# Patient Record
Sex: MALE | Race: BLACK OR AFRICAN AMERICAN | NOT HISPANIC OR LATINO | Employment: OTHER | URBAN - METROPOLITAN AREA
[De-identification: names, ages, dates, MRNs, and addresses within clinical notes are randomized per-mention and may not be internally consistent; named-entity substitution may affect disease eponyms.]

---

## 2017-01-04 ENCOUNTER — DOCUMENTATION ONLY (OUTPATIENT)
Dept: TRANSPLANT | Facility: CLINIC | Age: 67
End: 2017-01-04

## 2017-01-04 ENCOUNTER — TELEPHONE (OUTPATIENT)
Dept: TRANSPLANT | Facility: CLINIC | Age: 67
End: 2017-01-04

## 2017-01-04 NOTE — NURSING
Patient no showed for clinic follow-up with labs and ct (c/a/p).  Message sent to Rubi Mackey, , to contact patient to rescheduled ASAP.

## 2017-01-05 RX ORDER — METOPROLOL TARTRATE 25 MG/1
TABLET, FILM COATED ORAL
Qty: 90 TABLET | Refills: 0 | Status: SHIPPED | OUTPATIENT
Start: 2017-01-05 | End: 2017-04-07 | Stop reason: SDUPTHER

## 2017-01-10 ENCOUNTER — HOSPITAL ENCOUNTER (OUTPATIENT)
Dept: RADIOLOGY | Facility: HOSPITAL | Age: 67
Discharge: HOME | End: 2017-01-10
Attending: INTERNAL MEDICINE
Payer: MEDICARE

## 2017-01-10 DIAGNOSIS — C22.0 HCC (HEPATOCELLULAR CARCINOMA): Chronic | ICD-10-CM

## 2017-01-10 PROCEDURE — 71260 CT THORAX DX C+: CPT | Mod: 26,,, | Performed by: RADIOLOGY

## 2017-01-10 PROCEDURE — 74177 CT ABD & PELVIS W/CONTRAST: CPT | Mod: TC

## 2017-01-10 PROCEDURE — 25500020 PHARM REV CODE 255: Performed by: INTERNAL MEDICINE

## 2017-01-10 PROCEDURE — 71260 CT THORAX DX C+: CPT | Mod: TC

## 2017-01-10 PROCEDURE — 74177 CT ABD & PELVIS W/CONTRAST: CPT | Mod: 26,,, | Performed by: RADIOLOGY

## 2017-01-10 RX ADMIN — IOHEXOL 15 ML: 350 INJECTION, SOLUTION INTRAVENOUS at 04:01

## 2017-01-10 RX ADMIN — IOHEXOL 100 ML: 350 INJECTION, SOLUTION INTRAVENOUS at 07:01

## 2017-01-10 RX ADMIN — IOHEXOL 15 ML: 350 INJECTION, SOLUTION INTRAVENOUS at 03:01

## 2017-01-13 ENCOUNTER — IMMUNIZATION (OUTPATIENT)
Dept: INTERNAL MEDICINE | Facility: CLINIC | Age: 67
End: 2017-01-13
Payer: MEDICARE

## 2017-01-13 ENCOUNTER — OFFICE VISIT (OUTPATIENT)
Dept: INTERNAL MEDICINE | Facility: CLINIC | Age: 67
End: 2017-01-13
Payer: MEDICARE

## 2017-01-13 VITALS
DIASTOLIC BLOOD PRESSURE: 66 MMHG | HEART RATE: 58 BPM | HEIGHT: 73 IN | OXYGEN SATURATION: 99 % | WEIGHT: 212.31 LBS | TEMPERATURE: 97 F | BODY MASS INDEX: 28.14 KG/M2 | SYSTOLIC BLOOD PRESSURE: 119 MMHG

## 2017-01-13 DIAGNOSIS — Z85.038 HISTORY OF COLON CANCER: ICD-10-CM

## 2017-01-13 DIAGNOSIS — K21.00 GASTROESOPHAGEAL REFLUX DISEASE WITH ESOPHAGITIS: Chronic | ICD-10-CM

## 2017-01-13 DIAGNOSIS — Z76.82 LIVER TRANSPLANT CANDIDATE: ICD-10-CM

## 2017-01-13 DIAGNOSIS — J44.9 CHRONIC OBSTRUCTIVE PULMONARY DISEASE, UNSPECIFIED COPD TYPE: Chronic | ICD-10-CM

## 2017-01-13 DIAGNOSIS — Z93.0 TRACHEOSTOMY IN PLACE: Chronic | ICD-10-CM

## 2017-01-13 DIAGNOSIS — E03.8 SUBCLINICAL HYPOTHYROIDISM: Primary | ICD-10-CM

## 2017-01-13 DIAGNOSIS — Z00.00 ENCOUNTER FOR HEALTH MAINTENANCE EXAMINATION: ICD-10-CM

## 2017-01-13 DIAGNOSIS — R73.02 IMPAIRED GLUCOSE TOLERANCE: ICD-10-CM

## 2017-01-13 DIAGNOSIS — C22.0 HCC (HEPATOCELLULAR CARCINOMA): Chronic | ICD-10-CM

## 2017-01-13 DIAGNOSIS — Z85.46 HISTORY OF PROSTATE CANCER: Chronic | ICD-10-CM

## 2017-01-13 PROCEDURE — G0008 ADMIN INFLUENZA VIRUS VAC: HCPCS | Mod: S$GLB,,, | Performed by: INTERNAL MEDICINE

## 2017-01-13 PROCEDURE — 99214 OFFICE O/P EST MOD 30 MIN: CPT | Mod: 25,S$GLB,, | Performed by: INTERNAL MEDICINE

## 2017-01-13 PROCEDURE — 1157F ADVNC CARE PLAN IN RCRD: CPT | Mod: S$GLB,,, | Performed by: INTERNAL MEDICINE

## 2017-01-13 PROCEDURE — 3074F SYST BP LT 130 MM HG: CPT | Mod: S$GLB,,, | Performed by: INTERNAL MEDICINE

## 2017-01-13 PROCEDURE — 1159F MED LIST DOCD IN RCRD: CPT | Mod: S$GLB,,, | Performed by: INTERNAL MEDICINE

## 2017-01-13 PROCEDURE — 99999 PR PBB SHADOW E&M-EST. PATIENT-LVL III: CPT | Mod: PBBFAC,,, | Performed by: INTERNAL MEDICINE

## 2017-01-13 PROCEDURE — 1126F AMNT PAIN NOTED NONE PRSNT: CPT | Mod: S$GLB,,, | Performed by: INTERNAL MEDICINE

## 2017-01-13 PROCEDURE — 90670 PCV13 VACCINE IM: CPT | Mod: S$GLB,,, | Performed by: INTERNAL MEDICINE

## 2017-01-13 PROCEDURE — G0009 ADMIN PNEUMOCOCCAL VACCINE: HCPCS | Mod: S$GLB,,, | Performed by: INTERNAL MEDICINE

## 2017-01-13 PROCEDURE — 90662 IIV NO PRSV INCREASED AG IM: CPT | Mod: S$GLB,,, | Performed by: INTERNAL MEDICINE

## 2017-01-13 PROCEDURE — 3078F DIAST BP <80 MM HG: CPT | Mod: S$GLB,,, | Performed by: INTERNAL MEDICINE

## 2017-01-13 PROCEDURE — 1160F RVW MEDS BY RX/DR IN RCRD: CPT | Mod: S$GLB,,, | Performed by: INTERNAL MEDICINE

## 2017-01-13 RX ORDER — NAPROXEN SODIUM 220 MG/1
81 TABLET, FILM COATED ORAL EVERY MORNING
Status: ON HOLD | COMMUNITY
End: 2018-10-24

## 2017-01-13 NOTE — PROGRESS NOTES
Subjective:       Patient ID: Robin Hawkins is a 66 y.o. male.    Chief Complaint: Follow-up    HPI  65 y/o man with h/o HTN, HLD, GERD, h/o colon cancer, h/o prostate cancer, Hep C with HCC, glaucoma here for follow-up visit. Here with his sister Darby.    Hepatitis C, cirrhosis, liver lesion c/w HCC - Completed Harvoni tx on 1/2016 but relapsed. CT surveillance showed enlarging liver mass consistent with HCC, +elevated AFP. Underwent TACE on 1/29/16 -- follow-up CT scans on 3/4/16 and 6/21/16 showed well-treated lesion per Hepatology notes; CT 10/18/16 showed stable lesion.  Following closely with hepatology/transplant team.   Completed PT for deconditioning, overall doing better. No longer using walker. Patient says he can walk 6 blocks; sister thinks maybe 2-3 before getting short of breath. Feels that his legs are stronger.     Has been having dental problems, teeth falling out. Not following with a dentist but looking into this to get tooth extractions / dental work done before potentially getting transplant.     HCC, h/o colon cancer, h/o prostate cancer - following with Dr Holder with Oncology, see those notes for full history. Had LN biopsy (abdominal wall) which was negative); has an enlarged L lower pelvic LN noted 6/2016 that is being followed.     H/o tracheostomy - following with mahsa Howell decannulated 9/28/16. Feels he is doing well, was recommended to f/u in 4 weeks with ENT, hasn't yet made appointment. Hole closing up, still has some mucus discharge but no bleeding or pain at side.     GERD, f/u of intestinal metaplasia of stomach - Had EGD and cscope done 5/6/16. Polyp found in gastric body (focal mild gastritis on path, benign), another polyp found in duodenum (adenomatous polyp). No H.pylori, other biopsies showed gastritis but no other abnormalities. Recommended to repeat EGD in 3 years and c-scope in 5 years.  Has been having more heartburn recently, especially in the mornings. Takes  tums with relief. Still taking prilosec every day.     COPD - saw Dr Goyal 5/5/16, recommended to continue duoneb, pulmicort BID. Has both these inhalers at home.     Saw Dr Page 4/2016 for osteopenia, borderline DM, subclinical hypothyroidism.  Osteopenia - no recent falls/fractures. Does take a PPI. DXA done 3/2016 showing osteopenia of hip and femoral neck. Recommended RDA of calcium and vitamin D, would consider 1 dose IV reclast prior to transplant if deemed transplant candidate.   Borderline DM - working on getting more active. Not following specific diabetic diet. Last A1c 6.4 in 5/2016.  Elevated TSH - being monitored, recommended to start levothyroxine if TSH >10. Last TSH 6.354, normal free T4 on labs 5/2016.    Aortic atherosclerosis, CAD noted on imaging - stress echo overall normal 3/2016. On statin. Takes ASA 81.    Review of Systems   Constitutional: Negative for fatigue and fever.   HENT: Positive for dental problem. Negative for congestion and sore throat.    Eyes: Negative for visual disturbance.   Respiratory: Positive for shortness of breath (on exertion, improving). Negative for cough.    Cardiovascular: Negative for chest pain, palpitations and leg swelling.   Gastrointestinal: Positive for abdominal pain (heartburn). Negative for blood in stool, constipation, diarrhea and nausea.   Endocrine: Negative for cold intolerance and heat intolerance.   Genitourinary: Negative.    Musculoskeletal: Negative for arthralgias and gait problem.   Skin: Negative for rash.   Neurological: Positive for weakness (generalized; improving). Negative for numbness.   Psychiatric/Behavioral: Negative for dysphoric mood. The patient is not nervous/anxious.          Past medical history, surgical history, and family medical history reviewed and updated as appropriate.    Medications and allergies reviewed.     Objective:          Vitals:    01/13/17 0808   BP: 119/66   BP Location: Right arm   Patient Position:  "Sitting   Pulse: (!) 58   Temp: 97.4 °F (36.3 °C)   TempSrc: Oral   SpO2: 99%   Weight: 96.3 kg (212 lb 4.9 oz)   Height: 6' 1" (1.854 m)     Body mass index is 28.01 kg/(m^2).  Physical Exam   Constitutional: He is oriented to person, place, and time. He appears well-developed and well-nourished. No distress.   HENT:   Head: Normocephalic and atraumatic.   Nose: Nose normal.   Mouth/Throat: Oropharynx is clear and moist. Abnormal dentition.   Dressing in place over tracheostomy site, no discharge or drainage, nontender   Eyes: Conjunctivae and EOM are normal. Pupils are equal, round, and reactive to light. No scleral icterus.   Neck: Normal range of motion. Neck supple.   Cardiovascular: Normal rate, regular rhythm and normal heart sounds.    No murmur heard.  Pulmonary/Chest: Effort normal and breath sounds normal. No respiratory distress.   Abdominal: Soft. Bowel sounds are normal. He exhibits no distension. There is no tenderness.   Musculoskeletal: He exhibits no edema or tenderness.   Lymphadenopathy:     He has no cervical adenopathy.   Neurological: He is alert and oriented to person, place, and time. He has normal strength. No cranial nerve deficit or sensory deficit. Gait normal.   Skin: Skin is warm and dry.   Psychiatric: He has a normal mood and affect.   Vitals reviewed.      Lab Results   Component Value Date    WBC 8.58 01/10/2017    HGB 13.1 (L) 01/10/2017    HCT 38.2 (L) 01/10/2017     01/10/2017    CHOL 104 (L) 09/07/2015    TRIG 132 09/07/2015    HDL 23 (L) 09/07/2015    ALT 29 01/10/2017    AST 38 01/10/2017     (L) 01/10/2017    K 4.2 01/10/2017     01/10/2017    CREATININE 0.9 01/10/2017    BUN 14 01/10/2017    CO2 22 (L) 01/10/2017    TSH 6.354 (H) 05/10/2016    PSA 0.01 12/30/2015    INR 1.0 01/10/2017    HGBA1C 6.4 (H) 05/10/2016       Assessment:       1. Subclinical hypothyroidism    2. Impaired glucose tolerance     3. HCC (hepatocellular carcinoma)    4. Liver " transplant candidate    5. History of prostate cancer    6. History of colon cancer    7. Gastroesophageal reflux disease with esophagitis    8. Chronic obstructive pulmonary disease, unspecified COPD type    9. Tracheostomy in place    10. Encounter for health maintenance examination        Plan:   Robin was seen today for follow-up.    Diagnoses and all orders for this visit:    Subclinical hypothyroidism - due for recheck of TFTs with next labs. Clinically euthyroid today, monitoring.  -     TSH; Future  -     T4, free; Future    Impaired glucose tolerance - last A1c 6.4, due for recheck with next labs  -     Hemoglobin A1c; Future    HCC (hepatocellular carcinoma)  Liver transplant candidate - continue to follow with hepatology/transplant team, continue treatment/meds per their recommendations. Reviewed chart and recent notes with patient    History of prostate cancer  History of colon cancer - continue to follow with Oncology as recommended    Gastroesophageal reflux disease with esophagitis - continue PPI and tums    Chronic obstructive pulmonary disease, unspecified COPD type - continue current inhalers. Symptoms stable. Plan for pulm follow up if symptoms worse, otherwise monitoring via primary care    Tracheostomy in place - healing well, continue to f/u with ENT (will help make next appt with Dr Holcomb today)    Encounter for health maintenance examination  -     Pneumococcal Conjugate Vaccine (13 Valent) (IM)    Health maintenance reviewed with patient. Flu and prevnar vaccines today    Return in about 6 months (around 7/13/2017) for coordination of care.    Venancio Mcneil MD  Internal Medicine  [facility]  1/29/2017

## 2017-01-13 NOTE — PATIENT INSTRUCTIONS
Tips to Control Acid Reflux  To control acid reflux, youll need to make some basic diet and lifestyle changes. The simple steps outlined below may be all youll need to ease discomfort.  Watch what you eat    · Avoid fatty foods and spicy foods.  · Eat fewer acidic foods, such as citrus and tomato-based foods. These can increase symptoms.  · Limit drinking alcohol, caffeine, and fizzy beverages. All increase acid reflux.  · Try limiting chocolate, peppermint, and spearmint. These can worsen acid reflux in some people.  Watch when you eat  · Avoid lying down for 3 hours after eating.  · Do not snack before going to bed.  Raise your head    Raising your head and upper body by 4 to 6 inches helps limit reflux when youre lying down. Put blocks under the head of your bed frame to raise it.  Other changes  · Lose weight, if you need to  · Dont exercise near bedtime  · Avoid tight-fitting clothes  · Limit aspirin and ibuprofen  · Stop smoking   © 4643-3848 [employer]. [address] [place] [address], [place] [address]. All rights reserved. This information is not intended as a substitute for professional medical care. Always follow your healthcare professional's instructions.

## 2017-01-13 NOTE — MR AVS SNAPSHOT
[facility] - Internal Medicine  [address]  [facility] [address]  Phone: [phone]  Fax: [phone]                  Robin Hawkins   2017 8:00 AM   Office Visit    Description:  Male : 1950   Provider:  Venancio Mcneil MD   Department:  [facility] - Internal Medicine           Reason for Visit     Follow-up           Diagnoses this Visit        Comments    Borderline diabetes mellitus    -  Primary     Subclinical hypothyroidism         Encounter for health maintenance examination         Impaired glucose tolerance         HCC (hepatocellular carcinoma)         Liver transplant candidate         History of prostate cancer         History of colon cancer         Gastroesophageal reflux disease with esophagitis         Chronic obstructive pulmonary disease, unspecified COPD type         Tracheostomy in place                To Do List           Future Appointments        Provider Department Dept Phone    2017 10:00 AM LAB, [place] [facility] [phone]    2017 9:00 AM Octavia Scott MD [facility] Hepatology [phone]    2017 9:20 AM Lencho Holcomb MD [facility] [phone]    2017 10:00 AM LAB, [place] [facility] [phone]    3/16/2017 10:00 AM [facility], [place] [facility] [phone]      Goals (5 Years of Data)     None      Follow-Up and Disposition     Return in about 6 months (around 2017) for coordination of care.      [facility]     [facility] -  Assistance  Registered nurses in the [facility] provide clinical advisement, health education, appointment booking, and other advisory services.  Call for this free service at [phone].             Medications           Message regarding Medications     Verify the changes and/or additions to your medication regime listed below are the same as discussed with your clinician  "today.  If any of these changes or additions are incorrect, please notify your healthcare provider.             Verify that the below list of medications is an accurate representation of the medications you are currently taking.  If none reported, the list may be blank. If incorrect, please contact your healthcare provider. Carry this list with you in case of emergency.           Current Medications     albuterol-ipratropium 2.5mg-0.5mg/3mL (DUO-NEB) 0.5 mg-3 mg(2.5 mg base)/3 mL nebulizer solution USE 1 VIAL IN NEBULIZER EVERY 6 HOURS AS NEEDED FOR WHEEZING OR SHORTNESS OF BREATH    aspirin 81 MG Chew Take 81 mg by mouth once daily.    atorvastatin (LIPITOR) 80 MG tablet Take 1 tablet (80 mg total) by mouth once daily.    budesonide 1 mg/2 mL NbSp USE CONTENT OF 1 VIAL IN NEBULIZER TWICE DAILY    dorzolamide-timolol 2-0.5% (COSOPT) 22.3-6.8 mg/mL ophthalmic solution Place 1 drop into both eyes 2 (two) times daily.    hydrochlorothiazide (HYDRODIURIL) 25 MG tablet TAKE 1 TABLET BY MOUTH EVERY DAY    latanoprost 0.005 % ophthalmic solution INSTILL 1 DROP IN BOTH EYES EVERY EVENING    lisinopril (PRINIVIL,ZESTRIL) 20 MG tablet TAKE 1 TABLET BY MOUTH EVERY DAY    metoprolol tartrate (LOPRESSOR) 25 MG tablet TAKE ONE-HALF TABLET BY MOUTH TWICE DAILY    mirabegron (MYRBETRIQ) 50 mg Tb24 Take 1 tablet (50 mg total) by mouth once daily.    multivitamin capsule Take 1 capsule by mouth once daily.    omeprazole (PRILOSEC) 20 MG capsule TAKE 2 CAPSULES BY MOUTH EVERY MORNING    oxybutynin (DITROPAN-XL) 10 MG 24 hr tablet Take 10 mg by mouth once daily.            Clinical Reference Information           Vital Signs - Last Recorded  Most recent update: 1/13/2017  8:11 AM by Angelica Huynh MA    BP Pulse Temp Ht Wt SpO2    119/66 (BP Location: Right arm, Patient Position: Sitting) (!) 58 97.4 °F (36.3 °C) (Oral) 6' 1" (1.854 m) 96.3 kg (212 lb 4.9 oz) 99%    BMI                28.01 kg/m2          Blood Pressure          " Most Recent Value    BP  119/66      Allergies as of 1/13/2017     No Known Allergies      Immunizations Administered on Date of Encounter - 1/13/2017     Name Date Dose VIS Date Route    Pneumococcal Conjugate - 13 Valent  Incomplete 0.5 mL 11/5/2015 Intramuscular      Orders Placed During Today's Visit      Normal Orders This Visit    Pneumococcal Conjugate Vaccine (13 Valent) (IM)     Future Labs/Procedures Expected by Expires    Hemoglobin A1c  1/13/2017 4/13/2017    T4, free  1/13/2017 4/13/2017    TSH  1/13/2017 (Approximate) 1/13/2018      Maintenance Dialysis History     Patient has no recorded history of maintenance dialysis.      Transplant Information        Txp Date Organ Coordinator Care Team     Liver Katelyn Smith Referring Physician:  Francesco Hoffman MD   Corresponding Physician:  Francesco Hoffman MD   Current Hepatologist:  Octavia Scott MD         Instructions      Tips to Control Acid Reflux  To control acid reflux, youll need to make some basic diet and lifestyle changes. The simple steps outlined below may be all youll need to ease discomfort.  Watch what you eat    · Avoid fatty foods and spicy foods.  · Eat fewer acidic foods, such as citrus and tomato-based foods. These can increase symptoms.  · Limit drinking alcohol, caffeine, and fizzy beverages. All increase acid reflux.  · Try limiting chocolate, peppermint, and spearmint. These can worsen acid reflux in some people.  Watch when you eat  · Avoid lying down for 3 hours after eating.  · Do not snack before going to bed.  Raise your head    Raising your head and upper body by 4 to 6 inches helps limit reflux when youre lying down. Put blocks under the head of your bed frame to raise it.  Other changes  · Lose weight, if you need to  · Dont exercise near bedtime  · Avoid tight-fitting clothes  · Limit aspirin and ibuprofen  · Stop smoking   © 3834-3029 [employer]. [address] [place] [address], [place] [address]. All  rights reserved. This information is not intended as a substitute for professional medical care. Always follow your healthcare professional's instructions.

## 2017-01-13 NOTE — MEDICAL/APP STUDENT
Subjective:       Patient ID: Robin Hawkins is a 66 y.o. male.    Chief Complaint: Follow-up    HPI Comments: Liver - states transplant and liver team are watching the progression of the HCC and have prepared him for transplantation if needed. At the moment pt has no complaints and has been taking medications as directed.    Pt also reports having increasing problems with his dentition. He states that he cannot eat solid food anymore and that his teeth have become loose and falling out. Discussed this with liver team and agreed on getting dental work before any procedure.     Tracheostomy - Pt states that he has been doing well since the removal of his trach, doesn't complain of any pain, swelling or increasing secretions. Feels as though the stoma site is closing. Has not followed up with Dr. Holcomb since removal.    Deconditioning  - pt feels stronger and able to ambulate longer distances.     GERD - patient complains of increasing heart burn in the morning for which he takes multiples Toms and also takes protonix. He has not had any recent diet changes or changes in his weight.     Dyspnea - Pt still has dyspnea on exertion which has been stable since last visit.     Review of Systems   Constitutional: Negative for chills, fatigue and fever.   HENT: Positive for dental problem.    Eyes: Negative for photophobia and visual disturbance.   Respiratory: Positive for shortness of breath. Negative for cough, chest tightness and wheezing.    Cardiovascular: Negative for chest pain and leg swelling.   Gastrointestinal: Negative for abdominal pain, constipation, diarrhea and nausea.   Endocrine: Negative for cold intolerance and heat intolerance.   Genitourinary: Negative for dysuria, frequency and hematuria.   Neurological: Positive for weakness. Negative for syncope, light-headedness and headaches.       Objective:      Physical Exam   Constitutional: He appears well-developed and well-nourished.   HENT:   Head:  Normocephalic and atraumatic.   Mouth/Throat: No oral lesions. Abnormal dentition. No lacerations.   Eyes: EOM are normal. Pupils are equal, round, and reactive to light.   Cardiovascular: Normal rate and regular rhythm.    Pulmonary/Chest: Effort normal and breath sounds normal.   Abdominal: Soft. Bowel sounds are normal. He exhibits no distension and no ascites.       Assessment:       1. Borderline diabetes mellitus    2. Subclinical hypothyroidism    3. Encounter for health maintenance examination    4. Impaired glucose tolerance     5. HCC (hepatocellular carcinoma)    6. Liver transplant candidate    7. History of prostate cancer    8. History of colon cancer    9. Gastroesophageal reflux disease with esophagitis    10. Chronic obstructive pulmonary disease, unspecified COPD type    11. Tracheostomy in place        Plan:       1. HCC (hepatocellular carcinoma) - pt followed by transplant team, was told that HCC has increased and may need transplantation after all. Has not shown stigmata of liver failure.   - f/u with transplant and liver team  - labs on 1/16    2. Gastroesophageal reflux disease with esophagitis - continues to have symptoms and goes through several Toms tablets a day in addition to protonix.  - instructed to try OTC ranititine   - follow up with GI for a possible EGD    3. Borderline diabetes mellitus   - Continue to monitor blood sugar levels  - Hemoglobin A1c; Future    4. Chronic obstructive pulmonary disease, unspecified COPD type - continues to have dyspnea but able to mobilize more with increased strength  - continue     5. Tracheostomy in place - no significant secretions, no pain or erythema at site.  - continue to monitor  - f/u with ENT    6. Subclinical hypothyroidism - pt remains asymptomatic   - TSH  - T4, free    7. Encounter for health maintenance examination  - Pneumococcal Conjugate Vaccine (13 Valent) (IM)    Ifeoma Winter MS4

## 2017-01-16 ENCOUNTER — CONFERENCE (OUTPATIENT)
Dept: TRANSPLANT | Facility: CLINIC | Age: 67
End: 2017-01-16
Payer: MEDICARE

## 2017-01-16 ENCOUNTER — LAB VISIT (OUTPATIENT)
Dept: LAB | Facility: HOSPITAL | Age: 67
End: 2017-01-16
Attending: INTERNAL MEDICINE
Payer: MEDICARE

## 2017-01-16 DIAGNOSIS — C22.0 HCC (HEPATOCELLULAR CARCINOMA): ICD-10-CM

## 2017-01-16 DIAGNOSIS — C22.0 HCC (HEPATOCELLULAR CARCINOMA): Chronic | ICD-10-CM

## 2017-01-16 DIAGNOSIS — Z76.82 AWAITING ORGAN TRANSPLANT: Primary | ICD-10-CM

## 2017-01-16 LAB — AFP SERPL-MCNC: 313 NG/ML

## 2017-01-16 PROCEDURE — 82105 ALPHA-FETOPROTEIN SERUM: CPT

## 2017-01-16 PROCEDURE — 36415 COLL VENOUS BLD VENIPUNCTURE: CPT | Mod: PO

## 2017-01-16 NOTE — TELEPHONE ENCOUNTER
LIVER CONFERENCE 01/17/2017    Radiology review by Dr. Jacky Alarcon    . Robin Hawkins [id number] ([id number]) LTX--in work-up  re:  surveillance CT (rising AFP)  65 y/o with a diagnosis of HCV. Completed Harvoni tx on 1/2016.   Diagnosed with a 2.5 cm hcc 12/2015.  Treated with TACE 1/29/16.  CT Scan from 1/10/17 shows  that  there is persistent hypodense nonenhancing lesions in right hepatic lobe segment VI, measuring 2.3 cm on today's examination (1.8 cm previously).  Additionally, there has been interval development of 2.7 cm lesion in right hepatic lobe segment VI demonstrating subtle enhancement and some washout on the venous and delayed phases.     AFP was 109 on 1/25/16; 13 on 6/21/16; currently 272     Recommend Right lobe Y90    Patient results reviewed with the patient in clinic by MD according to clinic note.

## 2017-01-17 ENCOUNTER — LAB VISIT (OUTPATIENT)
Dept: LAB | Facility: HOSPITAL | Age: 67
End: 2017-01-17
Payer: MEDICARE

## 2017-01-17 ENCOUNTER — OFFICE VISIT (OUTPATIENT)
Dept: HEPATOLOGY | Facility: CLINIC | Age: 67
End: 2017-01-17
Payer: MEDICARE

## 2017-01-17 ENCOUNTER — TELEPHONE (OUTPATIENT)
Dept: HEPATOLOGY | Facility: CLINIC | Age: 67
End: 2017-01-17

## 2017-01-17 VITALS
DIASTOLIC BLOOD PRESSURE: 65 MMHG | WEIGHT: 212 LBS | SYSTOLIC BLOOD PRESSURE: 105 MMHG | HEIGHT: 73 IN | BODY MASS INDEX: 28.1 KG/M2 | OXYGEN SATURATION: 99 % | TEMPERATURE: 96 F | HEART RATE: 70 BPM | RESPIRATION RATE: 16 BRPM

## 2017-01-17 DIAGNOSIS — Z85.46 HISTORY OF PROSTATE CANCER: Chronic | ICD-10-CM

## 2017-01-17 DIAGNOSIS — B19.20 HEPATITIS C VIRUS INFECTION, UNSPECIFIED CHRONICITY: Primary | Chronic | ICD-10-CM

## 2017-01-17 DIAGNOSIS — R53.81 PHYSICAL DECONDITIONING: ICD-10-CM

## 2017-01-17 DIAGNOSIS — C22.0 HCC (HEPATOCELLULAR CARCINOMA): ICD-10-CM

## 2017-01-17 DIAGNOSIS — K74.60 CIRRHOSIS OF LIVER WITHOUT ASCITES, UNSPECIFIED HEPATIC CIRRHOSIS TYPE: Chronic | ICD-10-CM

## 2017-01-17 DIAGNOSIS — Z85.038 HISTORY OF COLON CANCER: ICD-10-CM

## 2017-01-17 DIAGNOSIS — Z76.82 LIVER TRANSPLANT CANDIDATE: ICD-10-CM

## 2017-01-17 DIAGNOSIS — C22.0 HCC (HEPATOCELLULAR CARCINOMA): Primary | Chronic | ICD-10-CM

## 2017-01-17 DIAGNOSIS — Z76.82 AWAITING ORGAN TRANSPLANT: ICD-10-CM

## 2017-01-17 PROCEDURE — 3078F DIAST BP <80 MM HG: CPT | Mod: S$GLB,,, | Performed by: INTERNAL MEDICINE

## 2017-01-17 PROCEDURE — 99999 PR PBB SHADOW E&M-EST. PATIENT-LVL IV: CPT | Mod: PBBFAC,,, | Performed by: INTERNAL MEDICINE

## 2017-01-17 PROCEDURE — 3074F SYST BP LT 130 MM HG: CPT | Mod: S$GLB,,, | Performed by: INTERNAL MEDICINE

## 2017-01-17 PROCEDURE — 1160F RVW MEDS BY RX/DR IN RCRD: CPT | Mod: S$GLB,,, | Performed by: INTERNAL MEDICINE

## 2017-01-17 PROCEDURE — 1157F ADVNC CARE PLAN IN RCRD: CPT | Mod: S$GLB,,, | Performed by: INTERNAL MEDICINE

## 2017-01-17 PROCEDURE — 1159F MED LIST DOCD IN RCRD: CPT | Mod: S$GLB,,, | Performed by: INTERNAL MEDICINE

## 2017-01-17 PROCEDURE — 99215 OFFICE O/P EST HI 40 MIN: CPT | Mod: S$GLB,,, | Performed by: INTERNAL MEDICINE

## 2017-01-17 NOTE — PATIENT INSTRUCTIONS
1. Rising AFP and enlarging original lesion with new lesion in the liver. Also 2 new spots in the lung- too small to know if they are cancer. Recommedn repeat TACE and ct chest in 3 months. Weekly labs after tace  2. Would also recommend seeing oncology for nexavar.

## 2017-01-17 NOTE — MR AVS SNAPSHOT
[facility]  [address]  [place] [address]  Phone: [phone]  Fax: [phone]                  Robin Hawkins   2017 9:00 AM   Office Visit    Description:  Male : 1950   Provider:  Octavia Scott MD   Department:  [facility] - Hepatology           Reason for Visit     Hepatocellular Carcinoma           Diagnoses this Visit        Comments    Awaiting organ transplant         HCC (hepatocellular carcinoma)                To Do List           Future Appointments        Provider Department Dept Phone    2017 9:20 AM Lencho Holcomb MD [facility] [phone]    2017 10:00 AM LAB, [place] [facility] [phone]    3/16/2017 10:00 AM [facility], [place] [facility] [phone]    2017 10:00 AM [facility], [place] [facility] [phone]      Goals (5 Years of Data)     None      [facility]     [facility] On [facility] Nurse Care Line -  Assistance  Registered nurses in the [facility] Center provide clinical advisement, health education, appointment booking, and other advisory services.  Call for this free service at [phone].             Medications           Message regarding Medications     Verify the changes and/or additions to your medication regime listed below are the same as discussed with your clinician today.  If any of these changes or additions are incorrect, please notify your healthcare provider.             Verify that the below list of medications is an accurate representation of the medications you are currently taking.  If none reported, the list may be blank. If incorrect, please contact your healthcare provider. Carry this list with you in case of emergency.           Current Medications     albuterol-ipratropium 2.5mg-0.5mg/3mL (DUO-NEB) 0.5 mg-3 mg(2.5 mg base)/3 mL nebulizer solution USE 1 VIAL IN NEBULIZER EVERY 6 HOURS AS NEEDED FOR WHEEZING OR SHORTNESS  "OF BREATH    atorvastatin (LIPITOR) 80 MG tablet Take 1 tablet (80 mg total) by mouth once daily.    budesonide 1 mg/2 mL NbS USE CONTENT OF 1 VIAL IN NEBULIZER TWICE DAILY    dorzolamide-timolol 2-0.5% (COSOPT) 22.3-6.8 mg/mL ophthalmic solution Place 1 drop into both eyes 2 (two) times daily.    hydrochlorothiazide (HYDRODIURIL) 25 MG tablet TAKE 1 TABLET BY MOUTH EVERY DAY    latanoprost 0.005 % ophthalmic solution INSTILL 1 DROP IN BOTH EYES EVERY EVENING    lisinopril (PRINIVIL,ZESTRIL) 20 MG tablet TAKE 1 TABLET BY MOUTH EVERY DAY    metoprolol tartrate (LOPRESSOR) 25 MG tablet TAKE ONE-HALF TABLET BY MOUTH TWICE DAILY    mirabegron (MYRBETRIQ) 50 mg Tb24 Take 1 tablet (50 mg total) by mouth once daily.    multivitamin capsule Take 1 capsule by mouth once daily.    omeprazole (PRILOSEC) 20 MG capsule TAKE 2 CAPSULES BY MOUTH EVERY MORNING    oxybutynin (DITROPAN-XL) 10 MG 24 hr tablet Take 10 mg by mouth once daily.     aspirin 81 MG Chew Take 81 mg by mouth once daily.           Clinical Reference Information           Vital Signs - Last Recorded  Most recent update: 1/17/2017  9:21 AM by Marie Peace MA    BP Pulse Temp Resp Ht Wt    105/65 (BP Location: Right arm, Patient Position: Sitting, BP Method: Automatic) 70 96.3 °F (35.7 °C) (Oral) 16 6' 1" (1.854 m) 96.2 kg (212 lb)    SpO2 BMI             99% 27.97 kg/m2         Blood Pressure          Most Recent Value    BP  105/65      Allergies as of 1/17/2017     No Known Allergies      Immunizations Administered on Date of Encounter - 1/17/2017     None      Orders Placed During Today's Visit      Normal Orders This Visit    Ambulatory consult to Oncology     Toxicology screen, urine     Future Labs/Procedures Expected by Expires    CT Chest Without Contrast  4/17/2017 1/17/2018    Recurring Lab Work Interval Expires    CBC auto differential  weekly until 1/17/2026 1/17/2026    Comprehensive metabolic panel  weekly until 1/17/2026 1/17/2026    " Protime-INR  weekly until 1/17/2026 1/17/2026      Maintenance Dialysis History     Patient has no recorded history of maintenance dialysis.      Transplant Information        Txp Date Organ Coordinator Care Team     Liver [facility] Referring Physician:  Francesco Hoffman MD   Corresponding Physician:  Francesco Hoffman MD   Current Hepatologist:  Octavia Scott MD         Instructions    1. Rising AFP and enlarging original lesion with new lesion in the liver. Also 2 new spots in the lung- too small to know if they are cancer. Recommedn repeat TACE and ct chest in 3 months. Weekly labs after tace  2. Would also recommend seeing oncology for nexavar.
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
## 2017-01-18 ENCOUNTER — TELEPHONE (OUTPATIENT)
Dept: HEMATOLOGY/ONCOLOGY | Facility: CLINIC | Age: 67
End: 2017-01-18

## 2017-01-20 LAB — PHOSPHATIDYLETHANOL (PETH): 68 NG/ML

## 2017-01-23 DIAGNOSIS — N39.41 URGE INCONTINENCE: ICD-10-CM

## 2017-01-24 ENCOUNTER — TELEPHONE (OUTPATIENT)
Dept: TRANSPLANT | Facility: HOSPITAL | Age: 67
End: 2017-01-24

## 2017-01-24 RX ORDER — OXYBUTYNIN CHLORIDE 10 MG/1
TABLET, EXTENDED RELEASE ORAL
Qty: 30 TABLET | Refills: 5 | Status: SHIPPED | OUTPATIENT
Start: 2017-01-24 | End: 2017-04-24 | Stop reason: SDUPTHER

## 2017-01-24 NOTE — TELEPHONE ENCOUNTER
"ROBIN attempted to contact pt at [phone]. Pt was unavailable at time of call. SW spoke with pt's sister, Darby.     Per note from office visit on 17, Dr. Scott noted that pt had returned to drinking and smoking cigarettes. PETH test was positive. Levels for PETH test indicated that pt had been engaging in moderate to heavy drinking in the past 3 weeks since lab collection.    SW addressed this with pt's sister, who confirmed that pt had been drinking over the holidays to cope with depression. Pt's sister reported that pt and the rest of the family get depressed over the holidays due to reminders of pt's  parents and other family members that  during past holidays. Pt's sister reported that she works during the day and other family members have been moving away. Pt's sister expressed her opinion that pt is lonely because of family not being around which facilitates his depression.     Pt's sister reported that she witnessed pt drinking one can of beer per day during his relapse. She stated "But I am gone all day and do not know if he was drinking while I was gone." Pt's sister reported that pt resumed drinking around [place] and continued to drink until "about a week before his appointment with the hepatologist." SW estimated stop date to be around 17. Pt's sister confirmed that this seemed accurate.     Pt's sister reported that she confronted pt about his drinking/smoking and discussed consequences if he continued to drink alcohol and use tobacco. Pt's sister reported that if he drinks one more time she will go home to [place] and cease all care that she is providing for pt. Pt's sister reported that he stopped drinking and smoking immediately after this interaction. Pt's sister is under the impression that pt will not return to use.     SW expressed concern that pt does not have enough support or skills to remain sober for a lifetime. Pt does not have a hx of attending formal addiction " "treatment. SW explained that this would benefit him and would be required for pt to be completed prior to transplant in light of his recent relapse. SW recommended that pt be evaluated for Addiction IOP. Pt's sister reported that this would not be possible as she works. Pt's sister reported she is willing to take time off work to care for pt if he receives a transplant, but is not able to transport pt to addiction treatment. Pt's sister stated "I have a life and this is the best that I can do." SW advised pt's sister that if pt does not complete IOP, it could create a significant barrier to pt receiving transplant. Pt's sister stated "I know he will not go back to drinking. By me threatening to leave if he returns to drinking, I know he won't  another drink." SW explained that rehab prior to transplant would still be recommended and required. Pt's sister expressed understanding and stated she is still unable to get him to treatment. Pt's sister reported she is the only one in pt's life that would be able to provide transport.     SW inquired about caregiver plan in the event pt receives transplant. Pt's sister reported that she would be providing care she is the only reliable person in pt's life at this time. Pt's sister denied having any backup caregivers at this time due to the previously committed caregivers being "unreliable." SW expressed the importance of a backup care plan. Pt's sister stated "getting a reliable back up is just not possible right now."    SW provided opportunity for pt to voice questions/concerns. Pt denied any needs at this time. SW remains available for psychosocial, resource, and educational needs as appropriate.     Psychosocial Suitability:    At this time pt presents as an unacceptable candidate for liver transplant. Pt is high risk due to recent relapse, no formal addiction treatment, inability to attend addiction treatment prior to transplant, and no back up caregiver plan. " Pt's sister also expressed her intention to leave as pt's caregiver if pt drinks again. If pt returns to use post transplant, pt will not have a caregiver. Strengths include a hx of 2 years sobriety from self-will, motivated to live per pt's sister, and support from sister.

## 2017-01-27 ENCOUNTER — OFFICE VISIT (OUTPATIENT)
Dept: OTOLARYNGOLOGY | Facility: CLINIC | Age: 67
End: 2017-01-27
Payer: MEDICARE

## 2017-01-27 VITALS
BODY MASS INDEX: 27.72 KG/M2 | WEIGHT: 210.13 LBS | TEMPERATURE: 95 F | HEART RATE: 58 BPM | SYSTOLIC BLOOD PRESSURE: 111 MMHG | DIASTOLIC BLOOD PRESSURE: 78 MMHG

## 2017-01-27 DIAGNOSIS — J95.04 TRACHEOCUTANEOUS FISTULA FOLLOWING TRACHEOSTOMY: Primary | ICD-10-CM

## 2017-01-27 DIAGNOSIS — J38.6 LARYNGEAL STENOSIS: Chronic | ICD-10-CM

## 2017-01-27 PROCEDURE — 1159F MED LIST DOCD IN RCRD: CPT | Mod: S$GLB,,, | Performed by: OTOLARYNGOLOGY

## 2017-01-27 PROCEDURE — 1157F ADVNC CARE PLAN IN RCRD: CPT | Mod: S$GLB,,, | Performed by: OTOLARYNGOLOGY

## 2017-01-27 PROCEDURE — 1160F RVW MEDS BY RX/DR IN RCRD: CPT | Mod: S$GLB,,, | Performed by: OTOLARYNGOLOGY

## 2017-01-27 PROCEDURE — 99999 PR PBB SHADOW E&M-EST. PATIENT-LVL IV: CPT | Mod: PBBFAC,,, | Performed by: OTOLARYNGOLOGY

## 2017-01-27 PROCEDURE — 3074F SYST BP LT 130 MM HG: CPT | Mod: S$GLB,,, | Performed by: OTOLARYNGOLOGY

## 2017-01-27 PROCEDURE — 31615 TRCHEOBRNCHSC EST TRACHS INC: CPT | Mod: S$GLB,,, | Performed by: OTOLARYNGOLOGY

## 2017-01-27 PROCEDURE — 3078F DIAST BP <80 MM HG: CPT | Mod: S$GLB,,, | Performed by: OTOLARYNGOLOGY

## 2017-01-27 PROCEDURE — 99214 OFFICE O/P EST MOD 30 MIN: CPT | Mod: 25,S$GLB,, | Performed by: OTOLARYNGOLOGY

## 2017-01-27 PROCEDURE — 1126F AMNT PAIN NOTED NONE PRSNT: CPT | Mod: S$GLB,,, | Performed by: OTOLARYNGOLOGY

## 2017-01-27 RX ORDER — LIDOCAINE HYDROCHLORIDE 10 MG/ML
1 INJECTION, SOLUTION EPIDURAL; INFILTRATION; INTRACAUDAL; PERINEURAL ONCE
Status: CANCELLED | OUTPATIENT
Start: 2017-01-27 | End: 2017-01-27

## 2017-01-27 NOTE — MR AVS SNAPSHOT
[facility]  [address] [facility] 2[address]  [facility] [address]  Phone: [phone]  Fax: [phone]                  Robin Hawkins   2017 9:20 AM   Office Visit    Description:  Male : 1950   Provider:  Lencho Holcomb MD   Department:  [facility]           Reason for Visit     Follow-up           Diagnoses this Visit        Comments    Tracheocutaneous fistula following tracheostomy    -  Primary            To Do List           Future Appointments        Provider Department Dept Phone    2017 8:00 AM LAB, HEMONC CANCER [facility] [phone]    2017 9:00 AM Ortiz Carrillo DO, [facility] - Hematology Oncology [phone]    2017 10:00 AM LAB, [place] [facility] [phone]    3/16/2017 10:00 AM LAB, [place] [facility] [phone]    2017 10:00 AM LAB, [place] [facility] [phone]      Goals (5 Years of Data)     None      Follow-Up and Disposition     Return in about 6 days (around 2017) for surgery.      [facility]     [facility] Nurse Care Line - 24 Assistance  Registered nurses in the [facility] provide clinical advisement, health education, appointment booking, and other advisory services.  Call for this free service at [phone].             Medications           Message regarding Medications     Verify the changes and/or additions to your medication regime listed below are the same as discussed with your clinician today.  If any of these changes or additions are incorrect, please notify your healthcare provider.             Verify that the below list of medications is an accurate representation of the medications you are currently taking.  If none reported, the list may be blank. If incorrect, please contact your healthcare provider. Carry this list with you in case of emergency.           Current  Medications     albuterol-ipratropium 2.5mg-0.5mg/3mL (DUO-NEB) 0.5 mg-3 mg(2.5 mg base)/3 mL nebulizer solution USE 1 VIAL IN NEBULIZER EVERY 6 HOURS AS NEEDED FOR WHEEZING OR SHORTNESS OF BREATH    aspirin 81 MG Chew Take 81 mg by mouth once daily.    atorvastatin (LIPITOR) 80 MG tablet Take 1 tablet (80 mg total) by mouth once daily.    budesonide 1 mg/2 mL NbSp USE CONTENT OF 1 VIAL IN NEBULIZER TWICE DAILY    dorzolamide-timolol 2-0.5% (COSOPT) 22.3-6.8 mg/mL ophthalmic solution Place 1 drop into both eyes 2 (two) times daily.    hydrochlorothiazide (HYDRODIURIL) 25 MG tablet TAKE 1 TABLET BY MOUTH EVERY DAY    latanoprost 0.005 % ophthalmic solution INSTILL 1 DROP IN BOTH EYES EVERY EVENING    lisinopril (PRINIVIL,ZESTRIL) 20 MG tablet TAKE 1 TABLET BY MOUTH EVERY DAY    metoprolol tartrate (LOPRESSOR) 25 MG tablet TAKE ONE-HALF TABLET BY MOUTH TWICE DAILY    mirabegron (MYRBETRIQ) 50 mg Tb24 Take 1 tablet (50 mg total) by mouth once daily.    multivitamin capsule Take 1 capsule by mouth once daily.    omeprazole (PRILOSEC) 20 MG capsule TAKE 2 CAPSULES BY MOUTH EVERY MORNING    oxybutynin (DITROPAN-XL) 10 MG 24 hr tablet TAKE 1 TABLET(10 MG) BY MOUTH EVERY DAY           Clinical Reference Information           Vital Signs - Last Recorded  Most recent update: 1/27/2017  9:09 AM by Giulia Torres MA    BP Pulse Temp Wt BMI    111/78 (!) 58 (!) 94.5 °F (34.7 °C) 95.3 kg (210 lb 1.6 oz) 27.72 kg/m2      Blood Pressure          Most Recent Value    BP  111/78      Allergies as of 1/27/2017     No Known Allergies      Immunizations Administered on Date of Encounter - 1/27/2017     None      Maintenance Dialysis History     Patient has no recorded history of maintenance dialysis.      Transplant Information        Txp Date Organ Coordinator Care Team     Liver [facility] Referring Physician:  Francesco Hoffman MD   Corresponding Physician:  Francesco Hoffman MD   Current Hepatologist:  Octavia Scott MD          Instructions    Plan for surgery on 2/2/2016

## 2017-01-27 NOTE — PROGRESS NOTES
[facility]  Department of Otorhinolaryngology and Communication Sciences    Subjective:      Robin Hawkins is a 66 y.o. male who presents for follow-up. He has laryngotracheal stenosis following a prolonged intubation. Treatment history is outlined below. He was decannulated in September. He denies dysphagia. His voice is serviceable. He denies dyspnea at rest or with exertion. He denies any nocturnal dyspnea. He reports that his trach stoma has not healed. He occasionally expectorates greenish mucus from the stoma. This is a nuisance which is negatively impacting his quality of life.    TREATMENT HISTORY:  1. 3/29/2016 - SML/bronch with left sided posterior cordotomy/medial arytenoidectomy, intralesional steroid, laser excision of SGS/tracheal stenosis.  2. 9/28/2016 - decannulation    The patient's medications, allergies, past medical, surgical, social and family histories were reviewed and updated as appropriate.    A detailed review of systems was obtained with pertinent positives as per the above HPI, and otherwise negative.      Objective:     Visit Vitals    /78    Pulse (!) 58    Temp (!) 94.5 °F (34.7 °C)    Wt 95.3 kg (210 lb 1.6 oz)    BMI 27.72 kg/m2        Constitutional: comfortable, well dressed  Psychiatric: appropriate affect  Respiratory: comfortably breathing, symmetric chest rise, no stridor  Voice: breathy  Head: normocephalic  Eyes: conjunctivae and sclerae clear  Neck: Persistent tracheocutaneous fistula, clean edges, tapers to ~ 6 mm    Procedure    Tracheobronchoscopy through Established Tracheal Stoma (56280): Tracheobronchoscopy is indicated for comprehensive airway evaluation. After verbal consent was obtained, the patient was positioned and the laryngotracheal complex was topically anesthetized with 2 mL of 4% lidocaine administered via the tracheocutaneous fistula. After an adequate degree of anesthesia was obtained, the endoscope was passed through the stoma and  into the trachea to achieve a magnified view of the trachea and proximal mainstem bronchi. The scope was retroflexed for a magnified view of the proximal trachea, subglottis, and infraglottic vocal folds. Structures were evaluated for the following features: the presence of laryngeal, tracheal, or bronchial masses; vocal fold range and symmetry of motion; mucosal edema, erythema, or inflammation; mucosal hydration; gross laryngeal sensation; laryngeal stenosis; tracheal stenosis; and mucopurulence, blood, or thickened secretions. The equipment was removed. The patient tolerated the procedure well without complication. All findings were normal except:  - durable relief of glottic-level stenosis, with stable left sided posterior glottic defect; no granulation  - durable relief of subglottic and tracheal stenosis  - distal trachea and proximal mainstem bronchi normal        Assessment:     Robin Hawkins is a 66 y.o. male with laryngotracheal stenosis following a prolonged intubation. He is doing well after endoscopic airway intervention followed by decannulation in 9/2016. He persists with a tracheocutaneous fistula.     Plan:     Options offered to the patient included no treatment or surgical intervention. The patient desires surgical intervention. I would recommend an open revision/repair of his tracheocutaneous fistula under local/MAC in the operating room. The risks and benefits were discussed with the patient and his wife. Risks included, but were not limited to, pain, bleeding, infection, scar, recurrence, need for additional/revision procedures, damage to the surrounding structures, pneumomediastinum, pneumothorax, mediastinitis, reaction to medication, and risks of anesthesia. I gave the patient the opportunity to ask questions and I answered all of them. He wishes to proceed. We will arrange this in the coming weeks. He will have preop testing triage by the anesthesiology team. We will plan for overnight  observation.    All questions were answered, and the patient is in agreement with the plan.    This visit was 25 minutes in duration, with over 50% of the time spent in direct face-to-face counseling and coordination of care regarding the issues outlined above.    Lencho Holcomb M.D.  [facility]  Department of Otorhinolaryngology and Communication Sciences

## 2017-01-29 PROBLEM — E03.8 SUBCLINICAL HYPOTHYROIDISM: Status: ACTIVE | Noted: 2017-01-29

## 2017-01-29 PROBLEM — R73.02 IMPAIRED GLUCOSE TOLERANCE: Status: ACTIVE | Noted: 2017-01-29

## 2017-02-01 ENCOUNTER — OFFICE VISIT (OUTPATIENT)
Dept: HEMATOLOGY/ONCOLOGY | Facility: CLINIC | Age: 67
End: 2017-02-01
Payer: MEDICARE

## 2017-02-01 ENCOUNTER — ANESTHESIA EVENT (OUTPATIENT)
Dept: SURGERY | Facility: HOSPITAL | Age: 67
End: 2017-02-01
Payer: MEDICAID

## 2017-02-01 ENCOUNTER — LAB VISIT (OUTPATIENT)
Dept: LAB | Facility: HOSPITAL | Age: 67
End: 2017-02-01
Attending: INTERNAL MEDICINE
Payer: MEDICARE

## 2017-02-01 ENCOUNTER — COMMITTEE REVIEW (OUTPATIENT)
Dept: TRANSPLANT | Facility: CLINIC | Age: 67
End: 2017-02-01

## 2017-02-01 VITALS
BODY MASS INDEX: 27.96 KG/M2 | TEMPERATURE: 96 F | DIASTOLIC BLOOD PRESSURE: 67 MMHG | HEIGHT: 73 IN | WEIGHT: 211 LBS | HEART RATE: 99 BPM | SYSTOLIC BLOOD PRESSURE: 139 MMHG

## 2017-02-01 DIAGNOSIS — Z85.038 HISTORY OF COLON CANCER: ICD-10-CM

## 2017-02-01 DIAGNOSIS — R91.1 LUNG NODULE: ICD-10-CM

## 2017-02-01 DIAGNOSIS — C22.0 HCC (HEPATOCELLULAR CARCINOMA): ICD-10-CM

## 2017-02-01 DIAGNOSIS — Z85.46 HISTORY OF PROSTATE CANCER: Chronic | ICD-10-CM

## 2017-02-01 DIAGNOSIS — C22.0 HCC (HEPATOCELLULAR CARCINOMA): Primary | Chronic | ICD-10-CM

## 2017-02-01 DIAGNOSIS — C61 PROSTATE CA: ICD-10-CM

## 2017-02-01 LAB
AFP SERPL-MCNC: 382 NG/ML
COMPLEXED PSA SERPL-MCNC: 0.05 NG/ML
ERYTHROCYTE [DISTWIDTH] IN BLOOD BY AUTOMATED COUNT: 14.4 %
HCT VFR BLD AUTO: 39 %
HGB BLD-MCNC: 12.8 G/DL
MCH RBC QN AUTO: 30.8 PG
MCHC RBC AUTO-ENTMCNC: 32.8 %
MCV RBC AUTO: 94 FL
NEUTROPHILS # BLD AUTO: 5.3 K/UL
PLATELET # BLD AUTO: 216 K/UL
PMV BLD AUTO: 10.4 FL
RBC # BLD AUTO: 4.15 M/UL
WBC # BLD AUTO: 8.35 K/UL

## 2017-02-01 PROCEDURE — 1160F RVW MEDS BY RX/DR IN RCRD: CPT | Mod: S$GLB,,, | Performed by: INTERNAL MEDICINE

## 2017-02-01 PROCEDURE — 3078F DIAST BP <80 MM HG: CPT | Mod: S$GLB,,, | Performed by: INTERNAL MEDICINE

## 2017-02-01 PROCEDURE — 85027 COMPLETE CBC AUTOMATED: CPT

## 2017-02-01 PROCEDURE — 1157F ADVNC CARE PLAN IN RCRD: CPT | Mod: S$GLB,,, | Performed by: INTERNAL MEDICINE

## 2017-02-01 PROCEDURE — 82105 ALPHA-FETOPROTEIN SERUM: CPT

## 2017-02-01 PROCEDURE — 1126F AMNT PAIN NOTED NONE PRSNT: CPT | Mod: S$GLB,,, | Performed by: INTERNAL MEDICINE

## 2017-02-01 PROCEDURE — 3075F SYST BP GE 130 - 139MM HG: CPT | Mod: S$GLB,,, | Performed by: INTERNAL MEDICINE

## 2017-02-01 PROCEDURE — 84153 ASSAY OF PSA TOTAL: CPT

## 2017-02-01 PROCEDURE — 99999 PR PBB SHADOW E&M-EST. PATIENT-LVL III: CPT | Mod: PBBFAC,,, | Performed by: INTERNAL MEDICINE

## 2017-02-01 PROCEDURE — 1159F MED LIST DOCD IN RCRD: CPT | Mod: S$GLB,,, | Performed by: INTERNAL MEDICINE

## 2017-02-01 PROCEDURE — 99499 UNLISTED E&M SERVICE: CPT | Mod: S$GLB,,, | Performed by: INTERNAL MEDICINE

## 2017-02-01 PROCEDURE — 99215 OFFICE O/P EST HI 40 MIN: CPT | Mod: S$GLB,,, | Performed by: INTERNAL MEDICINE

## 2017-02-01 PROCEDURE — 36415 COLL VENOUS BLD VENIPUNCTURE: CPT

## 2017-02-01 RX ORDER — ATORVASTATIN CALCIUM 40 MG/1
TABLET, FILM COATED ORAL
Qty: 90 TABLET | Refills: 0 | Status: SHIPPED | OUTPATIENT
Start: 2017-02-01 | End: 2017-05-01 | Stop reason: SDUPTHER

## 2017-02-01 NOTE — PRE-PROCEDURE INSTRUCTIONS
Spoke with Patient's Sister - Darby.  NPO, medication, and pre-op instructions reviewed.  Denies previous problems with Anesthesia.  Sister verbalized understanding of instructions.

## 2017-02-01 NOTE — COMMITTEE REVIEW
Robin Hawkins's case presented to selection committee.  Patient has been declined for liver transplant due to current alcohol abuse.   I was present and agree with the committee's conclusions.

## 2017-02-01 NOTE — LETTER
February 1, 2017      Octavia Scott MD  [address]  [place] [address]           [facility]  [address]  [place] [address]  Phone: [phone]          Patient: Robin Hawkins   MR Number: [id number]   YOB: 1950   Date of Visit: 2/1/2017       Dear Dr. Octavia Scott:    Thank you for referring Robin Hawkins to me for evaluation. Attached you will find relevant portions of my assessment and plan of care.    If you have questions, please do not hesitate to call me. I look forward to following Robin Hawkins along with you.    Sincerely,    Ortiz Carrillo, , FACP    Enclosure  CC:  No Recipients    If you would like to receive this communication electronically, please contact [email or web address] or [phone] to request more information on [employer] [employer] access.    For providers and/or their staff who would like to refer a patient to [facility], please contact us through our one-stop-shop provider referral line, Micah Potter, at [phone].    If you feel you have received this communication in error or would no longer like to receive these types of communications, please e-mail [email or web address]
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
## 2017-02-02 ENCOUNTER — ANESTHESIA (OUTPATIENT)
Dept: SURGERY | Facility: HOSPITAL | Age: 67
End: 2017-02-02
Payer: MEDICAID

## 2017-02-02 ENCOUNTER — HOSPITAL ENCOUNTER (OUTPATIENT)
Facility: HOSPITAL | Age: 67
Discharge: HOME | End: 2017-02-03
Attending: OTOLARYNGOLOGY | Admitting: OTOLARYNGOLOGY
Payer: MEDICARE

## 2017-02-02 ENCOUNTER — SURGERY (OUTPATIENT)
Age: 67
End: 2017-02-02

## 2017-02-02 DIAGNOSIS — J95.04 TRACHEOCUTANEOUS FISTULA FOLLOWING TRACHEOSTOMY: Primary | ICD-10-CM

## 2017-02-02 PROCEDURE — 36000706: Performed by: OTOLARYNGOLOGY

## 2017-02-02 PROCEDURE — 25000003 PHARM REV CODE 250

## 2017-02-02 PROCEDURE — 25000003 PHARM REV CODE 250: Performed by: OTOLARYNGOLOGY

## 2017-02-02 PROCEDURE — D9220A PRA ANESTHESIA: Mod: CRNA,,, | Performed by: NURSE ANESTHETIST, CERTIFIED REGISTERED

## 2017-02-02 PROCEDURE — 25000003 PHARM REV CODE 250: Performed by: NURSE ANESTHETIST, CERTIFIED REGISTERED

## 2017-02-02 PROCEDURE — 88304 TISSUE EXAM BY PATHOLOGIST: CPT | Mod: 26,,, | Performed by: PATHOLOGY

## 2017-02-02 PROCEDURE — 88304 TISSUE EXAM BY PATHOLOGIST: CPT | Performed by: PATHOLOGY

## 2017-02-02 PROCEDURE — 71000015 HC POSTOP RECOV 1ST HR: Performed by: OTOLARYNGOLOGY

## 2017-02-02 PROCEDURE — 31820 CLOSURE OF WINDPIPE LESION: CPT | Mod: ,,, | Performed by: OTOLARYNGOLOGY

## 2017-02-02 PROCEDURE — 63600175 PHARM REV CODE 636 W HCPCS: Performed by: NURSE ANESTHETIST, CERTIFIED REGISTERED

## 2017-02-02 PROCEDURE — 37000008 HC ANESTHESIA 1ST 15 MINUTES: Performed by: OTOLARYNGOLOGY

## 2017-02-02 PROCEDURE — 37000009 HC ANESTHESIA EA ADD 15 MINS: Performed by: OTOLARYNGOLOGY

## 2017-02-02 PROCEDURE — D9220A PRA ANESTHESIA: Mod: ANES,,, | Performed by: ANESTHESIOLOGY

## 2017-02-02 PROCEDURE — 36000707: Performed by: OTOLARYNGOLOGY

## 2017-02-02 RX ORDER — LIDOCAINE HYDROCHLORIDE 10 MG/ML
1 INJECTION, SOLUTION EPIDURAL; INFILTRATION; INTRACAUDAL; PERINEURAL ONCE
Status: COMPLETED | OUTPATIENT
Start: 2017-02-02 | End: 2017-02-02

## 2017-02-02 RX ORDER — SODIUM CHLORIDE 9 MG/ML
INJECTION, SOLUTION INTRAVENOUS CONTINUOUS
Status: DISCONTINUED | OUTPATIENT
Start: 2017-02-02 | End: 2017-02-02

## 2017-02-02 RX ORDER — OXYBUTYNIN CHLORIDE 10 MG/1
10 TABLET, EXTENDED RELEASE ORAL DAILY
Status: DISCONTINUED | OUTPATIENT
Start: 2017-02-02 | End: 2017-02-03 | Stop reason: HOSPADM

## 2017-02-02 RX ORDER — IPRATROPIUM BROMIDE AND ALBUTEROL SULFATE 2.5; .5 MG/3ML; MG/3ML
3 SOLUTION RESPIRATORY (INHALATION) EVERY 6 HOURS PRN
Status: DISCONTINUED | OUTPATIENT
Start: 2017-02-02 | End: 2017-02-03 | Stop reason: HOSPADM

## 2017-02-02 RX ORDER — SODIUM CHLORIDE 0.9 % (FLUSH) 0.9 %
3 SYRINGE (ML) INJECTION EVERY 8 HOURS
Status: DISCONTINUED | OUTPATIENT
Start: 2017-02-02 | End: 2017-02-03 | Stop reason: HOSPADM

## 2017-02-02 RX ORDER — LATANOPROST 50 UG/ML
1 SOLUTION/ DROPS OPHTHALMIC NIGHTLY
Status: DISCONTINUED | OUTPATIENT
Start: 2017-02-02 | End: 2017-02-03 | Stop reason: HOSPADM

## 2017-02-02 RX ORDER — ATORVASTATIN CALCIUM 20 MG/1
40 TABLET, FILM COATED ORAL DAILY
Status: DISCONTINUED | OUTPATIENT
Start: 2017-02-02 | End: 2017-02-03 | Stop reason: HOSPADM

## 2017-02-02 RX ORDER — PROPOFOL 10 MG/ML
VIAL (ML) INTRAVENOUS
Status: DISCONTINUED | OUTPATIENT
Start: 2017-02-02 | End: 2017-02-02

## 2017-02-02 RX ORDER — LISINOPRIL 20 MG/1
20 TABLET ORAL DAILY
Status: DISCONTINUED | OUTPATIENT
Start: 2017-02-02 | End: 2017-02-03 | Stop reason: HOSPADM

## 2017-02-02 RX ORDER — NAPROXEN SODIUM 220 MG/1
81 TABLET, FILM COATED ORAL EVERY MORNING
Status: DISCONTINUED | OUTPATIENT
Start: 2017-02-02 | End: 2017-02-03 | Stop reason: HOSPADM

## 2017-02-02 RX ORDER — FENTANYL CITRATE 50 UG/ML
INJECTION, SOLUTION INTRAMUSCULAR; INTRAVENOUS
Status: DISCONTINUED | OUTPATIENT
Start: 2017-02-02 | End: 2017-02-02

## 2017-02-02 RX ORDER — HYDROCHLOROTHIAZIDE 25 MG/1
25 TABLET ORAL DAILY
Status: DISCONTINUED | OUTPATIENT
Start: 2017-02-02 | End: 2017-02-03 | Stop reason: HOSPADM

## 2017-02-02 RX ORDER — HYDROCODONE BITARTRATE AND ACETAMINOPHEN 5; 325 MG/1; MG/1
1 TABLET ORAL EVERY 4 HOURS PRN
Status: DISCONTINUED | OUTPATIENT
Start: 2017-02-02 | End: 2017-02-03 | Stop reason: HOSPADM

## 2017-02-02 RX ORDER — MORPHINE SULFATE 2 MG/ML
4 INJECTION, SOLUTION INTRAMUSCULAR; INTRAVENOUS EVERY 4 HOURS PRN
Status: DISCONTINUED | OUTPATIENT
Start: 2017-02-02 | End: 2017-02-03 | Stop reason: HOSPADM

## 2017-02-02 RX ORDER — RAMELTEON 8 MG/1
8 TABLET ORAL NIGHTLY PRN
Status: DISCONTINUED | OUTPATIENT
Start: 2017-02-02 | End: 2017-02-03 | Stop reason: HOSPADM

## 2017-02-02 RX ORDER — ONDANSETRON 8 MG/1
8 TABLET, ORALLY DISINTEGRATING ORAL EVERY 8 HOURS PRN
Status: DISCONTINUED | OUTPATIENT
Start: 2017-02-02 | End: 2017-02-03 | Stop reason: HOSPADM

## 2017-02-02 RX ORDER — DIPHENHYDRAMINE HYDROCHLORIDE 50 MG/ML
25 INJECTION INTRAMUSCULAR; INTRAVENOUS EVERY 4 HOURS PRN
Status: DISCONTINUED | OUTPATIENT
Start: 2017-02-02 | End: 2017-02-03 | Stop reason: HOSPADM

## 2017-02-02 RX ORDER — LIDOCAINE HCL/PF 100 MG/5ML
SYRINGE (ML) INTRAVENOUS
Status: DISCONTINUED | OUTPATIENT
Start: 2017-02-02 | End: 2017-02-02

## 2017-02-02 RX ORDER — METOPROLOL TARTRATE 25 MG/1
12.5 TABLET ORAL 2 TIMES DAILY
Status: DISCONTINUED | OUTPATIENT
Start: 2017-02-02 | End: 2017-02-03 | Stop reason: HOSPADM

## 2017-02-02 RX ORDER — PANTOPRAZOLE SODIUM 40 MG/1
40 TABLET, DELAYED RELEASE ORAL DAILY
Status: DISCONTINUED | OUTPATIENT
Start: 2017-02-02 | End: 2017-02-03 | Stop reason: HOSPADM

## 2017-02-02 RX ORDER — MIDAZOLAM HYDROCHLORIDE 1 MG/ML
INJECTION, SOLUTION INTRAMUSCULAR; INTRAVENOUS
Status: DISCONTINUED | OUTPATIENT
Start: 2017-02-02 | End: 2017-02-02

## 2017-02-02 RX ORDER — LIDOCAINE HYDROCHLORIDE AND EPINEPHRINE 10; 10 MG/ML; UG/ML
INJECTION, SOLUTION INFILTRATION; PERINEURAL
Status: DISCONTINUED | OUTPATIENT
Start: 2017-02-02 | End: 2017-02-02 | Stop reason: HOSPADM

## 2017-02-02 RX ADMIN — MIDAZOLAM HYDROCHLORIDE 1 MG: 1 INJECTION, SOLUTION INTRAMUSCULAR; INTRAVENOUS at 07:02

## 2017-02-02 RX ADMIN — HYDROCODONE BITARTRATE AND ACETAMINOPHEN 1 TABLET: 5; 325 TABLET ORAL at 06:02

## 2017-02-02 RX ADMIN — Medication 12.5 MG: at 10:02

## 2017-02-02 RX ADMIN — LIDOCAINE HYDROCHLORIDE AND EPINEPHRINE 7 ML: 10; 10 INJECTION, SOLUTION INFILTRATION; PERINEURAL at 07:02

## 2017-02-02 RX ADMIN — HYDROCODONE BITARTRATE AND ACETAMINOPHEN 1 TABLET: 5; 325 TABLET ORAL at 10:02

## 2017-02-02 RX ADMIN — HYDROCODONE BITARTRATE AND ACETAMINOPHEN 1 TABLET: 5; 325 TABLET ORAL at 02:02

## 2017-02-02 RX ADMIN — PROPOFOL 20 MG: 10 INJECTION, EMULSION INTRAVENOUS at 07:02

## 2017-02-02 RX ADMIN — LIDOCAINE HYDROCHLORIDE 0.2 MG: 10 INJECTION, SOLUTION EPIDURAL; INFILTRATION; INTRACAUDAL; PERINEURAL at 05:02

## 2017-02-02 RX ADMIN — FENTANYL CITRATE 25 MCG: 50 INJECTION, SOLUTION INTRAMUSCULAR; INTRAVENOUS at 07:02

## 2017-02-02 RX ADMIN — Medication 2 G: at 07:02

## 2017-02-02 RX ADMIN — SODIUM CHLORIDE: 0.9 INJECTION, SOLUTION INTRAVENOUS at 05:02

## 2017-02-02 RX ADMIN — LATANOPROST 1 DROP: 50 SOLUTION OPHTHALMIC at 10:02

## 2017-02-02 RX ADMIN — Medication 3 ML: at 10:02

## 2017-02-02 RX ADMIN — LIDOCAINE HYDROCHLORIDE 50 MG: 20 INJECTION, SOLUTION INTRAVENOUS at 07:02

## 2017-02-02 RX ADMIN — PROPOFOL 30 MG: 10 INJECTION, EMULSION INTRAVENOUS at 07:02

## 2017-02-02 NOTE — PLAN OF CARE
Problem: Patient Care Overview  Goal: Plan of Care Review  Outcome: Ongoing (interventions implemented as appropriate)  Pt resting in bed. Tolerating PO intake. O2 stats % on RA. Dressing intact. Voiding without difficulty. Patient states adequate pain control with current pain med reg. States understanding of plan of care. Denies needs at present. Safety and fall precautions maintained. Will monitor.

## 2017-02-02 NOTE — NURSING TRANSFER
Nursing Transfer Note      2/2/2017     Transfer from [id number] to [id number]  Transfer via stretcher    Transfer with , tele    Transported by [facility]    Medicines sent:no    Chart send with patient: YES    Notified: Evie LINDA

## 2017-02-02 NOTE — ANESTHESIA RELEASE NOTE
Anesthesia Release from PACU Note    Patient name: Robin Hawkins    Procedure(s): Procedure(s) (LRB):  CLOSURE-FISTULA-TRACHEAL (N/A)    Anesthesia type: general    Post pain: adequate analgesia    Post assessment: no apparent complications    Last vitals:   Vitals:    02/02/17 0742   BP: 124/64   Pulse: (!) 52   Resp: 16   Temp: 36.5 °C (97.7 °F)       Post vital signs: stable    Level of consciousness: alert     Nausea/Vomiting: no nausea/no vomiting    Complications: none    Airway Patency:  patent    Respiratory: unassisted    Cardiovascular: stable and blood pressure at baseline    Hydration: euvolemic

## 2017-02-02 NOTE — ANESTHESIA POSTPROCEDURE EVALUATION
"Anesthesia Post Evaluation    Patient: Robin Hawkins    Procedure(s) Performed: Procedure(s) (LRB):  CLOSURE-FISTULA-TRACHEAL (N/A)    Final Anesthesia Type: general  Patient location during evaluation: PACU  Patient participation: Yes- Able to Participate  Level of consciousness: awake  Post-procedure vital signs: reviewed and stable  Pain management: adequate  Airway patency: patent  PONV status at discharge: No PONV  Anesthetic complications: no      Cardiovascular status: stable  Respiratory status: unassisted  Hydration status: euvolemic  Follow-up not needed.        Visit Vitals    /64 (BP Location: Left arm, Patient Position: Lying, BP Method: Automatic)    Pulse (!) 52    Temp 36.5 °C (97.7 °F) (Temporal)    Resp 16    Ht 6' 1" (1.854 m)    Wt 95.7 kg (211 lb)    SpO2 100%    BMI 27.84 kg/m2       Pain/Patrick Score: Pain Assessment Performed: Yes (2/2/2017  5:43 AM)  Presence of Pain: denies (2/2/2017  5:43 AM)      "

## 2017-02-02 NOTE — OP NOTE
DATE OF PROCEDURE:  02/02/2017.    PREOPERATIVE DIAGNOSIS:  Tracheocutaneous fistula following tracheostomy.    POSTOPERATIVE DIAGNOSIS:  Tracheocutaneous fistula following tracheostomy.    PROCEDURE:  Excision of tracheocutaneous fistula for closure by secondary   intention.    SURGEON:  Lencho Holcomb M.D.    ASSISTANT:  Rai Hernandez M.D. (RES).    ANESTHESIA:  Local with MAC.    BLOOD LOSS:  Minimal.    COMPLICATIONS:  None.    SPECIMENS:  Tracheocutaneous fistula.    FINDINGS:  The patient's tracheocutaneous fistula was comprised of a soft tissue   component measuring 4 mm in diameter and a cartilaginous defect component   measuring 6 mm in diameter.  The stomal tract was dissected down to the   anterior tracheal wall where it was excised.    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  He underwent endoscopic airway   surgery in March 2016 and was thereafter decannulated in September.  He has been   doing well from a respiratory and digestive standpoint and has achieved   serviceable voicing; however, he does persist with a tracheocutaneous fistula,   which is negatively impacting his quality of life.  Complete airway endoscopy   was performed in the office and his airway has not shown any signs of   re-stenosis.  The patient was offered surgical intervention in order to help   achieve closure of the tracheocutaneous fistula.  Prior to the procedure, the   risks, benefits, and options were discussed at length with the patient.  Risks   included, but were not limited to, pain, bleeding, infection, scar   recurrence/persistence, need for additional or revision procedures, damage to   surrounding structures, subcutaneous emphysema, pneumomediastinum,   mediastinitis, pneumothorax, worsening respiratory status requiring replacement   of the trach, as well  as risks of general anesthesia.  He understands that excision of  the tract followed by healing by secondary intention will minimize the risk of major   complications. In spite of the risks of surgery, the patient agreed to move forward   with the procedure.  Informed consent was obtained.    PROCEDURE IN DETAIL:  The patient was brought to the Operating Room and was   placed in the flat supine position.  Monitored anesthesia care was administered   with IV agents.  The tracheocutaneous fistula was infiltrated with local   anesthesia in the form of 1% lidocaine with epinephrine at a concentration of   1:100,000.  The neck was prepped and draped in the usual sterile fashion.  A   final timeout was performed for verification purposes.  An elliptical skin  incision was made in the skin at the periphery of the stoma using a 15-blade.    Thereafter, curved iris scissors were used to perform careful dissection along   the periphery of the fistula down to the anterior tracheal wall.  The fistula   was grasped externally with Allis clamps for additional assistance with traction   and dissection until the tract had been dissected down directly to the anterior   tracheal wall.  It was left completely intact. At this point, the fistula was truncated   at its communication with the trachea.  It was passed off the field for permanent   pathologic analysis.  The wound was inspected for hemostasis, which was achieved   with bipolar electrocautery.  A dry sterile dressing was applied.  With this, the   procedure was brought to completion.  He was turned back over to the Anesthesiology   team for full emergence from anesthesia.  He was escorted to the Recovery Room in good   condition.  The patient tolerated the procedure well.  There were no   complications.  All needle, sponge, and instrument counts were correct at the   end of the case.    ATTESTATION:  I, as the attending of record, was present and participated in all   portions  of this procedure.      TONY  dd: 02/02/2017 12:18:29 (CST)  td: 02/02/2017 14:33:44 (CST)  Doc ID   #[id number]  Job ID #[id number]    CC:

## 2017-02-02 NOTE — ANESTHESIA PREPROCEDURE EVALUATION
02/02/2017  Robin Hawkins is a 66 y.o., male.    [facility] Anesthesia Evaluation    I have reviewed the Patient Summary Reports.    I have reviewed the Nursing Notes.   I have reviewed the Medications.     Review of Systems  Anesthesia Hx:  No problems with previous Anesthesia    Cardiovascular:   Hypertension CAD      Pulmonary:   COPD Shortness of breath    Hepatic/GI:   Hiatal Hernia, GERD Liver Disease, Hepatitis, C    Neurological:   Neuromuscular Disease,    Endocrine:   Hypothyroidism    Psych:   Psychiatric History          Physical Exam  General:  Obesity, Well nourished    Airway/Jaw/Neck:  Airway Findings: Mouth Opening: Normal Tongue: Normal  General Airway Assessment: Adult  Mallampati: III  Improves to II with phonation.  TM Distance: Normal, at least 6 cm      Dental:  Dental Findings:   Chest/Lungs:  Chest/Lungs Findings: Clear to auscultation     Heart/Vascular:  Heart Findings: Rate: Normal  Rhythm: Regular Rhythm  Sounds: Normal        Mental Status:  Mental Status Findings:  Cooperative, Alert and Oriented         Anesthesia Plan  Type of Anesthesia, risks & benefits discussed:  Anesthesia Type:  general, MAC  Patient's Preference:   Intra-op Monitoring Plan:   Intra-op Monitoring Plan Comments:   Post Op Pain Control Plan:   Post Op Pain Control Plan Comments:   Induction:   IV  Beta Blocker:  Patient is on a Beta-Blocker and has received one dose within the past 24 hours (No further documentation required).       Informed Consent: Patient understands risks and agrees with Anesthesia plan.  Questions answered. Anesthesia consent signed with patient.  ASA Score: 3     Day of Surgery Review of History & Physical:    H&P update referred to the surgeon.         Ready For Surgery From Anesthesia Perspective.

## 2017-02-02 NOTE — PROGRESS NOTES
Pt admitted to floor, stable, VSS, no complaints at this time noted or stated, I.S at bedside, SCDs intact, will hand over to nurse. BITE pain menu, TV guide, pain control pamphlet, given, explained, and offered to patient

## 2017-02-02 NOTE — IP AVS SNAPSHOT
[facility]  [address]  [facility] [address]  Phone: [phone]           Patient Discharge Instructions     Our goal is to set you up for success. This packet includes information on your condition, medications, and your home care. It will help you to care for yourself so you don't get sicker and need to go back to the hospital.     Please ask your nurse if you have any questions.        There are many details to remember when preparing to leave the hospital. Here is what you will need to do:    1. Take your medicine. If you are prescribed medications, review your Medication List in the following pages. You may have new medications to  at the pharmacy and others that you'll need to stop taking. Review the instructions for how and when to take your medications. Talk with your doctor or nurses if you are unsure of what to do.     2. Go to your follow-up appointments. Specific follow-up information is listed in the following pages. Your may be contacted by a transition nurse or clinical provider about future appointments. Be sure we have all of the phone numbers to reach you, if needed. Please contact your provider's office if you are unable to make an appointment.     3. Watch for warning signs. Your doctor or nurse will give you detailed warning signs to watch for and when to call for assistance. These instructions may also include educational information about your condition. If you experience any of warning signs to your health, call your doctor.               [facility]  Unless otherwise directed by your provider, please contact [facility], our nurse care line that is available for 24/7 assistance.     [phone] (toll-free)    Registered nurses in the [facility] provide clinical advisement, health education, appointment booking, and other advisory services.                    ** Verify the list of medication(s) below is accurate and up  to date. Carry this with you in case of emergency. If your medications have changed, please notify your healthcare provider.             Medication List      START taking these medications        Additional Info                      cephALEXin 500 MG capsule   Commonly known as:  KEFLEX   Quantity:  56 capsule   Refills:  0   Dose:  500 mg    Instructions:  Take 1 capsule (500 mg total) by mouth 4 (four) times daily.     Begin Date    AM    Noon    PM    Bedtime       hydrocodone-acetaminophen 5-325mg 5-325 mg per tablet   Commonly known as:  NORCO   Quantity:  30 tablet   Refills:  0   Dose:  1 tablet    Last time this was given:  1 tablet on 2/3/2017  6:52 AM   Instructions:  Take 1 tablet by mouth every 6 (six) hours as needed for Pain.     Begin Date    AM    Noon    PM    Bedtime         CHANGE how you take these medications        Additional Info                      hydrochlorothiazide 25 MG tablet   Commonly known as:  HYDRODIURIL   Quantity:  90 tablet   Refills:  1   What changed:  See the new instructions.    Last time this was given:  25 mg on 2/3/2017  8:31 AM   Instructions:  TAKE 1 TABLET BY MOUTH EVERY DAY     Begin Date    AM    Noon    PM    Bedtime       lisinopril 20 MG tablet   Commonly known as:  PRINIVIL,ZESTRIL   Quantity:  90 tablet   Refills:  0   What changed:  See the new instructions.    Last time this was given:  20 mg on 2/3/2017  8:31 AM   Instructions:  TAKE 1 TABLET BY MOUTH EVERY DAY     Begin Date    AM    Noon    PM    Bedtime       mirabegron 50 mg Tb24   Commonly known as:  MYRBETRIQ   Quantity:  30 tablet   Refills:  11   Dose:  1 tablet   What changed:  when to take this    Last time this was given:  50 mg on 2/3/2017  8:34 AM   Instructions:  Take 1 tablet (50 mg total) by mouth once daily.     Begin Date    AM    Noon    PM    Bedtime       oxybutynin 10 MG 24 hr tablet   Commonly known as:  DITROPAN-XL   Quantity:  30 tablet   Refills:  5   What changed:  See the new  instructions.    Last time this was given:  10 mg on 2/3/2017  8:31 AM   Instructions:  TAKE 1 TABLET(10 MG) BY MOUTH EVERY DAY     Begin Date    AM    Noon    PM    Bedtime         CONTINUE taking these medications        Additional Info                      albuterol-ipratropium 2.5mg-0.5mg/3mL 0.5 mg-3 mg(2.5 mg base)/3 mL nebulizer solution   Commonly known as:  DUO-NEB   Quantity:  360 mL   Refills:  6    Instructions:  USE 1 VIAL IN NEBULIZER EVERY 6 HOURS AS NEEDED FOR WHEEZING OR SHORTNESS OF BREATH     Begin Date    AM    Noon    PM    Bedtime       aspirin 81 MG Chew   Refills:  0   Dose:  81 mg    Last time this was given:  81 mg on 2/3/2017  6:52 AM   Instructions:  Take 81 mg by mouth every morning.     Begin Date    AM    Noon    PM    Bedtime       atorvastatin 40 MG tablet   Commonly known as:  LIPITOR   Quantity:  90 tablet   Refills:  0    Last time this was given:  40 mg on 2/3/2017  8:30 AM   Instructions:  TAKE 1 TABLET BY MOUTH EVERY DAY     Begin Date    AM    Noon    PM    Bedtime       budesonide 1 mg/2 mL Nbsp   Quantity:  120 mL   Refills:  6    Instructions:  USE CONTENT OF 1 VIAL IN NEBULIZER TWICE DAILY     Begin Date    AM    Noon    PM    Bedtime       dorzolamide-timolol 2-0.5% 22.3-6.8 mg/mL ophthalmic solution   Commonly known as:  COSOPT   Quantity:  10 mL   Refills:  12   Dose:  1 drop    Instructions:  Place 1 drop into both eyes 2 (two) times daily.     Begin Date    AM    Noon    PM    Bedtime       latanoprost 0.005 % ophthalmic solution   Quantity:  7.5 mL   Refills:  12   Comments:  **Patient requests 90 days supply**    Last time this was given:  1 drop on 2/2/2017 10:00 PM   Instructions:  INSTILL 1 DROP IN BOTH EYES EVERY EVENING     Begin Date    AM    Noon    PM    Bedtime       metoprolol tartrate 25 MG tablet   Commonly known as:  LOPRESSOR   Quantity:  90 tablet   Refills:  0    Last time this was given:  12.5 mg on 2/3/2017  8:31 AM   Instructions:  TAKE ONE-HALF  TABLET BY MOUTH TWICE DAILY     Begin Date    AM    Noon    PM    Bedtime       multivitamin capsule   Refills:  0   Dose:  1 capsule    Instructions:  Take 1 capsule by mouth every morning.     Begin Date    AM    Noon    PM    Bedtime       omeprazole 20 MG capsule   Commonly known as:  PRILOSEC   Quantity:  180 capsule   Refills:  1    Instructions:  TAKE 2 CAPSULES BY MOUTH EVERY MORNING     Begin Date    AM    Noon    PM    Bedtime            Where to Get Your Medications      You can get these medications from any pharmacy     Bring a paper prescription for each of these medications     cephALEXin 500 MG capsule    hydrocodone-acetaminophen 5-325mg 5-325 mg per tablet                  Please bring to all follow up appointments:    1. A copy of your discharge instructions.  2. All medicines you are currently taking in their original bottles.  3. Identification and insurance card.    Please arrive 15 minutes ahead of scheduled appointment time.    Please call 24 hours in advance if you must reschedule your appointment and/or time.        Your Scheduled Appointments     Feb 08, 2017 10:00 AM CST   Post OP with Lencho Holcomb MD   [facility] ([facility] )    [address] [address] [phone]            Feb 16, 2017 10:00 AM CST   Non-Fasting Lab with LAB, [facility]    [address] [place] [address] [phone]            Mar 07, 2017 11:20 AM CST   Post OP with Lencho Holcomb MD   [facility] ([facility] )    [address] [id number] [phone]            Mar 15, 2017 10:30 AM CDT   Established Patient Visit with Ortiz Carrillo DO, FACP   [facility] ([facility])    [address]  [place] [address]   [phone]            Mar 16, 2017 10:00 AM CDT   [facility]  [facility]    [address]  [place] [address] [phone]              Follow-up Information     Follow up with Lencho Holcomb MD In 1 month.    Specialty:  Otolaryngology    Contact information:    [address]  [facility] [address] [phone]          Discharge Instructions     Future Orders    Activity as tolerated     Call MD for:  persistent nausea and vomiting or diarrhea     Call MD for:  redness, tenderness, or signs of infection (pain, swelling, redness, odor or green/yellow discharge around incision site)     Call MD for:  severe uncontrolled pain     Call MD for:  temperature >100.4     Diet general     Questions:    Total calories:      Fat restriction, if any:      Protein restriction, if any:      Na restriction, if any:      Fluid restriction:      Additional restrictions:      No dressing needed         Primary Diagnosis     Your primary diagnosis was:  Abnormal Connection Occurring Between Windpipe And Skin      Admission Information     Date & Time Provider Department CSN    2/2/2017  4:38 AM Lencho Holcomb MD [facility] [id number]      Care Providers     Provider Role Specialty Primary office phone    Lencho Holcomb MD Attending Provider Otolaryngology [phone]    Lencho Holcomb MD Surgeon  Otolaryngology [phone]      Your Vitals Were     BP Pulse Temp Resp Height Weight    120/61 55 97.2 °F (36.2 °C) (Oral) 14 6' 1" (1.854 m) 95.7 kg (211 lb)    SpO2 BMI             98% 27.84 kg/m2         Recent Lab Values        12/23/2014 12/30/2015 5/10/2016                    10:32 AM  4:34 PM  7:07 AM         A1C 6.0 6.1 6.4 (H)                   Pending Labs     Order Current Status    Specimen to Pathology - Surgery In process      Allergies as of 2/3/2017     No Known Allergies      Advance Directives     An advance directive is a document which, in the event you are no longer able to make decisions for " yourself, tells your healthcare team what kind of treatment you do or do not want to receive, or who you would like to make those decisions for you.  If you do not currently have an advance directive, [facility] encourages you to create one.  For more information call:  [phone]), [phone] ([phone]),  or log on to [email or web address].        Smoking Cessation     If you would like to quit smoking:   You may be eligible for free services if you are a [place] resident and started smoking cigarettes before September 1, 1988.  Call the Smoking Cessation [employer] ([facility]) toll free at (647) [phone] or [phone].   Call 4-926-QUIT-NOW if you do not meet the above criteria.            Language Assistance Services     ATTENTION: Language assistance services are available, free of charge. Please call [phone].      ATENCIÓN: Si habla español, tiene a gonzalez disposición servicios gratuitos de asistencia lingüística. Llame al [phone].     [facility] Ý: N?u b?n nói Ti?ng Vi?t, có các d?ch v? h? tr? ngôn ng? mi?n phí dành cho b?n. G?i s? [phone].         [facility] complies with applicable Federal civil rights laws and does not discriminate on the basis of race, color, national origin, age, disability, or sex.

## 2017-02-02 NOTE — H&P (VIEW-ONLY)
[facility]  Department of Otorhinolaryngology and Communication Sciences    Subjective:      Robin Hawkins is a 66 y.o. male who presents for follow-up. He has laryngotracheal stenosis following a prolonged intubation. Treatment history is outlined below. He was decannulated in September. He denies dysphagia. His voice is serviceable. He denies dyspnea at rest or with exertion. He denies any nocturnal dyspnea. He reports that his trach stoma has not healed. He occasionally expectorates greenish mucus from the stoma. This is a nuisance which is negatively impacting his quality of life.    TREATMENT HISTORY:  1. 3/29/2016 - SML/bronch with left sided posterior cordotomy/medial arytenoidectomy, intralesional steroid, laser excision of SGS/tracheal stenosis.  2. 9/28/2016 - decannulation    The patient's medications, allergies, past medical, surgical, social and family histories were reviewed and updated as appropriate.    A detailed review of systems was obtained with pertinent positives as per the above HPI, and otherwise negative.      Objective:     Visit Vitals    /78    Pulse (!) 58    Temp (!) 94.5 °F (34.7 °C)    Wt 95.3 kg (210 lb 1.6 oz)    BMI 27.72 kg/m2        Constitutional: comfortable, well dressed  Psychiatric: appropriate affect  Respiratory: comfortably breathing, symmetric chest rise, no stridor  Voice: breathy  Head: normocephalic  Eyes: conjunctivae and sclerae clear  Neck: Persistent tracheocutaneous fistula, clean edges, tapers to ~ 6 mm    Procedure    Tracheobronchoscopy through Established Tracheal Stoma (41471): Tracheobronchoscopy is indicated for comprehensive airway evaluation. After verbal consent was obtained, the patient was positioned and the laryngotracheal complex was topically anesthetized with 2 mL of 4% lidocaine administered via the tracheocutaneous fistula. After an adequate degree of anesthesia was obtained, the endoscope was passed through the stoma and  into the trachea to achieve a magnified view of the trachea and proximal mainstem bronchi. The scope was retroflexed for a magnified view of the proximal trachea, subglottis, and infraglottic vocal folds. Structures were evaluated for the following features: the presence of laryngeal, tracheal, or bronchial masses; vocal fold range and symmetry of motion; mucosal edema, erythema, or inflammation; mucosal hydration; gross laryngeal sensation; laryngeal stenosis; tracheal stenosis; and mucopurulence, blood, or thickened secretions. The equipment was removed. The patient tolerated the procedure well without complication. All findings were normal except:  - durable relief of glottic-level stenosis, with stable left sided posterior glottic defect; no granulation  - durable relief of subglottic and tracheal stenosis  - distal trachea and proximal mainstem bronchi normal        Assessment:     Robin Hawkins is a 66 y.o. male with laryngotracheal stenosis following a prolonged intubation. He is doing well after endoscopic airway intervention followed by decannulation in 9/2016. He persists with a tracheocutaneous fistula.     Plan:     Options offered to the patient included no treatment or surgical intervention. The patient desires surgical intervention. I would recommend an open revision/repair of his tracheocutaneous fistula under local/MAC in the operating room. The risks and benefits were discussed with the patient and his wife. Risks included, but were not limited to, pain, bleeding, infection, scar, recurrence, need for additional/revision procedures, damage to the surrounding structures, pneumomediastinum, pneumothorax, mediastinitis, reaction to medication, and risks of anesthesia. I gave the patient the opportunity to ask questions and I answered all of them. He wishes to proceed. We will arrange this in the coming weeks. He will have preop testing triage by the anesthesiology team. We will plan for overnight  observation.    All questions were answered, and the patient is in agreement with the plan.    This visit was 25 minutes in duration, with over 50% of the time spent in direct face-to-face counseling and coordination of care regarding the issues outlined above.    Lencho Holcomb M.D.  [facility]  Department of Otorhinolaryngology and Communication Sciences

## 2017-02-02 NOTE — INTERVAL H&P NOTE
The patient has been examined and the H&P has been reviewed:    I concur with the findings and no changes have occurred since H&P was written.    Anesthesia/Surgery risks, benefits and alternative options discussed and understood by patient/family.          Active Hospital Problems    Diagnosis  POA    Tracheocutaneous fistula following tracheostomy [J95.04]  Yes      Resolved Hospital Problems    Diagnosis Date Resolved POA   No resolved problems to display.

## 2017-02-02 NOTE — TRANSFER OF CARE
"Anesthesia Transfer of Care Note    Patient: Robin Hawkins    Procedure(s) Performed: Procedure(s) (LRB):  CLOSURE-FISTULA-TRACHEAL (N/A)    Patient location: [facility]    Anesthesia Type: MAC    Transport from OR: Transported from OR on 2-3 L/min O2 by NC with adequate spontaneous ventilation    Post pain: adequate analgesia    Post assessment: no apparent anesthetic complications    Post vital signs: stable    Level of consciousness: awake    Nausea/Vomiting: no nausea/vomiting    Complications: none    Comments: 124/99 RR16 99% T97.5 58      Last vitals:   Visit Vitals    /70 (BP Location: Right arm, Patient Position: Lying, BP Method: Automatic)    Pulse (!) 54    Temp 36.6 °C (97.9 °F) (Oral)    Resp 20    Ht 6' 1" (1.854 m)    Wt 95.7 kg (211 lb)    SpO2 96%    BMI 27.84 kg/m2     "

## 2017-02-03 ENCOUNTER — TELEPHONE (OUTPATIENT)
Dept: TRANSPLANT | Facility: CLINIC | Age: 67
End: 2017-02-03

## 2017-02-03 VITALS
SYSTOLIC BLOOD PRESSURE: 120 MMHG | BODY MASS INDEX: 27.96 KG/M2 | OXYGEN SATURATION: 98 % | RESPIRATION RATE: 14 BRPM | WEIGHT: 211 LBS | DIASTOLIC BLOOD PRESSURE: 61 MMHG | HEIGHT: 73 IN | HEART RATE: 70 BPM | TEMPERATURE: 97 F

## 2017-02-03 DIAGNOSIS — I70.0 AORTIC ATHEROSCLEROSIS: Chronic | ICD-10-CM

## 2017-02-03 DIAGNOSIS — E78.5 HYPERLIPIDEMIA: Chronic | ICD-10-CM

## 2017-02-03 PROCEDURE — 25000003 PHARM REV CODE 250

## 2017-02-03 RX ORDER — HYDROCODONE BITARTRATE AND ACETAMINOPHEN 5; 325 MG/1; MG/1
1 TABLET ORAL EVERY 6 HOURS PRN
Qty: 30 TABLET | Refills: 0 | Status: ON HOLD | OUTPATIENT
Start: 2017-02-03 | End: 2017-07-07 | Stop reason: HOSPADM

## 2017-02-03 RX ORDER — CEPHALEXIN 500 MG/1
500 CAPSULE ORAL 4 TIMES DAILY
Qty: 56 CAPSULE | Refills: 0 | Status: SHIPPED | OUTPATIENT
Start: 2017-02-03 | End: 2017-02-03

## 2017-02-03 RX ORDER — CEPHALEXIN 500 MG/1
500 CAPSULE ORAL 4 TIMES DAILY
Qty: 56 CAPSULE | Refills: 0 | Status: SHIPPED | OUTPATIENT
Start: 2017-02-03 | End: 2017-02-17

## 2017-02-03 RX ORDER — HYDROCODONE BITARTRATE AND ACETAMINOPHEN 5; 325 MG/1; MG/1
1 TABLET ORAL EVERY 6 HOURS PRN
Qty: 30 TABLET | Refills: 0 | Status: SHIPPED | OUTPATIENT
Start: 2017-02-03 | End: 2017-02-03

## 2017-02-03 RX ORDER — ATORVASTATIN CALCIUM 80 MG/1
TABLET, FILM COATED ORAL
Qty: 90 TABLET | Refills: 0 | OUTPATIENT
Start: 2017-02-03

## 2017-02-03 RX ADMIN — LISINOPRIL 20 MG: 20 TABLET ORAL at 08:02

## 2017-02-03 RX ADMIN — HYDROCODONE BITARTRATE AND ACETAMINOPHEN 1 TABLET: 5; 325 TABLET ORAL at 06:02

## 2017-02-03 RX ADMIN — PANTOPRAZOLE SODIUM 40 MG: 40 TABLET, DELAYED RELEASE ORAL at 08:02

## 2017-02-03 RX ADMIN — ATORVASTATIN CALCIUM 40 MG: 20 TABLET, FILM COATED ORAL at 08:02

## 2017-02-03 RX ADMIN — HYDROCODONE BITARTRATE AND ACETAMINOPHEN 1 TABLET: 5; 325 TABLET ORAL at 01:02

## 2017-02-03 RX ADMIN — Medication 12.5 MG: at 08:02

## 2017-02-03 RX ADMIN — OXYBUTYNIN CHLORIDE 10 MG: 10 TABLET, FILM COATED, EXTENDED RELEASE ORAL at 08:02

## 2017-02-03 RX ADMIN — HYDROCHLOROTHIAZIDE 25 MG: 25 TABLET ORAL at 08:02

## 2017-02-03 RX ADMIN — THERA TABS 1 TABLET: TAB at 08:02

## 2017-02-03 RX ADMIN — ASPIRIN 81 MG CHEWABLE TABLET 81 MG: 81 TABLET CHEWABLE at 06:02

## 2017-02-03 NOTE — TELEPHONE ENCOUNTER
Yes, yttrium was recommended at the last IR conference and he's in agreement with having the procedure done       ----- Message from Octavia Scott MD sent at 2/1/2017  2:40 PM CST -----  i think he initially refused a repeat tace, but now will do. Katelyn can you confirm?   ----- Message -----     From: Ortiz Carrillo DO, FACP     Sent: 2/1/2017   2:33 PM       To: Octavia Scott MD, #    DrMarques Scott,  I just saw Mr. Hawkins and noted he was taken off transplant list.  Are you setting him up for additional TACE?  I think we can watch the things in the lung for now.  Also, if you are setting him up for TACE can you see if they can also do a core needle biopsy prior to this so we can have tissue?  He may qualify for our immuno versus nexavar trial.  Thanks!  Nahun Carrillo

## 2017-02-03 NOTE — NURSING
Pt discharged in stable condition, discharge instructions and prescriptions given, pt verbalized understanding. Surgical site intact.pt waiting on transportation. Kusum Stinson RN

## 2017-02-03 NOTE — PROGRESS NOTES
Otolaryngology - Head and Neck Surgery  Progress Note    Subjective: NAEON. Pain well controlled. Tolerating regular diet.     Objective:  Temp:  [95.7 °F (35.4 °C)-97.7 °F (36.5 °C)]   Pulse:  [48-61]   Resp:  [16-20]   BP: (108-154)/(59-80)   SpO2:  [96 %-100 %]     PE:  AAOx3, NAD  Neck non tender, no crepitus  TC fistula with post surgical changes. No bleeding    CBC  No results for input(s): WBC, HGB, HCT, MCV, PLT in the last 24 hours.  BMP  No results for input(s): GLU, NA, K, CL, CO2, BUN, CREATININE, CALCIUM, MG in the last 24 hours.    Assessment: Mr Hawkins is a 67 yo M POD 1 from TC closure. Recovering well    Plan:   - Plan to d/c home today  - PO pain meds  - Discussed with Dr Holcomb

## 2017-02-03 NOTE — DISCHARGE SUMMARY
[facility]  Discharge Note  Short Stay    Admit Date: 2/2/2017    Discharge Date and Time: No discharge date for patient encounter.     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       Discharged Condition: good    Hospital Course: Following completion of an electively scheduled procedure, he was transferred to the floor for postoperative monitoring. his hospital course was uneventful and noted for adequate pain control and PO intake following surgery. he is discharged home in good condition and will follow-up with Dr. Holcomb in 1 month.      Final Diagnoses: Same as principal problem.    Disposition: Home or Self Care    Follow up/Patient Instructions:    Medications:  Reconciled Home Medications:   Current Discharge Medication List      START taking these medications    Details   cephALEXin (KEFLEX) 500 MG capsule Take 1 capsule (500 mg total) by mouth 4 (four) times daily.  Qty: 56 capsule, Refills: 0      hydrocodone-acetaminophen 5-325mg (NORCO) 5-325 mg per tablet Take 1 tablet by mouth every 6 (six) hours as needed for Pain.  Qty: 30 tablet, Refills: 0         CONTINUE these medications which have NOT CHANGED    Details   albuterol-ipratropium 2.5mg-0.5mg/3mL (DUO-NEB) 0.5 mg-3 mg(2.5 mg base)/3 mL nebulizer solution USE 1 VIAL IN NEBULIZER EVERY 6 HOURS AS NEEDED FOR WHEEZING OR SHORTNESS OF BREATH  Qty: 360 mL, Refills: 6      aspirin 81 MG Chew Take 81 mg by mouth every morning.       atorvastatin (LIPITOR) 40 MG tablet TAKE 1 TABLET BY MOUTH EVERY DAY  Qty: 90 tablet, Refills: 0      budesonide 1 mg/2 mL NbSp USE CONTENT OF 1 VIAL IN NEBULIZER TWICE DAILY  Qty: 120 mL, Refills: 6    Associated Diagnoses: Dyspnea, unspecified dyspnea       dorzolamide-timolol 2-0.5% (COSOPT) 22.3-6.8 mg/mL ophthalmic solution Place 1 drop into both eyes 2 (two) times daily.  Qty: 10 mL, Refills: 12    Associated Diagnoses: Open angle primary glaucoma, moderate stage      hydrochlorothiazide (HYDRODIURIL) 25 MG tablet TAKE 1 TABLET BY MOUTH EVERY DAY  Qty: 90 tablet, Refills: 1      latanoprost 0.005 % ophthalmic solution INSTILL 1 DROP IN BOTH EYES EVERY EVENING  Qty: 7.5 mL, Refills: 12    Comments: **Patient requests 90 days supply**      lisinopril (PRINIVIL,ZESTRIL) 20 MG tablet TAKE 1 TABLET BY MOUTH EVERY DAY  Qty: 90 tablet, Refills: 0    Associated Diagnoses: Essential hypertension      metoprolol tartrate (LOPRESSOR) 25 MG tablet TAKE ONE-HALF TABLET BY MOUTH TWICE DAILY  Qty: 90 tablet, Refills: 0      mirabegron (MYRBETRIQ) 50 mg Tb24 Take 1 tablet (50 mg total) by mouth once daily.  Qty: 30 tablet, Refills: 11    Associated Diagnoses: Urge incontinence      multivitamin capsule Take 1 capsule by mouth every morning.       omeprazole (PRILOSEC) 20 MG capsule TAKE 2 CAPSULES BY MOUTH EVERY MORNING  Qty: 180 capsule, Refills: 1      oxybutynin (DITROPAN-XL) 10 MG 24 hr tablet TAKE 1 TABLET(10 MG) BY MOUTH EVERY DAY  Qty: 30 tablet, Refills: 5    Associated Diagnoses: Urge incontinence             Discharge Procedure Orders  Diet general     Activity as tolerated     Call MD for:  temperature >100.4     Call MD for:  persistent nausea and vomiting or diarrhea     Call MD for:  severe uncontrolled pain     Call MD for:  redness, tenderness, or signs of infection (pain, swelling, redness, odor or green/yellow discharge around incision site)     No dressing needed       Follow-up Information     Follow up with Lencho Holcomb MD In 1 month.    Specialty:  Otolaryngology    Contact information:    [address]  [facility] [address] [phone]            Discharge Procedure Orders (must include Diet, Follow-up, Activity):    Discharge Procedure Orders  (must include Diet, Follow-up, Activity)  Diet general     Activity as tolerated     Call MD for:  temperature >100.4     Call MD for:  persistent nausea and vomiting or diarrhea     Call MD for:  severe uncontrolled pain     Call MD for:  redness, tenderness, or signs of infection (pain, swelling, redness, odor or green/yellow discharge around incision site)     No dressing needed

## 2017-02-14 ENCOUNTER — LAB VISIT (OUTPATIENT)
Dept: LAB | Facility: HOSPITAL | Age: 67
End: 2017-02-14
Attending: INTERNAL MEDICINE
Payer: MEDICARE

## 2017-02-14 DIAGNOSIS — I10 ESSENTIAL HYPERTENSION: ICD-10-CM

## 2017-02-14 DIAGNOSIS — E03.8 SUBCLINICAL HYPOTHYROIDISM: ICD-10-CM

## 2017-02-14 DIAGNOSIS — C22.0 HCC (HEPATOCELLULAR CARCINOMA): Chronic | ICD-10-CM

## 2017-02-14 DIAGNOSIS — R73.02 IMPAIRED GLUCOSE TOLERANCE: ICD-10-CM

## 2017-02-14 LAB
AFP SERPL-MCNC: 554 NG/ML
T4 FREE SERPL-MCNC: 0.93 NG/DL
TSH SERPL DL<=0.005 MIU/L-ACNC: 4.71 UIU/ML

## 2017-02-14 PROCEDURE — 83036 HEMOGLOBIN GLYCOSYLATED A1C: CPT

## 2017-02-14 PROCEDURE — 82105 ALPHA-FETOPROTEIN SERUM: CPT

## 2017-02-14 PROCEDURE — 84443 ASSAY THYROID STIM HORMONE: CPT

## 2017-02-14 PROCEDURE — 84439 ASSAY OF FREE THYROXINE: CPT

## 2017-02-14 PROCEDURE — 36415 COLL VENOUS BLD VENIPUNCTURE: CPT | Mod: PO

## 2017-02-14 RX ORDER — LISINOPRIL 20 MG/1
TABLET ORAL
Qty: 90 TABLET | Refills: 1 | Status: SHIPPED | OUTPATIENT
Start: 2017-02-14 | End: 2017-08-10 | Stop reason: SDUPTHER

## 2017-02-15 LAB
ESTIMATED AVG GLUCOSE: 126 MG/DL
HBA1C MFR BLD HPLC: 6 %

## 2017-02-16 DIAGNOSIS — H40.1193 PRIMARY OPEN-ANGLE GLAUCOMA, SEVERE STAGE: ICD-10-CM

## 2017-02-16 RX ORDER — LATANOPROST 50 UG/ML
SOLUTION/ DROPS OPHTHALMIC
Qty: 2.5 ML | Refills: 12 | Status: SHIPPED | OUTPATIENT
Start: 2017-02-16 | End: 2017-09-12

## 2017-02-16 NOTE — TELEPHONE ENCOUNTER
----- Message from Kusum Muhammad OD sent at 2/16/2017 10:24 AM CST -----  Regarding: call pt to schedule  Hello,    I received a refill request from this patient for Latanoprost, which I approved. However, please call him to remind him that he is overdue his 6-month follow-up (for HVF 24-2ss and RNFL OCT followed by IOP check with Dr. Muhammad). Please offer to schedule these appointments.    Thank you,  Kusum Muhammad OD

## 2017-02-17 ENCOUNTER — TELEPHONE (OUTPATIENT)
Dept: HEPATOLOGY | Facility: CLINIC | Age: 67
End: 2017-02-17

## 2017-02-17 NOTE — TELEPHONE ENCOUNTER
I spoke with patient's sister Darby to inform her of elevated lab and procedure that was ordered by Provider. Also that I will call and schedule procedure. Patient's sister Darby verbalizes understanding.  I spoke with Mag in IR to schedule patient' procedure. Patient procedure Pre Mapping Yttrium scheduled on 3/8/17 @7:30 am. Also informed nurse if patient's sister would like to have a consultation to explain procedure we could schedule an appointment for her.  I called and spoke with Darby, patient's sister. Informed her of scheduled date and time of procedure. Also, asked if she wanted to schedule an appointment to speak with someone to explain the procedure and to answer her questions if she has any. Patient's sister declined. Instructed her that patient should not eat or drink anything after midnight, night before procedure. And that patient should stop taking his ASA 7 days prior to procedure, to stop taking ASA on 3/1/17. Darby verbalizes understanding. Asked if she had any question,patient's sister says no. Informed her I will mail out appointment letter and instructions.

## 2017-03-01 ENCOUNTER — TELEPHONE (OUTPATIENT)
Dept: HEPATOLOGY | Facility: CLINIC | Age: 67
End: 2017-03-01

## 2017-03-01 DIAGNOSIS — C22.0 HCC (HEPATOCELLULAR CARCINOMA): Primary | ICD-10-CM

## 2017-03-01 DIAGNOSIS — C22.0 HEPATOCELLULAR CARCINOMA: Primary | ICD-10-CM

## 2017-03-01 NOTE — TELEPHONE ENCOUNTER
----- Message from Katelyn Smith sent at 2/28/2017  9:40 PM CST -----  Mr. Hawkins has been declined for transplant due to alcohol relapse.  He was last seen by Dr. Scott in Jan and needs a 2 month follow-up with labs.      Please refer the patient's chart for additional information.    Please contact me with any additional questions.    Thanks  Katelyn  ----- Message -----     From: Octavia Scott MD     Sent: 1/18/2017   4:05 PM       To: [facility] Pre-Liver Transplant Clinical

## 2017-03-01 NOTE — TELEPHONE ENCOUNTER
I spoke with patient's sister Darby. All labs, imaging and follow up appointment scheduled as ordered. Appointment letters mailed out to patient.

## 2017-03-03 ENCOUNTER — INITIAL CONSULT (OUTPATIENT)
Dept: INTERVENTIONAL RADIOLOGY/VASCULAR | Facility: CLINIC | Age: 67
End: 2017-03-03
Payer: MEDICARE

## 2017-03-03 VITALS
BODY MASS INDEX: 28.49 KG/M2 | SYSTOLIC BLOOD PRESSURE: 122 MMHG | WEIGHT: 215 LBS | HEIGHT: 73 IN | HEART RATE: 73 BPM | DIASTOLIC BLOOD PRESSURE: 70 MMHG

## 2017-03-03 DIAGNOSIS — C22.0 HCC (HEPATOCELLULAR CARCINOMA): Primary | ICD-10-CM

## 2017-03-03 PROCEDURE — 3074F SYST BP LT 130 MM HG: CPT | Mod: S$GLB,,, | Performed by: NURSE PRACTITIONER

## 2017-03-03 PROCEDURE — 3078F DIAST BP <80 MM HG: CPT | Mod: S$GLB,,, | Performed by: NURSE PRACTITIONER

## 2017-03-03 PROCEDURE — 1159F MED LIST DOCD IN RCRD: CPT | Mod: S$GLB,,, | Performed by: NURSE PRACTITIONER

## 2017-03-03 PROCEDURE — 99999 PR PBB SHADOW E&M-EST. PATIENT-LVL II: CPT | Mod: PBBFAC,,,

## 2017-03-03 PROCEDURE — 1157F ADVNC CARE PLAN IN RCRD: CPT | Mod: S$GLB,,, | Performed by: NURSE PRACTITIONER

## 2017-03-03 PROCEDURE — 1160F RVW MEDS BY RX/DR IN RCRD: CPT | Mod: S$GLB,,, | Performed by: NURSE PRACTITIONER

## 2017-03-03 PROCEDURE — 99499 UNLISTED E&M SERVICE: CPT | Mod: S$GLB,,, | Performed by: NURSE PRACTITIONER

## 2017-03-03 PROCEDURE — 99214 OFFICE O/P EST MOD 30 MIN: CPT | Mod: S$GLB,,, | Performed by: NURSE PRACTITIONER

## 2017-03-03 NOTE — PROGRESS NOTES
Subjective:       Patient ID: Robin Hawkins is a 66 y.o. male.    Chief Complaint: Hepatocellular Carcinoma    HPI Comments: Patient in today for consult for a radioembolization for recurrent HCC. Patient is scheduled for a Pre-Y90 (mapping) on 03/08/17. Patient has no complaints today. Here is accompanied by his sister.     Review of Systems   Constitutional: Negative for activity change, appetite change, chills, fatigue, fever and unexpected weight change.   HENT: Positive for hearing loss (diminished hearing left ear ).    Respiratory: Positive for shortness of breath. Negative for apnea, cough and chest tightness.         Tracheostomy reversed/closed 3 weeks ago   Cardiovascular: Negative for chest pain, palpitations and leg swelling.   Gastrointestinal: Positive for abdominal pain (occasional epigastric discomfort relieved by Tums  ). Negative for abdominal distention, blood in stool, constipation, diarrhea, nausea and vomiting.   Genitourinary: Negative for difficulty urinating, dysuria, frequency and urgency.   Musculoskeletal: Negative.    Neurological: Negative for dizziness, seizures, syncope, weakness, light-headedness, numbness and headaches.   Hematological: Does not bruise/bleed easily.       Objective:      Physical Exam   Constitutional: He is oriented to person, place, and time. He appears well-developed and well-nourished.   Eyes: EOM are normal. Pupils are equal, round, and reactive to light. No scleral icterus.   Neck: Normal range of motion. Neck supple.   Healing tracheostomy site    Cardiovascular: Normal rate, regular rhythm, normal heart sounds and intact distal pulses.    Pulmonary/Chest: Effort normal and breath sounds normal. He has no wheezes. He has no rales.   Abdominal: Soft. Bowel sounds are normal. He exhibits no distension and no mass. There is no hepatosplenomegaly. There is no tenderness. There is no rebound and no guarding.   Lymphadenopathy:     He has no cervical adenopathy.    Neurological: He is alert and oriented to person, place, and time.   Skin: Skin is warm and dry. No cyanosis.   Psychiatric: He has a normal mood and affect. His behavior is normal. Judgment and thought content normal.   Vitals reviewed.      Assessment:       1. HCC (hepatocellular carcinoma)        Plan:     Radioembolization and mapping procedures discussed in detail with the patient including risks, benefits, potential complications, usual pre and post procedure course, as well as the possible need for overnight hospital stay. Informed consents signed. Verbal and written pre procedure instructions provided. Mapping scheduled on 03/08/17 at 7:30AM. Patient and his sister verbalize understanding. Contact information verified in [facility]. Clinic contact information provided.

## 2017-03-07 ENCOUNTER — OFFICE VISIT (OUTPATIENT)
Dept: OTOLARYNGOLOGY | Facility: CLINIC | Age: 67
End: 2017-03-07
Payer: MEDICARE

## 2017-03-07 VITALS
SYSTOLIC BLOOD PRESSURE: 112 MMHG | BODY MASS INDEX: 28.24 KG/M2 | HEART RATE: 70 BPM | TEMPERATURE: 97 F | WEIGHT: 214.06 LBS | DIASTOLIC BLOOD PRESSURE: 78 MMHG

## 2017-03-07 DIAGNOSIS — J38.6 LARYNGEAL STENOSIS: Primary | Chronic | ICD-10-CM

## 2017-03-07 DIAGNOSIS — C22.0 HCC (HEPATOCELLULAR CARCINOMA): Primary | ICD-10-CM

## 2017-03-07 DIAGNOSIS — J38.02 BILATERAL COMPLETE VOCAL FOLD PARALYSIS: ICD-10-CM

## 2017-03-07 PROBLEM — J95.04 TRACHEOCUTANEOUS FISTULA FOLLOWING TRACHEOSTOMY: Status: RESOLVED | Noted: 2017-01-27 | Resolved: 2017-03-07

## 2017-03-07 PROCEDURE — 99024 POSTOP FOLLOW-UP VISIT: CPT | Mod: S$GLB,,, | Performed by: OTOLARYNGOLOGY

## 2017-03-07 PROCEDURE — 99999 PR PBB SHADOW E&M-EST. PATIENT-LVL III: CPT | Mod: PBBFAC,,, | Performed by: OTOLARYNGOLOGY

## 2017-03-07 NOTE — PATIENT INSTRUCTIONS
Follow up with pulmonology    If you ever need general anesthesia, I would recommend an LMA or no larger than a size 7 breathing tube    Call with questions

## 2017-03-07 NOTE — MR AVS SNAPSHOT
[facility]  [address]  [facility] [address]  Phone: [phone]  Fax: [phone]                  Robin Hawkins   3/7/2017 11:20 AM   Office Visit    Description:  Male : 1950   Provider:  Lencho Holcomb MD   Department:  [facility]           Reason for Visit     Follow-up           Diagnoses this Visit        Comments    Laryngeal stenosis    -  Primary     Bilateral complete vocal fold paralysis                To Do List           Future Appointments        Provider Department Dept Phone    3/8/2017 7:30 AM [facility] [id number] [facility] [phone]    3/8/2017 9:30 AM NOM NM5 PREP ROOM [facility] [phone]    3/8/2017 1:00 PM [facility] NM2 INFINIA 400LB LIMIT [facility] [phone]    3/15/2017 10:30 AM Ortiz Carrillo DO, [facility] - Hematology Oncology [phone]    3/16/2017 10:00 AM LAB, [place] [phone]      Your Future Surgeries/Procedures     Mar 08, 2017   Surgery with [facility] Diagnostic Provider   [facility] ([facility])    [address] [address] [phone]              Goals (5 Years of Data)     None      Follow-Up and Disposition     Return if symptoms worsen or fail to improve.      [facility]     [facility] Care [facility] -  Assistance  Registered nurses in the [facility] provide clinical advisement, health education, appointment booking, and other advisory services.  Call for this free service at [phone].             Medications           Message regarding Medications     Verify the changes and/or additions to your medication regime listed below are the same as discussed with your clinician today.  If any of these changes or additions are incorrect, please notify your healthcare provider.             Verify that the below list of  medications is an accurate representation of the medications you are currently taking.  If none reported, the list may be blank. If incorrect, please contact your healthcare provider. Carry this list with you in case of emergency.           Current Medications     albuterol-ipratropium 2.5mg-0.5mg/3mL (DUO-NEB) 0.5 mg-3 mg(2.5 mg base)/3 mL nebulizer solution USE 1 VIAL IN NEBULIZER EVERY 6 HOURS AS NEEDED FOR WHEEZING OR SHORTNESS OF BREATH    aspirin 81 MG Chew Take 81 mg by mouth every morning.     atorvastatin (LIPITOR) 40 MG tablet TAKE 1 TABLET BY MOUTH EVERY DAY    budesonide 1 mg/2 mL NbSp USE CONTENT OF 1 VIAL IN NEBULIZER TWICE DAILY    dorzolamide-timolol 2-0.5% (COSOPT) 22.3-6.8 mg/mL ophthalmic solution Place 1 drop into both eyes 2 (two) times daily.    hydrochlorothiazide (HYDRODIURIL) 25 MG tablet TAKE 1 TABLET BY MOUTH EVERY DAY    hydrocodone-acetaminophen 5-325mg (NORCO) 5-325 mg per tablet Take 1 tablet by mouth every 6 (six) hours as needed for Pain.    latanoprost 0.005 % ophthalmic solution INSTILL 1 DROP IN BOTH EYES EVERY EVENING    latanoprost 0.005 % ophthalmic solution INSTILL 1 DROP IN BOTH EYES EVERY EVENING    lisinopril (PRINIVIL,ZESTRIL) 20 MG tablet TAKE 1 TABLET BY MOUTH EVERY DAY    metoprolol tartrate (LOPRESSOR) 25 MG tablet TAKE ONE-HALF TABLET BY MOUTH TWICE DAILY    mirabegron (MYRBETRIQ) 50 mg Tb24 Take 1 tablet (50 mg total) by mouth once daily.    multivitamin capsule Take 1 capsule by mouth every morning.     omeprazole (PRILOSEC) 20 MG capsule TAKE 2 CAPSULES BY MOUTH EVERY MORNING    oxybutynin (DITROPAN-XL) 10 MG 24 hr tablet TAKE 1 TABLET(10 MG) BY MOUTH EVERY DAY           Clinical Reference Information           Your Vitals Were     BP Pulse Temp Weight BMI    112/78 70 97.2 °F (36.2 °C) (Tympanic) 97.1 kg (214 lb 1.1 oz) 28.24 kg/m2      Blood Pressure          Most Recent Value    BP  112/78      Allergies as of 3/7/2017     No Known Allergies      Immunizations  Administered on Date of Encounter - 3/7/2017     None      Maintenance Dialysis History     Patient has no recorded history of maintenance dialysis.      Instructions    Follow up with pulmonology    If you ever need general anesthesia, I would recommend an LMA or no larger than a size 7 breathing tube    Call with questions       Language Assistance Services     ATTENTION: Language assistance services are available, free of charge. Please call [phone].      ATENCIÓN: Si habla español, tiene a gonzalez disposición servicios gratuitos de asistencia lingüística. Llame al [phone].     CHÚ Ý: N?u b?n nói Ti?ng Vi?t, có các d?ch v? h? tr? ngôn ng? mi?n phí dành cho b?n. G?i s? [phone].         [facility] complies with applicable Federal civil rights laws and does not discriminate on the basis of race, color, national origin, age, disability, or sex.

## 2017-03-08 ENCOUNTER — HOSPITAL ENCOUNTER (OUTPATIENT)
Dept: RADIOLOGY | Facility: HOSPITAL | Age: 67
Discharge: HOME | End: 2017-03-08
Attending: INTERNAL MEDICINE | Admitting: INTERNAL MEDICINE
Payer: MEDICARE

## 2017-03-08 ENCOUNTER — HOSPITAL ENCOUNTER (OUTPATIENT)
Facility: HOSPITAL | Age: 67
Discharge: HOME | End: 2017-03-08
Attending: INTERNAL MEDICINE | Admitting: INTERNAL MEDICINE
Payer: MEDICARE

## 2017-03-08 VITALS
HEART RATE: 50 BPM | WEIGHT: 215 LBS | TEMPERATURE: 98 F | DIASTOLIC BLOOD PRESSURE: 45 MMHG | SYSTOLIC BLOOD PRESSURE: 110 MMHG | HEIGHT: 73 IN | BODY MASS INDEX: 28.49 KG/M2 | RESPIRATION RATE: 18 BRPM | OXYGEN SATURATION: 99 %

## 2017-03-08 DIAGNOSIS — Z76.82 LIVER TRANSPLANT CANDIDATE: ICD-10-CM

## 2017-03-08 DIAGNOSIS — C22.0 HCC (HEPATOCELLULAR CARCINOMA): Chronic | ICD-10-CM

## 2017-03-08 DIAGNOSIS — K74.60 CIRRHOSIS OF LIVER WITHOUT ASCITES, UNSPECIFIED HEPATIC CIRRHOSIS TYPE: Chronic | ICD-10-CM

## 2017-03-08 DIAGNOSIS — C22.0 HCC (HEPATOCELLULAR CARCINOMA): ICD-10-CM

## 2017-03-08 PROCEDURE — 25000003 PHARM REV CODE 250: Performed by: FAMILY MEDICINE

## 2017-03-08 PROCEDURE — 25000003 PHARM REV CODE 250: Performed by: RADIOLOGY

## 2017-03-08 PROCEDURE — 78201 LIVER IMAGING STATIC ONLY: CPT | Mod: 26,,, | Performed by: NUCLEAR MEDICINE

## 2017-03-08 PROCEDURE — 25500020 PHARM REV CODE 255: Performed by: INTERNAL MEDICINE

## 2017-03-08 PROCEDURE — 63600175 PHARM REV CODE 636 W HCPCS: Performed by: FAMILY MEDICINE

## 2017-03-08 RX ORDER — HEPARIN SODIUM 1000 [USP'U]/ML
INJECTION, SOLUTION INTRAVENOUS; SUBCUTANEOUS CODE/TRAUMA/SEDATION MEDICATION
Status: COMPLETED | OUTPATIENT
Start: 2017-03-08 | End: 2017-03-08

## 2017-03-08 RX ORDER — SODIUM CHLORIDE 9 MG/ML
INJECTION, SOLUTION INTRAVENOUS CONTINUOUS
Status: DISCONTINUED | OUTPATIENT
Start: 2017-03-08 | End: 2017-03-08 | Stop reason: HOSPADM

## 2017-03-08 RX ORDER — NITROGLYCERIN 5 MG/ML
INJECTION, SOLUTION INTRAVENOUS CODE/TRAUMA/SEDATION MEDICATION
Status: COMPLETED | OUTPATIENT
Start: 2017-03-08 | End: 2017-03-08

## 2017-03-08 RX ORDER — MIDAZOLAM HYDROCHLORIDE 1 MG/ML
2 INJECTION INTRAMUSCULAR; INTRAVENOUS
Status: DISCONTINUED | OUTPATIENT
Start: 2017-03-08 | End: 2017-03-08 | Stop reason: HOSPADM

## 2017-03-08 RX ORDER — FENTANYL CITRATE 50 UG/ML
50 INJECTION, SOLUTION INTRAMUSCULAR; INTRAVENOUS
Status: DISCONTINUED | OUTPATIENT
Start: 2017-03-08 | End: 2017-03-08 | Stop reason: HOSPADM

## 2017-03-08 RX ORDER — LIDOCAINE HYDROCHLORIDE 10 MG/ML
1 INJECTION, SOLUTION EPIDURAL; INFILTRATION; INTRACAUDAL; PERINEURAL ONCE AS NEEDED
Status: DISCONTINUED | OUTPATIENT
Start: 2017-03-08 | End: 2017-03-08 | Stop reason: HOSPADM

## 2017-03-08 RX ADMIN — HEPARIN SODIUM 3000 UNITS: 1000 INJECTION, SOLUTION INTRAVENOUS; SUBCUTANEOUS at 09:03

## 2017-03-08 RX ADMIN — MIDAZOLAM 1 MG: 1 INJECTION INTRAMUSCULAR; INTRAVENOUS at 09:03

## 2017-03-08 RX ADMIN — SODIUM CHLORIDE: 0.9 INJECTION, SOLUTION INTRAVENOUS at 09:03

## 2017-03-08 RX ADMIN — NITROGLYCERIN 200 MCG: 5 INJECTION, SOLUTION INTRAVENOUS at 10:03

## 2017-03-08 RX ADMIN — FENTANYL CITRATE 25 MCG: 50 INJECTION, SOLUTION INTRAMUSCULAR; INTRAVENOUS at 09:03

## 2017-03-08 RX ADMIN — NITROGLYCERIN 200 MCG: 5 INJECTION, SOLUTION INTRAVENOUS at 09:03

## 2017-03-08 RX ADMIN — IOHEXOL 40 ML: 300 INJECTION, SOLUTION INTRAVENOUS at 10:03

## 2017-03-08 RX ADMIN — AMPICILLIN SODIUM AND SULBACTAM SODIUM 3 G: 2; 1 INJECTION, POWDER, FOR SOLUTION INTRAMUSCULAR; INTRAVENOUS at 09:03

## 2017-03-08 NOTE — PROCEDURES
Radiology Post-Procedure Note    Pre Op Diagnosis: Hepatocellular carcinoma    Post Op Diagnosis: Same    Procedure: Yttrium planning    Procedure performed by: Dr Jacky Alarcon    Written Informed Consent Obtained: Yes    Specimen Removed: No    Estimated Blood Loss: Minimal    Findings: No complications  Patient tolerated procedure well.    Jacky Alarcon MD  Staff Radiologist  Department of Radiology  Pager: [phone]

## 2017-03-08 NOTE — PROGRESS NOTES
Pt arrived to [facility]. Report received from MADDI Streeter. Dressing to wrist dry and intact.

## 2017-03-08 NOTE — PROGRESS NOTES
[facility]  Department of Otorhinolaryngology and Communication Sciences    Subjective:      Robin Hawkins is a 66 y.o. male who presents for follow-up. He has laryngotracheal stenosis following a prolonged intubation. Treatment history is outlined below. He was decannulated in September. He underwent excision of his TC fistula about 1 month ago. It completely closed over within about 1 week's time.     He denies dyspnea at rest or with exertion. He denies any nocturnal dyspnea. He denies dysphagia. His voice is weak but serviceable.    TREATMENT HISTORY:  1. 3/29/2016 - SML/bronch with left sided posterior cordotomy/medial arytenoidectomy, intralesional steroid, laser excision of SGS/tracheal stenosis.  2. 9/28/2016 - decannulation  3. 2/02/2017 - excision of TC fistula / secondary intention closure    The patient's medications, allergies, past medical, surgical, social and family histories were reviewed and updated as appropriate.    A detailed review of systems was obtained with pertinent positives as per the above HPI, and otherwise negative.      Objective:     /78  Pulse 70  Temp 97.2 °F (36.2 °C) (Tympanic)   Wt 97.1 kg (214 lb 1.1 oz)  BMI 28.24 kg/m2     Constitutional: comfortable, well dressed  Psychiatric: appropriate affect  Respiratory: pursed lip exhalation, symmetric chest rise, no stridor  Voice: breathy  Head: normocephalic  Eyes: conjunctivae and sclerae clear  Neck: complete closure of TC fistula        Assessment:     Robin Hawkins is a 66 y.o. male with laryngotracheal stenosis following a prolonged intubation. He is doing well after endoscopic airway intervention followed by decannulation in 9/2016 and closure of his tracheocutaneous fistula in 2/2017.     Plan:     Reassurance was provided. I recommended continued follow up with his other specialists. Should he ever require general anesthetic, I would recommend use of an LMA or no larger than a size 7.0 ETT.     He will  follow up with me in the future on an as-needed basis.    Lencho Holcomb M.D.  [facility]  Department of Otorhinolaryngology and Communication Sciences
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
## 2017-03-08 NOTE — DISCHARGE INSTRUCTIONS
The following are instructions your doctor would like you to follow regarding your activity, diet, and follow up care.    Your procedure was done by making a small puncture in your wrist. You must observe that area for bleeding and swelling once you are discharged from the hospital. Be careful not to over-stimulate the affected wrist for 24 hours.    1. Do not strenuously flex the wrist for 24 hours.  2. Occlusive bandage (Tegaderm) may be removed after 24 hours.  3. At 24 hours after your procedure, remove the Tagaderm bandage and leave puncture site open to air. If minor oozing, apply adhesive bandage (Band-Aid) and remove after 12 hours.  4. No driving for 24 hours.  5. No soaking wrist for 2 days. Gently wash site with soap and water. Dry well. Do not apply powders, lotions or ointments. No tub baths, whirlpool or swimming for 48 hours.  6. No lifting more than 3-5 pounds with affected wrist for 7 days.  7. Restart your usual diet and medications with appropriate changes as dictated by your doctor.  8. For any bleeding, hold pressure with thumb against puncture site and finger against back of wrist.  9. If you see fresh bleeding, bright red or a lump that is getting bigger at the puncture site, apply pressure to the area as instructed above and call [phone] between the hours of 8:00am-5:00pm or [phone] after hours and ask to speak to the Interventional Radiology Resident on-call. If you are unable to control the bleeding, call [phone].   10. Call the Interventional Radiology Resident on-call at [phone] with any concerns or any of the following:  - Swelling, redness, discharge for the site, large bruising or increasing pain, numbness or tingling at the site  - Temperature of 101 F or higher  - Shortness of breath          For scheduling: Call Mag at [phone]    For questions or concerns call: TORREY MON-FRI 8 AM- 5PM [phone]. Radiology resident on call [phone].    For immediate concerns  that are not emergent, you may call our radiology clinic at: [phone]

## 2017-03-08 NOTE — IP AVS SNAPSHOT
[facility]  [address]  [facility] [address]  Phone: [phone]           Patient Discharge Instructions     Our goal is to set you up for success. This packet includes information on your condition, medications, and your home care. It will help you to care for yourself so you don't get sicker and need to go back to the hospital.     Please ask your nurse if you have any questions.        There are many details to remember when preparing to leave the hospital. Here is what you will need to do:    1. Take your medicine. If you are prescribed medications, review your Medication List in the following pages. You may have new medications to  at the pharmacy and others that you'll need to stop taking. Review the instructions for how and when to take your medications. Talk with your doctor or nurses if you are unsure of what to do.     2. Go to your follow-up appointments. Specific follow-up information is listed in the following pages. Your may be contacted by a transition nurse or clinical provider about future appointments. Be sure we have all of the phone numbers to reach you, if needed. Please contact your provider's office if you are unable to make an appointment.     3. Watch for warning signs. Your doctor or nurse will give you detailed warning signs to watch for and when to call for assistance. These instructions may also include educational information about your condition. If you experience any of warning signs to your health, call your doctor.               [facility]  Unless otherwise directed by your provider, please contact [facility], our nurse care line that is available for 24/7 assistance.     [phone] (toll-free)    Registered nurses in the [facility] provide clinical advisement, health education, appointment booking, and other advisory services.                    ** Verify the list of medication(s) below is accurate and up  to date. Carry this with you in case of emergency. If your medications have changed, please notify your healthcare provider.             Medication List      ASK your doctor about these medications        Additional Info                      albuterol-ipratropium 2.5mg-0.5mg/3mL 0.5 mg-3 mg(2.5 mg base)/3 mL nebulizer solution   Commonly known as:  DUO-NEB   Quantity:  360 mL   Refills:  6    Instructions:  USE 1 VIAL IN NEBULIZER EVERY 6 HOURS AS NEEDED FOR WHEEZING OR SHORTNESS OF BREATH     Begin Date    AM    Noon    PM    Bedtime       aspirin 81 MG Chew   Refills:  0   Dose:  81 mg    Instructions:  Take 81 mg by mouth every morning.     Begin Date    AM    Noon    PM    Bedtime       atorvastatin 40 MG tablet   Commonly known as:  LIPITOR   Quantity:  90 tablet   Refills:  0    Instructions:  TAKE 1 TABLET BY MOUTH EVERY DAY     Begin Date    AM    Noon    PM    Bedtime       budesonide 1 mg/2 mL Nbsp   Quantity:  120 mL   Refills:  6    Instructions:  USE CONTENT OF 1 VIAL IN NEBULIZER TWICE DAILY     Begin Date    AM    Noon    PM    Bedtime       dorzolamide-timolol 2-0.5% 22.3-6.8 mg/mL ophthalmic solution   Commonly known as:  COSOPT   Quantity:  10 mL   Refills:  12   Dose:  1 drop    Instructions:  Place 1 drop into both eyes 2 (two) times daily.     Begin Date    AM    Noon    PM    Bedtime       hydrochlorothiazide 25 MG tablet   Commonly known as:  HYDRODIURIL   Quantity:  90 tablet   Refills:  1    Instructions:  TAKE 1 TABLET BY MOUTH EVERY DAY     Begin Date    AM    Noon    PM    Bedtime       hydrocodone-acetaminophen 5-325mg 5-325 mg per tablet   Commonly known as:  NORCO   Quantity:  30 tablet   Refills:  0   Dose:  1 tablet    Instructions:  Take 1 tablet by mouth every 6 (six) hours as needed for Pain.     Begin Date    AM    Noon    PM    Bedtime       * latanoprost 0.005 % ophthalmic solution   Quantity:  7.5 mL   Refills:  12   Comments:  **Patient requests 90 days supply**     Instructions:  INSTILL 1 DROP IN BOTH EYES EVERY EVENING     Begin Date    AM    Noon    PM    Bedtime       * latanoprost 0.005 % ophthalmic solution   Quantity:  2.5 mL   Refills:  12    Instructions:  INSTILL 1 DROP IN BOTH EYES EVERY EVENING     Begin Date    AM    Noon    PM    Bedtime       lisinopril 20 MG tablet   Commonly known as:  PRINIVIL,ZESTRIL   Quantity:  90 tablet   Refills:  1    Instructions:  TAKE 1 TABLET BY MOUTH EVERY DAY     Begin Date    AM    Noon    PM    Bedtime       metoprolol tartrate 25 MG tablet   Commonly known as:  LOPRESSOR   Quantity:  90 tablet   Refills:  0    Instructions:  TAKE ONE-HALF TABLET BY MOUTH TWICE DAILY     Begin Date    AM    Noon    PM    Bedtime       mirabegron 50 mg Tb24   Commonly known as:  MYRBETRIQ   Quantity:  30 tablet   Refills:  11   Dose:  1 tablet    Instructions:  Take 1 tablet (50 mg total) by mouth once daily.     Begin Date    AM    Noon    PM    Bedtime       multivitamin capsule   Refills:  0   Dose:  1 capsule    Instructions:  Take 1 capsule by mouth every morning.     Begin Date    AM    Noon    PM    Bedtime       omeprazole 20 MG capsule   Commonly known as:  PRILOSEC   Quantity:  180 capsule   Refills:  1    Instructions:  TAKE 2 CAPSULES BY MOUTH EVERY MORNING     Begin Date    AM    Noon    PM    Bedtime       oxybutynin 10 MG 24 hr tablet   Commonly known as:  DITROPAN-XL   Quantity:  30 tablet   Refills:  5    Instructions:  TAKE 1 TABLET(10 MG) BY MOUTH EVERY DAY     Begin Date    AM    Noon    PM    Bedtime       * Notice:  This list has 2 medication(s) that are the same as other medications prescribed for you. Read the directions carefully, and ask your doctor or other care provider to review them with you.               Please bring to all follow up appointments:    1. A copy of your discharge instructions.  2. All medicines you are currently taking in their original bottles.  3. Identification and insurance card.    Please arrive  15 minutes ahead of scheduled appointment time.    Please call 24 hours in advance if you must reschedule your appointment and/or time.        Your Scheduled Appointments     Mar 08, 2017  1:00 PM CST   Nm Liver Pre Y90 with [facility] NM2 INFINIA 400LB LIMIT [facility] ([facility] )    [address] [address]   [phone]            Mar 15, 2017 10:30 AM CDT   Established Patient Visit with Ortiz Carrillo DO, FACP   [facility] ([facility])    [address]  [place] [address]   [phone]            Mar 16, 2017 10:00 AM CDT   Non-Fasting Lab with LAB, [place] [facility]    [address]  [place] [address]   [phone]            Mar 23, 2017 10:00 AM CDT   Non-Fasting Lab with LAB, [place] [facility]    [address]  [place] [address]   [phone]            Mar 28, 2017  9:20 AM CDT   Established Patient Visit with Octavia Scott MD   [facility] - Hepatology ([facility] )    [address]  [place] [address]   [phone]                  Discharge Instructions       The following are instructions your doctor would like you to follow regarding your activity, diet, and follow up care.    Your procedure was done by making a small puncture in your wrist. You must observe that area for bleeding and swelling once you are discharged from the hospital. Be careful not to over-stimulate the affected wrist for 24 hours.    1. Do not strenuously flex the wrist for 24 hours.  2. Occlusive bandage (Tegaderm) may be removed after 24 hours.  3. At 24 hours after your procedure, remove the Tagaderm bandage and leave puncture site open to air. If minor oozing, apply adhesive bandage (Band-Aid) and remove after 12 hours.  4. No driving for 24 hours.  5. No soaking wrist for 2 days. Gently wash site with soap and water. Dry well. Do not apply  "powders, lotions or ointments. No tub baths, whirlpool or swimming for 48 hours.  6. No lifting more than 3-5 pounds with affected wrist for 7 days.  7. Restart your usual diet and medications with appropriate changes as dictated by your doctor.  8. For any bleeding, hold pressure with thumb against puncture site and finger against back of wrist.  9. If you see fresh bleeding, bright red or a lump that is getting bigger at the puncture site, apply pressure to the area as instructed above and call [phone] between the hours of 8:00am-5:00pm or [phone] after hours and ask to speak to the Interventional Radiology Resident on-call. If you are unable to control the bleeding, call [phone].   10. Call the Interventional Radiology Resident on-call at [phone] with any concerns or any of the following:  - Swelling, redness, discharge for the site, large bruising or increasing pain, numbness or tingling at the site  - Temperature of 101 F or higher  - Shortness of breath          For scheduling: Call Mag at [phone]    For questions or concerns call: TORREY MON-FRI 8 AM- 5PM [phone]. Radiology resident on call [phone].    For immediate concerns that are not emergent, you may call our radiology clinic at: [phone]        Discharge References/Attachments     SEDATION, PROCEDURAL (ADULT) (ENGLISH)        Admission Information     Date & Time Provider Department CSN    3/8/2017  7:20 AM Octavia Scott MD [facility] [id number]      Care Providers     Provider Role Specialty Primary office phone    Octavia Scott MD Attending Provider Hepatology [phone]      Your Vitals Were     BP Pulse Temp Resp Height Weight    119/64 58 97.6 °F (36.4 °C) (Axillary) 18 6' 1" (1.854 m) 97.5 kg (215 lb)    SpO2 BMI             97% 28.37 kg/m2         Recent Lab Values        12/23/2014 12/30/2015 5/10/2016 2/14/2017                 10:32 AM  4:34 PM  7:07 AM  7:42 AM        A1C 6.0 6.1 6.4 " (H) 6.0        Comment for A1C at  7:42 AM on 2/14/2017:  According to ADA guidelines, hemoglobin A1C <7.0% represents  optimal control in non-pregnant diabetic patients.  Different  metrics may apply to specific populations.   Standards of Medical Care in Diabetes - 2016.  For the purpose of screening for the presence of diabetes:  <5.7%     Consistent with the absence of diabetes  5.7-6.4%  Consistent with increasing risk for diabetes   (prediabetes)  >or=6.5%  Consistent with diabetes  Currently no consensus exists for use of hemoglobin A1C  for diagnosis of diabetes for children.        Allergies as of 3/8/2017     No Known Allergies      Advance Directives     An advance directive is a document which, in the event you are no longer able to make decisions for yourself, tells your healthcare team what kind of treatment you do or do not want to receive, or who you would like to make those decisions for you.  If you do not currently have an advance directive, [facility] encourages you to create one.  For more information call:  [phone]), [phone] ([phone]),  or log on to [email or web address].        Language Assistance Services     ATTENTION: Language assistance services are available, free of charge. Please call [phone].      ATENCIÓN: Si habla español, tiene a gonzalez disposición servicios gratuitos de asistencia lingüística. Llame al [phone].     DEYSI Ý: N?u b?n nói Ti?ng Vi?t, có các d?ch v? h? tr? ngôn ng? mi?n phí dành cho b?n. G?i s? [phone].         [facility] complies with applicable Federal civil rights laws and does not discriminate on the basis of race, color, national origin, age, disability, or sex.

## 2017-03-08 NOTE — SEDATION DOCUMENTATION
Pre- Yttrium complete, TR band applied to left wrist. 13 Mls of air instilled int TR band at 1019 by MD Agnes. No acute events. Pt resting comfortably, denies pain/ discomfort. See flow sheets for additional monitoring.

## 2017-03-08 NOTE — PROGRESS NOTES
Pt discharged to home.  Discharge instructions given, pt and sister stated understanding.  Dressing to wrist dry and intact, IV removed.  Pt left via wheelchair with sister to home.

## 2017-03-08 NOTE — PROGRESS NOTES
3cc's air removed from TR band pressure closure device. No bleeding noted. Site CDI. Will continue to monitor.

## 2017-03-10 NOTE — DISCHARGE SUMMARY
Radiology Discharge Summary      Hospital Course: No complications    Admit Date: 3/8/2017  Discharge Date: 3/8/17    Instructions Given to Patient: Yes  Diet: regular diet and Resume prior diet  Activity: activity as tolerated    Description of Condition on Discharge: Stable  Vital Signs (Most Recent): Temp: 97.6 °F (36.4 °C) (03/08/17 1100)  Pulse: (!) 50 (03/08/17 1300)  Resp: 18 (03/08/17 1300)  BP: (!) 110/45 (03/08/17 1300)  SpO2: 99 % (03/08/17 1300)    Discharge Disposition: Home    Discharge Diagnosis: hcc    Jacky Alarcon MD  Staff Radiologist  Department of Radiology  Pager: [phone]

## 2017-03-13 DIAGNOSIS — I10 ESSENTIAL HYPERTENSION: Primary | ICD-10-CM

## 2017-03-13 RX ORDER — HYDROCHLOROTHIAZIDE 25 MG/1
25 TABLET ORAL DAILY
Qty: 90 TABLET | Refills: 1 | Status: SHIPPED | OUTPATIENT
Start: 2017-03-13 | End: 2017-09-05 | Stop reason: SDUPTHER

## 2017-03-15 ENCOUNTER — OFFICE VISIT (OUTPATIENT)
Dept: HEMATOLOGY/ONCOLOGY | Facility: CLINIC | Age: 67
End: 2017-03-15
Payer: MEDICARE

## 2017-03-15 VITALS
WEIGHT: 212.06 LBS | DIASTOLIC BLOOD PRESSURE: 73 MMHG | HEART RATE: 58 BPM | TEMPERATURE: 98 F | HEIGHT: 73 IN | SYSTOLIC BLOOD PRESSURE: 116 MMHG | BODY MASS INDEX: 28.11 KG/M2

## 2017-03-15 DIAGNOSIS — C22.0 HCC (HEPATOCELLULAR CARCINOMA): Primary | Chronic | ICD-10-CM

## 2017-03-15 PROCEDURE — 3078F DIAST BP <80 MM HG: CPT | Mod: S$GLB,,, | Performed by: INTERNAL MEDICINE

## 2017-03-15 PROCEDURE — 1157F ADVNC CARE PLAN IN RCRD: CPT | Mod: S$GLB,,, | Performed by: INTERNAL MEDICINE

## 2017-03-15 PROCEDURE — 1160F RVW MEDS BY RX/DR IN RCRD: CPT | Mod: S$GLB,,, | Performed by: INTERNAL MEDICINE

## 2017-03-15 PROCEDURE — 99999 PR PBB SHADOW E&M-EST. PATIENT-LVL III: CPT | Mod: PBBFAC,,, | Performed by: INTERNAL MEDICINE

## 2017-03-15 PROCEDURE — 1159F MED LIST DOCD IN RCRD: CPT | Mod: S$GLB,,, | Performed by: INTERNAL MEDICINE

## 2017-03-15 PROCEDURE — 1126F AMNT PAIN NOTED NONE PRSNT: CPT | Mod: S$GLB,,, | Performed by: INTERNAL MEDICINE

## 2017-03-15 PROCEDURE — 99214 OFFICE O/P EST MOD 30 MIN: CPT | Mod: S$GLB,,, | Performed by: INTERNAL MEDICINE

## 2017-03-15 PROCEDURE — 3074F SYST BP LT 130 MM HG: CPT | Mod: S$GLB,,, | Performed by: INTERNAL MEDICINE

## 2017-03-15 PROCEDURE — 99499 UNLISTED E&M SERVICE: CPT | Mod: S$GLB,,, | Performed by: INTERNAL MEDICINE
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
## 2017-03-16 ENCOUNTER — LAB VISIT (OUTPATIENT)
Dept: LAB | Facility: HOSPITAL | Age: 67
End: 2017-03-16
Attending: INTERNAL MEDICINE
Payer: MEDICARE

## 2017-03-16 DIAGNOSIS — C22.0 HCC (HEPATOCELLULAR CARCINOMA): Chronic | ICD-10-CM

## 2017-03-16 LAB — AFP SERPL-MCNC: 1373 NG/ML

## 2017-03-16 PROCEDURE — 36415 COLL VENOUS BLD VENIPUNCTURE: CPT | Mod: PO

## 2017-03-16 PROCEDURE — 82105 ALPHA-FETOPROTEIN SERUM: CPT

## 2017-03-23 ENCOUNTER — LAB VISIT (OUTPATIENT)
Dept: LAB | Facility: HOSPITAL | Age: 67
End: 2017-03-23
Attending: INTERNAL MEDICINE
Payer: MEDICARE

## 2017-03-23 DIAGNOSIS — C22.0 HCC (HEPATOCELLULAR CARCINOMA): Chronic | ICD-10-CM

## 2017-03-23 LAB — AFP SERPL-MCNC: 1919 NG/ML

## 2017-03-23 PROCEDURE — 36415 COLL VENOUS BLD VENIPUNCTURE: CPT | Mod: PO

## 2017-03-23 PROCEDURE — 82105 ALPHA-FETOPROTEIN SERUM: CPT

## 2017-03-24 ENCOUNTER — TELEPHONE (OUTPATIENT)
Dept: TRANSPLANT | Facility: CLINIC | Age: 67
End: 2017-03-24

## 2017-03-24 NOTE — TELEPHONE ENCOUNTER
Call returned.  Darby agreed to Yttrium procedure on 4/4/17 @ 1030.  Pre-procedure instructions mailed to patient.    ----- Message from Natalie Lincoln sent at 3/24/2017  2:59 PM CDT -----  Contact: patient    Patient calling to speak with Katelyn to let you know that April 4th is a good date for the procedure,. Please call

## 2017-03-27 ENCOUNTER — TELEPHONE (OUTPATIENT)
Dept: HEPATOLOGY | Facility: CLINIC | Age: 67
End: 2017-03-27

## 2017-03-27 DIAGNOSIS — C22.0 HCC (HEPATOCELLULAR CARCINOMA): Primary | ICD-10-CM

## 2017-03-27 NOTE — TELEPHONE ENCOUNTER
MA called pt no answer. Left message on Vm, next available not until May2017. Please give office a call back that he want to cx or keep appt.

## 2017-03-27 NOTE — TELEPHONE ENCOUNTER
----- Message from Jay Ellison sent at 3/27/2017 12:08 PM CDT -----  Contact: Pt   Pt would like a call back from staff    Pt is requesting to reschedule appt to Thursday at 9am    Can be reached at [phone] (leave message)

## 2017-03-28 ENCOUNTER — TELEPHONE (OUTPATIENT)
Dept: HEPATOLOGY | Facility: CLINIC | Age: 67
End: 2017-03-28

## 2017-03-28 NOTE — TELEPHONE ENCOUNTER
----- Message from Trudy Peralta sent at 3/28/2017  7:38 AM CDT -----  Contact: Sister   is calling regarding the pt's appt today.  Sister says she cannot bring him today, but she will bring him on Thursday morning at 9:00 am.    She can be reached at [phone].  She is at work, so please leave a message and contact number.    Thank you.

## 2017-03-30 ENCOUNTER — OFFICE VISIT (OUTPATIENT)
Dept: HEPATOLOGY | Facility: CLINIC | Age: 67
End: 2017-03-30
Payer: MEDICARE

## 2017-03-30 VITALS
BODY MASS INDEX: 27.91 KG/M2 | DIASTOLIC BLOOD PRESSURE: 74 MMHG | HEART RATE: 64 BPM | RESPIRATION RATE: 18 BRPM | OXYGEN SATURATION: 98 % | HEIGHT: 73 IN | SYSTOLIC BLOOD PRESSURE: 134 MMHG | WEIGHT: 210.56 LBS

## 2017-03-30 DIAGNOSIS — C22.0 HCC (HEPATOCELLULAR CARCINOMA): Chronic | ICD-10-CM

## 2017-03-30 DIAGNOSIS — Z85.46 HISTORY OF PROSTATE CANCER: Chronic | ICD-10-CM

## 2017-03-30 DIAGNOSIS — B19.20 HEPATITIS C VIRUS INFECTION, UNSPECIFIED CHRONICITY: Chronic | ICD-10-CM

## 2017-03-30 DIAGNOSIS — K74.60 CIRRHOSIS OF LIVER WITHOUT ASCITES, UNSPECIFIED HEPATIC CIRRHOSIS TYPE: Primary | Chronic | ICD-10-CM

## 2017-03-30 DIAGNOSIS — R91.1 LUNG NODULE: ICD-10-CM

## 2017-03-30 PROCEDURE — 1159F MED LIST DOCD IN RCRD: CPT | Mod: S$GLB,,, | Performed by: NURSE PRACTITIONER

## 2017-03-30 PROCEDURE — 1126F AMNT PAIN NOTED NONE PRSNT: CPT | Mod: S$GLB,,, | Performed by: NURSE PRACTITIONER

## 2017-03-30 PROCEDURE — 99999 PR PBB SHADOW E&M-EST. PATIENT-LVL IV: CPT | Mod: PBBFAC,,, | Performed by: NURSE PRACTITIONER

## 2017-03-30 PROCEDURE — 1157F ADVNC CARE PLAN IN RCRD: CPT | Mod: S$GLB,,, | Performed by: NURSE PRACTITIONER

## 2017-03-30 PROCEDURE — 3078F DIAST BP <80 MM HG: CPT | Mod: S$GLB,,, | Performed by: NURSE PRACTITIONER

## 2017-03-30 PROCEDURE — 1160F RVW MEDS BY RX/DR IN RCRD: CPT | Mod: S$GLB,,, | Performed by: NURSE PRACTITIONER

## 2017-03-30 PROCEDURE — 3075F SYST BP GE 130 - 139MM HG: CPT | Mod: S$GLB,,, | Performed by: NURSE PRACTITIONER

## 2017-03-30 PROCEDURE — 99214 OFFICE O/P EST MOD 30 MIN: CPT | Mod: S$GLB,,, | Performed by: NURSE PRACTITIONER
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
## 2017-03-31 ENCOUNTER — LAB VISIT (OUTPATIENT)
Dept: LAB | Facility: HOSPITAL | Age: 67
End: 2017-03-31
Attending: INTERNAL MEDICINE
Payer: MEDICARE

## 2017-03-31 DIAGNOSIS — C22.0 HEPATOCELLULAR CARCINOMA: ICD-10-CM

## 2017-03-31 LAB — AFP SERPL-MCNC: 3105 NG/ML

## 2017-03-31 PROCEDURE — 36415 COLL VENOUS BLD VENIPUNCTURE: CPT | Mod: PO

## 2017-03-31 PROCEDURE — 82105 ALPHA-FETOPROTEIN SERUM: CPT

## 2017-04-03 DIAGNOSIS — C22.0 HCC (HEPATOCELLULAR CARCINOMA): Primary | ICD-10-CM

## 2017-04-04 ENCOUNTER — HOSPITAL ENCOUNTER (OUTPATIENT)
Dept: RADIOLOGY | Facility: HOSPITAL | Age: 67
Discharge: HOME | End: 2017-04-04
Attending: INTERNAL MEDICINE | Admitting: INTERNAL MEDICINE
Payer: MEDICARE

## 2017-04-04 ENCOUNTER — HOSPITAL ENCOUNTER (OUTPATIENT)
Facility: HOSPITAL | Age: 67
Discharge: HOME | End: 2017-04-04
Attending: INTERNAL MEDICINE | Admitting: INTERNAL MEDICINE
Payer: MEDICARE

## 2017-04-04 VITALS
BODY MASS INDEX: 29.12 KG/M2 | HEIGHT: 72 IN | SYSTOLIC BLOOD PRESSURE: 138 MMHG | RESPIRATION RATE: 18 BRPM | OXYGEN SATURATION: 99 % | DIASTOLIC BLOOD PRESSURE: 78 MMHG | TEMPERATURE: 98 F | WEIGHT: 215 LBS | HEART RATE: 66 BPM

## 2017-04-04 DIAGNOSIS — K74.60 CIRRHOSIS OF LIVER WITHOUT ASCITES, UNSPECIFIED HEPATIC CIRRHOSIS TYPE: Chronic | ICD-10-CM

## 2017-04-04 DIAGNOSIS — Z76.82 LIVER TRANSPLANT CANDIDATE: ICD-10-CM

## 2017-04-04 DIAGNOSIS — C22.0 HCC (HEPATOCELLULAR CARCINOMA): Primary | ICD-10-CM

## 2017-04-04 DIAGNOSIS — C22.0 HCC (HEPATOCELLULAR CARCINOMA): ICD-10-CM

## 2017-04-04 DIAGNOSIS — C22.0 HCC (HEPATOCELLULAR CARCINOMA): Chronic | ICD-10-CM

## 2017-04-04 PROCEDURE — 63600175 PHARM REV CODE 636 W HCPCS: Performed by: RADIOLOGY

## 2017-04-04 PROCEDURE — 63600175 PHARM REV CODE 636 W HCPCS: Performed by: FAMILY MEDICINE

## 2017-04-04 PROCEDURE — 25000003 PHARM REV CODE 250: Performed by: FAMILY MEDICINE

## 2017-04-04 PROCEDURE — 25500020 PHARM REV CODE 255: Performed by: INTERNAL MEDICINE

## 2017-04-04 PROCEDURE — 78201 LIVER IMAGING STATIC ONLY: CPT | Mod: 26,,, | Performed by: NUCLEAR MEDICINE

## 2017-04-04 PROCEDURE — 25000003 PHARM REV CODE 250: Performed by: RADIOLOGY

## 2017-04-04 PROCEDURE — 78201 LIVER IMAGING STATIC ONLY: CPT | Mod: TC

## 2017-04-04 RX ORDER — SODIUM CHLORIDE 9 MG/ML
INJECTION, SOLUTION INTRAVENOUS CONTINUOUS
Status: DISCONTINUED | OUTPATIENT
Start: 2017-04-04 | End: 2017-04-04 | Stop reason: HOSPADM

## 2017-04-04 RX ORDER — MIDAZOLAM HYDROCHLORIDE 1 MG/ML
INJECTION, SOLUTION INTRAMUSCULAR; INTRAVENOUS CODE/TRAUMA/SEDATION MEDICATION
Status: COMPLETED | OUTPATIENT
Start: 2017-04-04 | End: 2017-04-04

## 2017-04-04 RX ORDER — FENTANYL CITRATE 50 UG/ML
50 INJECTION, SOLUTION INTRAMUSCULAR; INTRAVENOUS
Status: DISCONTINUED | OUTPATIENT
Start: 2017-04-04 | End: 2017-04-04 | Stop reason: HOSPADM

## 2017-04-04 RX ORDER — LIDOCAINE HYDROCHLORIDE 10 MG/ML
1 INJECTION, SOLUTION EPIDURAL; INFILTRATION; INTRACAUDAL; PERINEURAL ONCE AS NEEDED
Status: DISCONTINUED | OUTPATIENT
Start: 2017-04-04 | End: 2017-04-04 | Stop reason: HOSPADM

## 2017-04-04 RX ORDER — ONDANSETRON 2 MG/ML
INJECTION INTRAMUSCULAR; INTRAVENOUS CODE/TRAUMA/SEDATION MEDICATION
Status: COMPLETED | OUTPATIENT
Start: 2017-04-04 | End: 2017-04-04

## 2017-04-04 RX ORDER — FENTANYL CITRATE 50 UG/ML
INJECTION, SOLUTION INTRAMUSCULAR; INTRAVENOUS CODE/TRAUMA/SEDATION MEDICATION
Status: COMPLETED | OUTPATIENT
Start: 2017-04-04 | End: 2017-04-04

## 2017-04-04 RX ORDER — NITROGLYCERIN 5 MG/ML
INJECTION, SOLUTION INTRAVENOUS CODE/TRAUMA/SEDATION MEDICATION
Status: COMPLETED | OUTPATIENT
Start: 2017-04-04 | End: 2017-04-04

## 2017-04-04 RX ORDER — DEXAMETHASONE SODIUM PHOSPHATE 100 MG/10ML
INJECTION INTRAMUSCULAR; INTRAVENOUS CODE/TRAUMA/SEDATION MEDICATION
Status: COMPLETED | OUTPATIENT
Start: 2017-04-04 | End: 2017-04-04

## 2017-04-04 RX ORDER — HEPARIN SODIUM 1000 [USP'U]/ML
INJECTION, SOLUTION INTRAVENOUS; SUBCUTANEOUS CODE/TRAUMA/SEDATION MEDICATION
Status: COMPLETED | OUTPATIENT
Start: 2017-04-04 | End: 2017-04-04

## 2017-04-04 RX ORDER — MIDAZOLAM HYDROCHLORIDE 1 MG/ML
2 INJECTION INTRAMUSCULAR; INTRAVENOUS
Status: DISCONTINUED | OUTPATIENT
Start: 2017-04-04 | End: 2017-04-04 | Stop reason: HOSPADM

## 2017-04-04 RX ORDER — ESOMEPRAZOLE MAGNESIUM 40 MG/1
40 CAPSULE, DELAYED RELEASE ORAL DAILY
Qty: 30 CAPSULE | Refills: 0 | Status: SHIPPED | OUTPATIENT
Start: 2017-04-04 | End: 2017-10-30 | Stop reason: ALTCHOICE

## 2017-04-04 RX ORDER — METHYLPREDNISOLONE 4 MG/1
TABLET ORAL
Qty: 1 PACKAGE | Refills: 0 | Status: SHIPPED | OUTPATIENT
Start: 2017-04-04 | End: 2017-06-12 | Stop reason: ALTCHOICE

## 2017-04-04 RX ADMIN — SODIUM CHLORIDE: 0.9 INJECTION, SOLUTION INTRAVENOUS at 01:04

## 2017-04-04 RX ADMIN — IOHEXOL 60 ML: 300 INJECTION, SOLUTION INTRAVENOUS at 03:04

## 2017-04-04 RX ADMIN — MIDAZOLAM HYDROCHLORIDE 1 MG: 1 INJECTION, SOLUTION INTRAMUSCULAR; INTRAVENOUS at 03:04

## 2017-04-04 RX ADMIN — ONDANSETRON 4 MG: 2 INJECTION INTRAMUSCULAR; INTRAVENOUS at 02:04

## 2017-04-04 RX ADMIN — MIDAZOLAM 1 MG: 1 INJECTION INTRAMUSCULAR; INTRAVENOUS at 02:04

## 2017-04-04 RX ADMIN — FENTANYL CITRATE 50 MCG: 50 INJECTION, SOLUTION INTRAMUSCULAR; INTRAVENOUS at 03:04

## 2017-04-04 RX ADMIN — NITROGLYCERIN 200 MCG: 5 INJECTION, SOLUTION INTRAVENOUS at 02:04

## 2017-04-04 RX ADMIN — MIDAZOLAM HYDROCHLORIDE 0.5 MG: 1 INJECTION, SOLUTION INTRAMUSCULAR; INTRAVENOUS at 02:04

## 2017-04-04 RX ADMIN — FENTANYL CITRATE 50 MCG: 50 INJECTION, SOLUTION INTRAMUSCULAR; INTRAVENOUS at 02:04

## 2017-04-04 RX ADMIN — FENTANYL CITRATE 25 MCG: 50 INJECTION, SOLUTION INTRAMUSCULAR; INTRAVENOUS at 02:04

## 2017-04-04 RX ADMIN — FENTANYL CITRATE 25 MCG: 50 INJECTION, SOLUTION INTRAMUSCULAR; INTRAVENOUS at 03:04

## 2017-04-04 RX ADMIN — DEXAMETHASONE SODIUM PHOSPHATE 20 MG: 10 INJECTION INTRAMUSCULAR; INTRAVENOUS at 03:04

## 2017-04-04 RX ADMIN — HEPARIN SODIUM 3000 UNITS: 1000 INJECTION, SOLUTION INTRAVENOUS; SUBCUTANEOUS at 02:04

## 2017-04-04 RX ADMIN — AMPICILLIN SODIUM AND SULBACTAM SODIUM 3 G: 2; 1 INJECTION, POWDER, FOR SOLUTION INTRAMUSCULAR; INTRAVENOUS at 01:04

## 2017-04-04 RX ADMIN — MIDAZOLAM HYDROCHLORIDE 0.5 MG: 1 INJECTION, SOLUTION INTRAMUSCULAR; INTRAVENOUS at 03:04

## 2017-04-04 NOTE — IP AVS SNAPSHOT
[facility]  [address]  [facility] [address]  Phone: [phone]           Patient Discharge Instructions   Our goal is to set you up for success. This packet includes information on your condition, medications, and your home care.  It will help you care for yourself to prevent having to return to the hospital.     Please ask your nurse if you have any questions.      There are many details to remember when preparing to leave the hospital. Here is what you will need to do:    1. Take your medicine. If you are prescribed medications, review your Medication List on the following pages. You may have new medications to  at the pharmacy and others that you'll need to stop taking. Review the instructions for how and when to take your medications. Talk with your doctor or nurses if you are unsure of what to do.     2. Go to your follow-up appointments. Specific follow-up information is listed in the following pages. Your may be contacted by a nurse or clinical provider about future appointments. Be sure we have all of the phone numbers to reach you. Please contact your provider's office if you are unable to make an appointment.     3. Watch for warning signs. Your doctor or nurse will give you detailed warning signs to watch for and when to call for assistance. These instructions may also include educational information about your condition. If you experience any of warning signs to your health, call your doctor.           [facility]  Unless otherwise directed by your provider, please   contact [facility], our nurse care line   that is available for 24/7 assistance.     [phone] (toll-free)     Registered nurses in the [facility]   provide: appointment scheduling, clinical advisement, health education, and other advisory services.                  ** Verify the list of medication(s) below is accurate and up to date. Carry this with you in case of  emergency. If your medications have changed, please notify your healthcare provider.             Medication List      START taking these medications        Additional Info                      esomeprazole 40 MG capsule   Commonly known as:  NEXIUM   Quantity:  30 capsule   Refills:  0   Dose:  40 mg    Instructions:  Take 1 capsule (40 mg total) by mouth once daily.     Begin Date    AM    Noon    PM    Bedtime       methylPREDNISolone 4 mg tablet   Commonly known as:  MEDROL DOSEPACK   Quantity:  1 Package   Refills:  0    Instructions:  use as directed     Begin Date    AM    Noon    PM    Bedtime         CHANGE how you take these medications        Additional Info                      mirabegron 50 mg Tb24   Commonly known as:  MYRBETRIQ   Quantity:  30 tablet   Refills:  11   Dose:  1 tablet   What changed:  when to take this    Instructions:  Take 1 tablet (50 mg total) by mouth once daily.     Begin Date    AM    Noon    PM    Bedtime       oxybutynin 10 MG 24 hr tablet   Commonly known as:  DITROPAN-XL   Quantity:  30 tablet   Refills:  5   What changed:  See the new instructions.    Instructions:  TAKE 1 TABLET(10 MG) BY MOUTH EVERY DAY     Begin Date    AM    Noon    PM    Bedtime         CONTINUE taking these medications        Additional Info                      albuterol-ipratropium 2.5mg-0.5mg/3mL 0.5 mg-3 mg(2.5 mg base)/3 mL nebulizer solution   Commonly known as:  DUO-NEB   Quantity:  360 mL   Refills:  6    Instructions:  USE 1 VIAL IN NEBULIZER EVERY 6 HOURS AS NEEDED FOR WHEEZING OR SHORTNESS OF BREATH     Begin Date    AM    Noon    PM    Bedtime       aspirin 81 MG Chew   Refills:  0   Dose:  81 mg    Instructions:  Take 81 mg by mouth every morning.     Begin Date    AM    Noon    PM    Bedtime       atorvastatin 40 MG tablet   Commonly known as:  LIPITOR   Quantity:  90 tablet   Refills:  0    Instructions:  TAKE 1 TABLET BY MOUTH EVERY DAY     Begin Date    AM    Noon    PM    Bedtime        budesonide 1 mg/2 mL Nbsp   Quantity:  120 mL   Refills:  6    Instructions:  USE CONTENT OF 1 VIAL IN NEBULIZER TWICE DAILY     Begin Date    AM    Noon    PM    Bedtime       dorzolamide-timolol 2-0.5% 22.3-6.8 mg/mL ophthalmic solution   Commonly known as:  COSOPT   Quantity:  10 mL   Refills:  12   Dose:  1 drop    Instructions:  Place 1 drop into both eyes 2 (two) times daily.     Begin Date    AM    Noon    PM    Bedtime       hydrochlorothiazide 25 MG tablet   Commonly known as:  HYDRODIURIL   Quantity:  90 tablet   Refills:  1   Dose:  25 mg    Instructions:  Take 1 tablet (25 mg total) by mouth once daily.     Begin Date    AM    Noon    PM    Bedtime       hydrocodone-acetaminophen 5-325mg 5-325 mg per tablet   Commonly known as:  NORCO   Quantity:  30 tablet   Refills:  0   Dose:  1 tablet    Instructions:  Take 1 tablet by mouth every 6 (six) hours as needed for Pain.     Begin Date    AM    Noon    PM    Bedtime       * latanoprost 0.005 % ophthalmic solution   Quantity:  7.5 mL   Refills:  12   Comments:  **Patient requests 90 days supply**    Instructions:  INSTILL 1 DROP IN BOTH EYES EVERY EVENING     Begin Date    AM    Noon    PM    Bedtime       * latanoprost 0.005 % ophthalmic solution   Quantity:  2.5 mL   Refills:  12    Instructions:  INSTILL 1 DROP IN BOTH EYES EVERY EVENING     Begin Date    AM    Noon    PM    Bedtime       lisinopril 20 MG tablet   Commonly known as:  PRINIVIL,ZESTRIL   Quantity:  90 tablet   Refills:  1    Instructions:  TAKE 1 TABLET BY MOUTH EVERY DAY     Begin Date    AM    Noon    PM    Bedtime       metoprolol tartrate 25 MG tablet   Commonly known as:  LOPRESSOR   Quantity:  90 tablet   Refills:  0    Instructions:  TAKE ONE-HALF TABLET BY MOUTH TWICE DAILY     Begin Date    AM    Noon    PM    Bedtime       multivitamin capsule   Refills:  0   Dose:  1 capsule    Instructions:  Take 1 capsule by mouth every morning.     Begin Date    AM    Noon    PM    Bedtime        omeprazole 20 MG capsule   Commonly known as:  PRILOSEC   Quantity:  180 capsule   Refills:  1    Instructions:  TAKE 2 CAPSULES BY MOUTH EVERY MORNING     Begin Date    AM    Noon    PM    Bedtime       * Notice:  This list has 2 medication(s) that are the same as other medications prescribed for you. Read the directions carefully, and ask your doctor or other care provider to review them with you.         Where to Get Your Medications      You can get these medications from any pharmacy     Bring a paper prescription for each of these medications     esomeprazole 40 MG capsule    methylPREDNISolone 4 mg tablet                  Please bring to all follow up appointments:    1. A copy of your discharge instructions.  2. All medicines you are currently taking in their original bottles.  3. Identification and insurance card.    Please arrive 15 minutes ahead of scheduled appointment time.    Please call 24 hours in advance if you must reschedule your appointment and/or time.        Your Scheduled Appointments     Apr 06, 2017 10:00 AM CDT   Non-Fasting Lab with LAB, [place] [facility] ([facility])    [address] [facility] [address]   [phone]            Apr 17, 2017  9:00 AM CDT   CT CHEST W/OUT CONTRAST with [facility] CT1 64- LIMIT 450 LBS   [facility] ([facility] )    [address] [facility] [address]   [phone]            Apr 17, 2017  9:15 AM CDT   Ct Abd Pel W Contrast with [facility] CT1 64- LIMIT 450 LBS   [facility] ([facility] )    [address] [facility] [address]   [phone]            Apr 17, 2017 10:00 AM CDT   Non-Fasting Lab with LAB, [place] [facility] ([facility])    [address] [place] [address]   [phone]            Apr 26, 2017 11:00 AM CDT   Established Patient Visit with Ortiz MILLER  DO Gerardo, REMI   [facility] ([facility])    [address]  [facility] [address] [phone]                  Discharge Instructions       ROCU MON-FRI 8 AM- 5PM [phone]. Radiology resident on call [phone].    The following are instructions your doctor would like you to follow regarding your activity, diet, and follow up care.    Your procedure was done by making a small puncture in your wrist. You must observe that area for bleeding and swelling once you are discharged from the hospital. Be careful not to over-stimulate the affected wrist for 24 hours.    1. Do not strenuously flex the wrist for 24 hours.  2. Occlusive bandage (Tegaderm) may be removed after 24 hours.  3. At 24 hours after your procedure, remove the Tagaderm bandage and leave puncture site open to air. If minor oozing, apply adhesive bandage (Band-Aid) and remove after 12 hours.  4. No driving for 24 hours.  5. No soaking wrist for 2 days. Gently wash site with soap and water. Dry well. Do not apply powders, lotions or ointments. No tub baths, whirlpool or swimming for 48 hours.  6. No lifting more than 3-5 pounds with affected wrist for 7 days.  7. Restart your usual diet and medications with appropriate changes as dictated by your doctor.  8. For any bleeding, hold pressure with thumb against puncture site and finger against back of wrist.  9. If you see fresh bleeding, bright red or a lump that is getting bigger at the puncture site, apply pressure to the area as instructed above and call [phone] between the hours of 8:00am-5:00pm or [phone] after hours and ask to speak to the Interventional Radiology Resident on-call. If you are unable to control the bleeding, call [phone].   10. Call the Interventional Radiology Resident on-call at [phone] with any concerns or any of the following:  - Swelling, redness, discharge for the site, large bruising or increasing pain, numbness or  tingling at the site  - Temperature of 101 F or higher  - Shortness of breath      Discharge References/Attachments     HEPATIC ANGIOGRAPHY, DISCHARGE INSTRUCTIONS (ENGLISH)    SEDATION, PROCEDURAL (ADULT) (ENGLISH)        Admission Information     Date & Time Provider Department CSN    4/4/2017  9:55 AM Octavia Scott MD [facility] [id number]      Care Providers     Provider Role Specialty Primary office phone    Octavia Scott MD Attending Provider Hepatology [phone]    [facility] Diagnostic Provider Surgeon  -- Number not on file      Your Vitals Were     BP Pulse Temp Resp Height Weight    147/78 53 97.9 °F (36.6 °C) (Oral) 18 6' (1.829 m) 97.5 kg (215 lb)    SpO2 BMI             96% 29.16 kg/m2         Recent Lab Values        12/23/2014 12/30/2015 5/10/2016 2/14/2017                 10:32 AM  4:34 PM  7:07 AM  7:42 AM        A1C 6.0 6.1 6.4 (H) 6.0        Comment for A1C at  7:42 AM on 2/14/2017:  According to ADA guidelines, hemoglobin A1C <7.0% represents  optimal control in non-pregnant diabetic patients.  Different  metrics may apply to specific populations.   Standards of Medical Care in Diabetes - 2016.  For the purpose of screening for the presence of diabetes:  <5.7%     Consistent with the absence of diabetes  5.7-6.4%  Consistent with increasing risk for diabetes   (prediabetes)  >or=6.5%  Consistent with diabetes  Currently no consensus exists for use of hemoglobin A1C  for diagnosis of diabetes for children.        Allergies as of 4/4/2017     No Known Allergies      Advance Directives     An advance directive is a document which, in the event you are no longer able to make decisions for yourself, tells your healthcare team what kind of treatment you do or do not want to receive, or who you would like to make those decisions for you.  If you do not currently have an advance directive, [facility] encourages you to create one.  For more information call:  [phone]  [phone]), [phone] ([phone]),  or log on to [email or web address].        Smoking Cessation     If you would like to quit smoking:   You may be eligible for free services if you are a [place] resident and started smoking cigarettes before September 1, 1988.  Call the Smoking Cessation Trust (SCT) toll free at (246) 681-1392 or (090) 679-5998.   Call 1-800-QUIT-NOW if you do not meet the above criteria.   Contact us via email: [email or web address]   View our website for more information: [email or web address]        Language Assistance Services     ATTENTION: Language assistance services are available, free of charge. Please call [phone].      ATENCIÓN: Si izabelala josse, tiene a gonzalez disposición servicios gratuitos de asistencia lingüística. Llame al [phone].     CHÚ Ý: N?u b?n nói Ti?ng Vi?t, có các d?ch v? h? tr? ngôn ng? mi?n phí dành cho b?n. G?i s? [phone].         [facility] complies with applicable Federal civil rights laws and does not discriminate on the basis of race, color, national origin, age, disability, or sex.

## 2017-04-04 NOTE — PROGRESS NOTES
Y-90 complete. Pt tolerated well. Discharge instructions and handouts provided. Pt verbalized understanding. VSS. NAD. Pt escorted to  Lobby via wheelchair. Pt discharged.

## 2017-04-04 NOTE — PROGRESS NOTES
Pt RR noted to be increased, however sats 98% on RA and in NAD. Dr. Nur at bedside to assess pt. Ok to proceed.

## 2017-04-04 NOTE — PROGRESS NOTES
Pt arrived to ROCU bed 4/5 for post Yttrium recovery. Report received from CESAR Desir RN. Pt oriented to unit and staff.  Stretcher locked and placed in lowest position. Calming environment promoted. Comfort measures provided. Pt resting comfortably. Dressing CDI. VSS. No acute events.  See flow sheets for post procedure monitoring. Pt denies pain/discomfort.  Will continue to monitor.

## 2017-04-04 NOTE — PROCEDURES
Radiology Post-Procedure Note    Pre Op Diagnosis: Hepatocellular carcinoma    Post Op Diagnosis: Same    Procedure: Yttrium treatment    Procedure performed by: Agnes LONGORIA, Mauricio Nur    Written Informed Consent Obtained: Yes    Specimen Removed: No    Estimated Blood Loss: Minimal    Findings: Successful yttrium radioembolization of the right hepatic artery.    Patient tolerated procedure well.    Alan Nur MD  Resident  Department of Radiology  Pager: [phone]

## 2017-04-04 NOTE — DISCHARGE INSTRUCTIONS
[facility] MON-FRI 8 AM- 5PM [phone]. Radiology resident on call [phone].    The following are instructions your doctor would like you to follow regarding your activity, diet, and follow up care.    Your procedure was done by making a small puncture in your wrist. You must observe that area for bleeding and swelling once you are discharged from the hospital. Be careful not to over-stimulate the affected wrist for 24 hours.    1. Do not strenuously flex the wrist for 24 hours.  2. Occlusive bandage (Tegaderm) may be removed after 24 hours.  3. At 24 hours after your procedure, remove the Tagaderm bandage and leave puncture site open to air. If minor oozing, apply adhesive bandage (Band-Aid) and remove after 12 hours.  4. No driving for 24 hours.  5. No soaking wrist for 2 days. Gently wash site with soap and water. Dry well. Do not apply powders, lotions or ointments. No tub baths, whirlpool or swimming for 48 hours.  6. No lifting more than 3-5 pounds with affected wrist for 7 days.  7. Restart your usual diet and medications with appropriate changes as dictated by your doctor.  8. For any bleeding, hold pressure with thumb against puncture site and finger against back of wrist.  9. If you see fresh bleeding, bright red or a lump that is getting bigger at the puncture site, apply pressure to the area as instructed above and call [phone] between the hours of 8:00am-5:00pm or [phone] after hours and ask to speak to the Interventional Radiology Resident on-call. If you are unable to control the bleeding, call [phone].   10. Call the Interventional Radiology Resident on-call at [phone] with any concerns or any of the following:  - Swelling, redness, discharge for the site, large bruising or increasing pain, numbness or tingling at the site  - Temperature of 101 F or higher  - Shortness of breath

## 2017-04-04 NOTE — PROGRESS NOTES
Pt arrived to IR room 189 for right hepatic artery radioembolization, no acute distress noted. Orders, consent and labs reviewed on chart.
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
## 2017-04-04 NOTE — PROGRESS NOTES
Right hepatic artery radioembolization completed, pt tolerated well. No apparent distress noted. TR band applied with 15cc of air, CDI. Pt to be transferred to nuclear medicine and then ROCU, report to be given at bedside.

## 2017-04-06 NOTE — DISCHARGE SUMMARY
Radiology Discharge Summary      Hospital Course: No complications    Admit Date: 4/4/2017  Discharge Date: 4/4/2017    Instructions Given to Patient: Yes  Diet: Resume prior diet  Activity: activity as tolerated    Description of Condition on Discharge: Stable  Vital Signs (Most Recent): Temp: 97.9 °F (36.6 °C) (04/04/17 1600)  Pulse: 66 (04/04/17 1730)  Resp: 18 (04/04/17 1730)  BP: 138/78 (04/04/17 1730)  SpO2: 99 % (04/04/17 1730)    Discharge Disposition: Home    Discharge Diagnosis: HCC s/p Y90 radioembolization    Alan Nur MD  Resident  Department of Radiology  Pager: [phone]

## 2017-04-07 RX ORDER — METOPROLOL TARTRATE 25 MG/1
TABLET, FILM COATED ORAL
Qty: 90 TABLET | Refills: 0 | Status: SHIPPED | OUTPATIENT
Start: 2017-04-07 | End: 2017-07-06 | Stop reason: SDUPTHER

## 2017-04-10 DIAGNOSIS — C22.0 HCC (HEPATOCELLULAR CARCINOMA): Primary | ICD-10-CM

## 2017-04-17 ENCOUNTER — LAB VISIT (OUTPATIENT)
Dept: LAB | Facility: HOSPITAL | Age: 67
End: 2017-04-17
Attending: INTERNAL MEDICINE
Payer: MEDICARE

## 2017-04-17 DIAGNOSIS — C22.0 HCC (HEPATOCELLULAR CARCINOMA): Chronic | ICD-10-CM

## 2017-04-17 LAB — AFP SERPL-MCNC: 3662 NG/ML

## 2017-04-17 PROCEDURE — 82105 ALPHA-FETOPROTEIN SERUM: CPT

## 2017-04-17 PROCEDURE — 36415 COLL VENOUS BLD VENIPUNCTURE: CPT

## 2017-04-24 RX ORDER — OXYBUTYNIN CHLORIDE 10 MG/1
TABLET, EXTENDED RELEASE ORAL
Qty: 90 TABLET | Refills: 0 | Status: SHIPPED | OUTPATIENT
Start: 2017-04-24 | End: 2017-06-12 | Stop reason: SDUPTHER

## 2017-04-26 ENCOUNTER — OFFICE VISIT (OUTPATIENT)
Dept: HEMATOLOGY/ONCOLOGY | Facility: CLINIC | Age: 67
End: 2017-04-26
Payer: MEDICARE

## 2017-04-26 VITALS
HEIGHT: 73 IN | OXYGEN SATURATION: 100 % | BODY MASS INDEX: 27.96 KG/M2 | RESPIRATION RATE: 20 BRPM | TEMPERATURE: 98 F | DIASTOLIC BLOOD PRESSURE: 72 MMHG | WEIGHT: 211 LBS | SYSTOLIC BLOOD PRESSURE: 133 MMHG | HEART RATE: 67 BPM

## 2017-04-26 DIAGNOSIS — C22.0 HCC (HEPATOCELLULAR CARCINOMA): Primary | Chronic | ICD-10-CM

## 2017-04-26 PROCEDURE — 3075F SYST BP GE 130 - 139MM HG: CPT | Mod: S$GLB,,, | Performed by: INTERNAL MEDICINE

## 2017-04-26 PROCEDURE — 1126F AMNT PAIN NOTED NONE PRSNT: CPT | Mod: S$GLB,,, | Performed by: INTERNAL MEDICINE

## 2017-04-26 PROCEDURE — 1160F RVW MEDS BY RX/DR IN RCRD: CPT | Mod: S$GLB,,, | Performed by: INTERNAL MEDICINE

## 2017-04-26 PROCEDURE — 99999 PR PBB SHADOW E&M-EST. PATIENT-LVL IV: CPT | Mod: PBBFAC,,, | Performed by: INTERNAL MEDICINE

## 2017-04-26 PROCEDURE — 1159F MED LIST DOCD IN RCRD: CPT | Mod: S$GLB,,, | Performed by: INTERNAL MEDICINE

## 2017-04-26 PROCEDURE — 99214 OFFICE O/P EST MOD 30 MIN: CPT | Mod: S$GLB,,, | Performed by: INTERNAL MEDICINE

## 2017-04-26 PROCEDURE — 3078F DIAST BP <80 MM HG: CPT | Mod: S$GLB,,, | Performed by: INTERNAL MEDICINE

## 2017-04-26 PROCEDURE — 99499 UNLISTED E&M SERVICE: CPT | Mod: S$GLB,,, | Performed by: INTERNAL MEDICINE
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
## 2017-05-01 RX ORDER — ATORVASTATIN CALCIUM 40 MG/1
TABLET, FILM COATED ORAL
Qty: 90 TABLET | Refills: 3 | Status: SHIPPED | OUTPATIENT
Start: 2017-05-01 | End: 2018-04-27 | Stop reason: SDUPTHER

## 2017-05-04 RX ORDER — OMEPRAZOLE 20 MG/1
CAPSULE, DELAYED RELEASE ORAL
Qty: 180 CAPSULE | Refills: 1 | Status: SHIPPED | OUTPATIENT
Start: 2017-05-04 | End: 2017-06-05 | Stop reason: SDUPTHER

## 2017-05-10 ENCOUNTER — LAB VISIT (OUTPATIENT)
Dept: LAB | Facility: HOSPITAL | Age: 67
End: 2017-05-10
Attending: INTERNAL MEDICINE
Payer: MEDICARE

## 2017-05-10 DIAGNOSIS — C22.0 HEPATOCELLULAR CARCINOMA: ICD-10-CM

## 2017-05-10 LAB — AFP SERPL-MCNC: 5556 NG/ML

## 2017-05-10 PROCEDURE — 36415 COLL VENOUS BLD VENIPUNCTURE: CPT | Mod: PO

## 2017-05-10 PROCEDURE — 82105 ALPHA-FETOPROTEIN SERUM: CPT
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
## 2017-05-15 ENCOUNTER — TELEPHONE (OUTPATIENT)
Dept: HEPATOLOGY | Facility: CLINIC | Age: 67
End: 2017-05-15

## 2017-05-15 ENCOUNTER — TELEPHONE (OUTPATIENT)
Dept: ADMINISTRATIVE | Facility: OTHER | Age: 67
End: 2017-05-15

## 2017-05-15 NOTE — TELEPHONE ENCOUNTER
----- Message from Octavia Scott MD sent at 5/15/2017  9:08 AM CDT -----  Rising afp. Repeat imaging due in June. Please ensure pt has f/u with me and Dr tavares

## 2017-05-15 NOTE — TELEPHONE ENCOUNTER
MA called patient to schedule his follow up visit to see Dr. Scott. Patient sister accepted 6/5/17 mailed appt reminder to patient.BESSIE

## 2017-05-22 ENCOUNTER — OFFICE VISIT (OUTPATIENT)
Dept: HEMATOLOGY/ONCOLOGY | Facility: CLINIC | Age: 67
End: 2017-05-22
Payer: MEDICARE

## 2017-05-22 VITALS
BODY MASS INDEX: 28.02 KG/M2 | RESPIRATION RATE: 14 BRPM | TEMPERATURE: 99 F | HEART RATE: 67 BPM | SYSTOLIC BLOOD PRESSURE: 121 MMHG | HEIGHT: 73 IN | WEIGHT: 211.44 LBS | DIASTOLIC BLOOD PRESSURE: 59 MMHG | OXYGEN SATURATION: 100 %

## 2017-05-22 DIAGNOSIS — R77.2 ELEVATED AFP: ICD-10-CM

## 2017-05-22 DIAGNOSIS — C22.0 HCC (HEPATOCELLULAR CARCINOMA): Primary | Chronic | ICD-10-CM

## 2017-05-22 PROCEDURE — 1126F AMNT PAIN NOTED NONE PRSNT: CPT | Mod: S$GLB,,, | Performed by: INTERNAL MEDICINE

## 2017-05-22 PROCEDURE — 99214 OFFICE O/P EST MOD 30 MIN: CPT | Mod: S$GLB,,, | Performed by: INTERNAL MEDICINE

## 2017-05-22 PROCEDURE — 99999 PR PBB SHADOW E&M-EST. PATIENT-LVL IV: CPT | Mod: PBBFAC,,, | Performed by: INTERNAL MEDICINE

## 2017-05-22 PROCEDURE — 3074F SYST BP LT 130 MM HG: CPT | Mod: S$GLB,,, | Performed by: INTERNAL MEDICINE

## 2017-05-22 PROCEDURE — 99499 UNLISTED E&M SERVICE: CPT | Mod: S$GLB,,, | Performed by: INTERNAL MEDICINE

## 2017-05-22 PROCEDURE — 1159F MED LIST DOCD IN RCRD: CPT | Mod: S$GLB,,, | Performed by: INTERNAL MEDICINE

## 2017-05-22 PROCEDURE — 1160F RVW MEDS BY RX/DR IN RCRD: CPT | Mod: S$GLB,,, | Performed by: INTERNAL MEDICINE

## 2017-05-22 PROCEDURE — 3078F DIAST BP <80 MM HG: CPT | Mod: S$GLB,,, | Performed by: INTERNAL MEDICINE

## 2017-05-22 NOTE — Clinical Note
Follow-up with me on June 7 at 10:00 Add CT of the chest to the CT he already has scheduled on June 5
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
## 2017-05-25 DIAGNOSIS — N39.41 URGE INCONTINENCE: ICD-10-CM

## 2017-05-25 NOTE — TELEPHONE ENCOUNTER
Pt was last seen by dr valentine 5-27-16. Pt next visit with dr valentine  is scheduled for 6-12-17

## 2017-06-02 ENCOUNTER — TELEPHONE (OUTPATIENT)
Dept: RADIOLOGY | Facility: HOSPITAL | Age: 67
End: 2017-06-02

## 2017-06-05 ENCOUNTER — HOSPITAL ENCOUNTER (OUTPATIENT)
Dept: RADIOLOGY | Facility: HOSPITAL | Age: 67
Discharge: HOME | End: 2017-06-05
Attending: INTERNAL MEDICINE
Payer: MEDICARE

## 2017-06-05 ENCOUNTER — OFFICE VISIT (OUTPATIENT)
Dept: HEPATOLOGY | Facility: CLINIC | Age: 67
End: 2017-06-05
Payer: MEDICARE

## 2017-06-05 VITALS
DIASTOLIC BLOOD PRESSURE: 79 MMHG | TEMPERATURE: 97 F | SYSTOLIC BLOOD PRESSURE: 152 MMHG | HEIGHT: 73 IN | HEART RATE: 65 BPM | BODY MASS INDEX: 27.91 KG/M2 | WEIGHT: 210.56 LBS | OXYGEN SATURATION: 99 %

## 2017-06-05 DIAGNOSIS — C22.0 HCC (HEPATOCELLULAR CARCINOMA): ICD-10-CM

## 2017-06-05 DIAGNOSIS — K74.60 CIRRHOSIS OF LIVER WITHOUT ASCITES, UNSPECIFIED HEPATIC CIRRHOSIS TYPE: Chronic | ICD-10-CM

## 2017-06-05 DIAGNOSIS — R77.2 ELEVATED AFP: ICD-10-CM

## 2017-06-05 DIAGNOSIS — C22.0 HCC (HEPATOCELLULAR CARCINOMA): Primary | ICD-10-CM

## 2017-06-05 DIAGNOSIS — B19.20 HEPATITIS C VIRUS INFECTION, UNSPECIFIED CHRONICITY: Chronic | ICD-10-CM

## 2017-06-05 PROCEDURE — 99499 UNLISTED E&M SERVICE: CPT | Mod: S$GLB,,, | Performed by: INTERNAL MEDICINE

## 2017-06-05 PROCEDURE — 99215 OFFICE O/P EST HI 40 MIN: CPT | Mod: S$GLB,,, | Performed by: INTERNAL MEDICINE

## 2017-06-05 PROCEDURE — 1126F AMNT PAIN NOTED NONE PRSNT: CPT | Mod: S$GLB,,, | Performed by: INTERNAL MEDICINE

## 2017-06-05 PROCEDURE — 99999 PR PBB SHADOW E&M-EST. PATIENT-LVL V: CPT | Mod: PBBFAC,,, | Performed by: INTERNAL MEDICINE

## 2017-06-05 PROCEDURE — 1159F MED LIST DOCD IN RCRD: CPT | Mod: S$GLB,,, | Performed by: INTERNAL MEDICINE
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
## 2017-06-05 NOTE — PATIENT INSTRUCTIONS
1. Continue to do liver labs monthly  2. Winifred will reschedule ct scan with picc placement- after June 20th ([id number], winifred- his sister)  3. Bone scan to rule out mets to the bones  Return 3 months

## 2017-06-05 NOTE — Clinical Note
Winifred will reschedule ct scan with picc placement- after June 20th ([id number], winifred- his sister)

## 2017-06-06 ENCOUNTER — TELEPHONE (OUTPATIENT)
Dept: HEPATOLOGY | Facility: CLINIC | Age: 67
End: 2017-06-06

## 2017-06-07 ENCOUNTER — TELEPHONE (OUTPATIENT)
Dept: HEPATOLOGY | Facility: CLINIC | Age: 67
End: 2017-06-07

## 2017-06-07 ENCOUNTER — OFFICE VISIT (OUTPATIENT)
Dept: HEMATOLOGY/ONCOLOGY | Facility: CLINIC | Age: 67
End: 2017-06-07
Payer: MEDICARE

## 2017-06-07 VITALS
TEMPERATURE: 98 F | BODY MASS INDEX: 28.01 KG/M2 | RESPIRATION RATE: 18 BRPM | OXYGEN SATURATION: 99 % | WEIGHT: 212.31 LBS | SYSTOLIC BLOOD PRESSURE: 120 MMHG | DIASTOLIC BLOOD PRESSURE: 61 MMHG | HEART RATE: 60 BPM

## 2017-06-07 DIAGNOSIS — C22.0 HCC (HEPATOCELLULAR CARCINOMA): Primary | ICD-10-CM

## 2017-06-07 DIAGNOSIS — C22.0 HCC (HEPATOCELLULAR CARCINOMA): Primary | Chronic | ICD-10-CM

## 2017-06-07 DIAGNOSIS — R77.2 ELEVATED AFP: ICD-10-CM

## 2017-06-07 PROCEDURE — 99213 OFFICE O/P EST LOW 20 MIN: CPT | Mod: S$GLB,,, | Performed by: INTERNAL MEDICINE

## 2017-06-07 PROCEDURE — 99499 UNLISTED E&M SERVICE: CPT | Mod: S$GLB,,, | Performed by: INTERNAL MEDICINE

## 2017-06-07 PROCEDURE — 1159F MED LIST DOCD IN RCRD: CPT | Mod: S$GLB,,, | Performed by: INTERNAL MEDICINE

## 2017-06-07 PROCEDURE — 1126F AMNT PAIN NOTED NONE PRSNT: CPT | Mod: S$GLB,,, | Performed by: INTERNAL MEDICINE

## 2017-06-07 PROCEDURE — 99999 PR PBB SHADOW E&M-EST. PATIENT-LVL IV: CPT | Mod: PBBFAC,,, | Performed by: INTERNAL MEDICINE

## 2017-06-07 NOTE — TELEPHONE ENCOUNTER
Spoke with pt's sister, would like PICC schedule after 06/19.     Spoke with Mag can schedule on 06/21/2017 @ 9am but need orders enter correctly.     Mag will hold spot
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
## 2017-06-07 NOTE — TELEPHONE ENCOUNTER
----- Message from Rubi Flannery sent at 6/7/2017  9:38 AM CDT -----  Contact: sister  Needs to speak with Winifred perdomo. Said he has an appt on Mon but needs pic line insertion before that they can't find his veins and he missed 2 appts the other day because of it, wants a call back asap @ # [phone].

## 2017-06-08 ENCOUNTER — TELEPHONE (OUTPATIENT)
Dept: HEMATOLOGY/ONCOLOGY | Facility: CLINIC | Age: 67
End: 2017-06-08

## 2017-06-08 ENCOUNTER — TELEPHONE (OUTPATIENT)
Dept: HEPATOLOGY | Facility: CLINIC | Age: 67
End: 2017-06-08

## 2017-06-08 NOTE — TELEPHONE ENCOUNTER
----- Message from Laureen Robles sent at 6/8/2017 12:47 PM CDT -----  Contact: Darby Hawkins (Sister  Darby is calling wanting more information on the patient's appt.  Darby does not know where to take her brother for his bone density and she wants more information on which [facility] she is supposed to take him to first.  Darby also stated that someone from Dr. Scott's office contacted her about his appts, but was not able to speak with anyone and she has been calling, but still has not spoke with anyone.    Please contact Darby as soon as possible at [phone]    Thank you

## 2017-06-08 NOTE — TELEPHONE ENCOUNTER
Called pt sister and ask her to come in at 7;30 Monday to get the picc line and ask the doctor to put in the right orders.

## 2017-06-08 NOTE — TELEPHONE ENCOUNTER
----- Message from Suellen Rowe sent at 6/8/2017 10:27 AM CDT -----  Patient's sister Manju would like the nurse to give her a call back at [phone].

## 2017-06-08 NOTE — TELEPHONE ENCOUNTER
Spoke with pt's sister, requesting that CT be done on Monday with other appt.     Called CT spoke with supervisor Courtney, unable to schedule CT next week, but can schedule CT week of June 19. I asked if CT can be moved from 06/21 to 06/20, so that Dr. Carrillo can have results for f/u appt.     Called Mag to see if Picc line can stay in until after CT appt on 06/20. Mag said yes, pt will be instructed on how to care for Picc. She will schedule removal after CT appt on 06/20 @ 3 or 4pm.     I spoke with pt's sister Winifred, all appointments  discussed in detailed.  She agreed and confirmed all appts. Appt letter mailed to address on file.

## 2017-06-08 NOTE — TELEPHONE ENCOUNTER
----- Message from Ortiz Carrillo DO, FACP sent at 6/8/2017 11:44 AM CDT -----  i didn't order this.  Possibly hepatology      ----- Message -----  From: Nancy Reynolds MA  Sent: 6/8/2017  11:10 AM  To: Ortiz Carrillo DO, FACP    IR said the orders is in wrong. The order need to say IR picc xpd

## 2017-06-08 NOTE — TELEPHONE ENCOUNTER
IM ([facility])- received from Nancy Reynolds M.A.  Who will call IR to schedule pt PICC insertion.

## 2017-06-09 ENCOUNTER — TELEPHONE (OUTPATIENT)
Dept: HEMATOLOGY/ONCOLOGY | Facility: CLINIC | Age: 67
End: 2017-06-09

## 2017-06-09 NOTE — TELEPHONE ENCOUNTER
Spoke with uma yesterday to get the picc line in he is schedule for Monday spoke with his sister she said her son will bring him to the appt. Spoke with the MA for the doctor that put in the orders and ok for him to keep the picc line in until his ct scan.

## 2017-06-12 ENCOUNTER — OFFICE VISIT (OUTPATIENT)
Dept: UROLOGY | Facility: CLINIC | Age: 67
End: 2017-06-12
Payer: MEDICARE

## 2017-06-12 ENCOUNTER — TELEPHONE (OUTPATIENT)
Dept: HEPATOLOGY | Facility: CLINIC | Age: 67
End: 2017-06-12

## 2017-06-12 ENCOUNTER — HOSPITAL ENCOUNTER (OUTPATIENT)
Dept: RADIOLOGY | Facility: HOSPITAL | Age: 67
Discharge: HOME | End: 2017-06-12
Attending: INTERNAL MEDICINE
Payer: MEDICARE

## 2017-06-12 ENCOUNTER — HOSPITAL ENCOUNTER (OUTPATIENT)
Dept: INTERVENTIONAL RADIOLOGY/VASCULAR | Facility: HOSPITAL | Age: 67
Discharge: HOME | End: 2017-06-12
Attending: INTERNAL MEDICINE
Payer: MEDICARE

## 2017-06-12 VITALS
DIASTOLIC BLOOD PRESSURE: 74 MMHG | RESPIRATION RATE: 20 BRPM | SYSTOLIC BLOOD PRESSURE: 147 MMHG | OXYGEN SATURATION: 97 % | HEART RATE: 63 BPM

## 2017-06-12 VITALS
WEIGHT: 212.5 LBS | SYSTOLIC BLOOD PRESSURE: 122 MMHG | BODY MASS INDEX: 28.16 KG/M2 | HEART RATE: 66 BPM | HEIGHT: 73 IN | DIASTOLIC BLOOD PRESSURE: 76 MMHG

## 2017-06-12 DIAGNOSIS — C22.0 HCC (HEPATOCELLULAR CARCINOMA): ICD-10-CM

## 2017-06-12 DIAGNOSIS — Z85.46 PERSONAL HISTORY OF PROSTATE CANCER: Primary | ICD-10-CM

## 2017-06-12 DIAGNOSIS — N39.41 URGE INCONTINENCE: ICD-10-CM

## 2017-06-12 DIAGNOSIS — C22.0 HCC (HEPATOCELLULAR CARCINOMA): Primary | ICD-10-CM

## 2017-06-12 PROCEDURE — A9503 TC99M MEDRONATE: HCPCS

## 2017-06-12 PROCEDURE — 76937 US GUIDE VASCULAR ACCESS: CPT | Mod: 26,,, | Performed by: RADIOLOGY

## 2017-06-12 PROCEDURE — 99999 PR PBB SHADOW E&M-EST. PATIENT-LVL IV: CPT | Mod: PBBFAC,,, | Performed by: UROLOGY

## 2017-06-12 PROCEDURE — 1126F AMNT PAIN NOTED NONE PRSNT: CPT | Mod: S$GLB,,, | Performed by: UROLOGY

## 2017-06-12 PROCEDURE — 77001 FLUOROGUIDE FOR VEIN DEVICE: CPT | Mod: TC

## 2017-06-12 PROCEDURE — 99214 OFFICE O/P EST MOD 30 MIN: CPT | Mod: S$GLB,,, | Performed by: UROLOGY

## 2017-06-12 PROCEDURE — 36569 INSJ PICC 5 YR+ W/O IMAGING: CPT | Mod: ,,, | Performed by: RADIOLOGY

## 2017-06-12 PROCEDURE — 77001 FLUOROGUIDE FOR VEIN DEVICE: CPT | Mod: 26,,, | Performed by: RADIOLOGY

## 2017-06-12 PROCEDURE — 78306 BONE IMAGING WHOLE BODY: CPT | Mod: 26,,, | Performed by: RADIOLOGY

## 2017-06-12 PROCEDURE — 1159F MED LIST DOCD IN RCRD: CPT | Mod: S$GLB,,, | Performed by: UROLOGY

## 2017-06-12 PROCEDURE — 76937 US GUIDE VASCULAR ACCESS: CPT | Mod: TC

## 2017-06-12 RX ORDER — OXYBUTYNIN CHLORIDE 10 MG/1
10 TABLET, EXTENDED RELEASE ORAL DAILY
Qty: 90 TABLET | Refills: 3 | Status: SHIPPED | OUTPATIENT
Start: 2017-06-12 | End: 2018-07-11 | Stop reason: SDUPTHER
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
## 2017-06-12 NOTE — PROGRESS NOTES
PICC Line Placement  complete. Pt tolerated well. Discharge instructions and handouts provided. Pt verbalized understanding. Pt refused wheelchair, ambulated out of facility..
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
## 2017-06-12 NOTE — PROCEDURES
Radiology Post-Procedure Note    Pre Op Diagnosis: HCC    Post Op Diagnosis: Same    Procedure: PICC placement    Procedure performed by: Neelima LONGORIA, Lamont    Written Informed Consent Obtained: Yes    Specimen Removed: No    Estimated Blood Loss: Minimal    Findings:   Using realtime U/S guidance a 5 Fr PICC catheter was placed into the right Basilic Vein with tip of the catheter in the SVC.    PICC is ready for use.     Alan Nur MD  Resident  Department of Radiology  Pager: [phone]

## 2017-06-13 ENCOUNTER — TELEPHONE (OUTPATIENT)
Dept: HEPATOLOGY | Facility: CLINIC | Age: 67
End: 2017-06-13

## 2017-06-13 NOTE — TELEPHONE ENCOUNTER
Called and spoke with patient and his sister Darby, relayed message from Dr Scott, regarding scheduling x-ray.    Will order femur xray to r/o mets- please let patient know.    Patient and sister verbalizes understanding, test scheduled as ordered. Appointment letter mailed.

## 2017-06-19 ENCOUNTER — TELEPHONE (OUTPATIENT)
Dept: HEPATOLOGY | Facility: CLINIC | Age: 67
End: 2017-06-19

## 2017-06-19 ENCOUNTER — TELEPHONE (OUTPATIENT)
Dept: RADIOLOGY | Facility: HOSPITAL | Age: 67
End: 2017-06-19

## 2017-06-19 NOTE — TELEPHONE ENCOUNTER
Message from Dr Scott.        Winifred will reschedule ct scan with picc placement- after June 20th ([phone], winifred- his sister)      Call placed to the mobile number of the sister,Manju to reschedule the 3 scheduled appointments on 6/20/17. Xray of Femur, Picc line insertion, CT Scan of Abd, and Removal of Picc Line.  No Answer. Unable to leave voicemail message. Mail Box not set up. DB

## 2017-06-20 ENCOUNTER — HOSPITAL ENCOUNTER (OUTPATIENT)
Dept: INTERVENTIONAL RADIOLOGY/VASCULAR | Facility: HOSPITAL | Age: 67
Discharge: HOME | End: 2017-06-20
Attending: INTERNAL MEDICINE
Payer: MEDICARE

## 2017-06-20 ENCOUNTER — HOSPITAL ENCOUNTER (OUTPATIENT)
Dept: RADIOLOGY | Facility: HOSPITAL | Age: 67
Discharge: HOME | End: 2017-06-20
Attending: NURSE PRACTITIONER
Payer: MEDICARE

## 2017-06-20 ENCOUNTER — TELEPHONE (OUTPATIENT)
Dept: HEPATOLOGY | Facility: CLINIC | Age: 67
End: 2017-06-20

## 2017-06-20 ENCOUNTER — HOSPITAL ENCOUNTER (OUTPATIENT)
Dept: RADIOLOGY | Facility: HOSPITAL | Age: 67
Discharge: HOME | End: 2017-06-20
Attending: INTERNAL MEDICINE
Payer: MEDICARE

## 2017-06-20 ENCOUNTER — TELEPHONE (OUTPATIENT)
Dept: RADIOLOGY | Facility: HOSPITAL | Age: 67
End: 2017-06-20

## 2017-06-20 DIAGNOSIS — C22.0 HCC (HEPATOCELLULAR CARCINOMA): ICD-10-CM

## 2017-06-20 PROCEDURE — 74177 CT ABD & PELVIS W/CONTRAST: CPT | Mod: 26,,, | Performed by: RADIOLOGY

## 2017-06-20 PROCEDURE — 71260 CT THORAX DX C+: CPT | Mod: 26,,, | Performed by: RADIOLOGY

## 2017-06-20 PROCEDURE — 25500020 PHARM REV CODE 255: Performed by: NURSE PRACTITIONER

## 2017-06-20 PROCEDURE — 71260 CT THORAX DX C+: CPT | Mod: TC

## 2017-06-20 PROCEDURE — 73552 X-RAY EXAM OF FEMUR 2/>: CPT | Mod: 26,LT,, | Performed by: RADIOLOGY

## 2017-06-20 PROCEDURE — 74177 CT ABD & PELVIS W/CONTRAST: CPT | Mod: TC

## 2017-06-20 PROCEDURE — 73552 X-RAY EXAM OF FEMUR 2/>: CPT | Mod: TC,LT

## 2017-06-20 RX ADMIN — IOHEXOL 15 ML: 350 INJECTION, SOLUTION INTRAVENOUS at 01:06

## 2017-06-20 RX ADMIN — IOHEXOL 15 ML: 350 INJECTION, SOLUTION INTRAVENOUS at 12:06

## 2017-06-20 RX ADMIN — IOHEXOL 100 ML: 350 INJECTION, SOLUTION INTRAVENOUS at 03:06

## 2017-06-21 ENCOUNTER — TELEPHONE (OUTPATIENT)
Dept: PHARMACY | Facility: CLINIC | Age: 67
End: 2017-06-21

## 2017-06-21 ENCOUNTER — OFFICE VISIT (OUTPATIENT)
Dept: HEMATOLOGY/ONCOLOGY | Facility: CLINIC | Age: 67
End: 2017-06-21
Payer: MEDICARE

## 2017-06-21 VITALS
RESPIRATION RATE: 18 BRPM | OXYGEN SATURATION: 100 % | SYSTOLIC BLOOD PRESSURE: 99 MMHG | TEMPERATURE: 98 F | BODY MASS INDEX: 27.92 KG/M2 | DIASTOLIC BLOOD PRESSURE: 60 MMHG | HEART RATE: 65 BPM | WEIGHT: 211.63 LBS

## 2017-06-21 DIAGNOSIS — C22.0 HCC (HEPATOCELLULAR CARCINOMA): Primary | Chronic | ICD-10-CM

## 2017-06-21 PROCEDURE — 99214 OFFICE O/P EST MOD 30 MIN: CPT | Mod: S$GLB,,, | Performed by: INTERNAL MEDICINE

## 2017-06-21 PROCEDURE — 99999 PR PBB SHADOW E&M-EST. PATIENT-LVL IV: CPT | Mod: PBBFAC,,, | Performed by: INTERNAL MEDICINE

## 2017-06-21 PROCEDURE — 1159F MED LIST DOCD IN RCRD: CPT | Mod: S$GLB,,, | Performed by: INTERNAL MEDICINE

## 2017-06-21 PROCEDURE — 1126F AMNT PAIN NOTED NONE PRSNT: CPT | Mod: S$GLB,,, | Performed by: INTERNAL MEDICINE

## 2017-06-21 PROCEDURE — 99499 UNLISTED E&M SERVICE: CPT | Mod: S$GLB,,, | Performed by: INTERNAL MEDICINE

## 2017-06-21 RX ORDER — SORAFENIB 200 MG/1
400 TABLET, FILM COATED ORAL 2 TIMES DAILY
Qty: 120 TABLET | Refills: 2 | Status: SHIPPED | OUTPATIENT
Start: 2017-06-21 | End: 2017-09-19 | Stop reason: SDUPTHER

## 2017-06-21 NOTE — LETTER
June 21, 2017        Octavia Scott MD  [address]  [facility] [address]             [facility] Hematology Oncology  [address]  [facility] [address]  Phone: [phone]   Patient: Robin Hawkins   MR Number: [id number]   YOB: 1950   Date of Visit: 6/21/2017       Dear Dr. Scott:    Thank you for referring Robin Hawkins to me for evaluation. Attached you will find relevant portions of my assessment and plan of care.    If you have questions, please do not hesitate to call me. I look forward to following Robin Hawkins along with you.    Sincerely,      Ortiz Carrillo, DO, FACP            CC  No Recipients    Enclosure
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
## 2017-06-21 NOTE — TELEPHONE ENCOUNTER
.  Patient: Robin Hawkins       MRN: [id number]      : 1950     Age: 66 y.o.  [address] [facility] [address]    Provider: Hepatologist Clive Scott    Patient Transplant Status: Not a candidate    Reason for presentation: Reassessment    Clinical Summary: HCC in the setting of HCV/alcohol induced cirrhosis; also has a history of colon cancer and prostate cancer; AFP now 5080. Due to start nexavar    Imaging to be reviewed: ct scan 17    HCC Treatment History: TACE 2016; Yttrium 17    ABO: A POS    Platelets:   Lab Results   Component Value Date/Time     2017 07:49 AM     Creatinine:   Lab Results   Component Value Date/Time    CREATININE 1.0 2017 07:49 AM     Bilirubin:   Lab Results   Component Value Date/Time    BILITOT 0.4 2017 07:49 AM     AFP Last 3 each if available:   Lab Results   Component Value Date/Time    AFP 5080 (H) 2017 07:49 AM    AFP 5556 (H) 05/10/2017 07:22 AM    AFP 3662 (H) 2017 09:07 AM       MELD: MELD-Na score: 6 at 2017  7:49 AM  MELD score: 6 at 2017  7:49 AM  Calculated from:  Serum Creatinine: 1.0 mg/dL at 2017  7:49 AM  Serum Sodium: 140 mmol/L (Rounded to 137) at 2017  7:49 AM  Total Bilirubin: 0.4 mg/dL (Rounded to 1) at 2017  7:49 AM  INR(ratio): 1.0 at 2017  7:49 AM  Age: 66 years    Plan:     Follow-up Provider:

## 2017-06-26 ENCOUNTER — CONFERENCE (OUTPATIENT)
Dept: TRANSPLANT | Facility: CLINIC | Age: 67
End: 2017-06-26

## 2017-06-26 ENCOUNTER — OFFICE VISIT (OUTPATIENT)
Dept: INTERVENTIONAL RADIOLOGY/VASCULAR | Facility: CLINIC | Age: 67
End: 2017-06-26
Payer: MEDICARE

## 2017-06-26 VITALS
DIASTOLIC BLOOD PRESSURE: 69 MMHG | SYSTOLIC BLOOD PRESSURE: 127 MMHG | WEIGHT: 213 LBS | HEART RATE: 62 BPM | HEIGHT: 73 IN | BODY MASS INDEX: 28.23 KG/M2

## 2017-06-26 DIAGNOSIS — C22.0 HCC (HEPATOCELLULAR CARCINOMA): Primary | ICD-10-CM

## 2017-06-26 PROCEDURE — 99214 OFFICE O/P EST MOD 30 MIN: CPT | Mod: S$GLB,,, | Performed by: FAMILY MEDICINE

## 2017-06-26 PROCEDURE — 1126F AMNT PAIN NOTED NONE PRSNT: CPT | Mod: S$GLB,,, | Performed by: FAMILY MEDICINE

## 2017-06-26 PROCEDURE — 99499 UNLISTED E&M SERVICE: CPT | Mod: S$GLB,,, | Performed by: FAMILY MEDICINE

## 2017-06-26 PROCEDURE — 1159F MED LIST DOCD IN RCRD: CPT | Mod: S$GLB,,, | Performed by: FAMILY MEDICINE

## 2017-06-26 PROCEDURE — 99999 PR PBB SHADOW E&M-EST. PATIENT-LVL III: CPT | Mod: PBBFAC,,,

## 2017-06-26 NOTE — PROGRESS NOTES
Subjective:       Patient ID: Robin Hawkins is a 66 y.o. male.    Chief Complaint: Cancer    Patient here for follow up of his hepatocellular carcinoma. He had radioembolization on 4/4/2017. He reports feeling well. He denies any abdominal complaints such as distention or pain. He is accompanied by his sister. He had a CT scan performed on 6/20/2017.      Review of Systems   Constitutional: Negative for activity change, appetite change, chills, fatigue and fever.   Respiratory: Positive for shortness of breath. Negative for cough, wheezing and stridor.    Cardiovascular: Negative for chest pain, palpitations and leg swelling.   Gastrointestinal: Negative for abdominal distention, abdominal pain, constipation, diarrhea, nausea and vomiting.       Objective:      Physical Exam   Constitutional: He is oriented to person, place, and time. He appears well-developed and well-nourished. No distress.   Cardiovascular: Normal rate, regular rhythm and normal heart sounds.  Exam reveals no gallop and no friction rub.    No murmur heard.  Pulmonary/Chest: Effort normal and breath sounds normal. No respiratory distress. He has no wheezes. He has no rales.   Abdominal: Soft. Bowel sounds are normal. He exhibits no distension and no mass. There is no tenderness. There is no guarding. No hernia.   Neurological: He is alert and oriented to person, place, and time.   Skin: Skin is warm and dry. He is not diaphoretic.   Psychiatric: He has a normal mood and affect.   Vitals reviewed.      Assessment:       1. HCC (hepatocellular carcinoma)        Plan:         TACE procedure discussed in detail with the patient including risks, benefits, potential complications, usual pre and post procedure course, as well as the potential to develop post-embolization syndrome. Explained similar to the radioembolization procedure, except chemotherapy will be delivered instead of radiation. Also explained there is no need for a mapping to be done  prior to chemoembolization. Patient's sister tells me Mr. Hawkins is a very hard stick and required a PICC line prior to his last treatment. Reassured her I will order the PICC line to be placed. Consents signed. Spoke with Mag. Patient is scheduled for PICC then TACE on 7/72017. Patient's sister tells me Mr. Hawkins is scheduled for labs on the 7th at the [facility]. We will reschedule those labs to be drawn with our pre-procedure labs. She asks if the labs scheduled on 7/24 can also be drawn on 7/7. I explain she should contact the person who ordered the labs to see if they can be drawn sooner. If they can be drawn on 7/7 with the other labs she will call us to reschedule. Pre-procedure handout and clinic phone number provided. Reminded patient to stop aspirin 5 days prior to procedure. Patient and sister verbalize understanding and agreement.

## 2017-06-27 NOTE — TELEPHONE ENCOUNTER
Patient: Robin Hawkins       MRN: [id number]      : 1950     Age: 66 y.o.  [address] [facility] [address]     Provider: Hepatologist Clive Scott     Patient Transplant Status: Not a candidate     Reason for presentation: Reassessment     Clinical Summary: HCC in the setting of HCV/alcohol induced cirrhosis; also has a history of colon cancer and prostate cancer; AFP now 5080. Due to start nexavar     Imaging to be reviewed: ct scan 17     HCC Treatment History: TACE 2016; Yttrium 17     ABO: A POS     Platelets:         Lab Results   Component Value Date/Time      2017 07:49 AM      Creatinine:         Lab Results   Component Value Date/Time     CREATININE 1.0 2017 07:49 AM      Bilirubin:         Lab Results   Component Value Date/Time     BILITOT 0.4 2017 07:49 AM      AFP Last 3 each if available:         Lab Results   Component Value Date/Time     AFP 5080 (H) 2017 07:49 AM     AFP 5556 (H) 05/10/2017 07:22 AM     AFP 3662 (H) 2017 09:07 AM         MELD: MELD-Na score: 6 at 2017  7:49 AM  MELD score: 6 at 2017  7:49 AM  Calculated from:  Serum Creatinine: 1.0 mg/dL at 2017  7:49 AM  Serum Sodium: 140 mmol/L (Rounded to 137) at 2017  7:49 AM  Total Bilirubin: 0.4 mg/dL (Rounded to 1) at 2017  7:49 AM  INR(ratio): 1.0 at 2017  7:49 AM  Age: 66 years     Plan:  IR follow-up done .  TACE scheduled 17  CT 17  -- Lesion S6, 1.6 cm peripheral enhancement and washout --> Residual disease   -- Lesion S5, 2.0 cm with enhancement and washout --> HCC   Rec TACE of new lesion and residual. Too early for repeat Y90 treatment.       Follow-up Provider: Octavia Scott

## 2017-06-30 DIAGNOSIS — R11.0 NAUSEA: Primary | ICD-10-CM

## 2017-06-30 RX ORDER — ONDANSETRON 8 MG/1
8 TABLET, ORALLY DISINTEGRATING ORAL EVERY 12 HOURS PRN
Qty: 60 TABLET | Refills: 1 | Status: ON HOLD | OUTPATIENT
Start: 2017-06-30 | End: 2017-07-07 | Stop reason: HOSPADM

## 2017-06-30 NOTE — TELEPHONE ENCOUNTER
Initial Nexavar consult completed on 2017 . Nexavar (sorafenib) picked up 2017.  $3.70 copay. Patient plans to start Nexavar on 2017. Address confirmed, CC on file. Confirmed 2 patient identifiers - name and . Therapy Appropriate.    Patient was counseled on the administration directions:  -Take 2 tablets (400mg) by mouth twice daily on an empty stomach, take either 1 hour before or 2 hours after meals.    -Do not chew, crush, or break the tablets.   If possible, patient was instructed tip the tablets from the RX bottle to the cap, and take directly from the cap to the mouth.  Patient may handle the medication with their hands if they wear with a latex or nitrile glove and wash their hands before and after handling the tablets.    Patient was counseled on the following possible side effects, which include, but are not limited to:  hypertension, fatigue, hand-foot syndrome, hair loss, skin rash, skin irritation, diarrhea, nausea, loss of appetite, mouth sores, increased risk for infection, and may bleed more easily.  Patient was given Nexavar starter kit which includes Udderly Smooth Extra Care 20 for Hand-Foot Syndrome, and hydrocortisone cream for dermatitis.     Patient will try to have BP checked at local pharmacy to bring readings to appointments with provider.    DDIs:  Medication list reviewed, none.    Patient was given 2 patient education handouts on how to handle oral chemotherapy and specific recommendations- do's and don'ts. Instructed the patient that if they have any remaining oral chemotherapy, not to flush down the toilet or throw away in the trash; the patient or caregiver should return the unused oral chemotherapy to either the clinic or to myself in the Pharmacy where the oral chemotherapy can be disposed of properly.     Patient confirmed understanding. Patient did not have additional questions.  Patient plans to start Nexavar on 2017. Consultation included: indication; goals  of treatment; administration; storage and handling; side effects; how to handle side effects; the importance of compliance; how to handle missed doses; the importance of laboratory monitoring; the importance of keeping all follow up appointments.  Patient understands to report any medication changes to [facility] and provider. All questions answered and addressed to patients satisfaction. I will f/u with her in 1 week from start, [facility] to contact patient in 3 weeks for refills.

## 2017-07-06 ENCOUNTER — TELEPHONE (OUTPATIENT)
Dept: INTERVENTIONAL RADIOLOGY/VASCULAR | Facility: HOSPITAL | Age: 67
End: 2017-07-06

## 2017-07-06 DIAGNOSIS — C22.0 HCC (HEPATOCELLULAR CARCINOMA): Primary | ICD-10-CM

## 2017-07-06 RX ORDER — METOPROLOL TARTRATE 25 MG/1
TABLET, FILM COATED ORAL
Qty: 90 TABLET | Refills: 0 | Status: SHIPPED | OUTPATIENT
Start: 2017-07-06 | End: 2017-10-16 | Stop reason: SDUPTHER

## 2017-07-07 ENCOUNTER — HOSPITAL ENCOUNTER (OUTPATIENT)
Facility: HOSPITAL | Age: 67
Discharge: HOME | End: 2017-07-07
Attending: RADIOLOGY | Admitting: RADIOLOGY
Payer: MEDICARE

## 2017-07-07 ENCOUNTER — HOSPITAL ENCOUNTER (OUTPATIENT)
Dept: INTERVENTIONAL RADIOLOGY/VASCULAR | Facility: HOSPITAL | Age: 67
Discharge: HOME | End: 2017-07-07
Attending: FAMILY MEDICINE
Payer: MEDICARE

## 2017-07-07 ENCOUNTER — TELEPHONE (OUTPATIENT)
Dept: HEPATOLOGY | Facility: CLINIC | Age: 67
End: 2017-07-07

## 2017-07-07 VITALS
RESPIRATION RATE: 20 BRPM | SYSTOLIC BLOOD PRESSURE: 138 MMHG | TEMPERATURE: 98 F | DIASTOLIC BLOOD PRESSURE: 68 MMHG | WEIGHT: 215 LBS | OXYGEN SATURATION: 100 % | HEIGHT: 73 IN | HEART RATE: 65 BPM | BODY MASS INDEX: 28.49 KG/M2

## 2017-07-07 VITALS
DIASTOLIC BLOOD PRESSURE: 57 MMHG | SYSTOLIC BLOOD PRESSURE: 123 MMHG | RESPIRATION RATE: 18 BRPM | HEART RATE: 69 BPM | OXYGEN SATURATION: 100 %

## 2017-07-07 DIAGNOSIS — C22.0 HCC (HEPATOCELLULAR CARCINOMA): ICD-10-CM

## 2017-07-07 PROCEDURE — 63600175 PHARM REV CODE 636 W HCPCS: Performed by: FAMILY MEDICINE

## 2017-07-07 PROCEDURE — 63600175 PHARM REV CODE 636 W HCPCS: Performed by: GENERAL ACUTE CARE HOSPITAL

## 2017-07-07 PROCEDURE — 25000003 PHARM REV CODE 250: Performed by: FAMILY MEDICINE

## 2017-07-07 PROCEDURE — 77001 FLUOROGUIDE FOR VEIN DEVICE: CPT | Mod: 26,59,, | Performed by: RADIOLOGY

## 2017-07-07 PROCEDURE — 76937 US GUIDE VASCULAR ACCESS: CPT | Mod: 26,,, | Performed by: RADIOLOGY

## 2017-07-07 PROCEDURE — 77001 FLUOROGUIDE FOR VEIN DEVICE: CPT | Mod: TC

## 2017-07-07 PROCEDURE — 36569 INSJ PICC 5 YR+ W/O IMAGING: CPT | Mod: ,,, | Performed by: RADIOLOGY

## 2017-07-07 PROCEDURE — 76937 US GUIDE VASCULAR ACCESS: CPT | Mod: TC

## 2017-07-07 PROCEDURE — 25500020 PHARM REV CODE 255: Performed by: RADIOLOGY

## 2017-07-07 PROCEDURE — 63600175 PHARM REV CODE 636 W HCPCS: Performed by: RADIOLOGY

## 2017-07-07 RX ORDER — AMOXICILLIN AND CLAVULANATE POTASSIUM 500; 125 MG/1; MG/1
1 TABLET, FILM COATED ORAL 2 TIMES DAILY
Qty: 14 TABLET | Refills: 0 | Status: SHIPPED | OUTPATIENT
Start: 2017-07-07 | End: 2017-09-25

## 2017-07-07 RX ORDER — SODIUM CHLORIDE 9 MG/ML
INJECTION, SOLUTION INTRAVENOUS CONTINUOUS
Status: DISCONTINUED | OUTPATIENT
Start: 2017-07-07 | End: 2017-07-07 | Stop reason: HOSPADM

## 2017-07-07 RX ORDER — ONDANSETRON 2 MG/ML
INJECTION INTRAMUSCULAR; INTRAVENOUS CODE/TRAUMA/SEDATION MEDICATION
Status: COMPLETED | OUTPATIENT
Start: 2017-07-07 | End: 2017-07-07

## 2017-07-07 RX ORDER — FENTANYL CITRATE 50 UG/ML
50 INJECTION, SOLUTION INTRAMUSCULAR; INTRAVENOUS
Status: DISCONTINUED | OUTPATIENT
Start: 2017-07-07 | End: 2017-07-07 | Stop reason: HOSPADM

## 2017-07-07 RX ORDER — MIDAZOLAM HYDROCHLORIDE 1 MG/ML
1 INJECTION INTRAMUSCULAR; INTRAVENOUS
Status: DISCONTINUED | OUTPATIENT
Start: 2017-07-07 | End: 2017-07-07 | Stop reason: HOSPADM

## 2017-07-07 RX ORDER — DIPHENHYDRAMINE HYDROCHLORIDE 50 MG/ML
50 INJECTION INTRAMUSCULAR; INTRAVENOUS ONCE
Status: COMPLETED | OUTPATIENT
Start: 2017-07-07 | End: 2017-07-07

## 2017-07-07 RX ORDER — FENTANYL CITRATE 50 UG/ML
INJECTION, SOLUTION INTRAMUSCULAR; INTRAVENOUS CODE/TRAUMA/SEDATION MEDICATION
Status: COMPLETED | OUTPATIENT
Start: 2017-07-07 | End: 2017-07-07

## 2017-07-07 RX ORDER — ONDANSETRON 4 MG/1
4 TABLET, ORALLY DISINTEGRATING ORAL EVERY 6 HOURS PRN
Qty: 20 TABLET | Refills: 0 | Status: ON HOLD | OUTPATIENT
Start: 2017-07-07 | End: 2018-02-28 | Stop reason: HOSPADM

## 2017-07-07 RX ORDER — MIDAZOLAM HYDROCHLORIDE 1 MG/ML
INJECTION, SOLUTION INTRAMUSCULAR; INTRAVENOUS CODE/TRAUMA/SEDATION MEDICATION
Status: COMPLETED | OUTPATIENT
Start: 2017-07-07 | End: 2017-07-07

## 2017-07-07 RX ORDER — LIDOCAINE HYDROCHLORIDE 10 MG/ML
1 INJECTION, SOLUTION EPIDURAL; INFILTRATION; INTRACAUDAL; PERINEURAL ONCE
Status: DISCONTINUED | OUTPATIENT
Start: 2017-07-07 | End: 2017-07-07 | Stop reason: HOSPADM

## 2017-07-07 RX ORDER — OXYCODONE AND ACETAMINOPHEN 5; 325 MG/1; MG/1
1 TABLET ORAL EVERY 6 HOURS PRN
Qty: 20 TABLET | Refills: 0 | Status: ON HOLD | OUTPATIENT
Start: 2017-07-07 | End: 2018-02-28

## 2017-07-07 RX ADMIN — MIDAZOLAM HYDROCHLORIDE 1 MG: 1 INJECTION, SOLUTION INTRAMUSCULAR; INTRAVENOUS at 11:07

## 2017-07-07 RX ADMIN — DOXORUBICIN HYDROCHLORIDE 50 MG: 2 INJECTION, POWDER, LYOPHILIZED, FOR SOLUTION INTRAVENOUS at 12:07

## 2017-07-07 RX ADMIN — HYDROCORTISONE SODIUM SUCCINATE 200 MG: 100 INJECTION, POWDER, FOR SOLUTION INTRAMUSCULAR; INTRAVENOUS at 10:07

## 2017-07-07 RX ADMIN — FENTANYL CITRATE 25 MCG: 50 INJECTION, SOLUTION INTRAMUSCULAR; INTRAVENOUS at 11:07

## 2017-07-07 RX ADMIN — DIPHENHYDRAMINE HYDROCHLORIDE 50 MG: 50 INJECTION, SOLUTION INTRAMUSCULAR; INTRAVENOUS at 10:07

## 2017-07-07 RX ADMIN — ONDANSETRON 4 MG: 2 INJECTION INTRAMUSCULAR; INTRAVENOUS at 12:07

## 2017-07-07 RX ADMIN — FENTANYL CITRATE 25 MCG: 50 INJECTION, SOLUTION INTRAMUSCULAR; INTRAVENOUS at 12:07

## 2017-07-07 RX ADMIN — MIDAZOLAM HYDROCHLORIDE 1 MG: 1 INJECTION, SOLUTION INTRAMUSCULAR; INTRAVENOUS at 10:07

## 2017-07-07 RX ADMIN — AMPICILLIN SODIUM AND SULBACTAM SODIUM 3 G: 2; 1 INJECTION, POWDER, FOR SOLUTION INTRAMUSCULAR; INTRAVENOUS at 10:07

## 2017-07-07 RX ADMIN — IOHEXOL 110 ML: 300 INJECTION, SOLUTION INTRAVENOUS at 12:07

## 2017-07-07 NOTE — PROCEDURES
Radiology Post-Procedure Note    Pre Op Diagnosis: Poor central venous access    Post Op Diagnosis: Same    Procedure: PICC placement    Procedure performed by: Mike Baeza MD    Written Informed Consent Obtained: Yes    Specimen Removed: No    Estimated Blood Loss: Minimal    Findings:   Using realtime U/S guidance a 5 Fr PICC catheter was placed into the right Basilic Vein with tip of the catheter in the SVC.    PICC is ready for use.     Mike Baeza MD  Department of Radiology  Pager: [phone]

## 2017-07-07 NOTE — TELEPHONE ENCOUNTER
Received a call from labs that CBC got clotted need to be redrawn.     MA attempted to call patient to have his lab redrawn, patient unable to reached. BESSIE'    Left patient a VM to please go back in the lab. BESSIE

## 2017-07-07 NOTE — PROGRESS NOTES
Patient and sister received discharge instructions and verbalized understanding. Patient transported out in wheelchair.

## 2017-07-07 NOTE — PROCEDURES
Radiology Post-Procedure Note    Pre Op Diagnosis: Hepatocellular carcinoma    Post Op Diagnosis: Hepatocellular carcinoma    Procedure: Chemoembolization    Procedure Performed by: Hank Baeza MD, MD    Written Informed Consent Obtained: Yes    Specimen Removed: None    Estimated Blood Loss: Minimal    Findings:     After placement of a right femoral artery sheath, a 5-Mohawk catheter was inserted and angiography of the celiac artery for anatomic evaluation and localization of hepatic tumor.  A microcatheter was introduced into feeding arteries of a right inferior hepatic lobe tumor and LC beads coated with doxorubicin were injected until near stagnant flow was achieved. Then, the catheter was directed toward the more superior branch of RHA in area of previous radioemoblization and 1cc of chemo/bead solution was administered with near stagnant flow.    Post procedural angiography revealed no complications.    Right femoral artery angiogram was performed and the sheath was removed.  Hemostasis was achieved using Exoseal device technique.  There was no hematoma at the time of hemostasis.    The patient tolerated procedure well.  Please see Imaging report for further details.    Tio Hernandez M.D. 1:02 PM 7/7/2017

## 2017-07-07 NOTE — DISCHARGE INSTRUCTIONS
"For scheduling: Call Mag at [phone]    For questions or concerns call: TORREY MON-FRI 8 AM- 5PM: [phone].   **After hours and weekends: Call [phone] and ask for "Radiology Resident on call".    For immediate concerns that are not emergent, you may call our radiology clinic at: [phone]      "

## 2017-07-07 NOTE — SEDATION DOCUMENTATION
ExoSeal closure device deployed by Dr. Hernandez at this time. Pt to remain flat x2 hours until 1437.
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
## 2017-07-07 NOTE — DISCHARGE SUMMARY
Radiology Discharge Summary      Hospital Course: No complications    Admit Date: 7/7/2017  Discharge Date: 07/07/2017     Instructions Given to Patient: Yes  Diet: Resume prior diet  Activity: activity as tolerated    Description of Condition on Discharge: Stable  Vital Signs (Most Recent): Temp: 98 °F (36.7 °C) (07/07/17 0821)  Pulse: 76 (07/07/17 0821)  Resp: (!) 40 (07/07/17 0821)  BP: 130/75 (07/07/17 0821)  SpO2: 99 % (07/07/17 0821)    Discharge Disposition: Home    Discharge Diagnosis: HCC    Pt underwent chemo embolization of right hepatic lobe lesions. Pt tolerated the procedure well.     Follow-up: contrasted CT or MRI in 4 weeks.    Tio Hernandez M.D. 1:03 PM 7/7/2017

## 2017-07-10 DIAGNOSIS — C22.0 HCC (HEPATOCELLULAR CARCINOMA): Primary | ICD-10-CM

## 2017-07-17 ENCOUNTER — TELEPHONE (OUTPATIENT)
Dept: TRANSPLANT | Facility: CLINIC | Age: 67
End: 2017-07-17

## 2017-07-21 ENCOUNTER — TELEPHONE (OUTPATIENT)
Dept: PHARMACY | Facility: CLINIC | Age: 67
End: 2017-07-21

## 2017-07-24 ENCOUNTER — OFFICE VISIT (OUTPATIENT)
Dept: HEMATOLOGY/ONCOLOGY | Facility: CLINIC | Age: 67
End: 2017-07-24
Payer: MEDICARE

## 2017-07-24 ENCOUNTER — LAB VISIT (OUTPATIENT)
Dept: LAB | Facility: HOSPITAL | Age: 67
End: 2017-07-24
Attending: INTERNAL MEDICINE
Payer: MEDICARE

## 2017-07-24 ENCOUNTER — TELEPHONE (OUTPATIENT)
Dept: HEPATOLOGY | Facility: CLINIC | Age: 67
End: 2017-07-24

## 2017-07-24 VITALS
OXYGEN SATURATION: 100 % | HEIGHT: 73 IN | RESPIRATION RATE: 18 BRPM | BODY MASS INDEX: 27.23 KG/M2 | SYSTOLIC BLOOD PRESSURE: 136 MMHG | TEMPERATURE: 98 F | DIASTOLIC BLOOD PRESSURE: 86 MMHG | HEART RATE: 72 BPM | WEIGHT: 205.5 LBS

## 2017-07-24 DIAGNOSIS — C22.0 HCC (HEPATOCELLULAR CARCINOMA): Primary | ICD-10-CM

## 2017-07-24 DIAGNOSIS — C22.0 HCC (HEPATOCELLULAR CARCINOMA): Primary | Chronic | ICD-10-CM

## 2017-07-24 DIAGNOSIS — C22.0 HCC (HEPATOCELLULAR CARCINOMA): ICD-10-CM

## 2017-07-24 DIAGNOSIS — C61 PROSTATE CANCER: ICD-10-CM

## 2017-07-24 LAB
ALBUMIN SERPL BCP-MCNC: 3.1 G/DL
ALP SERPL-CCNC: 173 U/L
ALT SERPL W/O P-5'-P-CCNC: 37 U/L
ANION GAP SERPL CALC-SCNC: 10 MMOL/L
AST SERPL-CCNC: 57 U/L
BILIRUB SERPL-MCNC: 0.8 MG/DL
BUN SERPL-MCNC: 12 MG/DL
CALCIUM SERPL-MCNC: 9 MG/DL
CHLORIDE SERPL-SCNC: 103 MMOL/L
CO2 SERPL-SCNC: 21 MMOL/L
CREAT SERPL-MCNC: 1.1 MG/DL
ERYTHROCYTE [DISTWIDTH] IN BLOOD BY AUTOMATED COUNT: 14.1 %
EST. GFR  (AFRICAN AMERICAN): >60 ML/MIN/1.73 M^2
EST. GFR  (NON AFRICAN AMERICAN): >60 ML/MIN/1.73 M^2
GLUCOSE SERPL-MCNC: 115 MG/DL
HCT VFR BLD AUTO: 39 %
HGB BLD-MCNC: 12.9 G/DL
MCH RBC QN AUTO: 30.1 PG
MCHC RBC AUTO-ENTMCNC: 33.1 G/DL
MCV RBC AUTO: 91 FL
NEUTROPHILS # BLD AUTO: 5.3 K/UL
PLATELET # BLD AUTO: 224 K/UL
PMV BLD AUTO: 9.8 FL
POTASSIUM SERPL-SCNC: 3.7 MMOL/L
PROT SERPL-MCNC: 8.5 G/DL
RBC # BLD AUTO: 4.29 M/UL
SODIUM SERPL-SCNC: 134 MMOL/L
WBC # BLD AUTO: 7.71 K/UL

## 2017-07-24 PROCEDURE — 1126F AMNT PAIN NOTED NONE PRSNT: CPT | Mod: S$GLB,,, | Performed by: INTERNAL MEDICINE

## 2017-07-24 PROCEDURE — 99499 UNLISTED E&M SERVICE: CPT | Mod: S$GLB,,, | Performed by: INTERNAL MEDICINE

## 2017-07-24 PROCEDURE — 99999 PR PBB SHADOW E&M-EST. PATIENT-LVL IV: CPT | Mod: PBBFAC,,, | Performed by: INTERNAL MEDICINE

## 2017-07-24 PROCEDURE — 99214 OFFICE O/P EST MOD 30 MIN: CPT | Mod: S$GLB,,, | Performed by: INTERNAL MEDICINE

## 2017-07-24 PROCEDURE — 1159F MED LIST DOCD IN RCRD: CPT | Mod: S$GLB,,, | Performed by: INTERNAL MEDICINE
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
## 2017-07-24 NOTE — TELEPHONE ENCOUNTER
Winifredreina Canales, sister of the patient came to the clinic to inquire about an appointment for her brother to get a PICC Line Placement for his CT Scan. Winifred informed that I will have to call and set it up with Interventional Radiology.  Winifred asked for a working telephone number. Last call to Winifred no answer and voicemail was not set up. Winifred stated the number is the same [phone] and it is set up now.    Call placed to IR awaiting return call.  Received call from Mag in IR the patient can come in on Friday 8/4/17 at 9 am for the PICC Line Placement and CT Scan Abd Pelv is scheduled for 11 am.    Winifred called and message relayed about the date and time for Picc line and CT Scan. Voiced understanding. Stated we have gone through this before.

## 2017-07-27 ENCOUNTER — TELEPHONE (OUTPATIENT)
Dept: PHARMACY | Facility: CLINIC | Age: 67
End: 2017-07-27

## 2017-08-04 ENCOUNTER — HOSPITAL ENCOUNTER (OUTPATIENT)
Dept: RADIOLOGY | Facility: HOSPITAL | Age: 67
Discharge: HOME | End: 2017-08-04
Attending: FAMILY MEDICINE
Payer: MEDICARE

## 2017-08-04 ENCOUNTER — HOSPITAL ENCOUNTER (OUTPATIENT)
Dept: INTERVENTIONAL RADIOLOGY/VASCULAR | Facility: HOSPITAL | Age: 67
Discharge: HOME | End: 2017-08-04
Attending: INTERNAL MEDICINE
Payer: MEDICARE

## 2017-08-04 VITALS
RESPIRATION RATE: 20 BRPM | SYSTOLIC BLOOD PRESSURE: 124 MMHG | DIASTOLIC BLOOD PRESSURE: 77 MMHG | HEART RATE: 58 BPM | OXYGEN SATURATION: 99 %

## 2017-08-04 DIAGNOSIS — C22.0 HCC (HEPATOCELLULAR CARCINOMA): ICD-10-CM

## 2017-08-04 PROCEDURE — 76937 US GUIDE VASCULAR ACCESS: CPT | Mod: 26,,, | Performed by: RADIOLOGY

## 2017-08-04 PROCEDURE — 77001 FLUOROGUIDE FOR VEIN DEVICE: CPT | Mod: TC

## 2017-08-04 PROCEDURE — 25500020 PHARM REV CODE 255: Performed by: FAMILY MEDICINE

## 2017-08-04 PROCEDURE — 76937 US GUIDE VASCULAR ACCESS: CPT | Mod: TC

## 2017-08-04 PROCEDURE — 74177 CT ABD & PELVIS W/CONTRAST: CPT | Mod: 26,,, | Performed by: RADIOLOGY

## 2017-08-04 PROCEDURE — 36569 INSJ PICC 5 YR+ W/O IMAGING: CPT

## 2017-08-04 PROCEDURE — 74177 CT ABD & PELVIS W/CONTRAST: CPT | Mod: TC

## 2017-08-04 PROCEDURE — 77001 FLUOROGUIDE FOR VEIN DEVICE: CPT | Mod: 26,,, | Performed by: RADIOLOGY

## 2017-08-04 PROCEDURE — 36569 INSJ PICC 5 YR+ W/O IMAGING: CPT | Mod: ,,, | Performed by: RADIOLOGY

## 2017-08-04 RX ADMIN — IOHEXOL 100 ML: 350 INJECTION, SOLUTION INTRAVENOUS at 01:08

## 2017-08-04 RX ADMIN — IOHEXOL 15 ML: 350 INJECTION, SOLUTION INTRAVENOUS at 11:08

## 2017-08-04 NOTE — PROGRESS NOTES
Patient stable, tolerated procedure well.  Discharge instruction, and follow up care reviewed.  Patient verbalized understanding, and denies further questions.  Provided with ROCU and after hours number.  Patient  Directed to CT check in desk for Ct scan.

## 2017-08-04 NOTE — PROCEDURES
Radiology Post-Procedure Note    Pre Op Diagnosis: HCC with poor venous access.      Post Op Diagnosis: Same    Procedure: PICC placement    Procedure performed by: Mily Baeza MD    Written Informed Consent Obtained: Yes    Specimen Removed: No    Estimated Blood Loss: Minimal    Findings:   Using realtime U/S guidance a 5 Fr PICC catheter was placed into the right Basilic Vein with tip of the catheter in the SVC.    PICC is ready for use.     Jane Hua  Interventional radiology  10:30 AM
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
## 2017-08-06 ENCOUNTER — TELEPHONE (OUTPATIENT)
Dept: TRANSPLANT | Facility: CLINIC | Age: 67
End: 2017-08-06

## 2017-08-07 ENCOUNTER — OFFICE VISIT (OUTPATIENT)
Dept: INTERVENTIONAL RADIOLOGY/VASCULAR | Facility: CLINIC | Age: 67
End: 2017-08-07
Payer: MEDICARE

## 2017-08-07 VITALS
BODY MASS INDEX: 25.58 KG/M2 | SYSTOLIC BLOOD PRESSURE: 150 MMHG | WEIGHT: 193 LBS | DIASTOLIC BLOOD PRESSURE: 83 MMHG | HEIGHT: 73 IN | HEART RATE: 72 BPM

## 2017-08-07 DIAGNOSIS — C22.0 HCC (HEPATOCELLULAR CARCINOMA): Primary | ICD-10-CM

## 2017-08-07 PROCEDURE — 99999 PR PBB SHADOW E&M-EST. PATIENT-LVL II: CPT | Mod: PBBFAC,,,

## 2017-08-07 PROCEDURE — 99212 OFFICE O/P EST SF 10 MIN: CPT | Mod: S$GLB,,, | Performed by: FAMILY MEDICINE

## 2017-08-07 PROCEDURE — 1159F MED LIST DOCD IN RCRD: CPT | Mod: S$GLB,,, | Performed by: FAMILY MEDICINE

## 2017-08-07 NOTE — PROGRESS NOTES
Subjective:       Patient ID: Robin Hawkins is a 67 y.o. male.    Chief Complaint: Cancer    Patient here for follow up of hepatocellular carcinoma. He was recently treated with chemoembolization on 7/7/2017. He reports feeling well. He denies any abdominal complaints such as pain or distention. He is accompanied by his sister. He had a CT scan on 8/4/2017.      Review of Systems   Constitutional: Negative for activity change, appetite change, chills, fatigue and fever.   Respiratory: Positive for shortness of breath. Negative for cough, wheezing and stridor.    Cardiovascular: Negative for chest pain, palpitations and leg swelling.   Gastrointestinal: Negative for abdominal distention, abdominal pain, constipation, diarrhea, nausea and vomiting.       Objective:      Physical Exam   Constitutional: He is oriented to person, place, and time. He appears well-developed and well-nourished. No distress.   Cardiovascular: Normal rate, regular rhythm and normal heart sounds.  Exam reveals no gallop and no friction rub.    No murmur heard.  Pulmonary/Chest: Effort normal and breath sounds normal. No respiratory distress. He has no wheezes. He has no rales.   Abdominal: Soft. Bowel sounds are normal. He exhibits no distension and no mass. There is no hepatosplenomegaly. There is no tenderness. There is no guarding.   Neurological: He is alert and oriented to person, place, and time. Gait normal.   Skin: Skin is warm and dry. He is not diaphoretic. No cyanosis. Nails show no clubbing.   Psychiatric: He has a normal mood and affect.   Vitals reviewed.      Assessment:       1. HCC (hepatocellular carcinoma)        Plan:         Explained to patient CT scan shows residual tumor. I will message Dr. Baeza and have him review the images for his recommendation. Also explained Dr. Scott has submitted his case for review at liver conference. Explained he may be reviewed tomorrow, or at the latest on 8/15/17. I will call him  tomorrow to let him know if we reviewed him. He asks that I call his sister. Also let patient know his tumor marker has dropped significantly from 1109 on 7/7/2017 to 113 on 8/4/17. Patient and sister verbalized understanding. They tell me they still have the clinic phone number should they have any questions or concerns.
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
## 2017-08-08 ENCOUNTER — TELEPHONE (OUTPATIENT)
Dept: INTERVENTIONAL RADIOLOGY/VASCULAR | Facility: CLINIC | Age: 67
End: 2017-08-08

## 2017-08-08 DIAGNOSIS — C22.0 HCC (HEPATOCELLULAR CARCINOMA): Primary | ICD-10-CM

## 2017-08-08 NOTE — TELEPHONE ENCOUNTER
Spoke with patient's sister Manju Canales via telephone. Notified of Dr. Baeza's recommendation to repeat TACE once more. Mag will call to schedule. Ms. Canales verbalized understanding and agreement. Clinic phone number provided.

## 2017-08-10 DIAGNOSIS — I10 ESSENTIAL HYPERTENSION: ICD-10-CM

## 2017-08-10 RX ORDER — LISINOPRIL 20 MG/1
TABLET ORAL
Qty: 90 TABLET | Refills: 0 | Status: SHIPPED | OUTPATIENT
Start: 2017-08-10 | End: 2017-11-07 | Stop reason: SDUPTHER

## 2017-08-11 NOTE — TELEPHONE ENCOUNTER
Spoke with pt's sister that brings him to the doctor. Pt scheduled for first available appt. Letter mailed to pt as reminder.

## 2017-08-16 DIAGNOSIS — C22.0 HCC (HEPATOCELLULAR CARCINOMA): Primary | ICD-10-CM

## 2017-08-22 ENCOUNTER — TELEPHONE (OUTPATIENT)
Dept: PHARMACY | Facility: CLINIC | Age: 67
End: 2017-08-22

## 2017-08-22 NOTE — TELEPHONE ENCOUNTER
Patient's representative returned phone call back regarding specialty medication refill for Nexavar. She verified patient name along with /medication/direction. She informed patient have a doctor appointment on  and would like to  the medication on  $0/004. She confirmed no new medications. She voiced understanding.

## 2017-08-24 DIAGNOSIS — C22.0 HCC (HEPATOCELLULAR CARCINOMA): Primary | ICD-10-CM

## 2017-08-24 DIAGNOSIS — C61 PROSTATE CANCER: ICD-10-CM

## 2017-08-25 ENCOUNTER — SURGERY (OUTPATIENT)
Age: 67
End: 2017-08-25

## 2017-08-25 ENCOUNTER — HOSPITAL ENCOUNTER (OUTPATIENT)
Facility: HOSPITAL | Age: 67
Discharge: HOME | End: 2017-08-25
Attending: RADIOLOGY | Admitting: RADIOLOGY
Payer: MEDICARE

## 2017-08-25 VITALS
BODY MASS INDEX: 27.83 KG/M2 | RESPIRATION RATE: 18 BRPM | HEIGHT: 73 IN | WEIGHT: 210 LBS | HEART RATE: 60 BPM | SYSTOLIC BLOOD PRESSURE: 170 MMHG | OXYGEN SATURATION: 100 % | TEMPERATURE: 97 F | DIASTOLIC BLOOD PRESSURE: 61 MMHG

## 2017-08-25 DIAGNOSIS — C22.0 HCC (HEPATOCELLULAR CARCINOMA): ICD-10-CM

## 2017-08-25 PROCEDURE — 25000003 PHARM REV CODE 250: Performed by: FAMILY MEDICINE

## 2017-08-25 PROCEDURE — A4216 STERILE WATER/SALINE, 10 ML: HCPCS | Performed by: FAMILY MEDICINE

## 2017-08-25 PROCEDURE — 63600175 PHARM REV CODE 636 W HCPCS: Performed by: FAMILY MEDICINE

## 2017-08-25 PROCEDURE — 63600175 PHARM REV CODE 636 W HCPCS: Performed by: STUDENT IN AN ORGANIZED HEALTH CARE EDUCATION/TRAINING PROGRAM

## 2017-08-25 PROCEDURE — 25500020 PHARM REV CODE 255: Performed by: RADIOLOGY

## 2017-08-25 RX ORDER — DIPHENHYDRAMINE HYDROCHLORIDE 50 MG/ML
50 INJECTION INTRAMUSCULAR; INTRAVENOUS ONCE
Status: COMPLETED | OUTPATIENT
Start: 2017-08-25 | End: 2017-08-25

## 2017-08-25 RX ORDER — AMOXICILLIN AND CLAVULANATE POTASSIUM 500; 125 MG/1; MG/1
1 TABLET, FILM COATED ORAL 3 TIMES DAILY
Qty: 15 TABLET | Refills: 0 | Status: SHIPPED | OUTPATIENT
Start: 2017-08-25 | End: 2017-08-30

## 2017-08-25 RX ORDER — SODIUM CHLORIDE 9 MG/ML
INJECTION, SOLUTION INTRAVENOUS CONTINUOUS
Status: DISCONTINUED | OUTPATIENT
Start: 2017-08-25 | End: 2017-08-25 | Stop reason: HOSPADM

## 2017-08-25 RX ORDER — OXYCODONE HYDROCHLORIDE 5 MG/1
5 TABLET ORAL EVERY 4 HOURS PRN
Qty: 28 TABLET | Refills: 0 | Status: SHIPPED | OUTPATIENT
Start: 2017-08-25 | End: 2017-09-01

## 2017-08-25 RX ORDER — FENTANYL CITRATE 50 UG/ML
50 INJECTION, SOLUTION INTRAMUSCULAR; INTRAVENOUS
Status: DISCONTINUED | OUTPATIENT
Start: 2017-08-25 | End: 2017-08-25 | Stop reason: HOSPADM

## 2017-08-25 RX ORDER — MIDAZOLAM HYDROCHLORIDE 1 MG/ML
1 INJECTION INTRAMUSCULAR; INTRAVENOUS
Status: DISCONTINUED | OUTPATIENT
Start: 2017-08-25 | End: 2017-08-25 | Stop reason: HOSPADM

## 2017-08-25 RX ORDER — ONDANSETRON 2 MG/ML
INJECTION INTRAMUSCULAR; INTRAVENOUS CODE/TRAUMA/SEDATION MEDICATION
Status: COMPLETED | OUTPATIENT
Start: 2017-08-25 | End: 2017-08-25

## 2017-08-25 RX ORDER — LIDOCAINE HYDROCHLORIDE 10 MG/ML
1 INJECTION, SOLUTION EPIDURAL; INFILTRATION; INTRACAUDAL; PERINEURAL ONCE
Status: DISCONTINUED | OUTPATIENT
Start: 2017-08-25 | End: 2017-08-25 | Stop reason: HOSPADM

## 2017-08-25 RX ORDER — MIDAZOLAM HYDROCHLORIDE 1 MG/ML
INJECTION, SOLUTION INTRAMUSCULAR; INTRAVENOUS CODE/TRAUMA/SEDATION MEDICATION
Status: COMPLETED | OUTPATIENT
Start: 2017-08-25 | End: 2017-08-25

## 2017-08-25 RX ORDER — FENTANYL CITRATE 50 UG/ML
INJECTION, SOLUTION INTRAMUSCULAR; INTRAVENOUS CODE/TRAUMA/SEDATION MEDICATION
Status: COMPLETED | OUTPATIENT
Start: 2017-08-25 | End: 2017-08-25

## 2017-08-25 RX ORDER — OXYCODONE HYDROCHLORIDE 5 MG/1
5 TABLET ORAL EVERY 4 HOURS PRN
Status: DISCONTINUED | OUTPATIENT
Start: 2017-08-25 | End: 2017-08-25 | Stop reason: HOSPADM

## 2017-08-25 RX ORDER — ONDANSETRON 4 MG/1
4 TABLET, ORALLY DISINTEGRATING ORAL EVERY 6 HOURS PRN
Qty: 1 TABLET | Refills: 0 | Status: SHIPPED | OUTPATIENT
Start: 2017-08-25 | End: 2017-09-01

## 2017-08-25 RX ADMIN — IOHEXOL 150 ML: 300 INJECTION, SOLUTION INTRAVENOUS at 01:08

## 2017-08-25 RX ADMIN — FENTANYL CITRATE 25 MCG: 50 INJECTION, SOLUTION INTRAMUSCULAR; INTRAVENOUS at 12:08

## 2017-08-25 RX ADMIN — MIDAZOLAM HYDROCHLORIDE 0.5 MG: 1 INJECTION, SOLUTION INTRAMUSCULAR; INTRAVENOUS at 11:08

## 2017-08-25 RX ADMIN — FENTANYL CITRATE 50 MCG: 50 INJECTION, SOLUTION INTRAMUSCULAR; INTRAVENOUS at 12:08

## 2017-08-25 RX ADMIN — MIDAZOLAM HYDROCHLORIDE 0.5 MG: 1 INJECTION, SOLUTION INTRAMUSCULAR; INTRAVENOUS at 12:08

## 2017-08-25 RX ADMIN — FENTANYL CITRATE 25 MCG: 50 INJECTION, SOLUTION INTRAMUSCULAR; INTRAVENOUS at 01:08

## 2017-08-25 RX ADMIN — MIDAZOLAM HYDROCHLORIDE 0.5 MG: 1 INJECTION, SOLUTION INTRAMUSCULAR; INTRAVENOUS at 01:08

## 2017-08-25 RX ADMIN — HYDROCORTISONE SODIUM SUCCINATE 200 MG: 100 INJECTION, POWDER, FOR SOLUTION INTRAMUSCULAR; INTRAVENOUS at 11:08

## 2017-08-25 RX ADMIN — DIPHENHYDRAMINE HYDROCHLORIDE 50 MG: 50 INJECTION, SOLUTION INTRAMUSCULAR; INTRAVENOUS at 11:08

## 2017-08-25 RX ADMIN — DOXORUBICIN HYDROCHLORIDE 50 MG: 2 INJECTION, POWDER, LYOPHILIZED, FOR SOLUTION INTRAVENOUS at 12:08

## 2017-08-25 RX ADMIN — ONDANSETRON 4 MG: 2 INJECTION INTRAMUSCULAR; INTRAVENOUS at 12:08

## 2017-08-25 RX ADMIN — AMPICILLIN SODIUM AND SULBACTAM SODIUM 3 G: 2; 1 INJECTION, POWDER, FOR SOLUTION INTRAMUSCULAR; INTRAVENOUS at 11:08

## 2017-08-25 RX ADMIN — MIDAZOLAM HYDROCHLORIDE 1 MG: 1 INJECTION, SOLUTION INTRAMUSCULAR; INTRAVENOUS at 12:08

## 2017-08-25 RX ADMIN — FENTANYL CITRATE 25 MCG: 50 INJECTION, SOLUTION INTRAMUSCULAR; INTRAVENOUS at 11:08

## 2017-08-25 NOTE — PROGRESS NOTES
OK per IR for pt to go to procedure without IV access. Pt to have PICC line placed prior to chemo embolization

## 2017-08-25 NOTE — PROGRESS NOTES
PIV removed, bleeding controlled, pressure dressing placed.  Discharge instruction, and follow up care reviewed.  Patient and family verbalized understanding, and denies further questions.  Provided with ROCU and after hours number.  Patient transported via wheelchair.
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
## 2017-08-25 NOTE — DISCHARGE SUMMARY
Radiology Discharge Summary      Hospital Course: No complications    Admit Date: 8/25/2017  Discharge Date: 08/25/2017     Instructions Given to Patient: Yes  Diet: Resume prior diet  Activity: activity as tolerated    Description of Condition on Discharge: Stable  Vital Signs (Most Recent): Temp: 97.9 °F (36.6 °C) (08/25/17 0913)  Pulse: 70 (08/25/17 1335)  Resp: 19 (08/25/17 1335)  BP: 139/68 (08/25/17 1335)  SpO2: 100 % (08/25/17 1335)    Discharge Disposition: Home    Discharge Diagnosis: S/p chemoembolization    Followup: follow up imaging with triple phase CT in one month and return to clinic in one month.    Mason Golden MD  Radiology

## 2017-08-25 NOTE — DISCHARGE SUMMARY
Radiology Discharge Summary      Hospital Course: No complications    Admit Date: 8/25/2017  Discharge Date: 08/25/2017     Instructions Given to Patient: Yes  Diet: Resume prior diet  Activity: activity as tolerated and no driving for today    Description of Condition on Discharge: Stable  Vital Signs (Most Recent): Temp: 97.3 °F (36.3 °C) (08/25/17 1345)  Pulse: 60 (08/25/17 1530)  Resp: 18 (08/25/17 1530)  BP: (!) 170/61 (08/25/17 1530)  SpO2: 100 % (08/25/17 1530)    Discharge Disposition: Home    Discharge Diagnosis: HCC s/p TACE      Follow-up:   Triple phase 1 month    Jane Hua  Diagnostic and interventional radiology  PGY- II  [phone]

## 2017-08-28 ENCOUNTER — TELEPHONE (OUTPATIENT)
Dept: HEMATOLOGY/ONCOLOGY | Facility: CLINIC | Age: 67
End: 2017-08-28

## 2017-08-28 ENCOUNTER — OFFICE VISIT (OUTPATIENT)
Dept: HEMATOLOGY/ONCOLOGY | Facility: CLINIC | Age: 67
End: 2017-08-28
Payer: MEDICARE

## 2017-08-28 ENCOUNTER — LAB VISIT (OUTPATIENT)
Dept: LAB | Facility: HOSPITAL | Age: 67
End: 2017-08-28
Attending: INTERNAL MEDICINE
Payer: MEDICARE

## 2017-08-28 VITALS
WEIGHT: 198.44 LBS | RESPIRATION RATE: 18 BRPM | HEART RATE: 70 BPM | SYSTOLIC BLOOD PRESSURE: 128 MMHG | DIASTOLIC BLOOD PRESSURE: 67 MMHG | TEMPERATURE: 98 F | BODY MASS INDEX: 26.18 KG/M2 | OXYGEN SATURATION: 100 %

## 2017-08-28 DIAGNOSIS — Z09 CHEMOTHERAPY FOLLOW-UP EXAMINATION: ICD-10-CM

## 2017-08-28 DIAGNOSIS — E87.6 HYPOKALEMIA: ICD-10-CM

## 2017-08-28 DIAGNOSIS — C22.0 HCC (HEPATOCELLULAR CARCINOMA): Chronic | ICD-10-CM

## 2017-08-28 DIAGNOSIS — C22.0 HEPATOCELLULAR CARCINOMA: Primary | ICD-10-CM

## 2017-08-28 DIAGNOSIS — C22.0 HEPATOCELLULAR CARCINOMA: ICD-10-CM

## 2017-08-28 DIAGNOSIS — C22.0 HCC (HEPATOCELLULAR CARCINOMA): Primary | Chronic | ICD-10-CM

## 2017-08-28 LAB
ALBUMIN SERPL BCP-MCNC: 2.7 G/DL
ALP SERPL-CCNC: 116 U/L
ALT SERPL W/O P-5'-P-CCNC: 41 U/L
ANION GAP SERPL CALC-SCNC: 10 MMOL/L
AST SERPL-CCNC: 63 U/L
BILIRUB SERPL-MCNC: 0.6 MG/DL
BUN SERPL-MCNC: 11 MG/DL
CALCIUM SERPL-MCNC: 7.5 MG/DL
CHLORIDE SERPL-SCNC: 100 MMOL/L
CO2 SERPL-SCNC: 28 MMOL/L
CREAT SERPL-MCNC: 1.1 MG/DL
EST. GFR  (AFRICAN AMERICAN): >60 ML/MIN/1.73 M^2
EST. GFR  (NON AFRICAN AMERICAN): >60 ML/MIN/1.73 M^2
GLUCOSE SERPL-MCNC: 89 MG/DL
MAGNESIUM SERPL-MCNC: 1.4 MG/DL
POTASSIUM SERPL-SCNC: 2.9 MMOL/L
PROT SERPL-MCNC: 7.5 G/DL
SODIUM SERPL-SCNC: 138 MMOL/L

## 2017-08-28 PROCEDURE — 36415 COLL VENOUS BLD VENIPUNCTURE: CPT

## 2017-08-28 PROCEDURE — 99999 PR PBB SHADOW E&M-EST. PATIENT-LVL IV: CPT | Mod: PBBFAC,,, | Performed by: INTERNAL MEDICINE

## 2017-08-28 PROCEDURE — 3008F BODY MASS INDEX DOCD: CPT | Mod: S$GLB,,, | Performed by: INTERNAL MEDICINE

## 2017-08-28 PROCEDURE — 99214 OFFICE O/P EST MOD 30 MIN: CPT | Mod: S$GLB,,, | Performed by: INTERNAL MEDICINE

## 2017-08-28 PROCEDURE — 1126F AMNT PAIN NOTED NONE PRSNT: CPT | Mod: S$GLB,,, | Performed by: INTERNAL MEDICINE

## 2017-08-28 PROCEDURE — 3078F DIAST BP <80 MM HG: CPT | Mod: S$GLB,,, | Performed by: INTERNAL MEDICINE

## 2017-08-28 PROCEDURE — 1159F MED LIST DOCD IN RCRD: CPT | Mod: S$GLB,,, | Performed by: INTERNAL MEDICINE

## 2017-08-28 PROCEDURE — 80053 COMPREHEN METABOLIC PANEL: CPT

## 2017-08-28 PROCEDURE — 99499 UNLISTED E&M SERVICE: CPT | Mod: S$PBB,,, | Performed by: INTERNAL MEDICINE

## 2017-08-28 PROCEDURE — 83735 ASSAY OF MAGNESIUM: CPT

## 2017-08-28 PROCEDURE — 3074F SYST BP LT 130 MM HG: CPT | Mod: S$GLB,,, | Performed by: INTERNAL MEDICINE

## 2017-08-28 RX ORDER — LANOLIN ALCOHOL/MO/W.PET/CERES
400 CREAM (GRAM) TOPICAL 2 TIMES DAILY
Qty: 10 TABLET | Refills: 1 | Status: SHIPPED | OUTPATIENT
Start: 2017-08-28 | End: 2018-08-28

## 2017-08-28 RX ORDER — POTASSIUM CHLORIDE 20 MEQ/1
TABLET, EXTENDED RELEASE ORAL
Qty: 360 TABLET | Refills: 0 | Status: SHIPPED | OUTPATIENT
Start: 2017-08-28 | End: 2017-11-23 | Stop reason: SDUPTHER

## 2017-08-28 RX ORDER — POTASSIUM CHLORIDE 20 MEQ/1
40 TABLET, EXTENDED RELEASE ORAL 2 TIMES DAILY
Qty: 8 TABLET | Refills: 0 | Status: SHIPPED | OUTPATIENT
Start: 2017-08-28 | End: 2017-08-28 | Stop reason: SDUPTHER

## 2017-08-28 NOTE — TELEPHONE ENCOUNTER
----- Message from Ortiz Carrillo DO, FACP sent at 8/28/2017  4:12 PM CDT -----  Please let him know that his potassium and magnesium were low and I have sent replacement to his pharmacy.  Recheck cmp and mag in 1 week.

## 2017-08-28 NOTE — TELEPHONE ENCOUNTER
Gave patient the message about the supplementation and that I scheduled his labs for next Tuesday.
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
## 2017-08-29 DIAGNOSIS — C22.0 HCC (HEPATOCELLULAR CARCINOMA): Primary | ICD-10-CM

## 2017-09-05 DIAGNOSIS — I10 ESSENTIAL HYPERTENSION: ICD-10-CM

## 2017-09-05 RX ORDER — HYDROCHLOROTHIAZIDE 25 MG/1
TABLET ORAL
Qty: 90 TABLET | Refills: 0 | Status: SHIPPED | OUTPATIENT
Start: 2017-09-05 | End: 2017-09-25

## 2017-09-05 RX ORDER — HYDROCHLOROTHIAZIDE 25 MG/1
TABLET ORAL
Qty: 90 TABLET | Refills: 0 | Status: ON HOLD | OUTPATIENT
Start: 2017-09-05 | End: 2018-02-28 | Stop reason: HOSPADM

## 2017-09-06 ENCOUNTER — OFFICE VISIT (OUTPATIENT)
Dept: INTERNAL MEDICINE | Facility: CLINIC | Age: 67
End: 2017-09-06
Payer: MEDICARE

## 2017-09-06 ENCOUNTER — TELEPHONE (OUTPATIENT)
Dept: PHARMACY | Facility: CLINIC | Age: 67
End: 2017-09-06

## 2017-09-06 ENCOUNTER — LAB VISIT (OUTPATIENT)
Dept: LAB | Facility: HOSPITAL | Age: 67
End: 2017-09-06
Attending: INTERNAL MEDICINE
Payer: MEDICARE

## 2017-09-06 VITALS
WEIGHT: 193.81 LBS | DIASTOLIC BLOOD PRESSURE: 72 MMHG | HEART RATE: 72 BPM | SYSTOLIC BLOOD PRESSURE: 126 MMHG | HEIGHT: 73 IN | BODY MASS INDEX: 25.69 KG/M2

## 2017-09-06 DIAGNOSIS — H40.10X4 OPEN-ANGLE GLAUCOMA OF BOTH EYES, INDETERMINATE STAGE, UNSPECIFIED OPEN-ANGLE GLAUCOMA TYPE: Chronic | ICD-10-CM

## 2017-09-06 DIAGNOSIS — J44.9 CHRONIC OBSTRUCTIVE PULMONARY DISEASE, UNSPECIFIED COPD TYPE: Chronic | ICD-10-CM

## 2017-09-06 DIAGNOSIS — R91.1 LUNG NODULE: ICD-10-CM

## 2017-09-06 DIAGNOSIS — J38.6 LARYNGEAL STENOSIS: Chronic | ICD-10-CM

## 2017-09-06 DIAGNOSIS — E03.8 SUBCLINICAL HYPOTHYROIDISM: ICD-10-CM

## 2017-09-06 DIAGNOSIS — M85.80 OSTEOPENIA, UNSPECIFIED LOCATION: ICD-10-CM

## 2017-09-06 DIAGNOSIS — C22.0 HCC (HEPATOCELLULAR CARCINOMA): Chronic | ICD-10-CM

## 2017-09-06 DIAGNOSIS — K74.60 CIRRHOSIS OF LIVER WITHOUT ASCITES, UNSPECIFIED HEPATIC CIRRHOSIS TYPE: Chronic | ICD-10-CM

## 2017-09-06 DIAGNOSIS — K22.4 ESOPHAGEAL DYSMOTILITY: ICD-10-CM

## 2017-09-06 DIAGNOSIS — Z85.038 HISTORY OF COLON CANCER: ICD-10-CM

## 2017-09-06 DIAGNOSIS — J38.02 BILATERAL COMPLETE VOCAL FOLD PARALYSIS: ICD-10-CM

## 2017-09-06 DIAGNOSIS — Z00.00 ENCOUNTER FOR PREVENTIVE HEALTH EXAMINATION: Primary | ICD-10-CM

## 2017-09-06 DIAGNOSIS — E78.5 HYPERLIPIDEMIA, UNSPECIFIED HYPERLIPIDEMIA TYPE: Chronic | ICD-10-CM

## 2017-09-06 DIAGNOSIS — E87.6 HYPOKALEMIA: ICD-10-CM

## 2017-09-06 DIAGNOSIS — I25.10 CORONARY ARTERY CALCIFICATION SEEN ON CT SCAN: ICD-10-CM

## 2017-09-06 DIAGNOSIS — C22.0 HEPATOCELLULAR CARCINOMA: ICD-10-CM

## 2017-09-06 DIAGNOSIS — I70.0 ATHEROSCLEROSIS OF ABDOMINAL AORTA: ICD-10-CM

## 2017-09-06 DIAGNOSIS — Z85.46 HISTORY OF PROSTATE CANCER: Chronic | ICD-10-CM

## 2017-09-06 DIAGNOSIS — B18.2 CHRONIC HEPATITIS C WITHOUT HEPATIC COMA: ICD-10-CM

## 2017-09-06 DIAGNOSIS — R77.2 ELEVATED AFP: ICD-10-CM

## 2017-09-06 DIAGNOSIS — Z76.82 LIVER TRANSPLANT CANDIDATE: ICD-10-CM

## 2017-09-06 DIAGNOSIS — I10 ESSENTIAL HYPERTENSION: Chronic | ICD-10-CM

## 2017-09-06 PROBLEM — Z09 CHEMOTHERAPY FOLLOW-UP EXAMINATION: Status: RESOLVED | Noted: 2017-08-28 | Resolved: 2017-09-06

## 2017-09-06 LAB
ALBUMIN SERPL BCP-MCNC: 2.9 G/DL
ALP SERPL-CCNC: 143 U/L
ALT SERPL W/O P-5'-P-CCNC: 45 U/L
ANION GAP SERPL CALC-SCNC: 9 MMOL/L
AST SERPL-CCNC: 62 U/L
BILIRUB SERPL-MCNC: 0.7 MG/DL
BUN SERPL-MCNC: 13 MG/DL
CALCIUM SERPL-MCNC: 9.2 MG/DL
CHLORIDE SERPL-SCNC: 105 MMOL/L
CO2 SERPL-SCNC: 22 MMOL/L
CREAT SERPL-MCNC: 1 MG/DL
EST. GFR  (AFRICAN AMERICAN): >60 ML/MIN/1.73 M^2
EST. GFR  (NON AFRICAN AMERICAN): >60 ML/MIN/1.73 M^2
GLUCOSE SERPL-MCNC: 98 MG/DL
MAGNESIUM SERPL-MCNC: 1.7 MG/DL
POTASSIUM SERPL-SCNC: 4.3 MMOL/L
PROT SERPL-MCNC: 8.7 G/DL
SODIUM SERPL-SCNC: 136 MMOL/L

## 2017-09-06 PROCEDURE — 80053 COMPREHEN METABOLIC PANEL: CPT

## 2017-09-06 PROCEDURE — 99999 PR PBB SHADOW E&M-EST. PATIENT-LVL V: CPT | Mod: PBBFAC,,, | Performed by: NURSE PRACTITIONER

## 2017-09-06 PROCEDURE — 36415 COLL VENOUS BLD VENIPUNCTURE: CPT

## 2017-09-06 PROCEDURE — 99499 UNLISTED E&M SERVICE: CPT | Mod: S$PBB,,, | Performed by: NURSE PRACTITIONER

## 2017-09-06 PROCEDURE — 83735 ASSAY OF MAGNESIUM: CPT

## 2017-09-06 PROCEDURE — G0439 PPPS, SUBSEQ VISIT: HCPCS | Mod: S$GLB,,, | Performed by: NURSE PRACTITIONER

## 2017-09-06 NOTE — PROGRESS NOTES
"Robin Hawkins presented for a  Medicare AWV and comprehensive Health Risk Assessment today. The following components were reviewed and updated:    · Medical history  · Family History  · Social history  · Allergies and Current Medications  · Health Risk Assessment  · Health Maintenance  · Care Team     ** See Completed Assessments for Annual Wellness Visit within the encounter summary.**       The following assessments were completed:  · Living Situation  · CAGE  · Depression Screening  · Timed Get Up and Go  · Whisper Test  · Cognitive Function Screening  · Nutrition Screening  · ADL Screening  · PAQ Screening            Vitals:    09/06/17 0803   BP: 126/72   BP Location: Left arm   Patient Position: Sitting   Pulse: 72   Weight: 87.9 kg (193 lb 12.6 oz)   Height: 6' 1" (1.854 m)     Body mass index is 25.57 kg/m².     Physical Exam   Constitutional: He is oriented to person, place, and time. He appears well-developed and well-nourished. No distress.   HENT:   Head: Normocephalic and atraumatic.   Hoarse   Cardiovascular: Normal rate, regular rhythm and normal heart sounds.    No murmur heard.  Pulmonary/Chest: Breath sounds normal. No respiratory distress. He has no wheezes. He has no rales.   Dyspnea with exertion   Musculoskeletal: He exhibits no edema, tenderness or deformity.   Neurological: He is alert and oriented to person, place, and time.   Skin: Skin is warm and dry. He is not diaphoretic.   Psychiatric: He has a normal mood and affect. His behavior is normal. He expresses no homicidal and no suicidal ideation. He expresses no suicidal plans and no homicidal plans.   Vitals reviewed.        Diagnoses and health risks identified today and associated recommendations/orders:    1. Encounter for preventive health examination  Flu vaccine due this fall.  Defer Zoster vaccine d/t chemo.  Eye exam due.  - Ambulatory Referral to Pharmacy Assistance    2. Cirrhosis of liver without ascites, unspecified hepatic " cirrhosis type  Stable.   Followed by Liver Transplant and Hepatology.     3. Chronic obstructive pulmonary disease, unspecified COPD type  Stable.   Continue current medications.  Followed by Pulmonology.   - Ambulatory Referral to Pharmacy Assistance    4. Atherosclerosis of abdominal aorta  Stable.   Seen on imaging from 8/4/2017.  Followed by PCP.     5. HCC (hepatocellular carcinoma)  Stable.   Continue current medication.  Followed by Hem/Onc, Hepatology, and Liver Transplant.     6. Chronic hepatitis C without hepatic coma  Stable.   Followed by Hepatology.     7. Essential hypertension  Stable.   Continue current medications.  Followed by PCP.     8. Hyperlipidemia, unspecified hyperlipidemia type  Stable.   Continue current medication.  Followed by PCP.     9. History of prostate cancer  Stable.   Followed by Urology.     10. Laryngeal stenosis  Stable.   Followed by ENT.     11. Open-angle glaucoma of both eyes, indeterminate stage, unspecified open-angle glaucoma type  Stable.   Continue current medications.  Followed by Optometry.     12. Coronary artery calcification seen on CT scan  Stable.   On daily statin and ASA.  Followed by PCP.     13. Bilateral complete vocal fold paralysis  Stable.   Followed by ENT and Pulmonology.     14. Esophageal dysmotility  Stable.   Followed by ENT.     15. Liver transplant candidate  Stable.   Followed by Liver Transplant.     16. History of colon cancer  Stable.   Followed by PCP.     17. Subclinical hypothyroidism  Stable.   Followed by PCP.     18. Osteopenia, unspecified location  Stable.   Followed by PCP.     19. Lung nodule  Stable.   Followed by Pulmonology.     20. Elevated AFP  Stable.   Followed by Hepatology.     21. Hypokalemia  Stable.   Followed by Hem/Onc.       Provided Robin with a 5-10 year written screening schedule and personal prevention plan. Recommendations were developed using the USPSTF age appropriate recommendations. Education, counseling,  and referrals were provided as needed. After Visit Summary printed and given to patient which includes a list of additional screenings\tests needed.    Return in about 8 weeks (around 10/31/2017) for follow up with PCP. Return in 1 year for HRA.    Lyn Llamas NP

## 2017-09-06 NOTE — PATIENT INSTRUCTIONS
Counseling and Referral of Other Preventative  (Italic type indicates deductible and co-insurance are waived)    Patient Name: Robin Hawkins  Today's Date: 9/6/2017      SERVICE LIMITATIONS RECOMMENDATION    Vaccines    · Pneumococcal (once after 65)    · Influenza (annually)    · Hepatitis B (if medium/high risk)    · Prevnar 13      Hepatitis B medium/high risk factors:       - End-stage renal disease       - Hemophiliacs who received Factor VII or         IX concentrates       - Clients of institutions for the mentally             retarded       - Persons who live in the same house as          a HepB carrier       - Homosexual men       - Illicit injectable drug abusers     Pneumococcal: Done, no repeat necessary     Influenza: Due in fall     Hepatitis B: N/A     Prevnar 13: Done, no repeat necessary    Prostate cancer screening (annually to age 75)     Prostate specific antigen (PSA) Shared decision making with Provider. Sometimes a co-pay may be required if the patient decides to have this test. The USPSTF no longer recommends prostate cancer screening routinely in medicine: every 1 year    Colorectal cancer screening (to age 75)    · Fecal occult blood test (annual)  · Flexible sigmoidoscopy (5y)  · Screening colonoscopy (10y)  · Barium enema   Last done 5/2016, recommend to repeat every 5  years    Diabetes self-management training (no USPSTF recommendations)  Requires referral by treating physician for patient with diabetes or renal disease. 10 hours of initial DSMT sessions of no less than 30 minutes each in a continuous 12-month period. 2 hours of follow-up DSMT in subsequent years.  N/A    Glaucoma screening (no USPSTF recommendation)  Diabetes mellitus, family history   , age 50 or over    American, age 65 or over  Recommend follow up with eye care professional regularly    Medical nutrition therapy for diabetes or renal disease (no recommended schedule)  Requires referral by  treating physician for patient with diabetes or renal disease or kidney transplant within the past 3 years.  Can be provided in same year as diabetes self-management training (DSMT), and CMS recommends medical nutrition therapy take place after DSMT. Up to 3 hours for initial year and 2 hours in subsequent years.  N/A    Cardiovascular screening blood tests (every 5 years)  · Fasting lipid panel  Order as a panel if possible  Last done 9/2015, recommend to repeat every 5  years    Diabetes screening tests (at least every 3 years, Medicare covers annually or at 6-month intervals for prediabetic patients)  · Fasting blood sugar (FBS) or glucose tolerance test (GTT)  Patient must be diagnosed with one of the following:       - Hypertension       - Dyslipidemia       - Obesity (BMI 30kg/m2)       - Previous elevated impaired FBS or GTT       ... or any two of the following:       - Overweight (BMI 25 but <30)       - Family history of diabetes       - Age 65 or older       - History of gestational diabetes or birth of baby weighing more than 9 pounds  Last done 8/2017, recommend to repeat every  year    Abdominal aortic aneurysm screening (once)  · Sonogram   Limited to patients who meet one of the following criteria:       - Men who are 65-75 years old and have smoked more than 100 cigarette in their lifetime       - Anyone with a family history of abdominal aortic aneurysm       - Anyone recommended for screening by the USPSTF  done    HIV screening (annually for increased risk patients)  · HIV-1 and HIV-2 by EIA, or [place], rapid antibody test or oral mucosa transudate  Patients must be at increased risk for HIV infection per USPSTF guidelines or pregnant. Tests covered annually for patient at increased risk or as requested by the patient. Pregnant patients may receive up to 3 tests during pregnancy.  Risks discussed, screening is not recommended    Smoking cessation counseling (up to 8 sessions per year)  Patients  must be asymptomatic of tobacco-related conditions to receive as a preventative service.  Non-smoker    Subsequent annual wellness visit  At least 12 months since last AWV  Return in one year     The following information is provided to all patients.  This information is to help you find resources for any of the problems found today that may be affecting your health:                Living healthy guide: www.[facility].[email or web address].[facility]      Understanding Diabetes: www.diabetes.org      Eating healthy: www.cdc.gov/healthyweight      CDC home safety checklist: www.cdc.gov/steadi/patient.html      Agency on Aging: www.goea.[place].[facility]      Alcoholics anonymous (AA): www.aa.org      Physical Activity: www.adam.nih.gov/tp5octf      Tobacco use: www.quitwithusla.org

## 2017-09-06 NOTE — TELEPHONE ENCOUNTER
Spoke with patient sister Winifred, she is going to call me once she verifies if the patient insurance is paying for his medications.

## 2017-09-12 ENCOUNTER — TELEPHONE (OUTPATIENT)
Dept: OPTOMETRY | Facility: CLINIC | Age: 67
End: 2017-09-12

## 2017-09-12 ENCOUNTER — OFFICE VISIT (OUTPATIENT)
Dept: OPTOMETRY | Facility: CLINIC | Age: 67
End: 2017-09-12
Payer: MEDICARE

## 2017-09-12 ENCOUNTER — CLINICAL SUPPORT (OUTPATIENT)
Dept: OPHTHALMOLOGY | Facility: CLINIC | Age: 67
End: 2017-09-12
Payer: MEDICARE

## 2017-09-12 DIAGNOSIS — Z96.1 PSEUDOPHAKIA OF BOTH EYES: ICD-10-CM

## 2017-09-12 DIAGNOSIS — H52.203 MYOPIA WITH ASTIGMATISM AND PRESBYOPIA, BILATERAL: ICD-10-CM

## 2017-09-12 DIAGNOSIS — J44.9 CHRONIC OBSTRUCTIVE PULMONARY DISEASE, UNSPECIFIED COPD TYPE: Primary | Chronic | ICD-10-CM

## 2017-09-12 DIAGNOSIS — H40.1132 PRIMARY OPEN ANGLE GLAUCOMA OF BOTH EYES, MODERATE STAGE: Primary | ICD-10-CM

## 2017-09-12 DIAGNOSIS — H31.002 CHORIORETINAL SCAR OF LEFT EYE: ICD-10-CM

## 2017-09-12 DIAGNOSIS — H52.4 MYOPIA WITH ASTIGMATISM AND PRESBYOPIA, BILATERAL: ICD-10-CM

## 2017-09-12 DIAGNOSIS — H52.13 MYOPIA WITH ASTIGMATISM AND PRESBYOPIA, BILATERAL: ICD-10-CM

## 2017-09-12 PROCEDURE — 92014 COMPRE OPH EXAM EST PT 1/>: CPT | Mod: S$GLB,,, | Performed by: OPTOMETRIST

## 2017-09-12 PROCEDURE — 92133 CPTRZD OPH DX IMG PST SGM ON: CPT | Mod: S$GLB,,, | Performed by: OPTOMETRIST

## 2017-09-12 PROCEDURE — 92083 EXTENDED VISUAL FIELD XM: CPT | Mod: S$GLB,,, | Performed by: OPTOMETRIST

## 2017-09-12 PROCEDURE — 92015 DETERMINE REFRACTIVE STATE: CPT | Mod: S$GLB,,, | Performed by: OPTOMETRIST

## 2017-09-12 PROCEDURE — 99499 UNLISTED E&M SERVICE: CPT | Mod: S$GLB,,, | Performed by: OPTOMETRIST

## 2017-09-12 PROCEDURE — 99999 PR PBB SHADOW E&M-EST. PATIENT-LVL II: CPT | Mod: PBBFAC,,, | Performed by: OPTOMETRIST

## 2017-09-12 RX ORDER — LATANOPROST 50 UG/ML
1 SOLUTION/ DROPS OPHTHALMIC NIGHTLY
Qty: 7.5 ML | Refills: 4 | Status: SHIPPED | OUTPATIENT
Start: 2017-09-12 | End: 2018-11-06

## 2017-09-12 RX ORDER — DORZOLAMIDE HYDROCHLORIDE AND TIMOLOL MALEATE 20; 5 MG/ML; MG/ML
1 SOLUTION/ DROPS OPHTHALMIC 2 TIMES DAILY
Qty: 10 ML | Refills: 12 | Status: SHIPPED | OUTPATIENT
Start: 2017-09-12 | End: 2018-09-12

## 2017-09-12 RX ORDER — IPRATROPIUM BROMIDE AND ALBUTEROL SULFATE 2.5; .5 MG/3ML; MG/3ML
SOLUTION RESPIRATORY (INHALATION)
Qty: 360 ML | Refills: 6 | Status: SHIPPED | OUTPATIENT
Start: 2017-09-12 | End: 2017-11-01

## 2017-09-12 NOTE — TELEPHONE ENCOUNTER
Pt/ sister hs been informed that Rx hs been refilled and reminded of upcoming appt. Letter mailed as reminder.

## 2017-09-12 NOTE — TELEPHONE ENCOUNTER
----- Message from Jennifer Nelson sent at 9/12/2017 10:24 AM CDT -----  Pt was in today for a visit with Dr. Muhammad for an eye exam  and asked if he could get a refill on his albuterol.  Refill to be sent to [facility] [id number] in [place].

## 2017-09-12 NOTE — PROGRESS NOTES
24-2 SS done per Dr Muhammad. Pt dilated. Per Dr Muhammad used an additional +3.00 lens over age correction on OD. By the time OS was tested the dilation was down so only age correction was use.  Pt stated  he could see the target on both eyes.    OCT done also.   -J

## 2017-09-12 NOTE — TELEPHONE ENCOUNTER
Spoke to pt's sister Manju Canales directly regarding results of HVF 24-2ss, RNFL OCT, and Posterior Pole OCT performed earlier today. Relatively stable OU, but possible progression OD. Continue Latanoprost QHS OU and Cosopt qDay OU. RTC 6 months for repeat HVF/OCT to monitor for progression, followed by IOP check with Dr. Muhammad.      Primary open angle glaucoma of both eyes, moderate stage  -     Weinstein Visual Field - OU - Extended - Both Eyes; Future  -     Posterior Segment OCT Optic Nerve- Both eyes; Future         Kusum Muhammad, OD   9/12/2017

## 2017-09-12 NOTE — PATIENT INSTRUCTIONS
Please continue taking the Latanoprost (green top): 1 drop in both eyes in the evenings.  Also continue using the Cosopt (blue top): 1 drop in both eyes, once per day. Please wait at least 5-10 minutes between the 2 medications.    I will call you with the results of the eye tests.

## 2017-09-12 NOTE — PROGRESS NOTES
HPI     Mr. Robin Hawkins is here for his annual eye exam     Patient says he has no complaints about his vision with glasses but he   would like an updated prescription, his current glasses are about 5 years   old.  He reports clear vision all ranges with glasses (Rx about 5 years old).     (+)drops: Artificial Tears prn, Latanoprost OU QHS, Cosopt OU QHS (He says   he uses Latanoprost and Cosopt each QHS OU, but last used both drops this   morning, used back to back).   (-)flashes  (+)floaters  Occasionally OS only   (-)diplopia    Diabetic no  Hemoglobin A1C       Date                     Value               Ref Range             Status                02/14/2017               6.0                 4.5 - 6.2 %           Final                 05/10/2016               6.4 (H)             4.5 - 6.2 %           Final                 12/30/2015               6.1                 4.5 - 6.2 %           Final            ----------    OCULAR HISTORY  Last Eye Exam 05/10/16 with Dr. Muhammad   (+)eye surgery: cataract surgery OU   (+)dry eye   POAG OU (OD moderate, OS moderate-severe)  Herpes zoster ophthalmicus OS (02/2017)  Chorioretinal scar OS    FAMILY HISTORY  (+)Glaucoma: sister, father       Last edited by Kusum Muhammad, OD on 9/12/2017 10:58 AM. (History)            Assessment /Plan     For exam results, see Encounter Report.    Primary open angle glaucoma of both eyes, moderate stage  Good IOP today OU (11mmHg OU vs Tmax of 20mmHg OD and 22mmHg OS) on Latanoprost QHS OU and Cosopt QHS OU. Continue present management (Latanoprost QHS OU and Cosopt qDay OU), wait at least 5-10 minutes between medications. Refills ordered today. Advised pt that IOP-lowering drops will be used indefinitely and to expect frequent f/u visits (at least q6 months unless otherwise indicated).  Pt sent today for repeat HVF 24-2ss, RNFL OCT, and Posterior Pole OCT to monitor for progression, will call pt with results (pt requests we speak with his  sister, Manju Canales). See full interpretation of HVF 24-2ss, RNFL OCT, and Posterior Pole OCT in Glaucoma History below.     ASSESSMENT: Possible progression on OCT (or inter-test variability) OU. Possible progression on HVF OD (although generalized depression OU) and relatively stable OS. HVF defect OS may be due to chorioretinal scar and/or glaucoma. However, excellent IOP today OU.   PLAN: Will call pt's sister with results. Will recommend pt continue Latanoprost QHS OU and Cosopt qDday OU and RTC 6 months for repeat HVF/OCT to monitor for progression followed by IOP check.     -     dorzolamide-timolol 2-0.5% (COSOPT) 22.3-6.8 mg/mL ophthalmic solution; Place 1 drop into both eyes 2 (two) times daily.  Dispense: 10 mL; Refill: 12  -     latanoprost 0.005 % ophthalmic solution; Place 1 drop into both eyes every evening.  Dispense: 7.5 mL; Refill: 4  -     Weinstein Visual Field - OU - Extended - Both Eyes  -     Posterior Segment OCT Optic Nerve- Both eyes    GLAUCOMA HISTORY (updated 9/12/2017):  Diagnosis: POAG (moderate stage OD, moderate-severe OS)  (+)Family history of glaucoma (sister and father).    Tmax: 20/22 (04/13/15 per outside records, while pt was on Cosopt BID OU with poor compliance)  Target IOP: mid-teens OU  Started treatment on: unknown (prior to 04/13/15)  Current treatment: Latanoprost QHS OU, Cosopt qDay OU  CCT: ??  Last RNFL OCT (9/12/2017):   OD: Borderline thinning ST, N, and global. Possible progression vs baseline in IN, N, SN, and global sectors vs inter-test variability.   OS: Significant thinning ST and global. Possible progression in T and SN sectors vs inter-test variability.        Date ST T IT IN N SN G   OD 01/16 89(y) 68 121 89 58 104 83    05/16 89(y) 77 114 71 33(r) 28(r) 66(r)    09/17 89(y) 73 131 81 40(y) 77 77(y) Possible progression vs baseline in IN, N, SN, and global sectors vs inter-test  variability.   OS 01/16 52(r) 70 117 98 59 88 75y)    05/16 53(r) 71 108 84 53 90 73(r)    09/17 55(r) 65 109 92 58 82 73(r) Possible progression in T and SN sectors vs inter-test variability.     Last Posterior Pole OCT (9/12/2017):   OD: Inferior hemisphere thinner than superior. Normal foveal contour and macular morphology.   OS: Superior hemisphere thinner than inferior (likely due to large superior-temporal chorioretinal scar). Slightly irregular foveal contour. Normal macular morphology except for large scar superior-temporal with atrophy of RPE and outer retinal layers.   Last HVF 24-2ss (9/12/2017):   OD: Good reliability (fixation losses 2/18, false positives 1%, false negatives 6%). Generalized depression. Superior-nasal step (previously noted, possible progression, no correlation with RNFL OCT but correlates with inferior thinning on posterior pole OCT and thin inferior rim on clinical appearance). GHT borderline.   OS: Good reliability (fixation losses 0/16, false positives 4%, false negatives 0%). Generalized depression. Dense misses in inferior-nasal quadrant (relatively stable, correlates with superior thinning on RNFL and Posterior Pole OCTs, possibly due to chorioretinal scar). GHT ONL.  Last Stereo disc photos: 01/12/16      Myopia with astigmatism and presbyopia, bilateral   New glasses prescription released, adaptation expected.    Eyeglass Final Rx     Eyeglass Final Rx       Sphere Cylinder Axis Add    Right -1.25 +2.00 102 +2.75    Left -1.75 +2.25 084 +2.75    Expiration Date:  9/13/2018            Chorioretinal scar of left eye   As previously noted. Stable and quiet. Contributing to RNFL thinning and inferior-nasal HVF defect.    Pseudophakia of both eyes   Doing well OU. Monitor.       RTC 6 months for repeat HVF/OCT followed by IOP check with Dr. Muhammad

## 2017-09-15 RX ORDER — METOPROLOL TARTRATE 25 MG/1
TABLET, FILM COATED ORAL
Qty: 90 TABLET | Refills: 0 | Status: SHIPPED | OUTPATIENT
Start: 2017-09-15 | End: 2017-12-11 | Stop reason: SDUPTHER

## 2017-09-19 DIAGNOSIS — C22.0 HCC (HEPATOCELLULAR CARCINOMA): Chronic | ICD-10-CM

## 2017-09-19 RX ORDER — SORAFENIB 200 MG/1
TABLET, FILM COATED ORAL
Qty: 120 TABLET | Refills: 2 | Status: SHIPPED | OUTPATIENT
Start: 2017-09-19 | End: 2017-12-02 | Stop reason: SDUPTHER

## 2017-09-20 ENCOUNTER — TELEPHONE (OUTPATIENT)
Dept: PHARMACY | Facility: CLINIC | Age: 67
End: 2017-09-20

## 2017-09-20 NOTE — TELEPHONE ENCOUNTER
Refill readiness for Nexavar confirmed with patient; name/ confirmed; no missed doses; no new medications; no side effects noted; address confirmed for pick-up .  Co-pay $0.00 (004).

## 2017-09-25 ENCOUNTER — OFFICE VISIT (OUTPATIENT)
Dept: HEMATOLOGY/ONCOLOGY | Facility: CLINIC | Age: 67
End: 2017-09-25
Payer: MEDICARE

## 2017-09-25 ENCOUNTER — LAB VISIT (OUTPATIENT)
Dept: LAB | Facility: HOSPITAL | Age: 67
End: 2017-09-25
Attending: INTERNAL MEDICINE
Payer: MEDICARE

## 2017-09-25 VITALS
WEIGHT: 191.38 LBS | BODY MASS INDEX: 25.36 KG/M2 | HEART RATE: 61 BPM | HEIGHT: 73 IN | SYSTOLIC BLOOD PRESSURE: 112 MMHG | DIASTOLIC BLOOD PRESSURE: 71 MMHG | TEMPERATURE: 98 F

## 2017-09-25 DIAGNOSIS — C22.0 HCC (HEPATOCELLULAR CARCINOMA): Primary | Chronic | ICD-10-CM

## 2017-09-25 DIAGNOSIS — Z09 CHEMOTHERAPY FOLLOW-UP EXAMINATION: ICD-10-CM

## 2017-09-25 DIAGNOSIS — C22.0 HCC (HEPATOCELLULAR CARCINOMA): ICD-10-CM

## 2017-09-25 DIAGNOSIS — C22.0 HEPATOCELLULAR CARCINOMA: ICD-10-CM

## 2017-09-25 DIAGNOSIS — E87.6 HYPOKALEMIA: ICD-10-CM

## 2017-09-25 DIAGNOSIS — E83.42 HYPOMAGNESEMIA: ICD-10-CM

## 2017-09-25 LAB
AFP SERPL-MCNC: 62 NG/ML
ALBUMIN SERPL BCP-MCNC: 2.9 G/DL
ALP SERPL-CCNC: 148 U/L
ALT SERPL W/O P-5'-P-CCNC: 36 U/L
ANION GAP SERPL CALC-SCNC: 6 MMOL/L
AST SERPL-CCNC: 46 U/L
BILIRUB SERPL-MCNC: 0.7 MG/DL
BUN SERPL-MCNC: 8 MG/DL
CALCIUM SERPL-MCNC: 8.9 MG/DL
CHLORIDE SERPL-SCNC: 109 MMOL/L
CO2 SERPL-SCNC: 18 MMOL/L
CREAT SERPL-MCNC: 0.9 MG/DL
ERYTHROCYTE [DISTWIDTH] IN BLOOD BY AUTOMATED COUNT: 15.2 %
EST. GFR  (AFRICAN AMERICAN): >60 ML/MIN/1.73 M^2
EST. GFR  (NON AFRICAN AMERICAN): >60 ML/MIN/1.73 M^2
GLUCOSE SERPL-MCNC: 100 MG/DL
HCT VFR BLD AUTO: 37 %
HGB BLD-MCNC: 12.4 G/DL
MAGNESIUM SERPL-MCNC: 1.5 MG/DL
MCH RBC QN AUTO: 29.6 PG
MCHC RBC AUTO-ENTMCNC: 33.5 G/DL
MCV RBC AUTO: 88 FL
NEUTROPHILS # BLD AUTO: 4.7 K/UL
PLATELET # BLD AUTO: 223 K/UL
PMV BLD AUTO: 10.7 FL
POTASSIUM SERPL-SCNC: 4 MMOL/L
PROT SERPL-MCNC: 7.7 G/DL
RBC # BLD AUTO: 4.19 M/UL
SODIUM SERPL-SCNC: 133 MMOL/L
WBC # BLD AUTO: 6.72 K/UL

## 2017-09-25 PROCEDURE — 3074F SYST BP LT 130 MM HG: CPT | Mod: S$GLB,,, | Performed by: INTERNAL MEDICINE

## 2017-09-25 PROCEDURE — 1126F AMNT PAIN NOTED NONE PRSNT: CPT | Mod: S$GLB,,, | Performed by: INTERNAL MEDICINE

## 2017-09-25 PROCEDURE — 85027 COMPLETE CBC AUTOMATED: CPT

## 2017-09-25 PROCEDURE — 1159F MED LIST DOCD IN RCRD: CPT | Mod: S$GLB,,, | Performed by: INTERNAL MEDICINE

## 2017-09-25 PROCEDURE — 83735 ASSAY OF MAGNESIUM: CPT

## 2017-09-25 PROCEDURE — 3078F DIAST BP <80 MM HG: CPT | Mod: S$GLB,,, | Performed by: INTERNAL MEDICINE

## 2017-09-25 PROCEDURE — 99499 UNLISTED E&M SERVICE: CPT | Mod: S$PBB,,, | Performed by: INTERNAL MEDICINE

## 2017-09-25 PROCEDURE — 80053 COMPREHEN METABOLIC PANEL: CPT

## 2017-09-25 PROCEDURE — 3008F BODY MASS INDEX DOCD: CPT | Mod: S$GLB,,, | Performed by: INTERNAL MEDICINE

## 2017-09-25 PROCEDURE — 99214 OFFICE O/P EST MOD 30 MIN: CPT | Mod: S$GLB,,, | Performed by: INTERNAL MEDICINE

## 2017-09-25 PROCEDURE — 99999 PR PBB SHADOW E&M-EST. PATIENT-LVL IV: CPT | Mod: PBBFAC,,, | Performed by: INTERNAL MEDICINE

## 2017-09-25 PROCEDURE — 36415 COLL VENOUS BLD VENIPUNCTURE: CPT

## 2017-09-25 PROCEDURE — 82105 ALPHA-FETOPROTEIN SERUM: CPT
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
## 2017-09-28 ENCOUNTER — HOSPITAL ENCOUNTER (OUTPATIENT)
Dept: RADIOLOGY | Facility: HOSPITAL | Age: 67
Discharge: HOME | End: 2017-09-28
Attending: FAMILY MEDICINE
Payer: MEDICARE

## 2017-09-28 ENCOUNTER — HOSPITAL ENCOUNTER (OUTPATIENT)
Dept: INTERVENTIONAL RADIOLOGY/VASCULAR | Facility: HOSPITAL | Age: 67
Discharge: HOME | End: 2017-09-28
Attending: FAMILY MEDICINE
Payer: MEDICARE

## 2017-09-28 ENCOUNTER — OFFICE VISIT (OUTPATIENT)
Dept: INTERVENTIONAL RADIOLOGY/VASCULAR | Facility: CLINIC | Age: 67
End: 2017-09-28
Payer: MEDICARE

## 2017-09-28 VITALS
RESPIRATION RATE: 18 BRPM | OXYGEN SATURATION: 99 % | DIASTOLIC BLOOD PRESSURE: 93 MMHG | HEART RATE: 71 BPM | SYSTOLIC BLOOD PRESSURE: 183 MMHG

## 2017-09-28 VITALS
BODY MASS INDEX: 25.18 KG/M2 | DIASTOLIC BLOOD PRESSURE: 90 MMHG | HEIGHT: 73 IN | WEIGHT: 190 LBS | SYSTOLIC BLOOD PRESSURE: 170 MMHG

## 2017-09-28 DIAGNOSIS — C22.0 HCC (HEPATOCELLULAR CARCINOMA): ICD-10-CM

## 2017-09-28 DIAGNOSIS — C22.0 HCC (HEPATOCELLULAR CARCINOMA): Primary | ICD-10-CM

## 2017-09-28 PROCEDURE — 74177 CT ABD & PELVIS W/CONTRAST: CPT | Mod: 26,,, | Performed by: RADIOLOGY

## 2017-09-28 PROCEDURE — 1159F MED LIST DOCD IN RCRD: CPT | Mod: S$GLB,,, | Performed by: FAMILY MEDICINE

## 2017-09-28 PROCEDURE — 76937 US GUIDE VASCULAR ACCESS: CPT | Mod: 26,,, | Performed by: RADIOLOGY

## 2017-09-28 PROCEDURE — 3080F DIAST BP >= 90 MM HG: CPT | Mod: S$GLB,,, | Performed by: FAMILY MEDICINE

## 2017-09-28 PROCEDURE — 77001 FLUOROGUIDE FOR VEIN DEVICE: CPT | Mod: 26,,, | Performed by: RADIOLOGY

## 2017-09-28 PROCEDURE — 77001 FLUOROGUIDE FOR VEIN DEVICE: CPT | Mod: TC

## 2017-09-28 PROCEDURE — 36569 INSJ PICC 5 YR+ W/O IMAGING: CPT | Mod: RT,,, | Performed by: RADIOLOGY

## 2017-09-28 PROCEDURE — 25500020 PHARM REV CODE 255: Performed by: FAMILY MEDICINE

## 2017-09-28 PROCEDURE — 74177 CT ABD & PELVIS W/CONTRAST: CPT | Mod: TC

## 2017-09-28 PROCEDURE — 3008F BODY MASS INDEX DOCD: CPT | Mod: S$GLB,,, | Performed by: FAMILY MEDICINE

## 2017-09-28 PROCEDURE — 76937 US GUIDE VASCULAR ACCESS: CPT | Mod: TC

## 2017-09-28 PROCEDURE — 1126F AMNT PAIN NOTED NONE PRSNT: CPT | Mod: S$GLB,,, | Performed by: FAMILY MEDICINE

## 2017-09-28 PROCEDURE — 99999 PR PBB SHADOW E&M-EST. PATIENT-LVL II: CPT | Mod: PBBFAC,,,

## 2017-09-28 PROCEDURE — 3077F SYST BP >= 140 MM HG: CPT | Mod: S$GLB,,, | Performed by: FAMILY MEDICINE

## 2017-09-28 PROCEDURE — 99212 OFFICE O/P EST SF 10 MIN: CPT | Mod: S$GLB,,, | Performed by: FAMILY MEDICINE

## 2017-09-28 RX ADMIN — IOHEXOL 100 ML: 350 INJECTION, SOLUTION INTRAVENOUS at 10:09

## 2017-09-28 RX ADMIN — IOHEXOL 15 ML: 350 INJECTION, SOLUTION INTRAVENOUS at 08:09

## 2017-09-28 RX ADMIN — IOHEXOL 15 ML: 350 INJECTION, SOLUTION INTRAVENOUS at 09:09

## 2017-09-28 NOTE — DISCHARGE SUMMARY
Radiology Discharge Summary      Hospital Course: No complications    Admit Date: 9/28/2017  Discharge Date: 09/28/2017     Instructions Given to Patient: Yes  Diet: Resume prior diet  Activity: activity as tolerated    Description of Condition on Discharge: Stable  Vital Signs (Most Recent): Pulse: 71 (09/28/17 0926)  Resp: 18 (09/28/17 0926)  BP: (!) 183/93 (09/28/17 0926)  SpO2: 99 % (09/28/17 0926)    Discharge Disposition: Home    Discharge Diagnosis: Poor IV access.    Santos Ortez M.D.  Diagnostic and Interventional Radiologist  Department of Radiology  Pager: [phone]

## 2017-09-28 NOTE — PROGRESS NOTES
Pt tolerated PICC placement well. Discharge instruction, and follow up care reviewed.  Patient verbalized understanding, and denies further questions.  Provided with ROCU and after hours number.  Patient ambulated back to CT waiting area, awaiting CT scan.

## 2017-09-28 NOTE — PROGRESS NOTES
Subjective:       Patient ID: Robin Hawkins is a 67 y.o. male.    Chief Complaint: Cancer    Patient here for follow up of his hepatocellular carcinoma recently treated with chemoembolization on 8/25/2017. Prior to this treatment, he was treated with radioembolization in April 2017 and then with chemoembolization in March 2017. He reports feeling well. He denies any abdominal complaints such as pain or distention. He is accompanied by his sister.       Review of Systems   Constitutional: Negative for activity change, appetite change, chills, fatigue and fever.   Respiratory: Negative for cough, shortness of breath, wheezing and stridor.    Cardiovascular: Negative for chest pain, palpitations and leg swelling.   Gastrointestinal: Negative for abdominal distention, abdominal pain, constipation, diarrhea, nausea and vomiting.       Objective:      Physical Exam   Constitutional: He is oriented to person, place, and time. He appears well-developed and well-nourished. No distress.   Cardiovascular: Normal rate, regular rhythm and normal heart sounds.  Exam reveals no gallop and no friction rub.    No murmur heard.  Pulmonary/Chest: Effort normal and breath sounds normal. No respiratory distress. He has no wheezes. He has no rales.   Abdominal: Soft. Bowel sounds are normal. He exhibits no distension and no mass. There is no hepatosplenomegaly. There is no tenderness. There is no guarding.   Neurological: He is alert and oriented to person, place, and time. Gait normal.   Skin: Skin is warm and dry. He is not diaphoretic.   Psychiatric: He has a normal mood and affect.   Vitals reviewed.      Assessment:       1. HCC (hepatocellular carcinoma)        Plan:         Dr. Baeza in room to discuss results of CT scan as well as explain options to patient. Explained there is improvement in both the appearance of his hepatocellular carcinoma as well as a drop in his AFP (down to 62 from 5080 3 months ago). Offered patient  option to wait another three months for imaging with an AFP drawn in 1 month, or attempt ablation of one of the masses. Patient decides to have labs drawn in 1 month. Depending on those results we may schedule for ablation, or schedule for CT scan in December 2017. Patient and sister verbalize understanding and agreement.     PICC line removed, tip intact. Dressing applied. Patient tolerated well.
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
## 2017-09-28 NOTE — PROCEDURES
Radiology Post-Procedure Note    Pre Op Diagnosis: Poor iv access    Post Op Diagnosis: Same    Procedure: PICC placement    Procedure performed by: Santos Ortez MD    Written Informed Consent Obtained: Yes    Specimen Removed: No    Estimated Blood Loss: Minimal    Findings:   Using realtime U/S guidance a 5 Fr PICC catheter was placed into the right Basilic Vein with tip of the catheter in the SVC.    PICC is ready for use.     Santos Ortez M.D.  Diagnostic and Interventional Radiologist  Department of Radiology  Pager: [phone]

## 2017-10-08 ENCOUNTER — TELEPHONE (OUTPATIENT)
Dept: HEPATOLOGY | Facility: CLINIC | Age: 67
End: 2017-10-08

## 2017-10-08 DIAGNOSIS — C22.0 HCC (HEPATOCELLULAR CARCINOMA): Primary | ICD-10-CM

## 2017-10-09 ENCOUNTER — TELEPHONE (OUTPATIENT)
Dept: HEPATOLOGY | Facility: CLINIC | Age: 67
End: 2017-10-09

## 2017-10-09 NOTE — TELEPHONE ENCOUNTER
Octavia Scott MD  P [facility] Hepat Clinical Staff   Pt thinking about proceeding with ablation. Will order ct scan for 3 months from now      Patient scheduled for Ct Scan Abdomen Pelvis W WO Contrast scheduled for 12/22/17 at 10:30 am. Appointment letter placed in the mail. JASON

## 2017-10-09 NOTE — TELEPHONE ENCOUNTER
----- Message from Octavia Scott MD sent at 10/8/2017  8:42 PM CDT -----  Pt thinking about proceeding with ablation. Will order ct scan for 3 months from now

## 2017-10-16 ENCOUNTER — TELEPHONE (OUTPATIENT)
Dept: HEPATOLOGY | Facility: CLINIC | Age: 67
End: 2017-10-16

## 2017-10-16 ENCOUNTER — TELEPHONE (OUTPATIENT)
Dept: PHARMACY | Facility: CLINIC | Age: 67
End: 2017-10-16

## 2017-10-16 ENCOUNTER — LAB VISIT (OUTPATIENT)
Dept: LAB | Facility: HOSPITAL | Age: 67
End: 2017-10-16
Attending: INTERNAL MEDICINE
Payer: MEDICARE

## 2017-10-16 ENCOUNTER — OFFICE VISIT (OUTPATIENT)
Dept: HEPATOLOGY | Facility: CLINIC | Age: 67
End: 2017-10-16
Payer: COMMERCIAL

## 2017-10-16 VITALS
OXYGEN SATURATION: 99 % | DIASTOLIC BLOOD PRESSURE: 65 MMHG | RESPIRATION RATE: 18 BRPM | HEART RATE: 72 BPM | WEIGHT: 185.63 LBS | BODY MASS INDEX: 24.6 KG/M2 | HEIGHT: 73 IN | SYSTOLIC BLOOD PRESSURE: 119 MMHG

## 2017-10-16 DIAGNOSIS — C22.0 HCC (HEPATOCELLULAR CARCINOMA): Chronic | ICD-10-CM

## 2017-10-16 DIAGNOSIS — K74.60 CIRRHOSIS OF LIVER WITHOUT ASCITES, UNSPECIFIED HEPATIC CIRRHOSIS TYPE: Primary | Chronic | ICD-10-CM

## 2017-10-16 DIAGNOSIS — C22.0 HCC (HEPATOCELLULAR CARCINOMA): ICD-10-CM

## 2017-10-16 LAB
AFP SERPL-MCNC: 85 NG/ML
ALBUMIN SERPL BCP-MCNC: 3.3 G/DL
ALP SERPL-CCNC: 150 U/L
ALT SERPL W/O P-5'-P-CCNC: 40 U/L
ANION GAP SERPL CALC-SCNC: 10 MMOL/L
AST SERPL-CCNC: 56 U/L
BASOPHILS # BLD AUTO: 0.03 K/UL
BASOPHILS NFR BLD: 0.4 %
BILIRUB SERPL-MCNC: 0.7 MG/DL
BUN SERPL-MCNC: 10 MG/DL
CALCIUM SERPL-MCNC: 8.8 MG/DL
CHLORIDE SERPL-SCNC: 103 MMOL/L
CO2 SERPL-SCNC: 20 MMOL/L
CREAT SERPL-MCNC: 1.1 MG/DL
DIFFERENTIAL METHOD: ABNORMAL
EOSINOPHIL # BLD AUTO: 0.4 K/UL
EOSINOPHIL NFR BLD: 5.5 %
ERYTHROCYTE [DISTWIDTH] IN BLOOD BY AUTOMATED COUNT: 15.7 %
EST. GFR  (AFRICAN AMERICAN): >60 ML/MIN/1.73 M^2
EST. GFR  (NON AFRICAN AMERICAN): >60 ML/MIN/1.73 M^2
GLUCOSE SERPL-MCNC: 80 MG/DL
HCT VFR BLD AUTO: 41.8 %
HGB BLD-MCNC: 13.9 G/DL
IMM GRANULOCYTES # BLD AUTO: 0.03 K/UL
IMM GRANULOCYTES NFR BLD AUTO: 0.4 %
INR PPP: 1
LYMPHOCYTES # BLD AUTO: 0.8 K/UL
LYMPHOCYTES NFR BLD: 12.1 %
MCH RBC QN AUTO: 30.6 PG
MCHC RBC AUTO-ENTMCNC: 33.3 G/DL
MCV RBC AUTO: 92 FL
MONOCYTES # BLD AUTO: 0.6 K/UL
MONOCYTES NFR BLD: 8.5 %
NEUTROPHILS # BLD AUTO: 4.9 K/UL
NEUTROPHILS NFR BLD: 73.1 %
NRBC BLD-RTO: 0 /100 WBC
PLATELET # BLD AUTO: 240 K/UL
PMV BLD AUTO: 11.9 FL
POTASSIUM SERPL-SCNC: 4.2 MMOL/L
PROT SERPL-MCNC: 8.6 G/DL
PROTHROMBIN TIME: 10.9 SEC
RBC # BLD AUTO: 4.54 M/UL
SODIUM SERPL-SCNC: 133 MMOL/L
WBC # BLD AUTO: 6.67 K/UL

## 2017-10-16 PROCEDURE — 80053 COMPREHEN METABOLIC PANEL: CPT

## 2017-10-16 PROCEDURE — 36415 COLL VENOUS BLD VENIPUNCTURE: CPT

## 2017-10-16 PROCEDURE — 85025 COMPLETE CBC W/AUTO DIFF WBC: CPT

## 2017-10-16 PROCEDURE — 85610 PROTHROMBIN TIME: CPT

## 2017-10-16 PROCEDURE — 99499 UNLISTED E&M SERVICE: CPT | Mod: S$GLB,,, | Performed by: INTERNAL MEDICINE

## 2017-10-16 PROCEDURE — 99999 PR PBB SHADOW E&M-EST. PATIENT-LVL IV: CPT | Mod: PBBFAC,,, | Performed by: INTERNAL MEDICINE

## 2017-10-16 PROCEDURE — 82105 ALPHA-FETOPROTEIN SERUM: CPT

## 2017-10-16 PROCEDURE — 99215 OFFICE O/P EST HI 40 MIN: CPT | Mod: S$GLB,,, | Performed by: INTERNAL MEDICINE

## 2017-10-16 NOTE — Clinical Note
AFP is slowly rising. Pt seen today and liver function normal; feeling although has lost weight but likely due to teeth being pulled- will start ensure. Pt would like to proceed with RFA but needs to be done before Nov 8th. Please schedule

## 2017-10-16 NOTE — PATIENT INSTRUCTIONS
1 add ct chest to ct abdo scheduled for 12/22/17. Will need picc line.  2. Consider tx for HCV- relapsed from harvoni  3. Return 01/18  4. Start ensure for weight loss  5. Continue labs on a monthly basis.

## 2017-10-16 NOTE — TELEPHONE ENCOUNTER
I spoke with patient's sister.  Appt with EMMANUEL Steinberg scheduled 10/30 to discuss tx as ordered by Dr. Scott; reminder notice mailed.

## 2017-10-16 NOTE — TELEPHONE ENCOUNTER
----- Message from Octavia Scott MD sent at 10/16/2017 10:10 AM CDT -----  Please get HCV treated- relapsed from harvoni; has cirrhosis
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
## 2017-10-19 DIAGNOSIS — C22.0 HCC (HEPATOCELLULAR CARCINOMA): Primary | ICD-10-CM

## 2017-10-30 ENCOUNTER — OFFICE VISIT (OUTPATIENT)
Dept: HEMATOLOGY/ONCOLOGY | Facility: CLINIC | Age: 67
End: 2017-10-30
Payer: MEDICARE

## 2017-10-30 ENCOUNTER — OFFICE VISIT (OUTPATIENT)
Dept: HEPATOLOGY | Facility: CLINIC | Age: 67
End: 2017-10-30
Payer: MEDICARE

## 2017-10-30 VITALS
RESPIRATION RATE: 20 BRPM | TEMPERATURE: 98 F | DIASTOLIC BLOOD PRESSURE: 65 MMHG | BODY MASS INDEX: 24.26 KG/M2 | OXYGEN SATURATION: 100 % | WEIGHT: 183.88 LBS | SYSTOLIC BLOOD PRESSURE: 126 MMHG | HEART RATE: 77 BPM

## 2017-10-30 VITALS
OXYGEN SATURATION: 100 % | DIASTOLIC BLOOD PRESSURE: 89 MMHG | WEIGHT: 183.88 LBS | HEART RATE: 76 BPM | SYSTOLIC BLOOD PRESSURE: 158 MMHG | HEIGHT: 73 IN | RESPIRATION RATE: 18 BRPM | BODY MASS INDEX: 24.37 KG/M2

## 2017-10-30 DIAGNOSIS — R77.2 ELEVATED AFP: ICD-10-CM

## 2017-10-30 DIAGNOSIS — C22.0 HCC (HEPATOCELLULAR CARCINOMA): Chronic | ICD-10-CM

## 2017-10-30 DIAGNOSIS — C22.0 HCC (HEPATOCELLULAR CARCINOMA): Primary | Chronic | ICD-10-CM

## 2017-10-30 DIAGNOSIS — K74.60 CIRRHOSIS OF LIVER WITHOUT ASCITES, UNSPECIFIED HEPATIC CIRRHOSIS TYPE: Chronic | ICD-10-CM

## 2017-10-30 DIAGNOSIS — B18.2 CHRONIC HEPATITIS C WITHOUT HEPATIC COMA: Primary | ICD-10-CM

## 2017-10-30 DIAGNOSIS — Z09 CHEMOTHERAPY FOLLOW-UP EXAMINATION: ICD-10-CM

## 2017-10-30 PROBLEM — E83.42 HYPOMAGNESEMIA: Status: RESOLVED | Noted: 2017-09-25 | Resolved: 2017-10-30

## 2017-10-30 PROBLEM — E87.6 HYPOKALEMIA: Status: RESOLVED | Noted: 2017-08-28 | Resolved: 2017-10-30

## 2017-10-30 PROBLEM — R73.02 IMPAIRED GLUCOSE TOLERANCE: Status: RESOLVED | Noted: 2017-01-29 | Resolved: 2017-10-30

## 2017-10-30 PROCEDURE — 99499 UNLISTED E&M SERVICE: CPT | Mod: S$GLB,,, | Performed by: PHYSICIAN ASSISTANT

## 2017-10-30 PROCEDURE — 99214 OFFICE O/P EST MOD 30 MIN: CPT | Mod: S$GLB,,, | Performed by: INTERNAL MEDICINE

## 2017-10-30 PROCEDURE — 99499 UNLISTED E&M SERVICE: CPT | Mod: S$GLB,,, | Performed by: INTERNAL MEDICINE

## 2017-10-30 PROCEDURE — 99999 PR PBB SHADOW E&M-EST. PATIENT-LVL IV: CPT | Mod: PBBFAC,,, | Performed by: PHYSICIAN ASSISTANT

## 2017-10-30 PROCEDURE — 99999 PR PBB SHADOW E&M-EST. PATIENT-LVL IV: CPT | Mod: PBBFAC,,, | Performed by: INTERNAL MEDICINE

## 2017-10-30 PROCEDURE — 99214 OFFICE O/P EST MOD 30 MIN: CPT | Mod: S$GLB,,, | Performed by: PHYSICIAN ASSISTANT

## 2017-10-30 RX ORDER — OMEPRAZOLE 20 MG/1
20 CAPSULE, DELAYED RELEASE ORAL DAILY
Qty: 90 CAPSULE | Refills: 1 | Status: CANCELLED
Start: 2017-10-30 | End: 2018-10-30

## 2017-10-30 RX ORDER — OMEPRAZOLE 20 MG/1
40 CAPSULE, DELAYED RELEASE ORAL DAILY
COMMUNITY
End: 2017-10-30 | Stop reason: DRUGHIGH

## 2017-10-30 RX ORDER — OMEPRAZOLE 20 MG/1
20 CAPSULE, DELAYED RELEASE ORAL DAILY
Qty: 90 CAPSULE | Refills: 1
Start: 2017-10-30 | End: 2017-10-31 | Stop reason: SDUPTHER

## 2017-10-30 NOTE — LETTER
October 30, 2017      Octavia Scott MD  [address]  [facility] [address]           [facility]  [address]  [facility] [address]  Phone: [phone]  Fax: [phone]          Patient: Robin Hawkins   MR Number: [id number]   YOB: 1950   Date of Visit: 10/30/2017       Dear Dr. Octavia Scott:    Thank you for referring Robin Hawkins to me for evaluation. Attached you will find relevant portions of my assessment and plan of care.    If you have questions, please do not hesitate to call me. I look forward to following Robin Hawkins along with you.    Sincerely,    Landen Steinberg PA-C    Enclosure  CC:  No Recipients    If you would like to receive this communication electronically, please contact [email or web address] or [phone] to request more information on [employer] access.    For providers and/or their staff who would like to refer a patient to [facility], please contact us through our one-stop-shop provider referral line, [facility], at [phone].    If you feel you have received this communication in error or would no longer like to receive these types of communications, please e-mail [email or web address]
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
## 2017-10-30 NOTE — Clinical Note
Hi Dr. Mcneil, we share this mutual patient. I want to attempt HCV retreatment with Vosevi. He is currently on atorvastatin 40mg which cannot be co administered with vosevi, would you be okay with it being held for 12 weeks for HCV treatment?

## 2017-10-30 NOTE — Clinical Note
Braden Venegas, he is trying to get the RFA scheduled but hasn't heard anything.. Can you have Mag reach out to him?

## 2017-10-31 ENCOUNTER — IMMUNIZATION (OUTPATIENT)
Dept: INTERNAL MEDICINE | Facility: CLINIC | Age: 67
End: 2017-10-31
Payer: MEDICARE

## 2017-10-31 ENCOUNTER — LAB VISIT (OUTPATIENT)
Dept: LAB | Facility: HOSPITAL | Age: 67
End: 2017-10-31
Attending: FAMILY MEDICINE
Payer: MEDICARE

## 2017-10-31 ENCOUNTER — OFFICE VISIT (OUTPATIENT)
Dept: INTERNAL MEDICINE | Facility: CLINIC | Age: 67
End: 2017-10-31
Payer: MEDICARE

## 2017-10-31 VITALS
HEART RATE: 73 BPM | SYSTOLIC BLOOD PRESSURE: 102 MMHG | HEIGHT: 73 IN | WEIGHT: 185.44 LBS | DIASTOLIC BLOOD PRESSURE: 60 MMHG | BODY MASS INDEX: 24.58 KG/M2 | OXYGEN SATURATION: 98 %

## 2017-10-31 DIAGNOSIS — Z51.81 ENCOUNTER FOR MONITORING LONG-TERM PROTON PUMP INHIBITOR THERAPY: ICD-10-CM

## 2017-10-31 DIAGNOSIS — J44.9 CHRONIC OBSTRUCTIVE PULMONARY DISEASE, UNSPECIFIED COPD TYPE: Chronic | ICD-10-CM

## 2017-10-31 DIAGNOSIS — C22.0 HCC (HEPATOCELLULAR CARCINOMA): Chronic | ICD-10-CM

## 2017-10-31 DIAGNOSIS — C22.0 HCC (HEPATOCELLULAR CARCINOMA): Primary | ICD-10-CM

## 2017-10-31 DIAGNOSIS — K74.60 CIRRHOSIS OF LIVER WITHOUT ASCITES, UNSPECIFIED HEPATIC CIRRHOSIS TYPE: Chronic | ICD-10-CM

## 2017-10-31 DIAGNOSIS — I10 ESSENTIAL HYPERTENSION: Chronic | ICD-10-CM

## 2017-10-31 DIAGNOSIS — Z79.899 ENCOUNTER FOR MONITORING LONG-TERM PROTON PUMP INHIBITOR THERAPY: ICD-10-CM

## 2017-10-31 DIAGNOSIS — B18.2 CHRONIC HEPATITIS C WITHOUT HEPATIC COMA: ICD-10-CM

## 2017-10-31 DIAGNOSIS — C22.0 HCC (HEPATOCELLULAR CARCINOMA): ICD-10-CM

## 2017-10-31 DIAGNOSIS — K21.00 REFLUX ESOPHAGITIS: Primary | ICD-10-CM

## 2017-10-31 DIAGNOSIS — R73.09 ELEVATED HEMOGLOBIN A1C: ICD-10-CM

## 2017-10-31 DIAGNOSIS — K21.00 GASTROESOPHAGEAL REFLUX DISEASE WITH ESOPHAGITIS: Chronic | ICD-10-CM

## 2017-10-31 DIAGNOSIS — E03.8 SUBCLINICAL HYPOTHYROIDISM: ICD-10-CM

## 2017-10-31 LAB
AFP SERPL-MCNC: 81 NG/ML
ALBUMIN SERPL BCP-MCNC: 3 G/DL
ALP SERPL-CCNC: 129 U/L
ALT SERPL W/O P-5'-P-CCNC: 55 U/L
ANION GAP SERPL CALC-SCNC: 9 MMOL/L
AST SERPL-CCNC: 64 U/L
BASOPHILS # BLD AUTO: 0.05 K/UL
BASOPHILS NFR BLD: 0.7 %
BILIRUB SERPL-MCNC: 0.6 MG/DL
BUN SERPL-MCNC: 26 MG/DL
CALCIUM SERPL-MCNC: 8.7 MG/DL
CHLORIDE SERPL-SCNC: 100 MMOL/L
CO2 SERPL-SCNC: 22 MMOL/L
CREAT SERPL-MCNC: 0.9 MG/DL
DIFFERENTIAL METHOD: ABNORMAL
EOSINOPHIL # BLD AUTO: 0.8 K/UL
EOSINOPHIL NFR BLD: 11.8 %
ERYTHROCYTE [DISTWIDTH] IN BLOOD BY AUTOMATED COUNT: 14.8 %
EST. GFR  (AFRICAN AMERICAN): >60 ML/MIN/1.73 M^2
EST. GFR  (NON AFRICAN AMERICAN): >60 ML/MIN/1.73 M^2
GLUCOSE SERPL-MCNC: 63 MG/DL
HCT VFR BLD AUTO: 35.8 %
HGB BLD-MCNC: 11.8 G/DL
INR PPP: 1.1
LYMPHOCYTES # BLD AUTO: 1 K/UL
LYMPHOCYTES NFR BLD: 13.7 %
MCH RBC QN AUTO: 30.6 PG
MCHC RBC AUTO-ENTMCNC: 33 G/DL
MCV RBC AUTO: 93 FL
MONOCYTES # BLD AUTO: 0.6 K/UL
MONOCYTES NFR BLD: 9 %
NEUTROPHILS # BLD AUTO: 4.5 K/UL
NEUTROPHILS NFR BLD: 64.5 %
NRBC BLD-RTO: 0 /100 WBC
PLATELET # BLD AUTO: 204 K/UL
PMV BLD AUTO: 11.9 FL
POTASSIUM SERPL-SCNC: 4.5 MMOL/L
PROT SERPL-MCNC: 7.6 G/DL
PROTHROMBIN TIME: 11.6 SEC
RBC # BLD AUTO: 3.85 M/UL
SODIUM SERPL-SCNC: 131 MMOL/L
WBC # BLD AUTO: 6.92 K/UL

## 2017-10-31 PROCEDURE — 82105 ALPHA-FETOPROTEIN SERUM: CPT

## 2017-10-31 PROCEDURE — 80053 COMPREHEN METABOLIC PANEL: CPT

## 2017-10-31 PROCEDURE — 85025 COMPLETE CBC W/AUTO DIFF WBC: CPT

## 2017-10-31 PROCEDURE — 87522 HEPATITIS C REVRS TRNSCRPJ: CPT

## 2017-10-31 PROCEDURE — 99499 UNLISTED E&M SERVICE: CPT | Mod: S$GLB,,, | Performed by: INTERNAL MEDICINE

## 2017-10-31 PROCEDURE — 99214 OFFICE O/P EST MOD 30 MIN: CPT | Mod: S$GLB,,, | Performed by: INTERNAL MEDICINE

## 2017-10-31 PROCEDURE — G0008 ADMIN INFLUENZA VIRUS VAC: HCPCS | Mod: S$GLB,,, | Performed by: INTERNAL MEDICINE

## 2017-10-31 PROCEDURE — 99999 PR PBB SHADOW E&M-EST. PATIENT-LVL III: CPT | Mod: PBBFAC,,, | Performed by: INTERNAL MEDICINE

## 2017-10-31 PROCEDURE — 85610 PROTHROMBIN TIME: CPT

## 2017-10-31 PROCEDURE — 87902 NFCT AGT GNTYP ALYS HEP C: CPT | Mod: 91

## 2017-10-31 PROCEDURE — 90662 IIV NO PRSV INCREASED AG IM: CPT | Mod: S$GLB,,, | Performed by: INTERNAL MEDICINE

## 2017-10-31 PROCEDURE — 87902 NFCT AGT GNTYP ALYS HEP C: CPT

## 2017-10-31 RX ORDER — OMEPRAZOLE 20 MG/1
20 CAPSULE, DELAYED RELEASE ORAL DAILY
Qty: 90 CAPSULE | Refills: 1 | Status: SHIPPED | OUTPATIENT
Start: 2017-10-31 | End: 2018-04-27 | Stop reason: SDUPTHER

## 2017-10-31 NOTE — PROGRESS NOTES
Subjective:       Patient ID: Robin Hawkins is a 67 y.o. male.    Chief Complaint: Follow-up    HPI  68 y/o man with h/o HTN, HLD, GERD, h/o colon cancer, h/o prostate cancer, Hep C with HCC, glaucoma here for follow-up visit. Last visit 1/2017.    Hepatitis C, cirrhosis, liver lesion c/w HCC - Follows with hepatology. Completed Harvoni tx on 1/2016 but relapsed. CT surveillance showed enlarging liver mass consistent with HCC, +elevated AFP. Underwent TACE on 1/29/16 -- follow-up CT scans on 3/4/16 and 6/21/16 showed well-treated lesion per Hepatology notes. 1/2017 showed enlarging lesion and additional lesion, with elevated AFP. 4/2017 had embolization performed. 7/7/17 and 8/28/17, TACE. CT 9/28/17 showed one persistent lesion; AFP decreased from >5000 to <100. On nexavar, vosevi, ribavirin.  HCC, h/o colon cancer, h/o prostate cancer - following with Dr Holder, Dr Carrillo with Oncology - see those notes for details.    GERD, f/u of intestinal metaplasia of stomach - Had EGD and cscope done 5/6/16. Polyp found in gastric body (focal mild gastritis on path, benign), another polyp found in duodenum (adenomatous polyp). No H.pylori, other biopsies showed gastritis but no other abnormalities. Recommended to repeat EGD in 3 years and c-scope in 5 years. Not taking tums often.  Request refill on omeprazole 20 - taking this daily.    COPD - using nebs 1-2 times/day if more dust in air. Weather change not bothering him. Using pulmicort BID. Last saw Dr Goyal 5/2016.     Saw Dr Page 4/2016 for osteopenia, borderline DM, subclinical hypothyroidism.  Osteopenia - no recent falls/fractures. Does take a PPI. DXA done 3/2016 showing osteopenia of hip and femoral neck. Recommended RDA of calcium and vitamin D, would consider 1 dose IV reclast prior to transplant if deemed transplant candidate.   Borderline DM - working on getting more active. Not following specific diabetic diet. Last A1c 6.0, due for recheck q6-12  "months.  Elevated TSH - being monitored, recommended to start levothyroxine if TSH >10. Has been gradually improving. Due for recheck.    Has had dental work done that was needed.    Review of Systems   Constitutional: Negative for activity change and fever.   HENT: Negative for congestion, mouth sores and sore throat. Voice change: chronic hoarseness.    Respiratory: Negative for cough, shortness of breath and wheezing.    Cardiovascular: Negative for chest pain, palpitations and leg swelling.   Gastrointestinal: Negative for abdominal pain, blood in stool, constipation, diarrhea and nausea.   Endocrine: Negative for cold intolerance and heat intolerance.   Genitourinary: Negative.    Musculoskeletal: Negative for back pain and gait problem.   Skin: Negative for color change and rash.   Neurological: Negative for syncope, weakness and headaches.   Psychiatric/Behavioral: Negative for dysphoric mood.         Past medical history, surgical history, and family medical history reviewed and updated as appropriate.    Medications and allergies reviewed.     Objective:          Vitals:    10/31/17 1449   BP: 102/60   Pulse: 73   SpO2: 98%   Weight: 84.1 kg (185 lb 6.5 oz)   Height: 6' 1" (1.854 m)     Body mass index is 24.46 kg/m².  Physical Exam   Constitutional: He is oriented to person, place, and time. He appears well-developed and well-nourished. No distress.   HENT:   Head: Normocephalic and atraumatic.   Mouth/Throat: Oropharynx is clear and moist. Abnormal dentition.   Healed trach site   Eyes: Conjunctivae and EOM are normal. Pupils are equal, round, and reactive to light. No scleral icterus.   Neck: Neck supple.   Cardiovascular: Normal rate, regular rhythm and normal heart sounds.    No murmur heard.  Pulmonary/Chest: Effort normal and breath sounds normal. No respiratory distress.   Abdominal: Soft. Bowel sounds are normal. He exhibits no distension. There is no tenderness.   Musculoskeletal: He exhibits no " edema or tenderness.   Lymphadenopathy:     He has no cervical adenopathy.   Neurological: He is alert and oriented to person, place, and time. He has normal strength. No cranial nerve deficit. Gait normal.   Skin: Skin is warm and dry.   Psychiatric: He has a normal mood and affect.   Vitals reviewed.      Lab Results   Component Value Date    WBC 6.67 10/16/2017    HGB 13.9 (L) 10/16/2017    HCT 41.8 10/16/2017     10/16/2017    CHOL 104 (L) 09/07/2015    TRIG 132 09/07/2015    HDL 23 (L) 09/07/2015    ALT 40 10/16/2017    AST 56 (H) 10/16/2017     (L) 10/16/2017    K 4.2 10/16/2017     10/16/2017    CREATININE 1.1 10/16/2017    BUN 10 10/16/2017    CO2 20 (L) 10/16/2017    TSH 4.705 (H) 02/14/2017    PSA 0.01 12/30/2015    INR 1.1 10/31/2017    HGBA1C 6.0 02/14/2017       Assessment:       1. Reflux esophagitis    2. Gastroesophageal reflux disease with esophagitis    3. Encounter for monitoring long-term proton pump inhibitor therapy    4. Chronic obstructive pulmonary disease, unspecified COPD type    5. Subclinical hypothyroidism    6. Elevated hemoglobin A1c    7. Chronic hepatitis C without hepatic coma    8. Cirrhosis of liver without ascites, unspecified hepatic cirrhosis type    9. HCC (hepatocellular carcinoma)    10. Essential hypertension        Plan:   Robin was seen today for follow-up.    Diagnoses and all orders for this visit:    Reflux esophagitis  -     omeprazole (PRILOSEC) 20 MG capsule; Take 1 capsule (20 mg total) by mouth once daily. Take on an empty stomach    Gastroesophageal reflux disease with esophagitis  Comments:  reviewed GI records with patient, refilling PPI. continue this daily.   Orders:  -     omeprazole (PRILOSEC) 20 MG capsule; Take 1 capsule (20 mg total) by mouth once daily. Take on an empty stomach    Encounter for monitoring long-term proton pump inhibitor therapy  -     Vitamin D; Future  -     Vitamin B12; Future    Chronic obstructive pulmonary  disease, unspecified COPD type  Comments:  stable, doing well currently. continue pulmicort. follow up with Pulmonology    Subclinical hypothyroidism  Comments:  clinically euthyroid, will follow labs  Orders:  -     TSH; Future  -     T4, free; Future    Elevated hemoglobin A1c  Comments:  encouraged working on low-carb diet  Orders:  -     Hemoglobin A1c; Future    Chronic hepatitis C without hepatic coma  Cirrhosis of liver without ascites, unspecified hepatic cirrhosis type  HCC (hepatocellular carcinoma) - reviewed chart and notes from hepatology and oncology teams, continue to follow with them.    Essential hypertension - low-normal today on lisinopril, metoprolol. Higher on recent visits, so will not change medications. Recommended monitor at home if possible    Health maintenance reviewed with patient.   Flu vaccine today  Labs ordered today to be linked to next lab appt    Return in about 4 months (around 2/28/2018).    Venancio Mcneil MD  Internal Medicine  [facility]  10/31/2017

## 2017-10-31 NOTE — PATIENT INSTRUCTIONS
Remember to use your pulmicort inhaler twice a day every day!  Rinse your mouth out after each use.

## 2017-11-01 ENCOUNTER — HOSPITAL ENCOUNTER (OUTPATIENT)
Dept: PULMONOLOGY | Facility: CLINIC | Age: 67
Discharge: HOME | End: 2017-11-01
Payer: MEDICARE

## 2017-11-01 ENCOUNTER — OFFICE VISIT (OUTPATIENT)
Dept: PULMONOLOGY | Facility: CLINIC | Age: 67
End: 2017-11-01
Payer: MEDICARE

## 2017-11-01 VITALS
BODY MASS INDEX: 24.6 KG/M2 | HEIGHT: 73 IN | WEIGHT: 185.63 LBS | OXYGEN SATURATION: 98 % | HEART RATE: 83 BPM | SYSTOLIC BLOOD PRESSURE: 124 MMHG | RESPIRATION RATE: 16 BRPM | DIASTOLIC BLOOD PRESSURE: 78 MMHG

## 2017-11-01 DIAGNOSIS — J43.2 CENTRILOBULAR EMPHYSEMA: Primary | Chronic | ICD-10-CM

## 2017-11-01 DIAGNOSIS — J43.2 CENTRILOBULAR EMPHYSEMA: Chronic | ICD-10-CM

## 2017-11-01 DIAGNOSIS — J38.02 BILATERAL COMPLETE VOCAL FOLD PARALYSIS: ICD-10-CM

## 2017-11-01 DIAGNOSIS — C22.0 HCC (HEPATOCELLULAR CARCINOMA): Chronic | ICD-10-CM

## 2017-11-01 DIAGNOSIS — R06.00 DYSPNEA: ICD-10-CM

## 2017-11-01 DIAGNOSIS — R91.1 LUNG NODULE: ICD-10-CM

## 2017-11-01 DIAGNOSIS — B18.2 CHRONIC HEPATITIS C WITHOUT HEPATIC COMA: ICD-10-CM

## 2017-11-01 DIAGNOSIS — J44.9 COPD (CHRONIC OBSTRUCTIVE PULMONARY DISEASE): Primary | Chronic | ICD-10-CM

## 2017-11-01 LAB
PRE FEV1 FVC: 61
PRE FEV1: 2.03
PRE FVC: 3.31
PREDICTED FEV1 FVC: 79
PREDICTED FEV1: 3.14
PREDICTED FVC: 3.94

## 2017-11-01 PROCEDURE — 94010 BREATHING CAPACITY TEST: CPT | Mod: S$GLB,,, | Performed by: INTERNAL MEDICINE

## 2017-11-01 PROCEDURE — 99999 PR PBB SHADOW E&M-EST. PATIENT-LVL III: CPT | Mod: PBBFAC,,, | Performed by: INTERNAL MEDICINE

## 2017-11-01 PROCEDURE — 99214 OFFICE O/P EST MOD 30 MIN: CPT | Mod: 25,S$GLB,, | Performed by: INTERNAL MEDICINE

## 2017-11-01 PROCEDURE — 99499 UNLISTED E&M SERVICE: CPT | Mod: S$GLB,,, | Performed by: INTERNAL MEDICINE

## 2017-11-01 RX ORDER — ALBUTEROL SULFATE 0.83 MG/ML
2.5 SOLUTION RESPIRATORY (INHALATION) EVERY 6 HOURS PRN
Qty: 180 ML | Refills: 6 | Status: ON HOLD | OUTPATIENT
Start: 2017-11-01 | End: 2018-02-12 | Stop reason: HOSPADM

## 2017-11-01 NOTE — PATIENT INSTRUCTIONS
Continue Pulmicort aerosols twice daily.  Add Albuterol aerosols every 4-6 hours if needed to control acute resp symptoms.  Spirometry (phone report)  Call/return as needed.

## 2017-11-01 NOTE — PROGRESS NOTES
Subjective:       Patient ID: Robin Hawkins is a 67 y.o. male.    Chief Complaint: COPD    HPI HISTORY OF PRESENT ILLNESS:  Mr. Hawkins is a 67-year-old former smoker who   comes for assessment of chronic obstructive lung disease.  In addition to COPD,   he has the history of previous tracheostomy due to bilateral vocal fold   paralysis.  He also has the history of hepatitis C and hepatocellular carcinoma.    He has recently been undergoing chemoembolization therapy for the latter   problem.    Mr. Hawkins had ENT surgery in March of last year to treat his upper airway   obstruction.  He feels that this has improved his respiratory status, and he no   longer has the tracheostomy.    At present, Mr. Hawkins feels that his respiratory symptoms are stable.  He   describes being out of breath with modest daily activities.  He also reports a   modest cough.  He has had no recent wheezing.  He currently uses Pulmicort aerosol   treatments twice daily.  He previously used DuoNeb aerosol, but had to   discontinue this medication because of unfavorable insurance coverage.      CB/HN  dd: 11/01/2017 20:09:14 (CDT)  td: 11/02/2017 14:26:01 (CDT)  Doc ID   #[id number]  Job ID #[id number]    CC:       Review of Systems   Constitutional: Negative for fever and fatigue.   HENT: Positive for voice change. Negative for postnasal drip, sinus pressure and congestion.    Respiratory: Positive for wheezing and dyspnea on extertion. Negative for cough, sputum production and shortness of breath.    Cardiovascular: Negative for chest pain and leg swelling.   Genitourinary: Negative for difficulty urinating.   Musculoskeletal: Negative for arthralgias and back pain.   Skin: Negative for rash.   Gastrointestinal: Positive for abdominal pain. Negative for acid reflux.   Neurological: Negative for dizziness and weakness.   Hematological: Negative for adenopathy.       Objective:      Physical Exam   Constitutional: He is oriented to person,  place, and time. He appears well-developed and well-nourished.   HENT:   Head: Normocephalic.   Mouth/Throat: Oropharynx is clear and moist. No oropharyngeal exudate.   Neck: Normal range of motion. Neck supple. No JVD present. No tracheal deviation present. No thyromegaly present.   Cardiovascular: Normal rate, regular rhythm and normal heart sounds.    No murmur heard.  Pulmonary/Chest: Symmetric chest wall expansion. Stridor (mild stridorous respiratory pattern) present. He has no wheezes. He has no rhonchi. He has no rales. He exhibits no tenderness.   Abdominal: Soft.   Musculoskeletal: He exhibits no edema.   Lymphadenopathy:     He has no cervical adenopathy.   Neurological: He is alert and oriented to person, place, and time.   Skin: Skin is warm and dry. No rash noted. No erythema. Nails show no clubbing.   Psychiatric: He has a normal mood and affect.   Vitals reviewed.    Personal Diagnostic Review    No flowsheet data found.      Assessment:       1. Centrilobular emphysema    2. Chronic hepatitis C without hepatic coma    3. Bilateral complete vocal fold paralysis    4. HCC (hepatocellular carcinoma)        Outpatient Encounter Prescriptions as of 11/1/2017   Medication Sig Dispense Refill    albuterol (PROVENTIL) 2.5 mg /3 mL (0.083 %) nebulizer solution Take 3 mLs (2.5 mg total) by nebulization every 6 (six) hours as needed for Wheezing or Shortness of Breath. 180 mL 6    aspirin 81 MG Chew Take 81 mg by mouth every morning.       atorvastatin (LIPITOR) 40 MG tablet TAKE 1 TABLET BY MOUTH EVERY DAY 90 tablet 3    budesonide 1 mg/2 mL [facility] USE CONTENT OF 1 VIAL IN NEBULIZER TWICE DAILY 120 mL 6    dorzolamide-timolol 2-0.5% (COSOPT) 22.3-6.8 mg/mL ophthalmic solution Place 1 drop into both eyes 2 (two) times daily. 10 mL 12    hydrochlorothiazide (HYDRODIURIL) 25 MG tablet TAKE 1 TABLET(25 MG) BY MOUTH EVERY DAY 90 tablet 0    latanoprost 0.005 % ophthalmic solution Place 1 drop into both eyes  every evening. 7.5 mL 4    lisinopril (PRINIVIL,ZESTRIL) 20 MG tablet TAKE 1 TABLET BY MOUTH EVERY DAY 90 tablet 0    magnesium oxide (MAGOX) 400 mg tablet Take 1 tablet (400 mg total) by mouth 2 (two) times daily. 10 tablet 1    metoprolol tartrate (LOPRESSOR) 25 MG tablet TAKE ONE-HALF TABLET BY MOUTH TWICE DAILY 90 tablet 0    mirabegron (MYRBETRIQ) 50 mg Tb24 Take 1 tablet (50 mg total) by mouth every morning. 90 tablet 3    multivitamin capsule Take 1 capsule by mouth every morning.       NEXAVAR 200 mg tablet TAKE 2 TABLETS (400MG TOTAL) BY MOUTH TWO TIMES A  tablet 2    omeprazole (PRILOSEC) 20 MG capsule Take 1 capsule (20 mg total) by mouth once daily. Take on an empty stomach 90 capsule 1    ondansetron (ZOFRAN-ODT) 4 MG TbDL Take 1 tablet (4 mg total) by mouth every 6 (six) hours as needed. 20 tablet 0    oxybutynin (DITROPAN-XL) 10 MG 24 hr tablet Take 1 tablet (10 mg total) by mouth once daily. 90 tablet 3    oxycodone-acetaminophen (PERCOCET) 5-325 mg per tablet Take 1 tablet by mouth every 6 (six) hours as needed for Pain. 20 tablet 0    potassium chloride SA (K-DUR,KLOR-CON) 20 MEQ tablet TAKE 2 TABLETS(40 MEQ) BY MOUTH TWICE DAILY 360 tablet 0    [DISCONTINUED] albuterol-ipratropium 2.5mg-0.5mg/3mL (DUO-NEB) 0.5 mg-3 mg(2.5 mg base)/3 mL nebulizer solution USE 1 VIAL IN NEBULIZER EVERY 6 HOURS AS NEEDED FOR WHEEZING OR SHORTNESS OF BREATH 360 mL 6     No facility-administered encounter medications on file as of 11/1/2017.      Orders Placed This Encounter   Procedures    Spirometry without bronchodilator     Standing Status:   Future     Number of Occurrences:   1     Standing Expiration Date:   11/1/2018     Plan:     Continue Pulmicort aerosols twice daily.  Add Albuterol aerosols every 4-6 hours if needed to control acute resp symptoms.  Spirometry (phone report)  Call/return as needed.

## 2017-11-03 LAB
HCV GENTYP SERPL NAA+PROBE: ABNORMAL
HCV QUALITATIVE RESULT: DETECTED
HCV QUANTITATIVE LOG: 5.78 LOG (10) IU/ML
HCV RNA SPEC NAA+PROBE-ACNC: ABNORMAL IU/ML

## 2017-11-05 LAB
DACLATASVIR RESISTANCE RESULT1: ABNORMAL
ELBASVIR RESISTANCE RESULT1: ABNORMAL
HCV NS3 MUT DET ISLT GENOTYP: ABNORMAL
LEDIPASVIR RESISTANCE RESULT1: ABNORMAL
OMBITASVIR RESISTANCE: ABNORMAL
VELPATASVIR RESISTANCE RESULT1: ABNORMAL

## 2017-11-06 ENCOUNTER — TELEPHONE (OUTPATIENT)
Dept: HEPATOLOGY | Facility: CLINIC | Age: 67
End: 2017-11-06

## 2017-11-06 ENCOUNTER — TELEPHONE (OUTPATIENT)
Dept: PHARMACY | Facility: CLINIC | Age: 67
End: 2017-11-06

## 2017-11-06 PROBLEM — Z79.899 ENCOUNTER FOR MONITORING LONG-TERM PROTON PUMP INHIBITOR THERAPY: Status: ACTIVE | Noted: 2017-11-06

## 2017-11-06 PROBLEM — R73.09 ELEVATED HEMOGLOBIN A1C: Status: ACTIVE | Noted: 2017-11-06

## 2017-11-06 PROBLEM — Z51.81 ENCOUNTER FOR MONITORING LONG-TERM PROTON PUMP INHIBITOR THERAPY: Status: ACTIVE | Noted: 2017-11-06

## 2017-11-06 RX ORDER — RIBAVIRIN 200 MG/1
TABLET, FILM COATED ORAL
Qty: 168 TABLET | Refills: 2 | Status: SHIPPED | OUTPATIENT
Start: 2017-11-06 | End: 2018-02-15 | Stop reason: ALTCHOICE

## 2017-11-06 NOTE — TELEPHONE ENCOUNTER
----- Message from Landen Steinberg PA-C sent at 11/6/2017  2:15 PM CST -----  Please let him know these labs are stable  Thanks

## 2017-11-06 NOTE — TELEPHONE ENCOUNTER
Informed patients sister [facility] received a prescription for Vosevi & Ribavirin and we will contact their insurance company to find out if the medication is covered. We will update patient of status as more information is received. feel free to give us a call with  any questions at [phone].

## 2017-11-07 ENCOUNTER — TELEPHONE (OUTPATIENT)
Dept: HEPATOLOGY | Facility: CLINIC | Age: 67
End: 2017-11-07

## 2017-11-07 ENCOUNTER — EPISODE CHANGES (OUTPATIENT)
Dept: HEPATOLOGY | Facility: CLINIC | Age: 67
End: 2017-11-07

## 2017-11-07 DIAGNOSIS — I10 ESSENTIAL HYPERTENSION: ICD-10-CM

## 2017-11-07 RX ORDER — LISINOPRIL 20 MG/1
TABLET ORAL
Qty: 90 TABLET | Refills: 1 | Status: SHIPPED | OUTPATIENT
Start: 2017-11-07 | End: 2018-05-10 | Stop reason: SDUPTHER

## 2017-11-07 NOTE — TELEPHONE ENCOUNTER
----- Message from Venancio Mcneil MD sent at 11/6/2017  4:44 PM CST -----  Not a problem - in his case, treating the HCV is well worth a short-term stop of the statin. Please go ahead and have him hold this.    Thanks!  Venancio Mcneil MD      ----- Message -----  From: Landen Steinberg PA-C  Sent: 11/6/2017   2:12 PM  To: Venancio Mcneil MD    Hi Dr. Mcneil    We share this patient. I was planning to attempt HCV retreatment. His Lipitor interacts with Vosevi. Would you be okay with him holding it for the 12 week treatment?

## 2017-11-07 NOTE — TELEPHONE ENCOUNTER
Please let him know that his PCP gave the okay to hold his cholesterol medicine- lipitor when he starts treatment.

## 2017-11-08 ENCOUNTER — HOSPITAL ENCOUNTER (OUTPATIENT)
Facility: HOSPITAL | Age: 67
Discharge: HOME | End: 2017-11-09
Attending: INTERNAL MEDICINE | Admitting: RADIOLOGY
Payer: MEDICARE

## 2017-11-08 ENCOUNTER — ANESTHESIA (OUTPATIENT)
Dept: ENDOSCOPY | Facility: HOSPITAL | Age: 67
End: 2017-11-08
Payer: MEDICARE

## 2017-11-08 ENCOUNTER — ANESTHESIA EVENT (OUTPATIENT)
Dept: ENDOSCOPY | Facility: HOSPITAL | Age: 67
End: 2017-11-08
Payer: MEDICARE

## 2017-11-08 DIAGNOSIS — C22.0 HCC (HEPATOCELLULAR CARCINOMA): ICD-10-CM

## 2017-11-08 PROCEDURE — 63600175 PHARM REV CODE 636 W HCPCS: Performed by: ANESTHESIOLOGY

## 2017-11-08 PROCEDURE — 37000008 HC ANESTHESIA 1ST 15 MINUTES

## 2017-11-08 PROCEDURE — 37000009 HC ANESTHESIA EA ADD 15 MINS

## 2017-11-08 PROCEDURE — 25000003 PHARM REV CODE 250: Performed by: STUDENT IN AN ORGANIZED HEALTH CARE EDUCATION/TRAINING PROGRAM

## 2017-11-08 PROCEDURE — 63600175 PHARM REV CODE 636 W HCPCS: Performed by: NURSE ANESTHETIST, CERTIFIED REGISTERED

## 2017-11-08 PROCEDURE — 71000033 HC RECOVERY, INTIAL HOUR

## 2017-11-08 PROCEDURE — D9220A PRA ANESTHESIA: Mod: CRNA,,, | Performed by: NURSE ANESTHETIST, CERTIFIED REGISTERED

## 2017-11-08 PROCEDURE — 94761 N-INVAS EAR/PLS OXIMETRY MLT: CPT

## 2017-11-08 PROCEDURE — 25000003 PHARM REV CODE 250: Performed by: NURSE ANESTHETIST, CERTIFIED REGISTERED

## 2017-11-08 PROCEDURE — 71000039 HC RECOVERY, EACH ADD'L HOUR

## 2017-11-08 PROCEDURE — G0378 HOSPITAL OBSERVATION PER HR: HCPCS

## 2017-11-08 PROCEDURE — D9220A PRA ANESTHESIA: Mod: ANES,,, | Performed by: ANESTHESIOLOGY

## 2017-11-08 PROCEDURE — 25000003 PHARM REV CODE 250: Performed by: FAMILY MEDICINE

## 2017-11-08 RX ORDER — PROPOFOL 10 MG/ML
VIAL (ML) INTRAVENOUS
Status: DISCONTINUED | OUTPATIENT
Start: 2017-11-08 | End: 2017-11-08

## 2017-11-08 RX ORDER — DEXMEDETOMIDINE HYDROCHLORIDE 100 UG/ML
INJECTION, SOLUTION INTRAVENOUS
Status: DISCONTINUED | OUTPATIENT
Start: 2017-11-08 | End: 2017-11-08

## 2017-11-08 RX ORDER — AMOXICILLIN AND CLAVULANATE POTASSIUM 500; 125 MG/1; MG/1
1 TABLET, FILM COATED ORAL 3 TIMES DAILY
Qty: 21 TABLET | Refills: 0 | Status: SHIPPED | OUTPATIENT
Start: 2017-11-08 | End: 2017-11-15

## 2017-11-08 RX ORDER — HYDROMORPHONE HYDROCHLORIDE 1 MG/ML
0.2 INJECTION, SOLUTION INTRAMUSCULAR; INTRAVENOUS; SUBCUTANEOUS EVERY 5 MIN PRN
Status: DISCONTINUED | OUTPATIENT
Start: 2017-11-08 | End: 2017-11-08 | Stop reason: HOSPADM

## 2017-11-08 RX ORDER — ONDANSETRON 8 MG/1
8 TABLET, ORALLY DISINTEGRATING ORAL ONCE
Status: COMPLETED | OUTPATIENT
Start: 2017-11-08 | End: 2017-11-08

## 2017-11-08 RX ORDER — SODIUM CHLORIDE 9 MG/ML
INJECTION, SOLUTION INTRAVENOUS CONTINUOUS PRN
Status: DISCONTINUED | OUTPATIENT
Start: 2017-11-08 | End: 2017-11-08

## 2017-11-08 RX ORDER — KETAMINE HYDROCHLORIDE 100 MG/ML
INJECTION, SOLUTION INTRAMUSCULAR; INTRAVENOUS
Status: DISCONTINUED | OUTPATIENT
Start: 2017-11-08 | End: 2017-11-08

## 2017-11-08 RX ORDER — PHENYLEPHRINE HYDROCHLORIDE 10 MG/ML
INJECTION INTRAVENOUS
Status: DISCONTINUED | OUTPATIENT
Start: 2017-11-08 | End: 2017-11-08

## 2017-11-08 RX ORDER — MIDAZOLAM HYDROCHLORIDE 1 MG/ML
INJECTION, SOLUTION INTRAMUSCULAR; INTRAVENOUS
Status: DISCONTINUED | OUTPATIENT
Start: 2017-11-08 | End: 2017-11-08

## 2017-11-08 RX ORDER — SODIUM CHLORIDE 0.9 % (FLUSH) 0.9 %
3 SYRINGE (ML) INJECTION
Status: DISCONTINUED | OUTPATIENT
Start: 2017-11-08 | End: 2017-11-09 | Stop reason: HOSPADM

## 2017-11-08 RX ORDER — ONDANSETRON 4 MG/1
8 TABLET, FILM COATED ORAL EVERY 8 HOURS PRN
Qty: 30 TABLET | Refills: 0 | Status: ON HOLD | OUTPATIENT
Start: 2017-11-08 | End: 2018-02-28 | Stop reason: HOSPADM

## 2017-11-08 RX ORDER — HYDROCODONE BITARTRATE AND ACETAMINOPHEN 5; 325 MG/1; MG/1
1 TABLET ORAL EVERY 4 HOURS PRN
Status: DISCONTINUED | OUTPATIENT
Start: 2017-11-08 | End: 2017-11-09 | Stop reason: HOSPADM

## 2017-11-08 RX ORDER — LIDOCAINE HCL/PF 100 MG/5ML
SYRINGE (ML) INTRAVENOUS
Status: DISCONTINUED | OUTPATIENT
Start: 2017-11-08 | End: 2017-11-08

## 2017-11-08 RX ORDER — OXYCODONE HYDROCHLORIDE 5 MG/1
5 TABLET ORAL EVERY 6 HOURS PRN
Qty: 28 TABLET | Refills: 0 | Status: ON HOLD | OUTPATIENT
Start: 2017-11-08 | End: 2018-02-28

## 2017-11-08 RX ORDER — SODIUM CHLORIDE 9 MG/ML
500 INJECTION, SOLUTION INTRAVENOUS ONCE
Status: COMPLETED | OUTPATIENT
Start: 2017-11-08 | End: 2017-11-08

## 2017-11-08 RX ORDER — ONDANSETRON 2 MG/ML
INJECTION INTRAMUSCULAR; INTRAVENOUS
Status: DISCONTINUED | OUTPATIENT
Start: 2017-11-08 | End: 2017-11-08

## 2017-11-08 RX ORDER — FENTANYL CITRATE 50 UG/ML
INJECTION, SOLUTION INTRAMUSCULAR; INTRAVENOUS
Status: DISCONTINUED | OUTPATIENT
Start: 2017-11-08 | End: 2017-11-08

## 2017-11-08 RX ADMIN — LIDOCAINE HYDROCHLORIDE 100 MG: 20 INJECTION, SOLUTION INTRAVENOUS at 02:11

## 2017-11-08 RX ADMIN — HYDROMORPHONE HYDROCHLORIDE 0.2 MG: 1 INJECTION, SOLUTION INTRAMUSCULAR; INTRAVENOUS; SUBCUTANEOUS at 07:11

## 2017-11-08 RX ADMIN — ONDANSETRON 8 MG: 8 TABLET, ORALLY DISINTEGRATING ORAL at 07:11

## 2017-11-08 RX ADMIN — KETAMINE HYDROCHLORIDE 10 MG: 100 INJECTION, SOLUTION, CONCENTRATE INTRAMUSCULAR; INTRAVENOUS at 03:11

## 2017-11-08 RX ADMIN — MIDAZOLAM HYDROCHLORIDE 1 MG: 1 INJECTION, SOLUTION INTRAMUSCULAR; INTRAVENOUS at 02:11

## 2017-11-08 RX ADMIN — FENTANYL CITRATE 50 MCG: 50 INJECTION, SOLUTION INTRAMUSCULAR; INTRAVENOUS at 02:11

## 2017-11-08 RX ADMIN — PHENYLEPHRINE HYDROCHLORIDE 100 MCG: 10 INJECTION INTRAVENOUS at 04:11

## 2017-11-08 RX ADMIN — DEXMEDETOMIDINE HYDROCHLORIDE 20 MCG: 100 INJECTION, SOLUTION, CONCENTRATE INTRAVENOUS at 02:11

## 2017-11-08 RX ADMIN — DEXMEDETOMIDINE HYDROCHLORIDE 12 MCG: 100 INJECTION, SOLUTION, CONCENTRATE INTRAVENOUS at 02:11

## 2017-11-08 RX ADMIN — DEXMEDETOMIDINE HYDROCHLORIDE 12 MCG: 100 INJECTION, SOLUTION, CONCENTRATE INTRAVENOUS at 03:11

## 2017-11-08 RX ADMIN — HYDROMORPHONE HYDROCHLORIDE 0.2 MG: 1 INJECTION, SOLUTION INTRAMUSCULAR; INTRAVENOUS; SUBCUTANEOUS at 06:11

## 2017-11-08 RX ADMIN — HYDROCODONE BITARTRATE AND ACETAMINOPHEN 1 TABLET: 5; 325 TABLET ORAL at 05:11

## 2017-11-08 RX ADMIN — KETAMINE HYDROCHLORIDE 20 MG: 100 INJECTION, SOLUTION, CONCENTRATE INTRAMUSCULAR; INTRAVENOUS at 03:11

## 2017-11-08 RX ADMIN — SODIUM CHLORIDE 1000 ML: 0.9 INJECTION, SOLUTION INTRAVENOUS at 01:11

## 2017-11-08 RX ADMIN — FENTANYL CITRATE 25 MCG: 50 INJECTION, SOLUTION INTRAMUSCULAR; INTRAVENOUS at 05:11

## 2017-11-08 RX ADMIN — FENTANYL CITRATE 25 MCG: 50 INJECTION, SOLUTION INTRAMUSCULAR; INTRAVENOUS at 04:11

## 2017-11-08 RX ADMIN — LIDOCAINE HYDROCHLORIDE 100 MG: 20 INJECTION, SOLUTION INTRAVENOUS at 03:11

## 2017-11-08 RX ADMIN — PROPOFOL 50 MG: 10 INJECTION, EMULSION INTRAVENOUS at 03:11

## 2017-11-08 RX ADMIN — PROPOFOL 10 MG: 10 INJECTION, EMULSION INTRAVENOUS at 04:11

## 2017-11-08 RX ADMIN — HYDROCODONE BITARTRATE AND ACETAMINOPHEN 1 TABLET: 5; 325 TABLET ORAL at 10:11

## 2017-11-08 RX ADMIN — FENTANYL CITRATE 25 MCG: 50 INJECTION, SOLUTION INTRAMUSCULAR; INTRAVENOUS at 03:11

## 2017-11-08 RX ADMIN — PHENYLEPHRINE HYDROCHLORIDE 100 MCG: 10 INJECTION INTRAVENOUS at 03:11

## 2017-11-08 RX ADMIN — HYDROMORPHONE HYDROCHLORIDE 0.2 MG: 1 INJECTION, SOLUTION INTRAMUSCULAR; INTRAVENOUS; SUBCUTANEOUS at 05:11

## 2017-11-08 RX ADMIN — DEXMEDETOMIDINE HYDROCHLORIDE 8 MCG: 100 INJECTION, SOLUTION, CONCENTRATE INTRAVENOUS at 03:11

## 2017-11-08 RX ADMIN — DEXMEDETOMIDINE HYDROCHLORIDE 10 MCG: 100 INJECTION, SOLUTION, CONCENTRATE INTRAVENOUS at 02:11

## 2017-11-08 RX ADMIN — DEXMEDETOMIDINE HYDROCHLORIDE 8 MCG: 100 INJECTION, SOLUTION, CONCENTRATE INTRAVENOUS at 02:11

## 2017-11-08 RX ADMIN — ONDANSETRON 4 MG: 2 INJECTION INTRAMUSCULAR; INTRAVENOUS at 04:11

## 2017-11-08 RX ADMIN — SODIUM CHLORIDE: 0.9 INJECTION, SOLUTION INTRAVENOUS at 02:11

## 2017-11-08 RX ADMIN — DEXMEDETOMIDINE HYDROCHLORIDE 20 MCG: 100 INJECTION, SOLUTION, CONCENTRATE INTRAVENOUS at 03:11
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
## 2017-11-08 NOTE — PROGRESS NOTES
Patient brought into IR room 173 for microwave ablation of the liver. Patient left under care of anesthesia team.
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
## 2017-11-08 NOTE — PROCEDURES
Radiology Post-Procedure Note    Pre Op Diagnosis: HCC    Post Op Diagnosis: Same    Procedure: MWA of residual disease    Procedure performed by: Santos Ortez MD    Written Informed Consent Obtained: Yes  Specimen Removed: NO  Estimated Blood Loss: Minimal    Findings:   Under CT guidance, a 17 Gauge and a 15 Gauge probes were advanced to areas of residual disease and MWA was carried out for 5 minutes at [address]. Post ablation images did not show complication.    Patient tolerated procedure well.    Santos Ortez M.D.  Diagnostic and Interventional Radiologist  Department of Radiology  Pager: [phone]

## 2017-11-08 NOTE — TELEPHONE ENCOUNTER
I spoke with patient's sister Manju Canales and message from EMMANUEL Steinberg relayed.  Also message mailed to patient.

## 2017-11-08 NOTE — TRANSFER OF CARE
"Anesthesia Transfer of Care Note    Patient: Robin Hawkins    Procedure(s) Performed: Procedure(s) (LRB):  ABLATION, RADIOFREQUENCY (N/A)    Patient location: PACU    Anesthesia Type: general    Transport from OR: Transported from OR on 6-10 L/min O2 by face mask with adequate spontaneous ventilation. Continuous SpO2 monitoring in transport    Post pain: adequate analgesia    Post assessment: no apparent anesthetic complications    Post vital signs: stable    Level of consciousness: awake and alert    Nausea/Vomiting: no nausea/vomiting    Complications: none    Transfer of care protocol was followed      Last vitals:   Visit Vitals  BP (!) 151/73 (BP Location: Right arm, Patient Position: Lying)   Pulse (!) 56   Temp 36.5 °C (97.7 °F) (Temporal)   Resp 16   Ht 5' 11" (1.803 m)   Wt 79.8 kg (176 lb)   SpO2 100%   BMI 24.55 kg/m²     "

## 2017-11-09 ENCOUNTER — TELEPHONE (OUTPATIENT)
Dept: PHARMACY | Facility: CLINIC | Age: 67
End: 2017-11-09

## 2017-11-09 VITALS
SYSTOLIC BLOOD PRESSURE: 138 MMHG | OXYGEN SATURATION: 99 % | RESPIRATION RATE: 16 BRPM | HEIGHT: 71 IN | HEART RATE: 93 BPM | BODY MASS INDEX: 24.81 KG/M2 | WEIGHT: 177.25 LBS | TEMPERATURE: 96 F | DIASTOLIC BLOOD PRESSURE: 76 MMHG

## 2017-11-09 DIAGNOSIS — C22.0 HCC (HEPATOCELLULAR CARCINOMA): Primary | ICD-10-CM

## 2017-11-09 PROCEDURE — G0378 HOSPITAL OBSERVATION PER HR: HCPCS

## 2017-11-09 PROCEDURE — 63600175 PHARM REV CODE 636 W HCPCS: Performed by: STUDENT IN AN ORGANIZED HEALTH CARE EDUCATION/TRAINING PROGRAM

## 2017-11-09 PROCEDURE — 25000003 PHARM REV CODE 250: Performed by: STUDENT IN AN ORGANIZED HEALTH CARE EDUCATION/TRAINING PROGRAM

## 2017-11-09 RX ORDER — PANTOPRAZOLE SODIUM 40 MG/1
40 TABLET, DELAYED RELEASE ORAL ONCE
Status: COMPLETED | OUTPATIENT
Start: 2017-11-09 | End: 2017-11-09

## 2017-11-09 RX ADMIN — PANTOPRAZOLE SODIUM 40 MG: 40 TABLET, DELAYED RELEASE ORAL at 04:11

## 2017-11-09 RX ADMIN — AMPICILLIN AND SULBACTAM 3 G: 2; 1 INJECTION, POWDER, FOR SOLUTION INTRAVENOUS at 03:11

## 2017-11-09 NOTE — NURSING
Spoke with DR. Aquino informed of pt's  complaints and current v/s orders received and will initiate

## 2017-11-09 NOTE — PLAN OF CARE
Problem: Patient Care Overview  Goal: Plan of Care Review  Outcome: Ongoing (interventions implemented as appropriate)  Dressing to right upper quad. Dry and intact,afebrile,no evidence of infection. Placed on fall precautions due to narcotic pain med. Bed low and locked call bell in reach,no falls sustained. Complaining of pain to right upper quad,effectively controlled with hydrocodone. Continue plan of care.

## 2017-11-09 NOTE — PLAN OF CARE
Problem: Patient Care Overview  Goal: Plan of Care Review  Outcome: Ongoing (interventions implemented as appropriate)  Pt with no falls or injuries this shift. Pt denies pain this shift. Pt afebrile, post procedure dressing dry and intact, no s/s infection.

## 2017-11-09 NOTE — NURSING
Called to room by pt. States he has terrible heartburn,states his chest hurts,VSS,pt. Removed o2 states it makes him feel worse,page placed for

## 2017-11-09 NOTE — NURSING
Received from RR per stretcher in NAD,Awake alert and oriented,skin cool to touch,unable to obtain an oral temp. Axillary temp shows 94.5 Rectal thermometer ordered,dressing to right upper quad. Dry and intact. Breath sounds clear lew. Pt. Has a raspy voice and breathes rapid with grunting,states this is the way he breathes since he had his throat surgery. Oriented to room and call bell system. Page placec for  regarding temp.

## 2017-11-09 NOTE — DISCHARGE SUMMARY
Radiology Discharge Summary      Hospital Course: No complications    Admit Date: 11/8/2017  Discharge Date: 11/09/2017     Instructions Given to Patient: Yes  Diet: Resume prior diet  Activity: activity as tolerated    Description of Condition on Discharge: Stable  Vital Signs (Most Recent): Temp: 97.6 °F (36.4 °C) (11/09/17 0405)  Pulse: 83 (11/09/17 0405)  Resp: (!) 24 (11/09/17 0405)  BP: (!) 130/90 (11/09/17 0405)  SpO2: 100 % (11/09/17 0405)    Discharge Disposition: Home    Discharge Diagnosis: s/p microwave ablation    Followup: 1 month from now obtain CT or MRI and then follow up with radiology. Patient instructed to call if there is any complication, post procedural pain, or signs of infection. Return to PCP for results, as previously scheduled.      Mason Golden MD  Radiology

## 2017-11-09 NOTE — NURSING
Spoke with  on call for IR informed of low temps both axillary and rectal,no new orders,continue to monitor

## 2017-11-09 NOTE — NURSING
Supervisor notified of inability to reach Dr and pt. With low temp. Will continue to try to reach Dr.

## 2017-11-09 NOTE — NURSING
Order to d/c home today. Saline lock removed. VSS. Discharge instructions given to pt. Pt verbalized understanding. All personal belongings taken home by pt.

## 2017-11-09 NOTE — NURSING TRANSFER
Nursing Transfer Note      11/8/2017     Transfer To: [facility]    Transfer via stretcher    Transfer with personal belongings bag, dentures    Transported by transport    Medicines sent: none    Chart send with patient: Yes    Notified: sister    Patient reassessed at: 11/8/2017 2035   (date, time)

## 2017-11-10 NOTE — ANESTHESIA POSTPROCEDURE EVALUATION
"Anesthesia Post Evaluation    Patient: Robin Hawkins    Procedure(s) Performed: Procedure(s) (LRB):  ABLATION, RADIOFREQUENCY (N/A)    Final Anesthesia Type: general  Patient location during evaluation: PACU  Patient participation: Yes- Able to Participate  Level of consciousness: awake and alert and oriented  Post-procedure vital signs: reviewed and stable  Pain management: adequate  Airway patency: patent  PONV status at discharge: No PONV  Anesthetic complications: no      Cardiovascular status: blood pressure returned to baseline  Respiratory status: unassisted, spontaneous ventilation and room air  Hydration status: euvolemic  Follow-up not needed.        Visit Vitals  /76   Pulse 93   Temp 35.7 °C (96.3 °F) (Oral)   Resp 16   Ht 5' 11" (1.803 m)   Wt 80.4 kg (177 lb 4 oz)   SpO2 99%   BMI 24.72 kg/m²       Pain/Patrick Score: Pain Assessment Performed: Yes (11/9/2017 10:00 AM)  Presence of Pain: denies (11/9/2017 10:00 AM)      "

## 2017-11-13 ENCOUNTER — EPISODE CHANGES (OUTPATIENT)
Dept: HEPATOLOGY | Facility: CLINIC | Age: 67
End: 2017-11-13

## 2017-11-13 ENCOUNTER — TELEPHONE (OUTPATIENT)
Dept: HEPATOLOGY | Facility: CLINIC | Age: 67
End: 2017-11-13

## 2017-11-13 NOTE — TELEPHONE ENCOUNTER
----- Message from Landen Steinberg PA-C sent at 11/10/2017  4:02 PM CST -----  Is he tolerating the omeprazole 20mg since we changed and reduced doses?
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
## 2017-11-13 NOTE — TELEPHONE ENCOUNTER
DOCUMENTATION ONLY:  Prior authorization for Vosevi & Ribavirin (didn't need a prior authorization) approved from 11/12/2017 to 02/04/2018 x 12 weeks of treatment.   Case ID# [id number]    Co-pay: Vosevi & Ribavirin: $0    Patient Assistance IS NOT required. Forward to clinical pharmacist for consult & shipment.

## 2017-11-14 ENCOUNTER — TELEPHONE (OUTPATIENT)
Dept: PHARMACY | Facility: CLINIC | Age: 67
End: 2017-11-14

## 2017-11-14 ENCOUNTER — TELEPHONE (OUTPATIENT)
Dept: HEPATOLOGY | Facility: CLINIC | Age: 67
End: 2017-11-14

## 2017-11-14 ENCOUNTER — EPISODE CHANGES (OUTPATIENT)
Dept: HEPATOLOGY | Facility: CLINIC | Age: 67
End: 2017-11-14

## 2017-11-14 DIAGNOSIS — B18.2 CHRONIC HEPATITIS C WITHOUT HEPATIC COMA: Primary | ICD-10-CM

## 2017-11-14 PROBLEM — Z51.81 ENCOUNTER FOR MONITORING LONG-TERM PROTON PUMP INHIBITOR THERAPY: Status: RESOLVED | Noted: 2017-11-06 | Resolved: 2017-11-14

## 2017-11-14 PROBLEM — Z09 CHEMOTHERAPY FOLLOW-UP EXAMINATION: Status: RESOLVED | Noted: 2017-10-30 | Resolved: 2017-11-14

## 2017-11-14 PROBLEM — Z79.899 ENCOUNTER FOR MONITORING LONG-TERM PROTON PUMP INHIBITOR THERAPY: Status: RESOLVED | Noted: 2017-11-06 | Resolved: 2017-11-14

## 2017-11-14 NOTE — TELEPHONE ENCOUNTER
Pt beginning 12 weeks of Vosevi + Ribavirin on 11/16/17  *Duration of treatment will be based on Ribavirin tolerability and virologic response    Pls schedule:  - CBC at week 1- 11/21/17  - CBC, CMP at week 2 - 11/29/17  - CBC, CMP, HCV RNA at week 4 - 12/13/17  - CBC, CMP at week 6- 12/27/17    I will add more labs as I see how blood counts trend    thanks

## 2017-11-14 NOTE — TELEPHONE ENCOUNTER
Initial Medication Consult: Vosevi and ribavirin    Initial Vosevi consult completed on . Vosevi will be shipped on  to arrive at patient's home on 11/15 via [employer]. $0.00 copay. Patient will start Vosevi on . Address confirmed. Confirmed 2 patient identifiers - name and . Therapy Appropriate.     Vosevi 400/100/100mg- Take one tablet by mouth daily WITH FOOD x 12 weeks  Counseling was reviewed:   1. Patient MUST take Vosevi at the SAME time every day.   2. Patient MUST avoid acid reducers without consulting with myself or provider first. Omeprazole 20 mg is to be taken AT  ON AN EMPTY STOMACH, 45 minutes before breakfast.   3. Potential Side effects include: nausea, headaches, diarrhea and fatigue.   Headache: Patient may treat with OTC remedies. If Tylenol is used, dose should not exceed 2000mg per day.    4. Medication list reviewed. No DDIs or allergies noted. Patient MUST contact myself or provider prior to starting any new OTC, herbal, or prescription drugs to avoid potential DDIs.    Ribavirin 200mg - take 3 tablets once daily to start, may increase as tolerated WITH FOOD x 12 weeks.  Discussed possible adverse events including: Nausea - usually lessened with food. Anemia - monitored with labs by provider. Diarrhea, insomnia, loss of appetite, hair loss, dry mouth, muscle or joint pain.   Skin rash: Monitor closely. May use provided hydrocortisone cream if rash develops, notify me if excessive itching and/or welts. Eucerin Cream for potential dry skin.  Use is contraindicated in pregnant women or male partners of pregnant women. Avoid pregnancy in female patients and female partners of male patients during therapy by using two effective forms of contraception; continue contraceptive measures for at least 6 months after completion of therapy.     DDI: Medication list reviewed and potential DDIs addressed.  -Lipitor stopped and omeprazole reduced to 20 mg daily while on treatment    Discussed  the importance of staying well hydrated while on therapy. Compliance stressed - patient to take missed doses as soon as remembered, but NOT to take 2 doses in one day. Patient will report questions or concerns to myself or practitioner. Patient verbalizes understanding. Patient plans to start Vosevi on 11/16.  Consultation included: indication; goals of treatment; administration; storage and handling; side effects; how to handle side effects; the importance of compliance; how to handle missed doses; the importance of laboratory monitoring; the importance of keeping all follow up appointments.  Patient understands to report any medication changes to [facility] and provider. All questions answered and addressed to patients satisfaction. F/U will occur 7-10 days from start of treatment, [facility] to contact patient in 3 weeks for refills.     Maria Simmons, PharmD, [facility]  Clinical Pharmacist   [facility]  Phone: [phone]

## 2017-11-15 ENCOUNTER — EPISODE CHANGES (OUTPATIENT)
Dept: HEPATOLOGY | Facility: CLINIC | Age: 67
End: 2017-11-15

## 2017-11-15 NOTE — TELEPHONE ENCOUNTER
I spoke with patient's sister Manju and message from EMMANUEL Steinberg relayed.  Labs sched; appt notices mailed.

## 2017-11-21 ENCOUNTER — LAB VISIT (OUTPATIENT)
Dept: LAB | Facility: HOSPITAL | Age: 67
End: 2017-11-21
Payer: MEDICARE

## 2017-11-21 ENCOUNTER — TELEPHONE (OUTPATIENT)
Dept: HEPATOLOGY | Facility: CLINIC | Age: 67
End: 2017-11-21

## 2017-11-21 DIAGNOSIS — B18.2 CHRONIC HEPATITIS C WITHOUT HEPATIC COMA: ICD-10-CM

## 2017-11-21 LAB
BASOPHILS # BLD AUTO: 0.05 K/UL
BASOPHILS NFR BLD: 0.5 %
DIFFERENTIAL METHOD: ABNORMAL
EOSINOPHIL # BLD AUTO: 0.5 K/UL
EOSINOPHIL NFR BLD: 4.4 %
ERYTHROCYTE [DISTWIDTH] IN BLOOD BY AUTOMATED COUNT: 13.6 %
HCT VFR BLD AUTO: 35.8 %
HGB BLD-MCNC: 12.5 G/DL
IMM GRANULOCYTES # BLD AUTO: 0.04 K/UL
IMM GRANULOCYTES NFR BLD AUTO: 0.4 %
LYMPHOCYTES # BLD AUTO: 1.2 K/UL
LYMPHOCYTES NFR BLD: 11.5 %
MCH RBC QN AUTO: 30.5 PG
MCHC RBC AUTO-ENTMCNC: 34.9 G/DL
MCV RBC AUTO: 87 FL
MONOCYTES # BLD AUTO: 1 K/UL
MONOCYTES NFR BLD: 9.1 %
NEUTROPHILS # BLD AUTO: 7.7 K/UL
NEUTROPHILS NFR BLD: 74.1 %
NRBC BLD-RTO: 0 /100 WBC
PLATELET # BLD AUTO: 397 K/UL
PMV BLD AUTO: 10.2 FL
RBC # BLD AUTO: 4.1 M/UL
WBC # BLD AUTO: 10.43 K/UL

## 2017-11-21 PROCEDURE — 85025 COMPLETE CBC W/AUTO DIFF WBC: CPT

## 2017-11-21 PROCEDURE — 36415 COLL VENOUS BLD VENIPUNCTURE: CPT

## 2017-11-21 NOTE — TELEPHONE ENCOUNTER
HCV LAB REVIEW  Week 1 of Vosevi+RBV, planning on 12 weeks treatment    Pertinent labs:  CBC: stable, Hgb 12.5, around baseline    pls call pt:  Blood counts are stable. Continue current dosing    - Continue Vosevi - 1 pills daily - don't miss any doses.  - Continue Ribavirin 600mg - take 3 tablets once daily with food    Next labs due / pls schedule:  - CBC, CMP at week 2 - 11/29/17  - CBC, CMP, HCV RNA at week 4 - 12/13/17  - CBC, CMP at week 6- 12/27/17

## 2017-11-22 ENCOUNTER — TELEPHONE (OUTPATIENT)
Dept: PHARMACY | Facility: CLINIC | Age: 67
End: 2017-11-22

## 2017-11-22 NOTE — TELEPHONE ENCOUNTER
Follow Up    Initial clinical follow-up conducted for Vosevi and Ribavirin.  Name/ confirmed.  no missed doses; no new medications; no side effects noted.  All questions answered and addressed to patients satisfaction. Patient states he is feeling better than last week.  Reminded to take Omeprazole on an empty stomach 45 min before breakfast and to take Vosevi + RBV with food.  Call [facility] is additional antacids needed.

## 2017-11-23 DIAGNOSIS — C22.0 HCC (HEPATOCELLULAR CARCINOMA): Chronic | ICD-10-CM

## 2017-11-23 DIAGNOSIS — Z09 CHEMOTHERAPY FOLLOW-UP EXAMINATION: ICD-10-CM

## 2017-11-23 DIAGNOSIS — E87.6 HYPOKALEMIA: ICD-10-CM

## 2017-11-24 NOTE — TELEPHONE ENCOUNTER
Ms. Manju Canales called [facility] about Mr. Kelly having an episode of nausea and vomiting yesterday (11/23).  He was unsure if he vomited any medication tablets.  Today (11/25) he is doing well with NO nausea symptoms or vomiting.  He is talking both Vosevi and ribavirin WITH FOOD; Nexavar on an empty stomach twice daily (before breakfast and before dinner about 12 hours apart).  Instructed him to take Zofran 30 minutes prior to each Nexavar administration as needed.    [facility] will f/u with patient's sister on Monday.

## 2017-11-27 RX ORDER — POTASSIUM CHLORIDE 20 MEQ/1
TABLET, EXTENDED RELEASE ORAL
Qty: 360 TABLET | Refills: 0 | Status: SHIPPED | OUTPATIENT
Start: 2017-11-27 | End: 2018-02-23 | Stop reason: SDUPTHER

## 2017-11-28 ENCOUNTER — EPISODE CHANGES (OUTPATIENT)
Dept: HEPATOLOGY | Facility: CLINIC | Age: 67
End: 2017-11-28

## 2017-11-28 NOTE — TELEPHONE ENCOUNTER
Ms. Nunes states Mr. Kelly is now doing well with no additional episodes of N/V.  Patient understands to report any medication changes to [facility] and provider. All questions answered and addressed to patients satisfaction.

## 2017-11-29 ENCOUNTER — LAB VISIT (OUTPATIENT)
Dept: LAB | Facility: HOSPITAL | Age: 67
End: 2017-11-29
Payer: MEDICARE

## 2017-11-29 DIAGNOSIS — B18.2 CHRONIC HEPATITIS C WITHOUT HEPATIC COMA: ICD-10-CM

## 2017-11-29 LAB
ALBUMIN SERPL BCP-MCNC: 2.6 G/DL
ALP SERPL-CCNC: 117 U/L
ALT SERPL W/O P-5'-P-CCNC: 30 U/L
ANION GAP SERPL CALC-SCNC: 10 MMOL/L
AST SERPL-CCNC: 35 U/L
BASOPHILS # BLD AUTO: 0.05 K/UL
BASOPHILS NFR BLD: 0.6 %
BILIRUB SERPL-MCNC: 0.6 MG/DL
BUN SERPL-MCNC: 17 MG/DL
CALCIUM SERPL-MCNC: 8.3 MG/DL
CHLORIDE SERPL-SCNC: 100 MMOL/L
CO2 SERPL-SCNC: 19 MMOL/L
CREAT SERPL-MCNC: 1.1 MG/DL
DIFFERENTIAL METHOD: ABNORMAL
EOSINOPHIL # BLD AUTO: 0.3 K/UL
EOSINOPHIL NFR BLD: 3.5 %
ERYTHROCYTE [DISTWIDTH] IN BLOOD BY AUTOMATED COUNT: 13.8 %
EST. GFR  (AFRICAN AMERICAN): >60 ML/MIN/1.73 M^2
EST. GFR  (NON AFRICAN AMERICAN): >60 ML/MIN/1.73 M^2
GLUCOSE SERPL-MCNC: 100 MG/DL
HCT VFR BLD AUTO: 32.9 %
HGB BLD-MCNC: 10.8 G/DL
IMM GRANULOCYTES # BLD AUTO: 0.02 K/UL
IMM GRANULOCYTES NFR BLD AUTO: 0.2 %
LYMPHOCYTES # BLD AUTO: 0.9 K/UL
LYMPHOCYTES NFR BLD: 10.6 %
MCH RBC QN AUTO: 29.8 PG
MCHC RBC AUTO-ENTMCNC: 32.8 G/DL
MCV RBC AUTO: 91 FL
MONOCYTES # BLD AUTO: 0.6 K/UL
MONOCYTES NFR BLD: 7.7 %
NEUTROPHILS # BLD AUTO: 6.5 K/UL
NEUTROPHILS NFR BLD: 77.4 %
NRBC BLD-RTO: 0 /100 WBC
PLATELET # BLD AUTO: 378 K/UL
PMV BLD AUTO: 9.9 FL
POTASSIUM SERPL-SCNC: 4 MMOL/L
PROT SERPL-MCNC: 7.7 G/DL
RBC # BLD AUTO: 3.62 M/UL
SODIUM SERPL-SCNC: 129 MMOL/L
WBC # BLD AUTO: 8.36 K/UL

## 2017-11-29 PROCEDURE — 36415 COLL VENOUS BLD VENIPUNCTURE: CPT

## 2017-11-29 PROCEDURE — 85025 COMPLETE CBC W/AUTO DIFF WBC: CPT

## 2017-11-29 PROCEDURE — 80053 COMPREHEN METABOLIC PANEL: CPT

## 2017-11-30 ENCOUNTER — TELEPHONE (OUTPATIENT)
Dept: HEPATOLOGY | Facility: CLINIC | Age: 67
End: 2017-11-30

## 2017-11-30 NOTE — TELEPHONE ENCOUNTER
HCV LAB REVIEW  Week 2 of Vosevi+RBV, planning on 12 weeks treatment    Pertinent labs:  CBC: stable, Hgb 12.5, around baseline  CMP: stable    pls call pt:  Blood counts are stable. Continue current dosing    - Continue Vosevi - 1 pills daily - don't miss any doses.  - Continue Ribavirin 600mg - take 3 tablets once daily with food    Next labs due / pls schedule:  - please schedule CBC next week: 12/06/17  - CBC, CMP, HCV RNA at week 4 - 12/13/17  - CBC, CMP at week 6- 12/27/17

## 2017-11-30 NOTE — TELEPHONE ENCOUNTER
S/w pts sister Manju melendez today to give her these results pt is to have labs done next week at [facility] pts sister nicholas and donal bring him for labs on 12/6/17 appt will be made once order is put into the system by Landen.

## 2017-12-01 DIAGNOSIS — K74.60 CIRRHOSIS OF LIVER WITHOUT ASCITES, UNSPECIFIED HEPATIC CIRRHOSIS TYPE: Primary | ICD-10-CM

## 2017-12-01 DIAGNOSIS — C22.0 HCC (HEPATOCELLULAR CARCINOMA): Chronic | ICD-10-CM

## 2017-12-02 RX ORDER — SORAFENIB 200 MG/1
TABLET, FILM COATED ORAL
Qty: 120 TABLET | Refills: 2 | Status: SHIPPED | OUTPATIENT
Start: 2017-12-02 | End: 2018-02-15 | Stop reason: SDUPTHER

## 2017-12-05 ENCOUNTER — TELEPHONE (OUTPATIENT)
Dept: PHARMACY | Facility: CLINIC | Age: 67
End: 2017-12-05

## 2017-12-05 NOTE — TELEPHONE ENCOUNTER
Refill readiness for Nexavar and Vosevi + RBV confirmed with patient's sister, Manju; Name/ confirmed; No missed doses;  No side effects noted; No new allergies , conditions or medications.  Address confirmed for  shipment and   delivery.

## 2017-12-05 NOTE — TELEPHONE ENCOUNTER
Refill readiness for Vosevi + RBV and Nexavar attempted.  No answer; LVM for call back.  Copay $0.

## 2017-12-06 ENCOUNTER — LAB VISIT (OUTPATIENT)
Dept: LAB | Facility: HOSPITAL | Age: 67
End: 2017-12-06
Attending: PHYSICIAN ASSISTANT
Payer: MEDICARE

## 2017-12-06 DIAGNOSIS — K74.60 CIRRHOSIS OF LIVER WITHOUT ASCITES, UNSPECIFIED HEPATIC CIRRHOSIS TYPE: ICD-10-CM

## 2017-12-06 LAB
BASOPHILS # BLD AUTO: 0.02 K/UL
BASOPHILS NFR BLD: 0.4 %
DIFFERENTIAL METHOD: ABNORMAL
EOSINOPHIL # BLD AUTO: 0.4 K/UL
EOSINOPHIL NFR BLD: 8.2 %
ERYTHROCYTE [DISTWIDTH] IN BLOOD BY AUTOMATED COUNT: 14.5 %
HCT VFR BLD AUTO: 31.1 %
HGB BLD-MCNC: 10.2 G/DL
IMM GRANULOCYTES # BLD AUTO: 0.01 K/UL
IMM GRANULOCYTES NFR BLD AUTO: 0.2 %
LYMPHOCYTES # BLD AUTO: 0.6 K/UL
LYMPHOCYTES NFR BLD: 10.9 %
MCH RBC QN AUTO: 30.9 PG
MCHC RBC AUTO-ENTMCNC: 32.8 G/DL
MCV RBC AUTO: 94 FL
MONOCYTES # BLD AUTO: 0.6 K/UL
MONOCYTES NFR BLD: 11.1 %
NEUTROPHILS # BLD AUTO: 3.7 K/UL
NEUTROPHILS NFR BLD: 69.2 %
NRBC BLD-RTO: 0 /100 WBC
PLATELET # BLD AUTO: 191 K/UL
PMV BLD AUTO: 11 FL
RBC # BLD AUTO: 3.3 M/UL
WBC # BLD AUTO: 5.39 K/UL

## 2017-12-06 PROCEDURE — 36415 COLL VENOUS BLD VENIPUNCTURE: CPT | Mod: PO

## 2017-12-06 PROCEDURE — 85025 COMPLETE CBC W/AUTO DIFF WBC: CPT

## 2017-12-08 ENCOUNTER — TELEPHONE (OUTPATIENT)
Dept: HEPATOLOGY | Facility: CLINIC | Age: 67
End: 2017-12-08

## 2017-12-08 NOTE — TELEPHONE ENCOUNTER
HCV LAB REVIEW  Week 3 of Vosevi+RBV, planning on 12 weeks treatment    Pertinent labs:  CBC: stable, Hgb 10.2, baseline 12    pls call pt:  Blood counts are stable. Continue current dosing    - Continue Vosevi - 1 pills daily - don't miss any doses.  - Continue Ribavirin 600mg - take 3 tablets once daily with food    Next labs due / pls schedule:  - CBC, CMP, HCV RNA at week 4 - 12/13/17  - CBC, CMP at week 6- 12/27/17

## 2017-12-11 ENCOUNTER — EPISODE CHANGES (OUTPATIENT)
Dept: HEPATOLOGY | Facility: CLINIC | Age: 67
End: 2017-12-11

## 2017-12-11 RX ORDER — METOPROLOL TARTRATE 25 MG/1
TABLET, FILM COATED ORAL
Qty: 90 TABLET | Refills: 0 | Status: SHIPPED | OUTPATIENT
Start: 2017-12-11 | End: 2018-03-09 | Stop reason: SDUPTHER

## 2017-12-11 NOTE — TELEPHONE ENCOUNTER
Pt notified of this via msg left on pts VM mailed out instructions and next lab and office visit appts to the pt today.

## 2017-12-13 ENCOUNTER — OFFICE VISIT (OUTPATIENT)
Dept: HEPATOLOGY | Facility: CLINIC | Age: 67
End: 2017-12-13
Payer: MEDICARE

## 2017-12-13 ENCOUNTER — OFFICE VISIT (OUTPATIENT)
Dept: HEMATOLOGY/ONCOLOGY | Facility: CLINIC | Age: 67
End: 2017-12-13
Payer: MEDICARE

## 2017-12-13 VITALS
DIASTOLIC BLOOD PRESSURE: 63 MMHG | OXYGEN SATURATION: 100 % | WEIGHT: 176.13 LBS | RESPIRATION RATE: 18 BRPM | SYSTOLIC BLOOD PRESSURE: 136 MMHG | HEIGHT: 71 IN | HEART RATE: 87 BPM | BODY MASS INDEX: 24.66 KG/M2

## 2017-12-13 DIAGNOSIS — Z09 CHEMOTHERAPY FOLLOW-UP EXAMINATION: ICD-10-CM

## 2017-12-13 DIAGNOSIS — C22.0 HCC (HEPATOCELLULAR CARCINOMA): ICD-10-CM

## 2017-12-13 DIAGNOSIS — K21.9 GASTROESOPHAGEAL REFLUX DISEASE, ESOPHAGITIS PRESENCE NOT SPECIFIED: Chronic | ICD-10-CM

## 2017-12-13 DIAGNOSIS — C22.0 HCC (HEPATOCELLULAR CARCINOMA): Primary | Chronic | ICD-10-CM

## 2017-12-13 DIAGNOSIS — B18.2 CHRONIC HEPATITIS C WITHOUT HEPATIC COMA: ICD-10-CM

## 2017-12-13 DIAGNOSIS — K74.60 CIRRHOSIS OF LIVER WITHOUT ASCITES, UNSPECIFIED HEPATIC CIRRHOSIS TYPE: Primary | ICD-10-CM

## 2017-12-13 PROCEDURE — 99999 PR PBB SHADOW E&M-EST. PATIENT-LVL I: CPT | Mod: PBBFAC,,, | Performed by: INTERNAL MEDICINE

## 2017-12-13 PROCEDURE — 99214 OFFICE O/P EST MOD 30 MIN: CPT | Mod: S$GLB,,, | Performed by: PHYSICIAN ASSISTANT

## 2017-12-13 PROCEDURE — 99214 OFFICE O/P EST MOD 30 MIN: CPT | Mod: S$GLB,,, | Performed by: INTERNAL MEDICINE

## 2017-12-13 PROCEDURE — 99999 PR PBB SHADOW E&M-EST. PATIENT-LVL IV: CPT | Mod: PBBFAC,,, | Performed by: PHYSICIAN ASSISTANT

## 2017-12-13 PROCEDURE — 99499 UNLISTED E&M SERVICE: CPT | Mod: S$GLB,,, | Performed by: PHYSICIAN ASSISTANT

## 2017-12-13 PROCEDURE — 99499 UNLISTED E&M SERVICE: CPT | Mod: S$GLB,,, | Performed by: INTERNAL MEDICINE
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
## 2017-12-22 ENCOUNTER — HOSPITAL ENCOUNTER (OUTPATIENT)
Dept: RADIOLOGY | Facility: HOSPITAL | Age: 67
Discharge: HOME | End: 2017-12-22
Attending: INTERNAL MEDICINE
Payer: MEDICARE

## 2017-12-22 ENCOUNTER — HOSPITAL ENCOUNTER (OUTPATIENT)
Dept: INTERVENTIONAL RADIOLOGY/VASCULAR | Facility: HOSPITAL | Age: 67
Discharge: HOME | End: 2017-12-22
Attending: INTERNAL MEDICINE
Payer: MEDICARE

## 2017-12-22 ENCOUNTER — HOSPITAL ENCOUNTER (OUTPATIENT)
Dept: RADIOLOGY | Facility: HOSPITAL | Age: 67
Discharge: HOME | End: 2017-12-22
Attending: FAMILY MEDICINE
Payer: MEDICARE

## 2017-12-22 VITALS
HEART RATE: 70 BPM | SYSTOLIC BLOOD PRESSURE: 181 MMHG | OXYGEN SATURATION: 100 % | DIASTOLIC BLOOD PRESSURE: 85 MMHG | RESPIRATION RATE: 16 BRPM

## 2017-12-22 DIAGNOSIS — C22.0 HCC (HEPATOCELLULAR CARCINOMA): ICD-10-CM

## 2017-12-22 DIAGNOSIS — C22.0 HCC (HEPATOCELLULAR CARCINOMA): Chronic | ICD-10-CM

## 2017-12-22 LAB
25(OH)D3+25(OH)D2 SERPL-MCNC: 19 NG/ML
AFP SERPL-MCNC: 65 NG/ML
ALBUMIN SERPL BCP-MCNC: 2.9 G/DL
ALP SERPL-CCNC: 103 U/L
ALT SERPL W/O P-5'-P-CCNC: 23 U/L
ANION GAP SERPL CALC-SCNC: 9 MMOL/L
AST SERPL-CCNC: 26 U/L
BASOPHILS # BLD AUTO: 0.03 K/UL
BASOPHILS NFR BLD: 0.5 %
BILIRUB SERPL-MCNC: 0.9 MG/DL
BUN SERPL-MCNC: 16 MG/DL
CALCIUM SERPL-MCNC: 9.2 MG/DL
CHLORIDE SERPL-SCNC: 107 MMOL/L
CO2 SERPL-SCNC: 20 MMOL/L
CREAT SERPL-MCNC: 0.8 MG/DL
DIFFERENTIAL METHOD: ABNORMAL
EOSINOPHIL # BLD AUTO: 0.4 K/UL
EOSINOPHIL NFR BLD: 5.6 %
ERYTHROCYTE [DISTWIDTH] IN BLOOD BY AUTOMATED COUNT: 15.9 %
EST. GFR  (AFRICAN AMERICAN): >60 ML/MIN/1.73 M^2
EST. GFR  (NON AFRICAN AMERICAN): >60 ML/MIN/1.73 M^2
ESTIMATED AVG GLUCOSE: 88 MG/DL
GLUCOSE SERPL-MCNC: 127 MG/DL
HBA1C MFR BLD HPLC: 4.7 %
HCT VFR BLD AUTO: 32.3 %
HGB BLD-MCNC: 10.2 G/DL
IMM GRANULOCYTES # BLD AUTO: 0.02 K/UL
IMM GRANULOCYTES NFR BLD AUTO: 0.3 %
INR PPP: 1
LYMPHOCYTES # BLD AUTO: 0.8 K/UL
LYMPHOCYTES NFR BLD: 12.1 %
MCH RBC QN AUTO: 30.5 PG
MCHC RBC AUTO-ENTMCNC: 31.6 G/DL
MCV RBC AUTO: 97 FL
MONOCYTES # BLD AUTO: 0.5 K/UL
MONOCYTES NFR BLD: 7.7 %
NEUTROPHILS # BLD AUTO: 4.9 K/UL
NEUTROPHILS NFR BLD: 73.8 %
NRBC BLD-RTO: 0 /100 WBC
PLATELET # BLD AUTO: 288 K/UL
PMV BLD AUTO: 10.7 FL
POTASSIUM SERPL-SCNC: 4 MMOL/L
PROT SERPL-MCNC: 7.8 G/DL
PROTHROMBIN TIME: 10.6 SEC
RBC # BLD AUTO: 3.34 M/UL
SODIUM SERPL-SCNC: 136 MMOL/L
T4 SERPL-MCNC: 7.2 UG/DL
TSH SERPL DL<=0.005 MIU/L-ACNC: 4 UIU/ML
VIT B12 SERPL-MCNC: 556 PG/ML
WBC # BLD AUTO: 6.61 K/UL

## 2017-12-22 PROCEDURE — 84443 ASSAY THYROID STIM HORMONE: CPT

## 2017-12-22 PROCEDURE — 82105 ALPHA-FETOPROTEIN SERUM: CPT

## 2017-12-22 PROCEDURE — 80053 COMPREHEN METABOLIC PANEL: CPT

## 2017-12-22 PROCEDURE — 83036 HEMOGLOBIN GLYCOSYLATED A1C: CPT

## 2017-12-22 PROCEDURE — 82607 VITAMIN B-12: CPT

## 2017-12-22 PROCEDURE — 85025 COMPLETE CBC W/AUTO DIFF WBC: CPT

## 2017-12-22 PROCEDURE — 82306 VITAMIN D 25 HYDROXY: CPT

## 2017-12-22 PROCEDURE — 36569 INSJ PICC 5 YR+ W/O IMAGING: CPT | Mod: RT,,, | Performed by: RADIOLOGY

## 2017-12-22 PROCEDURE — 84436 ASSAY OF TOTAL THYROXINE: CPT

## 2017-12-22 PROCEDURE — 74183 MRI ABD W/O CNTR FLWD CNTR: CPT | Mod: 26,,, | Performed by: RADIOLOGY

## 2017-12-22 PROCEDURE — 25500020 PHARM REV CODE 255: Performed by: FAMILY MEDICINE

## 2017-12-22 PROCEDURE — 76937 US GUIDE VASCULAR ACCESS: CPT | Mod: TC

## 2017-12-22 PROCEDURE — 74183 MRI ABD W/O CNTR FLWD CNTR: CPT | Mod: TC

## 2017-12-22 PROCEDURE — 77001 FLUOROGUIDE FOR VEIN DEVICE: CPT | Mod: 26,,, | Performed by: RADIOLOGY

## 2017-12-22 PROCEDURE — 85610 PROTHROMBIN TIME: CPT

## 2017-12-22 PROCEDURE — 77001 FLUOROGUIDE FOR VEIN DEVICE: CPT | Mod: TC

## 2017-12-22 PROCEDURE — A9585 GADOBUTROL INJECTION: HCPCS | Performed by: FAMILY MEDICINE

## 2017-12-22 PROCEDURE — 71250 CT THORAX DX C-: CPT | Mod: 26,,, | Performed by: RADIOLOGY

## 2017-12-22 PROCEDURE — 76937 US GUIDE VASCULAR ACCESS: CPT | Mod: 26,,, | Performed by: RADIOLOGY

## 2017-12-22 PROCEDURE — 71250 CT THORAX DX C-: CPT | Mod: TC

## 2017-12-22 PROCEDURE — 36415 COLL VENOUS BLD VENIPUNCTURE: CPT

## 2017-12-22 RX ORDER — GADOBUTROL 604.72 MG/ML
10 INJECTION INTRAVENOUS
Status: COMPLETED | OUTPATIENT
Start: 2017-12-22 | End: 2017-12-22

## 2017-12-22 RX ADMIN — GADOBUTROL 10 ML: 604.72 INJECTION INTRAVENOUS at 10:12
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
## 2017-12-22 NOTE — DISCHARGE INSTRUCTIONS
For scheduling: Call Mag at [phone]    For questions or concerns call: TORREY MON-FRI 8 AM- 5PM [phone]. Radiology resident on call [phone].    For immediate concerns that are not emergent, you may call our radiology clinic at: [phone]

## 2017-12-22 NOTE — PROCEDURES
Radiology Post-Procedure Note    Pre Op Diagnosis: Colon and prostate cancer.    Post Op Diagnosis: Same    Procedure: PICC placement    Procedure performed by: Jl Najera MD and Cirilo Moraes MD    Written Informed Consent Obtained: Yes    Specimen Removed: No    Estimated Blood Loss: Minimal    Findings:   Using realtime U/S guidance a 5 Fr PICC catheter was placed into the right Brachial Vein with tip of the catheter in the SVC.    PICC is ready for use.     Cirilo Moraes MD  PGY-5  Department of Radiology  [phone]

## 2017-12-22 NOTE — PROGRESS NOTES
Picc line to right upper arm pulled post scan per MD order, catheter intact, patient tolerated well, pressure held for 5 minutes, dressed with gauze and coban, patient and family instructed to hold pressure if bleeding occurs.

## 2017-12-22 NOTE — PROGRESS NOTES
Patient tolerated without any acute distress noted.Ok to discharge per MD order. Discharge instructions reviewed with patient and family. Understanding verbalized. Dressing CDI. VSS.  Patient refused transport. Patient to garage via wheelchair by family.

## 2017-12-25 ENCOUNTER — TELEPHONE (OUTPATIENT)
Dept: HEPATOLOGY | Facility: CLINIC | Age: 67
End: 2017-12-25

## 2017-12-25 DIAGNOSIS — R93.89 ABNORMAL CT OF THE CHEST: Primary | ICD-10-CM

## 2017-12-25 NOTE — TELEPHONE ENCOUNTER
Patient: Robin Hawkins       MRN: [id number]      : 1950     Age: 67 y.o.  [address] [facility] [address]    Provider: Hepatologist - [place]    Patient Transplant Status: Not a candidate    Reason for presentation: Reassessment    Clinical Summary: His history dates to 2015 when he was diagnosed with hepatocellular carcinoma in the setting of hepatitis C.  He had a 2.5 cm mass which demonstrated arterial hyperenhancement.  This had enlarged from prior imaging and AFP was elevated.  He underwent TACE in 2016.  He was considered for transplant but taken off of the transplant list due to alcohol abuse and comorbidities.  He also history history of early stage colon cancer which was completely resected at [facility] which required no adjuvant therapy (records not available) he also has a history of prostate cancer and had a radical prostatectomy on 2011 for a [place] 7 adenocarcinoma, ([id number]), with negative margins. He subsequently had a local recurrence for which he received XRT at [facility] (completed 10/28/2011). Last available PSA was 0.03 (10/18/16).  He underwent radioembolization to the right hepatic lobe on 17.  He was started on sorafenib in 2017 and underwent TACE in 2017 and 2017.    Imaging to be reviewed: 17 MRI abdomen and ct chest    HCC Treatment History: as above; peak AFP 5556 ;  65    ABO: A POS    Platelets:   Lab Results   Component Value Date/Time     2017 08:46 AM     Creatinine:   Lab Results   Component Value Date/Time    CREATININE 0.8 2017 08:46 AM     Bilirubin:   Lab Results   Component Value Date/Time    BILITOT 0.9 2017 08:46 AM     AFP Last 3 each if available:   Lab Results   Component Value Date/Time    AFP 65 (H) 2017 08:46 AM    AFP 81 (H) 10/31/2017 01:41 PM    AFP 85 (H) 10/16/2017 06:55 AM       MELD: MELD-Na score: 6 at 2017  8:46 AM  MELD score: 6 at 2017  8:46 AM  Calculated from:  Serum  Creatinine: 0.8 mg/dL (Rounded to 1) at 12/22/2017  8:46 AM  Serum Sodium: 136 mmol/L at 12/22/2017  8:46 AM  Total Bilirubin: 0.9 mg/dL (Rounded to 1) at 12/22/2017  8:46 AM  INR(ratio): 1.0 at 12/22/2017  8:46 AM  Age: 67 years    Plan:     Follow-up Provider:

## 2017-12-26 ENCOUNTER — TELEPHONE (OUTPATIENT)
Dept: HEPATOLOGY | Facility: CLINIC | Age: 67
End: 2017-12-26

## 2017-12-26 NOTE — TELEPHONE ENCOUNTER
Pt informed via mail.    JDN  ----- Message from Octavia Scott MD sent at 12/25/2017 11:59 AM CST -----  He should take vit d otc 1000 units daily - please let patient know.
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
## 2017-12-26 NOTE — TELEPHONE ENCOUNTER
MA scheduled CT Chest for 03/23/2017. Mailed appt reminder to ptMarques VALLE    ----- Message from Octavia Scott MD sent at 12/25/2017 11:54 AM CST -----  Needs pukmonary consult and repeat ct chest in 3 months - please let patient know.

## 2017-12-27 ENCOUNTER — LAB VISIT (OUTPATIENT)
Dept: LAB | Facility: HOSPITAL | Age: 67
End: 2017-12-27
Attending: PHYSICIAN ASSISTANT
Payer: MEDICARE

## 2017-12-27 DIAGNOSIS — B18.2 CHRONIC HEPATITIS C WITHOUT HEPATIC COMA: ICD-10-CM

## 2017-12-27 LAB
ALBUMIN SERPL BCP-MCNC: 3.1 G/DL
ALP SERPL-CCNC: 120 U/L
ALT SERPL W/O P-5'-P-CCNC: 34 U/L
ANION GAP SERPL CALC-SCNC: 10 MMOL/L
AST SERPL-CCNC: 39 U/L
BASOPHILS # BLD AUTO: 0.03 K/UL
BASOPHILS NFR BLD: 0.4 %
BILIRUB SERPL-MCNC: 0.8 MG/DL
BUN SERPL-MCNC: 19 MG/DL
CALCIUM SERPL-MCNC: 8.8 MG/DL
CHLORIDE SERPL-SCNC: 104 MMOL/L
CO2 SERPL-SCNC: 20 MMOL/L
CREAT SERPL-MCNC: 1 MG/DL
DIFFERENTIAL METHOD: ABNORMAL
EOSINOPHIL # BLD AUTO: 0.5 K/UL
EOSINOPHIL NFR BLD: 6.5 %
ERYTHROCYTE [DISTWIDTH] IN BLOOD BY AUTOMATED COUNT: 16.7 %
EST. GFR  (AFRICAN AMERICAN): >60 ML/MIN/1.73 M^2
EST. GFR  (NON AFRICAN AMERICAN): >60 ML/MIN/1.73 M^2
GLUCOSE SERPL-MCNC: 74 MG/DL
HCT VFR BLD AUTO: 35.9 %
HGB BLD-MCNC: 10.9 G/DL
IMM GRANULOCYTES # BLD AUTO: 0.02 K/UL
IMM GRANULOCYTES NFR BLD AUTO: 0.3 %
LYMPHOCYTES # BLD AUTO: 0.8 K/UL
LYMPHOCYTES NFR BLD: 10 %
MCH RBC QN AUTO: 30.2 PG
MCHC RBC AUTO-ENTMCNC: 30.4 G/DL
MCV RBC AUTO: 99 FL
MONOCYTES # BLD AUTO: 0.5 K/UL
MONOCYTES NFR BLD: 6.9 %
NEUTROPHILS # BLD AUTO: 5.7 K/UL
NEUTROPHILS NFR BLD: 75.9 %
NRBC BLD-RTO: 0 /100 WBC
PLATELET # BLD AUTO: 383 K/UL
PMV BLD AUTO: 10.7 FL
POTASSIUM SERPL-SCNC: 4.5 MMOL/L
PROT SERPL-MCNC: 8.8 G/DL
RBC # BLD AUTO: 3.61 M/UL
SODIUM SERPL-SCNC: 134 MMOL/L
WBC # BLD AUTO: 7.51 K/UL

## 2017-12-27 PROCEDURE — 80053 COMPREHEN METABOLIC PANEL: CPT

## 2017-12-27 PROCEDURE — 85025 COMPLETE CBC W/AUTO DIFF WBC: CPT

## 2017-12-27 PROCEDURE — 36415 COLL VENOUS BLD VENIPUNCTURE: CPT | Mod: PO

## 2017-12-28 ENCOUNTER — TELEPHONE (OUTPATIENT)
Dept: PHARMACY | Facility: CLINIC | Age: 67
End: 2017-12-28

## 2017-12-28 ENCOUNTER — TELEPHONE (OUTPATIENT)
Dept: HEPATOLOGY | Facility: CLINIC | Age: 67
End: 2017-12-28

## 2017-12-28 DIAGNOSIS — B18.2 CHRONIC HEPATITIS C WITHOUT HEPATIC COMA: Primary | ICD-10-CM

## 2017-12-28 NOTE — TELEPHONE ENCOUNTER
HCV LAB REVIEW  Week 6 of Vosevi+RBV, planning on 12 weeks treatment    Pertinent labs:  CBC: stable, Hgb 10.9, baseline 12  CMP: stable  HCV RNA: not drawn    pls call pt:  Blood counts are stable. Continue current dosing  - Continue Vosevi - 1 pills daily - don't miss any doses.  - Continue Ribavirin 600mg - take 3 tablets once daily with food    Next labs due / pls schedule:  - CBC, CMP, HCV RNA at week 10- 01/24/18  - CBC, CMP, HCV RNA at week 12-02/07/18

## 2017-12-29 NOTE — TELEPHONE ENCOUNTER
S/w pts sister today scheduled one lab appt however I need new orders placed for CBC X2, CMP, and another HCV RNA quant to finish scheduling those week 10 and 12 labs at [facility]. Pts sister also concerned as pt has had no appetitie and is loosing weight is there anything he can take to gain his appetite while on his current medications. Please advise thanks

## 2018-01-01 NOTE — PROCEDURES
Radiology Post-Procedure Note    Pre Op Diagnosis: Hepatocellular carcinoma    Post Op Diagnosis: Hepatocellular carcinoma    Procedure: Chemoembolization    Procedure Performed by: Mike Baeza MD    Written Informed Consent Obtained: Yes    Specimen Removed: None    Estimated Blood Loss: Minimal    Findings:     After placement of a right femoral artery sheath, a 5-Arabic catheter was inserted and angiography of the celiac artery and right phrenic artery for anatomic evaluation and localization of hepatic tumor.  A microcatheter was introduced into feeding arteries of a right hepatic lobe tumor and LC beads coated with doxorubicin were injected until near stagnant flow was achieved.  Embolization via a R phrenic artery branch was also performed. Post procedural angiography revealed no complications.    Right femoral artery angiogram was performed and the sheath was removed.  Hemostasis was achieved using exoseal technique.  There was no hematoma at the time of hemostasis.    The patient tolerated procedure well.  Please see Imaging report for further details.    Mason Golden MD  Radiology       negative -  no rash

## 2018-01-02 ENCOUNTER — CONFERENCE (OUTPATIENT)
Dept: TRANSPLANT | Facility: CLINIC | Age: 68
End: 2018-01-02

## 2018-01-02 DIAGNOSIS — B18.2 CHRONIC HEPATITIS C WITHOUT HEPATIC COMA: Primary | ICD-10-CM

## 2018-01-08 ENCOUNTER — EPISODE CHANGES (OUTPATIENT)
Dept: HEPATOLOGY | Facility: CLINIC | Age: 68
End: 2018-01-08

## 2018-01-08 ENCOUNTER — OFFICE VISIT (OUTPATIENT)
Dept: PULMONOLOGY | Facility: CLINIC | Age: 68
End: 2018-01-08
Payer: MEDICARE

## 2018-01-08 ENCOUNTER — HOSPITAL ENCOUNTER (OUTPATIENT)
Dept: PULMONOLOGY | Facility: CLINIC | Age: 68
Discharge: HOME | End: 2018-01-08
Payer: MEDICARE

## 2018-01-08 ENCOUNTER — OFFICE VISIT (OUTPATIENT)
Dept: INTERVENTIONAL RADIOLOGY/VASCULAR | Facility: CLINIC | Age: 68
End: 2018-01-08
Payer: MEDICARE

## 2018-01-08 ENCOUNTER — HOSPITAL ENCOUNTER (OUTPATIENT)
Dept: RADIOLOGY | Facility: HOSPITAL | Age: 68
Discharge: HOME | End: 2018-01-08
Attending: INTERNAL MEDICINE
Payer: MEDICARE

## 2018-01-08 VITALS
RESPIRATION RATE: 14 BRPM | SYSTOLIC BLOOD PRESSURE: 104 MMHG | HEIGHT: 71 IN | DIASTOLIC BLOOD PRESSURE: 78 MMHG | HEART RATE: 74 BPM | BODY MASS INDEX: 23.8 KG/M2 | WEIGHT: 170 LBS | OXYGEN SATURATION: 99 %

## 2018-01-08 VITALS
WEIGHT: 168.81 LBS | DIASTOLIC BLOOD PRESSURE: 60 MMHG | BODY MASS INDEX: 23.63 KG/M2 | HEIGHT: 71 IN | SYSTOLIC BLOOD PRESSURE: 127 MMHG | HEART RATE: 63 BPM

## 2018-01-08 DIAGNOSIS — C22.0 HCC (HEPATOCELLULAR CARCINOMA): Chronic | ICD-10-CM

## 2018-01-08 DIAGNOSIS — J43.2 CENTRILOBULAR EMPHYSEMA: Chronic | ICD-10-CM

## 2018-01-08 DIAGNOSIS — B18.2 CHRONIC HEPATITIS C WITHOUT HEPATIC COMA: ICD-10-CM

## 2018-01-08 DIAGNOSIS — J38.02 BILATERAL COMPLETE VOCAL FOLD PARALYSIS: ICD-10-CM

## 2018-01-08 DIAGNOSIS — R06.00 DYSPNEA, UNSPECIFIED TYPE: ICD-10-CM

## 2018-01-08 DIAGNOSIS — J44.9 COPD (CHRONIC OBSTRUCTIVE PULMONARY DISEASE): Primary | Chronic | ICD-10-CM

## 2018-01-08 DIAGNOSIS — C22.0 HCC (HEPATOCELLULAR CARCINOMA): Primary | ICD-10-CM

## 2018-01-08 DIAGNOSIS — J43.2 CENTRILOBULAR EMPHYSEMA: Primary | Chronic | ICD-10-CM

## 2018-01-08 LAB
PRE FEV1 FVC: 61
PRE FEV1: 2.21
PRE FVC: 3.62
PREDICTED FEV1 FVC: 79
PREDICTED FEV1: 3.14
PREDICTED FVC: 3.94

## 2018-01-08 PROCEDURE — 99999 PR PBB SHADOW E&M-EST. PATIENT-LVL III: CPT | Mod: PBBFAC,,,

## 2018-01-08 PROCEDURE — 99999 PR PBB SHADOW E&M-EST. PATIENT-LVL IV: CPT | Mod: PBBFAC,,, | Performed by: INTERNAL MEDICINE

## 2018-01-08 PROCEDURE — 99213 OFFICE O/P EST LOW 20 MIN: CPT | Mod: S$GLB,,, | Performed by: FAMILY MEDICINE

## 2018-01-08 PROCEDURE — 99499 UNLISTED E&M SERVICE: CPT | Mod: S$GLB,,, | Performed by: INTERNAL MEDICINE

## 2018-01-08 PROCEDURE — 71046 X-RAY EXAM CHEST 2 VIEWS: CPT | Mod: 26,,, | Performed by: RADIOLOGY

## 2018-01-08 PROCEDURE — 94010 BREATHING CAPACITY TEST: CPT | Mod: S$GLB,,, | Performed by: INTERNAL MEDICINE

## 2018-01-08 PROCEDURE — 71046 X-RAY EXAM CHEST 2 VIEWS: CPT | Mod: TC,FY

## 2018-01-08 PROCEDURE — 99499 UNLISTED E&M SERVICE: CPT | Mod: S$GLB,,, | Performed by: FAMILY MEDICINE

## 2018-01-08 PROCEDURE — 99214 OFFICE O/P EST MOD 30 MIN: CPT | Mod: 25,S$GLB,, | Performed by: INTERNAL MEDICINE

## 2018-01-08 RX ORDER — PREDNISONE 10 MG/1
TABLET ORAL
Qty: 25 TABLET | Refills: 1 | Status: ON HOLD | OUTPATIENT
Start: 2018-01-08 | End: 2018-02-12

## 2018-01-08 NOTE — PROGRESS NOTES
Subjective:       Patient ID: Robin Hawkins is a 67 y.o. male.    Chief Complaint: Hepatocellular Carcinoma    Patient here to discuss treatment of his hepatocellular carcinoma last treated with ablation on 11/8/2017. He reports feeling well. He denies any abdominal pain or distention. He is accompanied by his sister. He had an MRI on 12/22/2017, and the results were reviewed in liver conference.      Review of Systems   Constitutional: Positive for appetite change and fatigue. Negative for activity change, chills and fever.   Respiratory: Positive for shortness of breath. Negative for cough, wheezing and stridor.    Cardiovascular: Negative for chest pain, palpitations and leg swelling.   Gastrointestinal: Negative for abdominal distention, abdominal pain, constipation, diarrhea, nausea and vomiting.       Objective:      Physical Exam   Constitutional: He is oriented to person, place, and time. He appears well-developed and well-nourished. No distress.   Cardiovascular: Normal rate, regular rhythm and normal heart sounds.  Exam reveals no gallop and no friction rub.    No murmur heard.  Pulmonary/Chest: Effort normal and breath sounds normal. No respiratory distress. He has no wheezes. He has no rales.   Abdominal: Soft. Bowel sounds are normal. He exhibits no distension and no mass. There is no tenderness. There is no guarding.   Neurological: He is alert and oriented to person, place, and time.   Skin: Skin is warm and dry. He is not diaphoretic.   Psychiatric: He has a normal mood and affect.   Vitals reviewed.      Assessment:       1. HCC (hepatocellular carcinoma)        Plan:         Explained MRI shows residual/recurrence. Recommendation of liver conference is to repeat treatment with chemoembolization. TACE procedure discussed in detail with the patient including risks, benefits, potential complications, usual pre and post procedure course, as well as the potential to develop post embolization syndrome.  Patient is familiar with this treatment as he had this in August of last year. Also explained that his lesions are on both lobes of liver. Explained unsafe to treat entire liver at one time. Will treat one lobe at a time. Patient and sister verbalize understanding and agreement. Sister will be unable to bring him until 1/26/2018. We will call once we have the MD schedule. Clinic phone number provided.

## 2018-01-09 NOTE — PROGRESS NOTES
Subjective:       Patient ID: Robin Hawkins is a 67 y.o. male.    Chief Complaint: Shortness of Breath    HPI Mr. Hawkins is a 67-year-old former smoker who comes for assessment of   worsening shortness of breath.  During the past several weeks, he has had   shortness of breath with modest daily activities.  He notes some cough, but no   regular sputum production.  He has had intermittent wheezing.  He continues to   use aerosol treatments with albuterol and Pulmicort as scheduled.  He feels that   these provide some temporary benefit for control of respiratory symptoms.    Mr. Hawkins has the history of chronic hepatitis C, and he is currently taking   medical therapy for this.  He also has hepatocellular carcinoma; and has   undergone chemotherapy and embolization therapy for this.    Mr. Hawkins has the history of bilateral vocal fold paresis.  He had a   tracheostomy for a period of time due to this problem.  This has been   successfully closed following ENT surgery here a year or so ago (see notes by   Dr. Holcomb).    DATA:  I have reviewed the images from a CT scan done within the past month.    The cardiac silhouette is not enlarged.  There are emphysematous abnormalities   within the upper lung zones.  There are scattered ground-glass opacities (right   more than left) with nonspecific radiographic features.  Note is made of a   moderate hiatal hernia.  There are localized chest wall calcifications.  There   do not appear to be any localized intrathoracic airway obstructions.      CB/HN  dd: 01/08/2018 20:14:27 (CST)  td: 01/09/2018 11:01:22 (CST)  Doc ID   #[id number]  Job ID #[id number]    CC:       Review of Systems   Constitutional: Negative for fever and fatigue.   HENT: Positive for voice change. Negative for postnasal drip, sinus pressure and congestion.    Respiratory: Positive for cough, shortness of breath and dyspnea on extertion. Negative for sputum production and wheezing.    Cardiovascular:  Negative for chest pain and leg swelling.   Genitourinary: Negative for difficulty urinating.   Musculoskeletal: Negative for arthralgias and back pain.   Skin: Negative for rash.   Gastrointestinal: Negative for abdominal pain and acid reflux.   Neurological: Negative for dizziness and weakness.   Hematological: Negative for adenopathy.       Objective:      Physical Exam   Constitutional: He is oriented to person, place, and time. He appears well-developed and well-nourished.   HENT:   Head: Normocephalic.   Mouth/Throat: Oropharynx is clear and moist. No oropharyngeal exudate.   Neck: Normal range of motion. Neck supple. No JVD present. No tracheal deviation present. No thyromegaly present.   Chronic hoarseness.   Cardiovascular: Normal rate, regular rhythm and normal heart sounds.    No murmur heard.  Pulmonary/Chest: Symmetric chest wall expansion. Stridor present. He has wheezes (few exp wheezes). He has no rhonchi. He has no rales. He exhibits no tenderness.   Abdominal: Soft.   Musculoskeletal: He exhibits no edema.   Lymphadenopathy:     He has no cervical adenopathy.   Neurological: He is alert and oriented to person, place, and time.   Skin: Skin is warm and dry. No rash noted. No cyanosis or erythema.   Psychiatric: He has a normal mood and affect.   Vitals reviewed.    Personal Diagnostic Review    No flowsheet data found.      Assessment:       1. Centrilobular emphysema    2. Bilateral complete vocal fold paralysis    3. Chronic hepatitis C without hepatic coma    4. Dyspnea, unspecified type    5. HCC (hepatocellular carcinoma)        Outpatient Encounter Prescriptions as of 1/8/2018   Medication Sig Dispense Refill    albuterol (PROVENTIL) 2.5 mg /3 mL (0.083 %) nebulizer solution Take 3 mLs (2.5 mg total) by nebulization every 6 (six) hours as needed for Wheezing or Shortness of Breath. 180 mL 6    aspirin 81 MG Chew Take 81 mg by mouth every morning.       atorvastatin (LIPITOR) 40 MG tablet  TAKE 1 TABLET BY MOUTH EVERY DAY 90 tablet 3    budesonide 1 mg/2 mL NbS USE CONTENT OF 1 VIAL IN NEBULIZER TWICE DAILY 120 mL 6    dorzolamide-timolol 2-0.5% (COSOPT) 22.3-6.8 mg/mL ophthalmic solution Place 1 drop into both eyes 2 (two) times daily. 10 mL 12    hydrochlorothiazide (HYDRODIURIL) 25 MG tablet TAKE 1 TABLET(25 MG) BY MOUTH EVERY DAY 90 tablet 0    latanoprost 0.005 % ophthalmic solution Place 1 drop into both eyes every evening. 7.5 mL 4    lisinopril (PRINIVIL,ZESTRIL) 20 MG tablet TAKE 1 TABLET BY MOUTH EVERY DAY 90 tablet 1    magnesium oxide (MAGOX) 400 mg tablet Take 1 tablet (400 mg total) by mouth 2 (two) times daily. 10 tablet 1    metoprolol tartrate (LOPRESSOR) 25 MG tablet TAKE ONE-HALF TABLET BY MOUTH TWICE DAILY 90 tablet 0    mirabegron (MYRBETRIQ) 50 mg Tb24 Take 1 tablet (50 mg total) by mouth every morning. 90 tablet 3    multivitamin capsule Take 1 capsule by mouth every morning.       NEXAVAR 200 mg tablet TAKE TWO TABLETS (400 MG TOTAL) BY MOUTH TWICE A  tablet 2    omeprazole (PRILOSEC) 20 MG capsule Take 1 capsule (20 mg total) by mouth once daily. Take on an empty stomach 90 capsule 1    ondansetron (ZOFRAN) 4 MG tablet Take 2 tablets (8 mg total) by mouth every 8 (eight) hours as needed for Nausea. 30 tablet 0    ondansetron (ZOFRAN-ODT) 4 MG TbDL Take 1 tablet (4 mg total) by mouth every 6 (six) hours as needed. 20 tablet 0    oxybutynin (DITROPAN-XL) 10 MG 24 hr tablet Take 1 tablet (10 mg total) by mouth once daily. 90 tablet 3    oxyCODONE (ROXICODONE) 5 MG immediate release tablet Take 1 tablet (5 mg total) by mouth every 6 (six) hours as needed for Pain. 28 tablet 0    oxycodone-acetaminophen (PERCOCET) 5-325 mg per tablet Take 1 tablet by mouth every 6 (six) hours as needed for Pain. 20 tablet 0    potassium chloride SA (K-DUR,KLOR-CON) 20 MEQ tablet TAKE 2 TABLETS(40 MEQ) BY MOUTH TWICE DAILY 360 tablet 0    predniSONE (DELTASONE) 10 MG tablet  Take 3 tabs per day x 5 days, then 2 tabs per day x 5 days, then stop. Take with food 25 tablet 1    ribavirin (COPEGUS) 200 MG tablet Take 3 tablets daily with food, dose will increase as directed by your provider. 168 tablet 2    sofosbuvir-velpatas-voxilaprev (VOSEVI) 400-100-100 mg Tab Take 1 tablet by mouth once daily. Take with food 28 tablet 2     No facility-administered encounter medications on file as of 1/8/2018.      Orders Placed This Encounter   Procedures    X-Ray Chest PA And Lateral     Standing Status:   Future     Number of Occurrences:   1     Standing Expiration Date:   1/8/2019    Spirometry without bronchodilator     Standing Status:   Future     Number of Occurrences:   1     Standing Expiration Date:   1/8/2019     Plan:      Spirometry and CXR (phone report).  ENT follow up (Zhang).  Continue aerosol treatments 3 x daily.  Begin Prednisone 30 mg daily x 5, 20 mg daily x 5, then stop.

## 2018-01-09 NOTE — PATIENT INSTRUCTIONS
Spirometry and CXR (phone report).  ENT follow up (Zhang).  Continue aerosol treatments 3 x daily.  Begin Prednisone 30 mg daily x 5, 20 mg daily x 5, then stop.

## 2018-01-09 NOTE — TELEPHONE ENCOUNTER
S/w pts sister shagufta today they have an appointment with hem/ Onc tomorrow and they will address it with them at that time and see what he can do to get help with his appetite.

## 2018-01-10 ENCOUNTER — OFFICE VISIT (OUTPATIENT)
Dept: HEMATOLOGY/ONCOLOGY | Facility: CLINIC | Age: 68
End: 2018-01-10
Payer: MEDICARE

## 2018-01-10 ENCOUNTER — TELEPHONE (OUTPATIENT)
Dept: HEMATOLOGY/ONCOLOGY | Facility: CLINIC | Age: 68
End: 2018-01-10

## 2018-01-10 ENCOUNTER — LAB VISIT (OUTPATIENT)
Dept: LAB | Facility: HOSPITAL | Age: 68
End: 2018-01-10
Attending: INTERNAL MEDICINE
Payer: MEDICARE

## 2018-01-10 VITALS
HEART RATE: 68 BPM | TEMPERATURE: 98 F | WEIGHT: 168 LBS | RESPIRATION RATE: 18 BRPM | SYSTOLIC BLOOD PRESSURE: 112 MMHG | OXYGEN SATURATION: 100 % | BODY MASS INDEX: 23.52 KG/M2 | HEIGHT: 71 IN | DIASTOLIC BLOOD PRESSURE: 60 MMHG

## 2018-01-10 DIAGNOSIS — B18.2 CHRONIC HEPATITIS C WITHOUT HEPATIC COMA: ICD-10-CM

## 2018-01-10 DIAGNOSIS — K21.9 GASTROESOPHAGEAL REFLUX DISEASE, ESOPHAGITIS PRESENCE NOT SPECIFIED: Chronic | ICD-10-CM

## 2018-01-10 DIAGNOSIS — B18.2 CHRONIC HEPATITIS C WITHOUT HEPATIC COMA: Primary | ICD-10-CM

## 2018-01-10 DIAGNOSIS — C22.0 HCC (HEPATOCELLULAR CARCINOMA): Primary | Chronic | ICD-10-CM

## 2018-01-10 DIAGNOSIS — Z09 CHEMOTHERAPY FOLLOW-UP EXAMINATION: ICD-10-CM

## 2018-01-10 DIAGNOSIS — C22.0 HEPATOCELLULAR CARCINOMA: ICD-10-CM

## 2018-01-10 LAB
ALBUMIN SERPL BCP-MCNC: 3 G/DL
ALP SERPL-CCNC: 107 U/L
ALT SERPL W/O P-5'-P-CCNC: 19 U/L
ANION GAP SERPL CALC-SCNC: 11 MMOL/L
AST SERPL-CCNC: 26 U/L
BILIRUB SERPL-MCNC: 0.8 MG/DL
BUN SERPL-MCNC: 14 MG/DL
CALCIUM SERPL-MCNC: 8.2 MG/DL
CHLORIDE SERPL-SCNC: 104 MMOL/L
CO2 SERPL-SCNC: 16 MMOL/L
CREAT SERPL-MCNC: 1 MG/DL
ERYTHROCYTE [DISTWIDTH] IN BLOOD BY AUTOMATED COUNT: 17.8 %
EST. GFR  (AFRICAN AMERICAN): >60 ML/MIN/1.73 M^2
EST. GFR  (NON AFRICAN AMERICAN): >60 ML/MIN/1.73 M^2
GLUCOSE SERPL-MCNC: 102 MG/DL
HCT VFR BLD AUTO: 32.9 %
HGB BLD-MCNC: 10.1 G/DL
IMM GRANULOCYTES # BLD AUTO: 0.05 K/UL
MAGNESIUM SERPL-MCNC: 2.1 MG/DL
MCH RBC QN AUTO: 31.7 PG
MCHC RBC AUTO-ENTMCNC: 30.7 G/DL
MCV RBC AUTO: 103 FL
NEUTROPHILS # BLD AUTO: 8.8 K/UL
PLATELET # BLD AUTO: 342 K/UL
PMV BLD AUTO: 9.6 FL
POTASSIUM SERPL-SCNC: 4.8 MMOL/L
PROT SERPL-MCNC: 8.2 G/DL
RBC # BLD AUTO: 3.19 M/UL
SODIUM SERPL-SCNC: 131 MMOL/L
WBC # BLD AUTO: 9.49 K/UL

## 2018-01-10 PROCEDURE — 99214 OFFICE O/P EST MOD 30 MIN: CPT | Mod: S$GLB,,, | Performed by: INTERNAL MEDICINE

## 2018-01-10 PROCEDURE — 99999 PR PBB SHADOW E&M-EST. PATIENT-LVL V: CPT | Mod: PBBFAC,,, | Performed by: INTERNAL MEDICINE

## 2018-01-10 PROCEDURE — 80053 COMPREHEN METABOLIC PANEL: CPT

## 2018-01-10 PROCEDURE — 83735 ASSAY OF MAGNESIUM: CPT

## 2018-01-10 PROCEDURE — 99499 UNLISTED E&M SERVICE: CPT | Mod: S$GLB,,, | Performed by: INTERNAL MEDICINE

## 2018-01-10 PROCEDURE — 36415 COLL VENOUS BLD VENIPUNCTURE: CPT

## 2018-01-10 PROCEDURE — 85027 COMPLETE CBC AUTOMATED: CPT

## 2018-01-10 NOTE — TELEPHONE ENCOUNTER
Left message for patient letting him know he can take omeprazole 20 mg in addition to Pepcid. If he has any questions he can call the office

## 2018-01-10 NOTE — TELEPHONE ENCOUNTER
----- Message from Mallory Bailey sent at 1/10/2018  1:32 PM CST -----  There was an issue with a test ordered on this patient from 1/10/2018.  Please call the Sendout lab as soon as possible at [phone] ext. [phone] for complete details.  Anyone in the Send[facility] lab will be able to assist you with further information.

## 2018-01-10 NOTE — TELEPHONE ENCOUNTER
Patient's HCV quantitative lab drawn today, the quantity was insufficient. It will need to be reordered and redrawn

## 2018-01-10 NOTE — TELEPHONE ENCOUNTER
He has yet to have HCV RNA drawn due to several lab errors. Can you get this rescheduled?    Also per oncology notes, pt not taking PPI due to concern of side effects. Can you remind him that he can take a PPI (omeprazole 20mg not 40mg) at the exact same time. Note mentions that he is taking pepcid, can you also verify dose and that he is taking at same time as Jada as well? He can take one or the other.

## 2018-01-10 NOTE — TELEPHONE ENCOUNTER
----- Message from Ortiz Carrillo DO, FACP sent at 1/10/2018  3:00 PM CST -----  Can you please let him know that he can take omeprazole 20mg (not 40) for his reflux in addition to pepcid.
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
## 2018-01-11 ENCOUNTER — TELEPHONE (OUTPATIENT)
Dept: INTERVENTIONAL RADIOLOGY/VASCULAR | Facility: CLINIC | Age: 68
End: 2018-01-11

## 2018-01-12 DIAGNOSIS — C22.0 HCC (HEPATOCELLULAR CARCINOMA): Primary | ICD-10-CM

## 2018-01-12 NOTE — TELEPHONE ENCOUNTER
Multiple attempts made to reach patient.  I spoke with patient;'s sister Manju and message from EMMANUEL Steinberg relayed. HCV RNA scheduled 1/13/18.  Sister states that patient was taking Omeprazole first, waiting some and then taking Vosevi.  It was scheduled that Omeprazole 20 mg and Vosevi must be taken at the exact same time.  She states that is not taking Pepcid.  This message from provider will be mailed to patient to stress proper use of PPI with Vosevi.

## 2018-01-13 ENCOUNTER — LAB VISIT (OUTPATIENT)
Dept: LAB | Facility: HOSPITAL | Age: 68
End: 2018-01-13
Payer: MEDICARE

## 2018-01-13 DIAGNOSIS — B18.2 CHRONIC HEPATITIS C WITHOUT HEPATIC COMA: ICD-10-CM

## 2018-01-13 PROCEDURE — 36415 COLL VENOUS BLD VENIPUNCTURE: CPT | Mod: PO

## 2018-01-13 PROCEDURE — 87522 HEPATITIS C REVRS TRNSCRPJ: CPT

## 2018-01-17 LAB
HCV LOG: <1.08 LOG (10) IU/ML
HCV RNA QUANT PCR: <12 IU/ML
HCV, QUALITATIVE: DETECTED IU/ML

## 2018-01-18 ENCOUNTER — TELEPHONE (OUTPATIENT)
Dept: HEPATOLOGY | Facility: CLINIC | Age: 68
End: 2018-01-18

## 2018-01-18 ENCOUNTER — EPISODE CHANGES (OUTPATIENT)
Dept: HEPATOLOGY | Facility: CLINIC | Age: 68
End: 2018-01-18

## 2018-01-18 NOTE — TELEPHONE ENCOUNTER
HCV LAB REVIEW  Week 6 of Vosevi+RBV, part 2, planning on 12 weeks treatment    Pertinent labs:  CBC: stable, Hgb 10.9, baseline 12  CMP: stable  HCV RNA: <12, detected.     pls call pt:  HCV was too low for lab to count! The medication is working!  - Continue Vosevi - 1 pills daily - don't miss any doses.  - Continue Ribavirin 600mg - take 3 tablets once daily with food    Next labs due / pls schedule:  - CBC, CMP, HCV RNA at week 10- 01/24/18  - CBC, CMP, HCV RNA at week 12-02/07/18

## 2018-01-23 ENCOUNTER — TELEPHONE (OUTPATIENT)
Dept: PHARMACY | Facility: CLINIC | Age: 68
End: 2018-01-23

## 2018-01-23 NOTE — TELEPHONE ENCOUNTER
Patient called for refill on nexavar - vosevi and rbv have completed tx.  NA M for call back     STONEY Rubin.Ph.  Clinical Pharmacist  [facility]  Phone: [phone]

## 2018-01-25 NOTE — TELEPHONE ENCOUNTER
Refill readiness for Nexavar confirmed with patient's sister/caregiver, Manju; name/ confirmed; no missed doses - Patient's sister does not have medication with her to give dose count; no new medications; no side effects noted; address confirmed for  shipment and  delivery.   Copay $3.70.

## 2018-01-25 NOTE — TELEPHONE ENCOUNTER
Nexavar refill readiness attempted.  No answer.  [facility] for call back.  Copay $3.70

## 2018-01-26 ENCOUNTER — LAB VISIT (OUTPATIENT)
Dept: LAB | Facility: HOSPITAL | Age: 68
End: 2018-01-26
Attending: PHYSICIAN ASSISTANT
Payer: MEDICARE

## 2018-01-26 DIAGNOSIS — B18.2 CHRONIC HEPATITIS C WITHOUT HEPATIC COMA: ICD-10-CM

## 2018-01-26 LAB
ALBUMIN SERPL BCP-MCNC: 2.8 G/DL
ALP SERPL-CCNC: 135 U/L
ALT SERPL W/O P-5'-P-CCNC: 26 U/L
ANION GAP SERPL CALC-SCNC: 14 MMOL/L
AST SERPL-CCNC: 28 U/L
BASOPHILS # BLD AUTO: 0.02 K/UL
BASOPHILS NFR BLD: 0.3 %
BILIRUB SERPL-MCNC: 1.4 MG/DL
BUN SERPL-MCNC: 10 MG/DL
CALCIUM SERPL-MCNC: 8.3 MG/DL
CHLORIDE SERPL-SCNC: 97 MMOL/L
CO2 SERPL-SCNC: 17 MMOL/L
CREAT SERPL-MCNC: 1 MG/DL
DIFFERENTIAL METHOD: ABNORMAL
EOSINOPHIL # BLD AUTO: 0.3 K/UL
EOSINOPHIL NFR BLD: 3.9 %
ERYTHROCYTE [DISTWIDTH] IN BLOOD BY AUTOMATED COUNT: 17.7 %
EST. GFR  (AFRICAN AMERICAN): >60 ML/MIN/1.73 M^2
EST. GFR  (NON AFRICAN AMERICAN): >60 ML/MIN/1.73 M^2
GLUCOSE SERPL-MCNC: 131 MG/DL
HCT VFR BLD AUTO: 30.5 %
HGB BLD-MCNC: 9.2 G/DL
IMM GRANULOCYTES # BLD AUTO: 0.03 K/UL
IMM GRANULOCYTES NFR BLD AUTO: 0.4 %
LYMPHOCYTES # BLD AUTO: 0.6 K/UL
LYMPHOCYTES NFR BLD: 7.6 %
MCH RBC QN AUTO: 33.2 PG
MCHC RBC AUTO-ENTMCNC: 30.2 G/DL
MCV RBC AUTO: 110 FL
MONOCYTES # BLD AUTO: 0.4 K/UL
MONOCYTES NFR BLD: 4.8 %
NEUTROPHILS # BLD AUTO: 6.6 K/UL
NEUTROPHILS NFR BLD: 83 %
NRBC BLD-RTO: 0 /100 WBC
PLATELET # BLD AUTO: 383 K/UL
PMV BLD AUTO: 10.1 FL
POTASSIUM SERPL-SCNC: 3.9 MMOL/L
PROT SERPL-MCNC: 8.1 G/DL
RBC # BLD AUTO: 2.77 M/UL
SODIUM SERPL-SCNC: 128 MMOL/L
WBC # BLD AUTO: 7.92 K/UL

## 2018-01-26 PROCEDURE — 85025 COMPLETE CBC W/AUTO DIFF WBC: CPT

## 2018-01-26 PROCEDURE — 36415 COLL VENOUS BLD VENIPUNCTURE: CPT | Mod: PO

## 2018-01-26 PROCEDURE — 80053 COMPREHEN METABOLIC PANEL: CPT

## 2018-01-26 PROCEDURE — 87522 HEPATITIS C REVRS TRNSCRPJ: CPT

## 2018-01-29 LAB
HCV LOG: <1.08 LOG (10) IU/ML
HCV RNA QUANT PCR: <12 IU/ML
HCV, QUALITATIVE: NOT DETECTED IU/ML

## 2018-01-31 ENCOUNTER — TELEPHONE (OUTPATIENT)
Dept: HEPATOLOGY | Facility: CLINIC | Age: 68
End: 2018-01-31

## 2018-01-31 DIAGNOSIS — C22.0 HCC (HEPATOCELLULAR CARCINOMA): Primary | ICD-10-CM

## 2018-01-31 NOTE — TELEPHONE ENCOUNTER
HCV LAB REVIEW  Week 10 of Vosevi+RBV, part 2, planning on 12 weeks treatment    Pertinent labs:  CBC: stable, Hgb 9.2, baseline 12  CMP: stable  HCV RNA: <12, not detected.     pls call pt:  HCV was too low for lab to count! The medication is working!  - Continue Vosevi - 1 pills daily - don't miss any doses.  - Reduce Ribavirin 400mg - take 3 tablets once daily with food. 2 weeks left!!     Next labs due / pls schedule:  - CBC, CMP, HCV RNA at week 12-02/07/18

## 2018-01-31 NOTE — TELEPHONE ENCOUNTER
Pt and pts sister notified that his should only be 2 RIBAVIRIN tablets daily not 3 as instructed below to equalt a total of 400mg a day with the vosevi, this was clarified by Landen Steinberg PA-C. Pt is already set up for lab on 2/7/18 appt slip reminder mailed out today and pts sister asekd to call us back with any questions or concerns, episode updated today as well

## 2018-02-01 ENCOUNTER — HOSPITAL ENCOUNTER (OUTPATIENT)
Facility: HOSPITAL | Age: 68
Discharge: HOME | End: 2018-02-01
Attending: RADIOLOGY | Admitting: RADIOLOGY
Payer: MEDICARE

## 2018-02-01 VITALS
DIASTOLIC BLOOD PRESSURE: 74 MMHG | OXYGEN SATURATION: 100 % | HEART RATE: 85 BPM | SYSTOLIC BLOOD PRESSURE: 115 MMHG | RESPIRATION RATE: 28 BRPM | TEMPERATURE: 98 F

## 2018-02-01 DIAGNOSIS — C22.0 HCC (HEPATOCELLULAR CARCINOMA): ICD-10-CM

## 2018-02-01 PROCEDURE — 94640 AIRWAY INHALATION TREATMENT: CPT

## 2018-02-01 PROCEDURE — 25000242 PHARM REV CODE 250 ALT 637 W/ HCPCS: Performed by: RADIOLOGY

## 2018-02-01 RX ORDER — IPRATROPIUM BROMIDE AND ALBUTEROL SULFATE 2.5; .5 MG/3ML; MG/3ML
3 SOLUTION RESPIRATORY (INHALATION) ONCE
Status: COMPLETED | OUTPATIENT
Start: 2018-02-01 | End: 2018-02-01

## 2018-02-01 RX ORDER — SODIUM CHLORIDE 9 MG/ML
INJECTION, SOLUTION INTRAVENOUS CONTINUOUS
Status: DISCONTINUED | OUTPATIENT
Start: 2018-02-01 | End: 2018-02-01 | Stop reason: HOSPADM

## 2018-02-01 RX ORDER — DIPHENHYDRAMINE HYDROCHLORIDE 50 MG/ML
50 INJECTION INTRAMUSCULAR; INTRAVENOUS ONCE
Status: DISCONTINUED | OUTPATIENT
Start: 2018-02-01 | End: 2018-02-01 | Stop reason: HOSPADM

## 2018-02-01 RX ORDER — LIDOCAINE HYDROCHLORIDE 10 MG/ML
1 INJECTION, SOLUTION EPIDURAL; INFILTRATION; INTRACAUDAL; PERINEURAL ONCE
Status: DISCONTINUED | OUTPATIENT
Start: 2018-02-01 | End: 2018-02-01 | Stop reason: HOSPADM

## 2018-02-01 RX ORDER — FENTANYL CITRATE 50 UG/ML
50 INJECTION, SOLUTION INTRAMUSCULAR; INTRAVENOUS
Status: DISCONTINUED | OUTPATIENT
Start: 2018-02-01 | End: 2018-02-01 | Stop reason: HOSPADM

## 2018-02-01 RX ORDER — HEPARIN SODIUM 200 [USP'U]/100ML
500 INJECTION, SOLUTION INTRAVENOUS CONTINUOUS
Status: DISCONTINUED | OUTPATIENT
Start: 2018-02-01 | End: 2018-02-01 | Stop reason: HOSPADM

## 2018-02-01 RX ORDER — MIDAZOLAM HYDROCHLORIDE 1 MG/ML
1 INJECTION INTRAMUSCULAR; INTRAVENOUS
Status: DISCONTINUED | OUTPATIENT
Start: 2018-02-01 | End: 2018-02-01 | Stop reason: HOSPADM

## 2018-02-01 RX ADMIN — IPRATROPIUM BROMIDE AND ALBUTEROL SULFATE 3 ML: .5; 3 SOLUTION RESPIRATORY (INHALATION) at 12:02

## 2018-02-01 NOTE — PROGRESS NOTES
D/c instructions reviewed, when to call a physician, when to call 911. IV removed.  Pt wheeled to door by sister.

## 2018-02-01 NOTE — PLAN OF CARE
"Pre procedure assessment complete, family at bedside. Pt states his breathing "feels better' after treatment. MD Ryan to bedside for assessment. Pt to  for PICC line. Sister at bedside, updated. Chart and personal items transferred with patient. Pt denies pain/discomfort.   "

## 2018-02-01 NOTE — PROGRESS NOTES
Arrived to [facility] bed [facility] for PICC line and TACE preop assessment. Upon arrival pt c/o SOB, Pt placed on 2 L O2 NC. MD Ryan notified, MD to bedside for assessment. Orders placed for breathing treatment. Resp therapy notified. Pts sister at bedside states this started recently at home. WCTM, See flow sheets for monitoring.

## 2018-02-01 NOTE — PROGRESS NOTES
Pt arrives to  for TACE.  IV placed.  Pt unable to tolerate laying on back d/t SOB. Dr. Baeza at bedside.   sp02 100% HR 91 /76
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
## 2018-02-07 ENCOUNTER — OFFICE VISIT (OUTPATIENT)
Dept: HEMATOLOGY/ONCOLOGY | Facility: CLINIC | Age: 68
DRG: 189 | End: 2018-02-07
Payer: MEDICARE

## 2018-02-07 ENCOUNTER — TELEPHONE (OUTPATIENT)
Dept: HEMATOLOGY/ONCOLOGY | Facility: CLINIC | Age: 68
End: 2018-02-07

## 2018-02-07 VITALS
DIASTOLIC BLOOD PRESSURE: 55 MMHG | WEIGHT: 160.25 LBS | HEART RATE: 65 BPM | RESPIRATION RATE: 18 BRPM | HEIGHT: 71 IN | TEMPERATURE: 98 F | BODY MASS INDEX: 22.44 KG/M2 | SYSTOLIC BLOOD PRESSURE: 90 MMHG

## 2018-02-07 DIAGNOSIS — Z09 CHEMOTHERAPY FOLLOW-UP EXAMINATION: ICD-10-CM

## 2018-02-07 DIAGNOSIS — R06.00 DYSPNEA, UNSPECIFIED TYPE: ICD-10-CM

## 2018-02-07 DIAGNOSIS — C22.0 HCC (HEPATOCELLULAR CARCINOMA): Primary | Chronic | ICD-10-CM

## 2018-02-07 PROCEDURE — 99999 PR PBB SHADOW E&M-EST. PATIENT-LVL V: CPT | Mod: PBBFAC,,, | Performed by: INTERNAL MEDICINE

## 2018-02-07 PROCEDURE — 1159F MED LIST DOCD IN RCRD: CPT | Mod: ,,, | Performed by: INTERNAL MEDICINE

## 2018-02-07 PROCEDURE — 3008F BODY MASS INDEX DOCD: CPT | Mod: ,,, | Performed by: INTERNAL MEDICINE

## 2018-02-07 PROCEDURE — 99499 UNLISTED E&M SERVICE: CPT | Mod: S$GLB,,, | Performed by: INTERNAL MEDICINE

## 2018-02-07 PROCEDURE — 1126F AMNT PAIN NOTED NONE PRSNT: CPT | Mod: ,,, | Performed by: INTERNAL MEDICINE

## 2018-02-07 PROCEDURE — 99214 OFFICE O/P EST MOD 30 MIN: CPT | Mod: ,,, | Performed by: INTERNAL MEDICINE

## 2018-02-08 ENCOUNTER — OFFICE VISIT (OUTPATIENT)
Dept: INTERNAL MEDICINE | Facility: CLINIC | Age: 68
End: 2018-02-08
Payer: MEDICARE

## 2018-02-08 ENCOUNTER — HOSPITAL ENCOUNTER (INPATIENT)
Facility: HOSPITAL | Age: 68
LOS: 4 days | Discharge: HOME | DRG: 189 | End: 2018-02-12
Attending: EMERGENCY MEDICINE | Admitting: INTERNAL MEDICINE
Payer: MEDICARE

## 2018-02-08 ENCOUNTER — OFFICE VISIT (OUTPATIENT)
Dept: OTOLARYNGOLOGY | Facility: CLINIC | Age: 68
End: 2018-02-08
Payer: MEDICARE

## 2018-02-08 VITALS
DIASTOLIC BLOOD PRESSURE: 66 MMHG | BODY MASS INDEX: 22.72 KG/M2 | WEIGHT: 162.94 LBS | HEART RATE: 66 BPM | SYSTOLIC BLOOD PRESSURE: 102 MMHG

## 2018-02-08 VITALS — DIASTOLIC BLOOD PRESSURE: 50 MMHG | RESPIRATION RATE: 32 BRPM | SYSTOLIC BLOOD PRESSURE: 80 MMHG

## 2018-02-08 DIAGNOSIS — R06.02 SOB (SHORTNESS OF BREATH): Primary | ICD-10-CM

## 2018-02-08 DIAGNOSIS — J38.6 LARYNGEAL STENOSIS: Primary | ICD-10-CM

## 2018-02-08 DIAGNOSIS — B18.2 CHRONIC HEPATITIS C WITHOUT HEPATIC COMA: ICD-10-CM

## 2018-02-08 DIAGNOSIS — K21.00 REFLUX ESOPHAGITIS: ICD-10-CM

## 2018-02-08 DIAGNOSIS — J44.1 COPD WITH ACUTE EXACERBATION: Primary | ICD-10-CM

## 2018-02-08 DIAGNOSIS — R06.82 TACHYPNEA: ICD-10-CM

## 2018-02-08 DIAGNOSIS — J43.2 CENTRILOBULAR EMPHYSEMA: Chronic | ICD-10-CM

## 2018-02-08 DIAGNOSIS — I95.9 HYPOTENSION, UNSPECIFIED HYPOTENSION TYPE: ICD-10-CM

## 2018-02-08 DIAGNOSIS — C22.0 HCC (HEPATOCELLULAR CARCINOMA): Chronic | ICD-10-CM

## 2018-02-08 DIAGNOSIS — R06.00 DYSPNEA: ICD-10-CM

## 2018-02-08 DIAGNOSIS — K44.9 HIATAL HERNIA: ICD-10-CM

## 2018-02-08 DIAGNOSIS — I10 ESSENTIAL HYPERTENSION: Chronic | ICD-10-CM

## 2018-02-08 DIAGNOSIS — D64.9 ANEMIA, UNSPECIFIED TYPE: ICD-10-CM

## 2018-02-08 DIAGNOSIS — R06.02 SOB (SHORTNESS OF BREATH): ICD-10-CM

## 2018-02-08 LAB
ALBUMIN SERPL BCP-MCNC: 2.7 G/DL
ALP SERPL-CCNC: 107 U/L
ALT SERPL W/O P-5'-P-CCNC: 17 U/L
ANION GAP SERPL CALC-SCNC: 6 MMOL/L
APTT BLDCRRT: 27.3 SEC
AST SERPL-CCNC: 24 U/L
BASOPHILS # BLD AUTO: 0.03 K/UL
BASOPHILS NFR BLD: 0.5 %
BILIRUB SERPL-MCNC: 1.7 MG/DL
BNP SERPL-MCNC: 41 PG/ML
BUN SERPL-MCNC: 10 MG/DL
CALCIUM SERPL-MCNC: 8.5 MG/DL
CHLORIDE SERPL-SCNC: 105 MMOL/L
CO2 SERPL-SCNC: 18 MMOL/L
CREAT SERPL-MCNC: 0.9 MG/DL
D DIMER PPP IA.FEU-MCNC: 3.48 MG/L FEU
DIFFERENTIAL METHOD: ABNORMAL
EOSINOPHIL # BLD AUTO: 0.2 K/UL
EOSINOPHIL NFR BLD: 4 %
ERYTHROCYTE [DISTWIDTH] IN BLOOD BY AUTOMATED COUNT: 16.9 %
EST. GFR  (AFRICAN AMERICAN): >60 ML/MIN/1.73 M^2
EST. GFR  (NON AFRICAN AMERICAN): >60 ML/MIN/1.73 M^2
GLUCOSE SERPL-MCNC: 89 MG/DL
HCT VFR BLD AUTO: 29.2 %
HGB BLD-MCNC: 9 G/DL
IMM GRANULOCYTES # BLD AUTO: 0.02 K/UL
IMM GRANULOCYTES NFR BLD AUTO: 0.3 %
INR PPP: 1.1
LACTATE SERPL-SCNC: 2.1 MMOL/L
LACTATE SERPL-SCNC: 2.7 MMOL/L
LYMPHOCYTES # BLD AUTO: 0.7 K/UL
LYMPHOCYTES NFR BLD: 11.9 %
MAGNESIUM SERPL-MCNC: 1.9 MG/DL
MCH RBC QN AUTO: 32.8 PG
MCHC RBC AUTO-ENTMCNC: 30.8 G/DL
MCV RBC AUTO: 107 FL
MONOCYTES # BLD AUTO: 0.5 K/UL
MONOCYTES NFR BLD: 7.8 %
NEUTROPHILS # BLD AUTO: 4.4 K/UL
NEUTROPHILS NFR BLD: 75.5 %
NRBC BLD-RTO: 0 /100 WBC
PHOSPHATE SERPL-MCNC: 1.5 MG/DL
PLATELET # BLD AUTO: 424 K/UL
PMV BLD AUTO: 9.1 FL
POTASSIUM SERPL-SCNC: 5.1 MMOL/L
PROCALCITONIN SERPL IA-MCNC: 0.06 NG/ML
PROT SERPL-MCNC: 7.1 G/DL
PROTHROMBIN TIME: 11.1 SEC
RBC # BLD AUTO: 2.74 M/UL
SODIUM SERPL-SCNC: 129 MMOL/L
TROPONIN I SERPL DL<=0.01 NG/ML-MCNC: 0.03 NG/ML
TSH SERPL DL<=0.005 MIU/L-ACNC: 3.23 UIU/ML
WBC # BLD AUTO: 5.79 K/UL

## 2018-02-08 PROCEDURE — 1159F MED LIST DOCD IN RCRD: CPT | Mod: S$GLB,,, | Performed by: NURSE PRACTITIONER

## 2018-02-08 PROCEDURE — 85730 THROMBOPLASTIN TIME PARTIAL: CPT

## 2018-02-08 PROCEDURE — 87040 BLOOD CULTURE FOR BACTERIA: CPT

## 2018-02-08 PROCEDURE — 84443 ASSAY THYROID STIM HORMONE: CPT

## 2018-02-08 PROCEDURE — 31575 DIAGNOSTIC LARYNGOSCOPY: CPT | Mod: S$GLB,,, | Performed by: OTOLARYNGOLOGY

## 2018-02-08 PROCEDURE — 3008F BODY MASS INDEX DOCD: CPT | Mod: S$GLB,,, | Performed by: OTOLARYNGOLOGY

## 2018-02-08 PROCEDURE — 94640 AIRWAY INHALATION TREATMENT: CPT | Mod: S$GLB,,, | Performed by: FAMILY MEDICINE

## 2018-02-08 PROCEDURE — 25000003 PHARM REV CODE 250: Performed by: EMERGENCY MEDICINE

## 2018-02-08 PROCEDURE — 85610 PROTHROMBIN TIME: CPT

## 2018-02-08 PROCEDURE — 94060 EVALUATION OF WHEEZING: CPT | Mod: S$GLB,,, | Performed by: NURSE PRACTITIONER

## 2018-02-08 PROCEDURE — 80053 COMPREHEN METABOLIC PANEL: CPT

## 2018-02-08 PROCEDURE — 84484 ASSAY OF TROPONIN QUANT: CPT

## 2018-02-08 PROCEDURE — 1159F MED LIST DOCD IN RCRD: CPT | Mod: S$GLB,,, | Performed by: OTOLARYNGOLOGY

## 2018-02-08 PROCEDURE — 85025 COMPLETE CBC W/AUTO DIFF WBC: CPT

## 2018-02-08 PROCEDURE — 99213 OFFICE O/P EST LOW 20 MIN: CPT | Mod: 25,S$GLB,, | Performed by: OTOLARYNGOLOGY

## 2018-02-08 PROCEDURE — 96375 TX/PRO/DX INJ NEW DRUG ADDON: CPT

## 2018-02-08 PROCEDURE — 85379 FIBRIN DEGRADATION QUANT: CPT

## 2018-02-08 PROCEDURE — 83880 ASSAY OF NATRIURETIC PEPTIDE: CPT

## 2018-02-08 PROCEDURE — 94640 AIRWAY INHALATION TREATMENT: CPT

## 2018-02-08 PROCEDURE — 27000221 HC OXYGEN, UP TO 24 HOURS

## 2018-02-08 PROCEDURE — 63600175 PHARM REV CODE 636 W HCPCS: Performed by: EMERGENCY MEDICINE

## 2018-02-08 PROCEDURE — 99215 OFFICE O/P EST HI 40 MIN: CPT | Mod: 25,S$GLB,, | Performed by: NURSE PRACTITIONER

## 2018-02-08 PROCEDURE — 25000242 PHARM REV CODE 250 ALT 637 W/ HCPCS: Performed by: EMERGENCY MEDICINE

## 2018-02-08 PROCEDURE — 99999 PR PBB SHADOW E&M-EST. PATIENT-LVL IV: CPT | Mod: PBBFAC,,, | Performed by: NURSE PRACTITIONER

## 2018-02-08 PROCEDURE — 20600001 HC STEP DOWN PRIVATE ROOM

## 2018-02-08 PROCEDURE — 84100 ASSAY OF PHOSPHORUS: CPT

## 2018-02-08 PROCEDURE — 84145 PROCALCITONIN (PCT): CPT

## 2018-02-08 PROCEDURE — 83605 ASSAY OF LACTIC ACID: CPT

## 2018-02-08 PROCEDURE — 99999 PR PBB SHADOW E&M-EST. PATIENT-LVL II: CPT | Mod: PBBFAC,,, | Performed by: OTOLARYNGOLOGY

## 2018-02-08 PROCEDURE — 83735 ASSAY OF MAGNESIUM: CPT

## 2018-02-08 PROCEDURE — 99214 OFFICE O/P EST MOD 30 MIN: CPT | Mod: 27 | Performed by: NURSE PRACTITIONER

## 2018-02-08 PROCEDURE — 3008F BODY MASS INDEX DOCD: CPT | Mod: S$GLB,,, | Performed by: NURSE PRACTITIONER

## 2018-02-08 PROCEDURE — 93010 ELECTROCARDIOGRAM REPORT: CPT | Mod: ,,, | Performed by: INTERNAL MEDICINE

## 2018-02-08 PROCEDURE — 94761 N-INVAS EAR/PLS OXIMETRY MLT: CPT

## 2018-02-08 PROCEDURE — 94760 N-INVAS EAR/PLS OXIMETRY 1: CPT | Mod: S$GLB,,, | Performed by: NURSE PRACTITIONER

## 2018-02-08 PROCEDURE — 96374 THER/PROPH/DIAG INJ IV PUSH: CPT

## 2018-02-08 PROCEDURE — 99212 OFFICE O/P EST SF 10 MIN: CPT | Mod: 25 | Performed by: OTOLARYNGOLOGY

## 2018-02-08 PROCEDURE — 36415 COLL VENOUS BLD VENIPUNCTURE: CPT

## 2018-02-08 PROCEDURE — 1126F AMNT PAIN NOTED NONE PRSNT: CPT | Mod: S$GLB,,, | Performed by: NURSE PRACTITIONER

## 2018-02-08 PROCEDURE — 1126F AMNT PAIN NOTED NONE PRSNT: CPT | Mod: S$GLB,,, | Performed by: OTOLARYNGOLOGY

## 2018-02-08 PROCEDURE — 99285 EMERGENCY DEPT VISIT HI MDM: CPT | Mod: 25

## 2018-02-08 RX ORDER — AZITHROMYCIN 250 MG/1
500 TABLET, FILM COATED ORAL
Status: COMPLETED | OUTPATIENT
Start: 2018-02-08 | End: 2018-02-08

## 2018-02-08 RX ORDER — METHYLPREDNISOLONE SOD SUCC 125 MG
125 VIAL (EA) INJECTION
Status: COMPLETED | OUTPATIENT
Start: 2018-02-08 | End: 2018-02-08

## 2018-02-08 RX ORDER — IPRATROPIUM BROMIDE AND ALBUTEROL SULFATE 2.5; .5 MG/3ML; MG/3ML
3 SOLUTION RESPIRATORY (INHALATION) EVERY 4 HOURS PRN
Status: DISCONTINUED | OUTPATIENT
Start: 2018-02-08 | End: 2018-02-12 | Stop reason: HOSPADM

## 2018-02-08 RX ORDER — IPRATROPIUM BROMIDE AND ALBUTEROL SULFATE 2.5; .5 MG/3ML; MG/3ML
3 SOLUTION RESPIRATORY (INHALATION)
Status: DISCONTINUED | OUTPATIENT
Start: 2018-02-08 | End: 2018-02-08 | Stop reason: HOSPADM

## 2018-02-08 RX ORDER — IPRATROPIUM BROMIDE AND ALBUTEROL SULFATE 2.5; .5 MG/3ML; MG/3ML
3 SOLUTION RESPIRATORY (INHALATION) EVERY 4 HOURS
Status: DISCONTINUED | OUTPATIENT
Start: 2018-02-09 | End: 2018-02-12 | Stop reason: HOSPADM

## 2018-02-08 RX ORDER — IPRATROPIUM BROMIDE AND ALBUTEROL SULFATE 2.5; .5 MG/3ML; MG/3ML
3 SOLUTION RESPIRATORY (INHALATION)
Status: COMPLETED | OUTPATIENT
Start: 2018-02-08 | End: 2018-02-08

## 2018-02-08 RX ORDER — CEFTRIAXONE 1 G/1
1 INJECTION, POWDER, FOR SOLUTION INTRAMUSCULAR; INTRAVENOUS
Status: COMPLETED | OUTPATIENT
Start: 2018-02-08 | End: 2018-02-08

## 2018-02-08 RX ADMIN — SODIUM CHLORIDE, SODIUM LACTATE, POTASSIUM CHLORIDE, AND CALCIUM CHLORIDE 500 ML: 600; 310; 30; 20 INJECTION, SOLUTION INTRAVENOUS at 07:02

## 2018-02-08 RX ADMIN — IPRATROPIUM BROMIDE AND ALBUTEROL SULFATE 3 ML: .5; 3 SOLUTION RESPIRATORY (INHALATION) at 04:02

## 2018-02-08 RX ADMIN — IPRATROPIUM BROMIDE AND ALBUTEROL SULFATE 3 ML: .5; 3 SOLUTION RESPIRATORY (INHALATION) at 11:02

## 2018-02-08 RX ADMIN — CEFTRIAXONE SODIUM 1 G: 1 INJECTION, POWDER, FOR SOLUTION INTRAMUSCULAR; INTRAVENOUS at 05:02

## 2018-02-08 RX ADMIN — IPRATROPIUM BROMIDE AND ALBUTEROL SULFATE 3 ML: 2.5; .5 SOLUTION RESPIRATORY (INHALATION) at 02:02

## 2018-02-08 RX ADMIN — AZITHROMYCIN 500 MG: 250 TABLET, FILM COATED ORAL at 06:02

## 2018-02-08 RX ADMIN — METHYLPREDNISOLONE SODIUM SUCCINATE 125 MG: 125 INJECTION, POWDER, FOR SOLUTION INTRAMUSCULAR; INTRAVENOUS at 04:02
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
## 2018-02-08 NOTE — PROGRESS NOTES
1429 Called to exam room 2, Pt with SOB, decreased breath sounds to all lobes. Respiratory tx started as order by JOSE Harris. Pt unable to take deep breaths, very shallow breaths. B/p 82/46 , p-66 resp 32, unable to get pulse ox. EMS called. Pt to go to [facility].    1442 EMS arrived pt transfer to stretcher with assist, sister to accompany to ER and also has pt.'s belongings and walker.
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
## 2018-02-08 NOTE — LETTER
February 8, 2018      Ortiz Carrillo DO, FACP  [address]  [facility] [address]           [facility] - Head/Neck Surg On[facility]  [address]  [facility] [address]  Phone: [phone]  Fax: [phone]          Patient: Robin Hawkins   MR Number: [id number]   YOB: 1950   Date of Visit: 2/8/2018       Dear Dr. Ortiz Carrillo:    Thank you for referring Robin Hawkins to me for evaluation. Attached you will find relevant portions of my assessment and plan of care.    If you have questions, please do not hesitate to call me. I look forward to following Robin Hawkins along with you.    Sincerely,    Nicolas Araujo MD    Enclosure  CC:  No Recipients    If you would like to receive this communication electronically, please contact [email or web address] or [phone] to request more information on [facility] access.    For providers and/or their staff who would like to refer a patient to [facility], please contact us through our one-stop-shop provider referral line, [facility], at [phone].    If you feel you have received this communication in error or would no longer like to receive these types of communications, please e-mail [email or web address]

## 2018-02-08 NOTE — DISCHARGE SUMMARY
Radiology Discharge Summary      Hospital Course: No complications    Admit Date: 2/1/2018  Discharge Date: 02/08/2018     Instructions Given to Patient: Yes  Diet: Resume prior diet  Activity: activity as tolerated    Description of Condition on Discharge: Stable  Vital Signs (Most Recent): Temp: 97.6 °F (36.4 °C) (02/01/18 1105)  Pulse: 85 (02/01/18 1237)  Resp: (!) 28 (02/01/18 1237)  BP: 115/74 (02/01/18 1210)  SpO2: 100 % (02/01/18 1237)    Discharge Disposition: Home    Discharge Diagnosis:   HCC       Follow-up:   TACE not performed secondary to shortness of breath while lying flat on the procedure table. Patient's O2 sats were within normal range and did not require oxygen. Case discussed with referring physician and patient will be referred to ENT. TACE can be performed when patient can tolerate procedure.    Mike Baeza MD  Department of Radiology  Pager: [phone]

## 2018-02-08 NOTE — PROCEDURES
Procedure was cancelled secondary to patient's shortness of breath while lying flat on the procedure table. Case was discussed with referring physician and patient will be referred to ENT.    Mike Baeza MD  Department of Radiology  Pager: [phone]

## 2018-02-08 NOTE — ED NOTES
Appearance:  Pt awake, alert & oriented to person, place & time.   Skin:  Skin warm, dry & intact.  Mucous membranes dry  Skin turgor normal.  Pale.    Respiratory:  Respirations shallow, tachypneic.  Speaks in short sentences.   Neurologic:  Pt moving all extremities without difficulty.  Sensation intact.     Peripheral Vascular:  All peripheral pulses present.

## 2018-02-09 LAB
ANION GAP SERPL CALC-SCNC: 9 MMOL/L
BASOPHILS # BLD AUTO: 0 K/UL
BASOPHILS NFR BLD: 0 %
BILIRUB UR QL STRIP: NEGATIVE
BUN SERPL-MCNC: 12 MG/DL
CALCIUM SERPL-MCNC: 7.6 MG/DL
CHLORIDE SERPL-SCNC: 104 MMOL/L
CLARITY UR REFRACT.AUTO: CLEAR
CO2 SERPL-SCNC: 18 MMOL/L
COLOR UR AUTO: NORMAL
CREAT SERPL-MCNC: 0.9 MG/DL
DIFFERENTIAL METHOD: ABNORMAL
EOSINOPHIL # BLD AUTO: 0 K/UL
EOSINOPHIL NFR BLD: 0 %
ERYTHROCYTE [DISTWIDTH] IN BLOOD BY AUTOMATED COUNT: 16.5 %
EST. GFR  (AFRICAN AMERICAN): >60 ML/MIN/1.73 M^2
EST. GFR  (NON AFRICAN AMERICAN): >60 ML/MIN/1.73 M^2
GLUCOSE SERPL-MCNC: 124 MG/DL
GLUCOSE UR QL STRIP: NEGATIVE
HCT VFR BLD AUTO: 24.8 %
HGB BLD-MCNC: 7.5 G/DL
HGB UR QL STRIP: NEGATIVE
IMM GRANULOCYTES # BLD AUTO: 0.01 K/UL
IMM GRANULOCYTES NFR BLD AUTO: 0.2 %
KETONES UR QL STRIP: NEGATIVE
LACTATE SERPL-SCNC: 2.4 MMOL/L
LEUKOCYTE ESTERASE UR QL STRIP: NEGATIVE
LYMPHOCYTES # BLD AUTO: 0.3 K/UL
LYMPHOCYTES NFR BLD: 6.6 %
MCH RBC QN AUTO: 32.6 PG
MCHC RBC AUTO-ENTMCNC: 30.2 G/DL
MCV RBC AUTO: 108 FL
MONOCYTES # BLD AUTO: 0 K/UL
MONOCYTES NFR BLD: 0.4 %
NEUTROPHILS # BLD AUTO: 4.2 K/UL
NEUTROPHILS NFR BLD: 92.8 %
NITRITE UR QL STRIP: NEGATIVE
NRBC BLD-RTO: 0 /100 WBC
PH UR STRIP: 5 [PH] (ref 5–8)
PLATELET # BLD AUTO: 323 K/UL
PMV BLD AUTO: 9.6 FL
POTASSIUM SERPL-SCNC: 4.2 MMOL/L
PROT UR QL STRIP: NEGATIVE
RBC # BLD AUTO: 2.3 M/UL
SODIUM SERPL-SCNC: 131 MMOL/L
SP GR UR STRIP: 1 (ref 1–1.03)
URN SPEC COLLECT METH UR: NORMAL
UROBILINOGEN UR STRIP-ACNC: NEGATIVE EU/DL
WBC # BLD AUTO: 4.54 K/UL

## 2018-02-09 PROCEDURE — 25000242 PHARM REV CODE 250 ALT 637 W/ HCPCS: Performed by: INTERNAL MEDICINE

## 2018-02-09 PROCEDURE — 80048 BASIC METABOLIC PNL TOTAL CA: CPT

## 2018-02-09 PROCEDURE — 27000221 HC OXYGEN, UP TO 24 HOURS

## 2018-02-09 PROCEDURE — 25000003 PHARM REV CODE 250: Performed by: INTERNAL MEDICINE

## 2018-02-09 PROCEDURE — 99231 SBSQ HOSP IP/OBS SF/LOW 25: CPT | Mod: ,,, | Performed by: INTERNAL MEDICINE

## 2018-02-09 PROCEDURE — 20600001 HC STEP DOWN PRIVATE ROOM

## 2018-02-09 PROCEDURE — 85025 COMPLETE CBC W/AUTO DIFF WBC: CPT

## 2018-02-09 PROCEDURE — 99223 1ST HOSP IP/OBS HIGH 75: CPT | Mod: ,,, | Performed by: INTERNAL MEDICINE

## 2018-02-09 PROCEDURE — 25500020 PHARM REV CODE 255: Performed by: INTERNAL MEDICINE

## 2018-02-09 PROCEDURE — 63600175 PHARM REV CODE 636 W HCPCS: Performed by: INTERNAL MEDICINE

## 2018-02-09 PROCEDURE — 94761 N-INVAS EAR/PLS OXIMETRY MLT: CPT

## 2018-02-09 PROCEDURE — 94640 AIRWAY INHALATION TREATMENT: CPT

## 2018-02-09 PROCEDURE — 36415 COLL VENOUS BLD VENIPUNCTURE: CPT

## 2018-02-09 PROCEDURE — 81003 URINALYSIS AUTO W/O SCOPE: CPT

## 2018-02-09 PROCEDURE — 25000003 PHARM REV CODE 250: Performed by: NURSE PRACTITIONER

## 2018-02-09 RX ORDER — ONDANSETRON 4 MG/1
8 TABLET, FILM COATED ORAL EVERY 6 HOURS PRN
Status: DISCONTINUED | OUTPATIENT
Start: 2018-02-09 | End: 2018-02-12 | Stop reason: HOSPADM

## 2018-02-09 RX ORDER — RIBAVIRIN 200 MG/1
600 TABLET, FILM COATED ORAL DAILY
Status: DISCONTINUED | OUTPATIENT
Start: 2018-02-09 | End: 2018-02-12 | Stop reason: HOSPADM

## 2018-02-09 RX ORDER — GLUCAGON 1 MG
1 KIT INJECTION
Status: DISCONTINUED | OUTPATIENT
Start: 2018-02-09 | End: 2018-02-12 | Stop reason: HOSPADM

## 2018-02-09 RX ORDER — ENOXAPARIN SODIUM 100 MG/ML
40 INJECTION SUBCUTANEOUS EVERY 24 HOURS
Status: DISCONTINUED | OUTPATIENT
Start: 2018-02-09 | End: 2018-02-12 | Stop reason: HOSPADM

## 2018-02-09 RX ORDER — LATANOPROST 50 UG/ML
1 SOLUTION/ DROPS OPHTHALMIC NIGHTLY
Status: DISCONTINUED | OUTPATIENT
Start: 2018-02-09 | End: 2018-02-12 | Stop reason: HOSPADM

## 2018-02-09 RX ORDER — OXYBUTYNIN CHLORIDE 5 MG/1
10 TABLET, EXTENDED RELEASE ORAL DAILY
Status: DISCONTINUED | OUTPATIENT
Start: 2018-02-09 | End: 2018-02-12 | Stop reason: HOSPADM

## 2018-02-09 RX ORDER — IBUPROFEN 200 MG
24 TABLET ORAL
Status: DISCONTINUED | OUTPATIENT
Start: 2018-02-09 | End: 2018-02-12 | Stop reason: HOSPADM

## 2018-02-09 RX ORDER — ATORVASTATIN CALCIUM 10 MG/1
40 TABLET, FILM COATED ORAL DAILY
Status: DISCONTINUED | OUTPATIENT
Start: 2018-02-09 | End: 2018-02-12 | Stop reason: HOSPADM

## 2018-02-09 RX ORDER — CALCIUM CARBONATE 200(500)MG
500 TABLET,CHEWABLE ORAL 2 TIMES DAILY PRN
Status: DISCONTINUED | OUTPATIENT
Start: 2018-02-09 | End: 2018-02-09

## 2018-02-09 RX ORDER — AZITHROMYCIN 250 MG/1
250 TABLET, FILM COATED ORAL DAILY
Status: COMPLETED | OUTPATIENT
Start: 2018-02-09 | End: 2018-02-12

## 2018-02-09 RX ORDER — CALCIUM CARBONATE 200(500)MG
500 TABLET,CHEWABLE ORAL 2 TIMES DAILY PRN
Status: DISCONTINUED | OUTPATIENT
Start: 2018-02-09 | End: 2018-02-12 | Stop reason: HOSPADM

## 2018-02-09 RX ORDER — OXYCODONE HYDROCHLORIDE 5 MG/1
5 TABLET ORAL EVERY 6 HOURS PRN
Status: DISCONTINUED | OUTPATIENT
Start: 2018-02-09 | End: 2018-02-12 | Stop reason: HOSPADM

## 2018-02-09 RX ORDER — SORAFENIB 200 MG/1
200 TABLET, FILM COATED ORAL 2 TIMES DAILY
Status: DISCONTINUED | OUTPATIENT
Start: 2018-02-09 | End: 2018-02-12 | Stop reason: HOSPADM

## 2018-02-09 RX ORDER — HYDROCHLOROTHIAZIDE 25 MG/1
25 TABLET ORAL DAILY
Status: DISCONTINUED | OUTPATIENT
Start: 2018-02-09 | End: 2018-02-12 | Stop reason: HOSPADM

## 2018-02-09 RX ORDER — PANTOPRAZOLE SODIUM 40 MG/1
40 TABLET, DELAYED RELEASE ORAL DAILY
Status: DISCONTINUED | OUTPATIENT
Start: 2018-02-09 | End: 2018-02-12 | Stop reason: HOSPADM

## 2018-02-09 RX ORDER — RIBAVIRIN 200 MG/1
600 CAPSULE ORAL DAILY
Status: DISCONTINUED | OUTPATIENT
Start: 2018-02-09 | End: 2018-02-09

## 2018-02-09 RX ORDER — LISINOPRIL 20 MG/1
20 TABLET ORAL DAILY
Status: DISCONTINUED | OUTPATIENT
Start: 2018-02-09 | End: 2018-02-12 | Stop reason: HOSPADM

## 2018-02-09 RX ORDER — POTASSIUM CHLORIDE 20 MEQ/1
20 TABLET, EXTENDED RELEASE ORAL DAILY
Status: DISCONTINUED | OUTPATIENT
Start: 2018-02-09 | End: 2018-02-12 | Stop reason: HOSPADM

## 2018-02-09 RX ORDER — METOPROLOL TARTRATE 25 MG/1
12.5 TABLET ORAL 2 TIMES DAILY
Status: DISCONTINUED | OUTPATIENT
Start: 2018-02-09 | End: 2018-02-12 | Stop reason: HOSPADM

## 2018-02-09 RX ORDER — SODIUM CHLORIDE 0.9 % (FLUSH) 0.9 %
5 SYRINGE (ML) INJECTION
Status: DISCONTINUED | OUTPATIENT
Start: 2018-02-09 | End: 2018-02-12 | Stop reason: HOSPADM

## 2018-02-09 RX ORDER — IBUPROFEN 200 MG
16 TABLET ORAL
Status: DISCONTINUED | OUTPATIENT
Start: 2018-02-09 | End: 2018-02-12 | Stop reason: HOSPADM

## 2018-02-09 RX ADMIN — PREDNISONE 50 MG: 20 TABLET ORAL at 08:02

## 2018-02-09 RX ADMIN — LATANOPROST 1 DROP: 50 SOLUTION OPHTHALMIC at 12:02

## 2018-02-09 RX ADMIN — ATORVASTATIN CALCIUM 40 MG: 10 TABLET, FILM COATED ORAL at 08:02

## 2018-02-09 RX ADMIN — IPRATROPIUM BROMIDE AND ALBUTEROL SULFATE 3 ML: .5; 3 SOLUTION RESPIRATORY (INHALATION) at 04:02

## 2018-02-09 RX ADMIN — AZITHROMYCIN 250 MG: 250 TABLET, FILM COATED ORAL at 08:02

## 2018-02-09 RX ADMIN — Medication 12.5 MG: at 08:02

## 2018-02-09 RX ADMIN — ENOXAPARIN SODIUM 40 MG: 100 INJECTION SUBCUTANEOUS at 05:02

## 2018-02-09 RX ADMIN — IPRATROPIUM BROMIDE AND ALBUTEROL SULFATE 3 ML: .5; 3 SOLUTION RESPIRATORY (INHALATION) at 12:02

## 2018-02-09 RX ADMIN — PANTOPRAZOLE SODIUM 40 MG: 40 TABLET, DELAYED RELEASE ORAL at 08:02

## 2018-02-09 RX ADMIN — CALCIUM CARBONATE (ANTACID) CHEW TAB 500 MG 500 MG: 500 CHEW TAB at 06:02

## 2018-02-09 RX ADMIN — OXYBUTYNIN CHLORIDE 10 MG: 5 TABLET, FILM COATED, EXTENDED RELEASE ORAL at 08:02

## 2018-02-09 RX ADMIN — IPRATROPIUM BROMIDE AND ALBUTEROL SULFATE 3 ML: .5; 3 SOLUTION RESPIRATORY (INHALATION) at 08:02

## 2018-02-09 RX ADMIN — IOHEXOL 75 ML: 350 INJECTION, SOLUTION INTRAVENOUS at 09:02

## 2018-02-09 RX ADMIN — LATANOPROST 1 DROP: 50 SOLUTION OPHTHALMIC at 08:02

## 2018-02-09 RX ADMIN — CALCIUM CARBONATE (ANTACID) CHEW TAB 500 MG 500 MG: 500 CHEW TAB at 09:02

## 2018-02-09 RX ADMIN — IPRATROPIUM BROMIDE AND ALBUTEROL SULFATE 3 ML: .5; 3 SOLUTION RESPIRATORY (INHALATION) at 11:02

## 2018-02-09 RX ADMIN — POTASSIUM CHLORIDE 20 MEQ: 1500 TABLET, EXTENDED RELEASE ORAL at 08:02

## 2018-02-09 RX ADMIN — IPRATROPIUM BROMIDE AND ALBUTEROL SULFATE 3 ML: .5; 3 SOLUTION RESPIRATORY (INHALATION) at 09:02

## 2018-02-09 RX ADMIN — CALCIUM CARBONATE (ANTACID) CHEW TAB 500 MG 500 MG: 500 CHEW TAB at 08:02

## 2018-02-09 RX ADMIN — LISINOPRIL 20 MG: 20 TABLET ORAL at 08:02

## 2018-02-09 NOTE — ED NOTES
PT REFUSED PE STUDY. IV BLEW WHILE IN CT SCAN. DR. MCCLELLAND AND DR. RODRIGUEZ NOTIFIED. WILL CONTINUE TO MONITOR.

## 2018-02-09 NOTE — NURSING
Patient to unit; ambulated with standby assist to bed; notified team of patient arrival; oriented to room; call light within reach. Will continue to monitor.

## 2018-02-09 NOTE — NURSING
"VSS.Pt AO to person, place,and situation.Awaiting CTA for Pe. Still tachypnic,but pt states,"I get like this when I hve heartburn." sister spent time at bedside. Call button inreach,bed low and locked. Srs upx2. Instructed to call for needs.   "

## 2018-02-09 NOTE — NURSING
Patient refusing additional iv sticks for CT PE study.  MD Lorenzana notified. PICC consult placed. Will continue to monitor.

## 2018-02-09 NOTE — PLAN OF CARE
02/09/18 1425   Discharge Assessment   Assessment Type Discharge Planning Assessment   Confirmed/corrected address and phone number on facesheet? Yes   Assessment information obtained from? Patient;Caregiver   Expected Length of Stay (days) 3   Communicated expected length of stay with patient/caregiver yes   Prior to hospitilization cognitive status: Alert/Oriented   Prior to hospitalization functional status: Assistive Equipment;Independent   Current cognitive status: Alert/Oriented   Current Functional Status: Independent;Assistive Equipment   Lives With spouse   Able to Return to Prior Arrangements yes   Is patient able to care for self after discharge? Yes   Readmission Within The Last 30 Days no previous admission in last 30 days   Patient currently being followed by outpatient case management? No   Patient currently receives any other outside agency services? No   Equipment Currently Used at Home nebulizer;walker, rolling   Do you have any problems affording any of your prescribed medications? No   Is the patient taking medications as prescribed? yes   Does the patient have transportation home? Yes   Transportation Available car  (sister will transport home)   Does the patient receive services at the [facility]madin Clinic? No   Discharge Plan A Home with family   Discharge Plan B Home Health   Patient/Family In Agreement With Plan yes
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
## 2018-02-10 PROBLEM — D64.9 ANEMIA: Status: ACTIVE | Noted: 2018-02-10

## 2018-02-10 LAB
ANION GAP SERPL CALC-SCNC: 5 MMOL/L
BASOPHILS # BLD AUTO: 0 K/UL
BASOPHILS NFR BLD: 0 %
BUN SERPL-MCNC: 13 MG/DL
CALCIUM SERPL-MCNC: 8.9 MG/DL
CHLORIDE SERPL-SCNC: 103 MMOL/L
CO2 SERPL-SCNC: 24 MMOL/L
CREAT SERPL-MCNC: 0.9 MG/DL
DIFFERENTIAL METHOD: ABNORMAL
EOSINOPHIL # BLD AUTO: 0 K/UL
EOSINOPHIL NFR BLD: 0 %
ERYTHROCYTE [DISTWIDTH] IN BLOOD BY AUTOMATED COUNT: 16.8 %
EST. GFR  (AFRICAN AMERICAN): >60 ML/MIN/1.73 M^2
EST. GFR  (NON AFRICAN AMERICAN): >60 ML/MIN/1.73 M^2
GLUCOSE SERPL-MCNC: 115 MG/DL
HCT VFR BLD AUTO: 22.9 %
HGB BLD-MCNC: 7.1 G/DL
IMM GRANULOCYTES # BLD AUTO: 0.04 K/UL
IMM GRANULOCYTES NFR BLD AUTO: 0.4 %
LYMPHOCYTES # BLD AUTO: 0.5 K/UL
LYMPHOCYTES NFR BLD: 5.6 %
MCH RBC QN AUTO: 33.5 PG
MCHC RBC AUTO-ENTMCNC: 31 G/DL
MCV RBC AUTO: 108 FL
MONOCYTES # BLD AUTO: 0.7 K/UL
MONOCYTES NFR BLD: 7.1 %
NEUTROPHILS # BLD AUTO: 8 K/UL
NEUTROPHILS NFR BLD: 86.9 %
NRBC BLD-RTO: 0 /100 WBC
PLATELET # BLD AUTO: 316 K/UL
PMV BLD AUTO: 9.3 FL
POTASSIUM SERPL-SCNC: 4.2 MMOL/L
RBC # BLD AUTO: 2.12 M/UL
SODIUM SERPL-SCNC: 132 MMOL/L
WBC # BLD AUTO: 9.16 K/UL

## 2018-02-10 PROCEDURE — 36415 COLL VENOUS BLD VENIPUNCTURE: CPT

## 2018-02-10 PROCEDURE — 25000003 PHARM REV CODE 250: Performed by: INTERNAL MEDICINE

## 2018-02-10 PROCEDURE — 25000242 PHARM REV CODE 250 ALT 637 W/ HCPCS: Performed by: INTERNAL MEDICINE

## 2018-02-10 PROCEDURE — 94640 AIRWAY INHALATION TREATMENT: CPT

## 2018-02-10 PROCEDURE — 27000221 HC OXYGEN, UP TO 24 HOURS

## 2018-02-10 PROCEDURE — 94761 N-INVAS EAR/PLS OXIMETRY MLT: CPT

## 2018-02-10 PROCEDURE — 20600001 HC STEP DOWN PRIVATE ROOM

## 2018-02-10 PROCEDURE — 63600175 PHARM REV CODE 636 W HCPCS: Performed by: INTERNAL MEDICINE

## 2018-02-10 PROCEDURE — 99232 SBSQ HOSP IP/OBS MODERATE 35: CPT | Mod: ,,, | Performed by: INTERNAL MEDICINE

## 2018-02-10 PROCEDURE — 85025 COMPLETE CBC W/AUTO DIFF WBC: CPT

## 2018-02-10 PROCEDURE — 80048 BASIC METABOLIC PNL TOTAL CA: CPT

## 2018-02-10 PROCEDURE — 25000003 PHARM REV CODE 250: Performed by: PHYSICIAN ASSISTANT

## 2018-02-10 RX ORDER — AMOXICILLIN 250 MG
1 CAPSULE ORAL DAILY PRN
Status: DISCONTINUED | OUTPATIENT
Start: 2018-02-10 | End: 2018-02-12 | Stop reason: HOSPADM

## 2018-02-10 RX ADMIN — IPRATROPIUM BROMIDE AND ALBUTEROL SULFATE 3 ML: .5; 3 SOLUTION RESPIRATORY (INHALATION) at 03:02

## 2018-02-10 RX ADMIN — IPRATROPIUM BROMIDE AND ALBUTEROL SULFATE 3 ML: .5; 3 SOLUTION RESPIRATORY (INHALATION) at 08:02

## 2018-02-10 RX ADMIN — Medication 12.5 MG: at 08:02

## 2018-02-10 RX ADMIN — AZITHROMYCIN 250 MG: 250 TABLET, FILM COATED ORAL at 09:02

## 2018-02-10 RX ADMIN — PANTOPRAZOLE SODIUM 40 MG: 40 TABLET, DELAYED RELEASE ORAL at 09:02

## 2018-02-10 RX ADMIN — RIBAVIRIN 600 MG: 200 TABLET, FILM COATED ORAL at 09:02

## 2018-02-10 RX ADMIN — ENOXAPARIN SODIUM 40 MG: 100 INJECTION SUBCUTANEOUS at 04:02

## 2018-02-10 RX ADMIN — IPRATROPIUM BROMIDE AND ALBUTEROL SULFATE 3 ML: .5; 3 SOLUTION RESPIRATORY (INHALATION) at 07:02

## 2018-02-10 RX ADMIN — PREDNISONE 50 MG: 20 TABLET ORAL at 09:02

## 2018-02-10 RX ADMIN — IPRATROPIUM BROMIDE AND ALBUTEROL SULFATE 3 ML: .5; 3 SOLUTION RESPIRATORY (INHALATION) at 11:02

## 2018-02-10 RX ADMIN — ATORVASTATIN CALCIUM 40 MG: 10 TABLET, FILM COATED ORAL at 09:02

## 2018-02-10 RX ADMIN — POTASSIUM CHLORIDE 20 MEQ: 1500 TABLET, EXTENDED RELEASE ORAL at 09:02

## 2018-02-10 RX ADMIN — STANDARDIZED SENNA CONCENTRATE AND DOCUSATE SODIUM 1 TABLET: 8.6; 5 TABLET, FILM COATED ORAL at 08:02

## 2018-02-10 RX ADMIN — OXYBUTYNIN CHLORIDE 10 MG: 5 TABLET, FILM COATED, EXTENDED RELEASE ORAL at 09:02

## 2018-02-10 RX ADMIN — LATANOPROST 1 DROP: 50 SOLUTION OPHTHALMIC at 08:02

## 2018-02-10 RX ADMIN — IPRATROPIUM BROMIDE AND ALBUTEROL SULFATE 3 ML: .5; 3 SOLUTION RESPIRATORY (INHALATION) at 04:02

## 2018-02-10 NOTE — PROGRESS NOTES
[facility] Hospital Medicine  Progress Note    Patient Name: Robin Hawkins  MRN: [id number]  Patient Class: IP- Inpatient   Admission Date: 2/8/2018  Length of Stay: 1 days  Attending Physician: Clare Pinto MD  Primary Care Provider: Venancio Mcneil MD    [facility] Medicine Team: [facility]OSP MED D Clare Pinto MD    Subjective:     Principal Problem:COPD with acute exacerbation    Interval History: Patient with no events overnight, no new complaints.    Review of Systems   Constitutional: Negative for fever.   Respiratory: Positive for shortness of breath and wheezing.      Objective:     Vital Signs (Most Recent):  Temp: 97.8 °F (36.6 °C) (02/09/18 1528)  Pulse: 78 (02/09/18 1638)  Resp: 20 (02/09/18 1638)  BP: 118/65 (02/09/18 1528)  SpO2: 100 % (02/09/18 1528) Vital Signs (24h Range):  Temp:  [97.5 °F (36.4 °C)-97.9 °F (36.6 °C)] 97.8 °F (36.6 °C)  Pulse:  [64-84] 78  Resp:  [15-75] 20  SpO2:  [96 %-100 %] 100 %  BP: (116-144)/(56-67) 118/65     Weight: 73.5 kg (162 lb)  Body mass index is 24.63 kg/m².  No intake or output data in the 24 hours ending 02/09/18 1809   Physical Exam   Constitutional: He appears well-developed.   HENT:   Head: Normocephalic.   Mouth/Throat: Mucous membranes are not pale and not cyanotic.   Eyes: Conjunctivae and lids are normal.   Neck: Neck supple.   Cardiovascular: Normal rate, regular rhythm, S1 normal and S2 normal.    Pulmonary/Chest: Effort normal. He has wheezes.   Abdominal: Soft. Bowel sounds are normal. There is no tenderness.   Musculoskeletal: He exhibits no edema.   Neurological: He is alert. He is not disoriented.   Skin: Skin is warm and dry. He is not diaphoretic. No cyanosis. Nails show no clubbing.   Psychiatric: He has a normal mood and affect.       Significant Labs:   A1C:   Recent Labs  Lab 12/22/17  0846   HGBA1C 4.7     BMP:   Recent Labs  Lab 02/08/18  1609 02/09/18  0407   GLU 89 124*   * 131*   K 5.1 4.2    104   CO2  18* 18*   BUN 10 12   CREATININE 0.9 0.9   CALCIUM 8.5* 7.6*   MG 1.9  --      CBC:   Recent Labs  Lab 02/08/18  1609 02/09/18  0407   WBC 5.79 4.54   HGB 9.0* 7.5*   HCT 29.2* 24.8*   * 323     CMP:   Recent Labs  Lab 02/08/18  1609 02/09/18  0407   * 131*   K 5.1 4.2    104   CO2 18* 18*   GLU 89 124*   BUN 10 12   CREATININE 0.9 0.9   CALCIUM 8.5* 7.6*   PROT 7.1  --    ALBUMIN 2.7*  --    BILITOT 1.7*  --    ALKPHOS 107  --    AST 24  --    ALT 17  --    ANIONGAP 6* 9   EGFRNONAA >60.0 >60.0     Cardiac Markers:   Recent Labs  Lab 02/08/18  1609   BNP 41     Coagulation:   Recent Labs  Lab 02/08/18  1609   INR 1.1   APTT 27.3     Lactic Acid:   Recent Labs  Lab 02/08/18  1609 02/08/18  1953 02/08/18  2336   LACTATE 2.7* 2.1 2.4*     Magnesium:   Recent Labs  Lab 02/08/18  1609   MG 1.9     Troponin:   Recent Labs  Lab 02/08/18  1609   TROPONINI 0.026     TSH:   Recent Labs  Lab 02/08/18  1609   TSH 3.235     Urine Studies:   Recent Labs  Lab 02/09/18  1901   COLORU Straw   APPEARANCEUA Clear   PHUR 5.0   SPECGRAV 1.005   PROTEINUA Negative   GLUCUA Negative   KETONESU Negative   BILIRUBINUA Negative   OCCULTUA Negative   NITRITE Negative   UROBILINOGEN Negative   LEUKOCYTESUR Negative     Assessment/Plan:      Brief HPI:      Hospital Course Overview:    Current Hospital Problem List:    Active Hospital Problems    Diagnosis  POA    *COPD with acute exacerbation [J44.1]  Yes     Priority: 1 - High    Dyspnea [R06.00]  Yes     Priority: 1 - High    HCC (hepatocellular carcinoma) [C22.0]  Yes     Chronic     Enlarging liver mass consistent with HCC on CT 1/2016. +elevated AFP.  S/p TACE on 1/29/16, follow-up CT scans on 3/4/16 and 6/21/16 showed well-treated lesion  Following with Dr Scott for liver transplant evaluation      Essential hypertension [I10]  Yes     Chronic    Reflux esophagitis [K21.0]  Yes     S/p EGD 5/2016        Resolved Hospital Problems    Diagnosis Date Resolved POA    No resolved problems to display.       Medications for management of current problems:      albuterol-ipratropium 2.5mg-0.5mg/3mL  3 mL Nebulization Q4H    atorvastatin  40 mg Oral Daily    azithromycin  250 mg Oral Daily    enoxaparin  40 mg Subcutaneous Daily    hydroCHLOROthiazide  25 mg Oral Daily    latanoprost  1 drop Both Eyes QHS    lisinopril  20 mg Oral Daily    metoprolol tartrate  12.5 mg Oral BID    NON FORMULARY MEDICATION   Oral Daily    oxybutynin  10 mg Oral Daily    pantoprazole  40 mg Oral Daily    potassium chloride SA  20 mEq Oral Daily    predniSONE  50 mg Oral Daily    ribavirin  600 mg Oral Daily    SORAfenib  200 mg Oral BID       PRN:   albuterol-ipratropium 2.5mg-0.5mg/3mL    calcium carbonate    dextrose 50%    dextrose 50%    glucagon (human recombinant)    glucose    glucose    ondansetron    oxyCODONE    sodium chloride 0.9%     IV Fluids:     Problems addressed today:    Shortness of breath, probable COPD exacerbation  Patient presenting with acute on chronic shortness of breath, undergoing outpatient workup given complicated h/o trach, PFTs with mild obstructive dx, pt reporting wheezing, improvement with nebs, some sputum production however not over baseline, will treat as COPD exacerbation, consider pulm eval, ENT has already evaluated patient, airway stenosis resolved unlikely etiology. Less likely pneumonia, no focal s/s infection, will hold further abx tx (s/p rocephin in ED)  -Duonebs Q4H  -Prednisone 50mg daily x5d  -Azithromycin 500mg x1, 250mg x4d  -Consider HRCT if no improvement, prior CT noted ground glass, some emphysematous changes. CTA pending.     H/o HCC   Per Heme Onc,   -Continue Nexavar, ribaviri  -Outpatient f/u  -Continue oxycodine PRN     HTN   Stable  Continue HCTZ, lisinopril     H/o Prostate / Colon Ca  Per Heme/onc, otherwise stable      Anticipated Disposition: Home or Self Care  JESSICA 2/12/2018    Goals of Care:  General  Prognosis: fair  Goals: Curative  Comfort Only: No  Hospice: No  Code Status: Full Code    VTE Risk Mitigation         Ordered     enoxaparin injection 40 mg  Daily     Route:  Subcutaneous        02/09/18 0015     Medium Risk of VTE  Once      02/09/18 0015             Clare Pinto MD  Department of Hospital Medicine   [facility]

## 2018-02-10 NOTE — PLAN OF CARE
Problem: Patient Care Overview  Goal: Plan of Care Review  Outcome: Ongoing (interventions implemented as appropriate)  Pt AAOX4. AVSS. No c/o pain during shift. Pt titrated off oxygen, sat remaining >97% throughout shift. Pt family member bringing home meds on 2/11, MD aware. Pt safety maintained. Hourly rounding performed.

## 2018-02-10 NOTE — PROGRESS NOTES
[facility] Hospital Medicine  Progress Note    Patient Name: Robin Hawkins  MRN: [id number]  Patient Class: IP- Inpatient   Admission Date: 2/8/2018  Length of Stay: 2 days  Attending Physician: Clare Pinto MD  Primary Care Provider: Venancio Mcneil MD    [facility] Medicine Team: [facility]OSP MED D Clare Pinto MD    Subjective:     Principal Problem:COPD with acute exacerbation    Interval History: Patient with no events overnight, no new complaints. H&H trending down.    Review of Systems   Constitutional: Negative for fever.   Respiratory: Positive for shortness of breath.      Objective:     Vital Signs (Most Recent):  Temp: 97.7 °F (36.5 °C) (02/10/18 1303)  Pulse: 93 (02/10/18 1303)  Resp: 16 (02/10/18 1303)  BP: (!) 111/56 (02/10/18 1303)  SpO2: 100 % (02/10/18 1303) Vital Signs (24h Range):  Temp:  [97.5 °F (36.4 °C)-97.8 °F (36.6 °C)] 97.7 °F (36.5 °C)  Pulse:  [60-93] 93  Resp:  [16-20] 16  SpO2:  [97 %-100 %] 100 %  BP: (103-123)/(56-65) 111/56     Weight: 73.4 kg (161 lb 13.1 oz)  Body mass index is 24.6 kg/m².    Intake/Output Summary (Last 24 hours) at 02/10/18 1458  Last data filed at 02/09/18 1800   Gross per 24 hour   Intake              420 ml   Output              600 ml   Net             -180 ml      Physical Exam   Constitutional: He appears well-developed.   HENT:   Head: Normocephalic.   Mouth/Throat: Mucous membranes are not pale and not cyanotic.   Eyes: Conjunctivae and lids are normal.   Neck: Neck supple.   Cardiovascular: Normal rate, regular rhythm, S1 normal and S2 normal.    Pulmonary/Chest: Effort normal. He has wheezes.   Abdominal: Soft. Bowel sounds are normal. There is no tenderness.   Musculoskeletal: He exhibits no edema.   Neurological: He is alert. He is not disoriented.   Skin: Skin is warm and dry. He is not diaphoretic. No cyanosis. Nails show no clubbing.   Psychiatric: He has a normal mood and affect.       Significant Labs:   A1C:     Recent  Labs  Lab 12/22/17  0846   HGBA1C 4.7     BMP:   Recent Labs  Lab 02/08/18  1609  02/10/18  0422   GLU 89  < > 115*   *  < > 132*   K 5.1  < > 4.2     < > 103   CO2 18*  < > 24   BUN 10  < > 13   CREATININE 0.9  < > 0.9   CALCIUM 8.5*  < > 8.9   MG 1.9  --   --    < > = values in this interval not displayed.     CBC:     Recent Labs  Lab 02/08/18  1609 02/09/18  0407 02/10/18  0422   WBC 5.79 4.54 9.16   HGB 9.0* 7.5* 7.1*   HCT 29.2* 24.8* 22.9*   * 323 316     CMP:     Recent Labs  Lab 02/08/18  1609 02/09/18  0407 02/10/18  0422   * 131* 132*   K 5.1 4.2 4.2    104 103   CO2 18* 18* 24   GLU 89 124* 115*   BUN 10 12 13   CREATININE 0.9 0.9 0.9   CALCIUM 8.5* 7.6* 8.9   PROT 7.1  --   --    ALBUMIN 2.7*  --   --    BILITOT 1.7*  --   --    ALKPHOS 107  --   --    AST 24  --   --    ALT 17  --   --    ANIONGAP 6* 9 5*   EGFRNONAA >60.0 >60.0 >60.0     Cardiac Markers:     Recent Labs  Lab 02/08/18  1609   BNP 41     Coagulation:     Recent Labs  Lab 02/08/18  1609   INR 1.1   APTT 27.3     Lactic Acid:     Recent Labs  Lab 02/08/18  1609 02/08/18  1953 02/08/18  2336   LACTATE 2.7* 2.1 2.4*     Magnesium:     Recent Labs  Lab 02/08/18  1609   MG 1.9     Troponin:     Recent Labs  Lab 02/08/18  1609   TROPONINI 0.026     TSH:     Recent Labs  Lab 02/08/18  1609   TSH 3.235     Urine Studies:     Recent Labs  Lab 02/09/18  1901   COLORU Straw   APPEARANCEUA Clear   PHUR 5.0   SPECGRAV 1.005   PROTEINUA Negative   GLUCUA Negative   KETONESU Negative   BILIRUBINUA Negative   OCCULTUA Negative   NITRITE Negative   UROBILINOGEN Negative   LEUKOCYTESUR Negative     Assessment/Plan:      Brief HPI:      Hospital Course Overview:    Current Hospital Problem List:    Active Hospital Problems    Diagnosis  POA    *COPD with acute exacerbation [J44.1]  Yes     Priority: 1 - High    Dyspnea [R06.00]  Yes     Priority: 1 - High    Anemia [D64.9]  Yes    HCC (hepatocellular carcinoma) [C22.0]   Yes     Chronic     Enlarging liver mass consistent with HCC on CT 1/2016. +elevated AFP.  S/p TACE on 1/29/16, follow-up CT scans on 3/4/16 and 6/21/16 showed well-treated lesion  Following with Dr Scott for liver transplant evaluation      Hiatal hernia [K44.9]  Yes    Essential hypertension [I10]  Yes     Chronic    Reflux esophagitis [K21.0]  Yes     S/p EGD 5/2016        Resolved Hospital Problems    Diagnosis Date Resolved POA   No resolved problems to display.       Medications for management of current problems:      albuterol-ipratropium 2.5mg-0.5mg/3mL  3 mL Nebulization Q4H    atorvastatin  40 mg Oral Daily    azithromycin  250 mg Oral Daily    enoxaparin  40 mg Subcutaneous Daily    hydroCHLOROthiazide  25 mg Oral Daily    latanoprost  1 drop Both Eyes QHS    lisinopril  20 mg Oral Daily    metoprolol tartrate  12.5 mg Oral BID    NON FORMULARY MEDICATION   Oral Daily    oxybutynin  10 mg Oral Daily    pantoprazole  40 mg Oral Daily    potassium chloride SA  20 mEq Oral Daily    predniSONE  50 mg Oral Daily    ribavirin  600 mg Oral Daily    SORAfenib  200 mg Oral BID       PRN:   albuterol-ipratropium 2.5mg-0.5mg/3mL    calcium carbonate    dextrose 50%    dextrose 50%    glucagon (human recombinant)    glucose    glucose    ondansetron    oxyCODONE    sodium chloride 0.9%     IV Fluids:     Problems addressed today:    Shortness of breath, probable COPD exacerbation  Patient presenting with acute on chronic shortness of breath, undergoing outpatient workup given complicated h/o trach, PFTs with mild obstructive dx, pt reporting wheezing, improvement with nebs, some sputum production however not over baseline, will treat as COPD exacerbation, consider pulm eval, ENT has already evaluated patient, airway stenosis resolved unlikely etiology. Less likely pneumonia, no focal s/s infection, will hold further abx tx (s/p rocephin in ED)  -Duonebs Q4H  -Prednisone 50mg daily  x5d  -Azithromycin 500mg x1, 250mg x4d  -Consider HRCT if no improvement, prior CT noted ground glass, some emphysematous changes. CTA negative for PE but revealed a fluid-filled esophagus and hiatal hernia. Concern for reflux and aspiration contributing to shortness of breath; consulted GI.     H/o HCC   Per Heme Onc,   -Continue Nexavar, ribaviri  -Outpatient f/u  -Continue oxycodine PRN     HTN   Stable  Continue HCTZ, lisinopril     H/o Prostate / Colon Ca  Per Heme/onc, otherwise stable    Anemia, multifactorial  FOBT ordered. Family history of anemia, HGB electrophoresis ordered.    Anticipated Disposition: Home or Self Care  JESSICA 2/12/2018    Goals of Care:  General Prognosis: fair  Goals: Curative  Comfort Only: No  Hospice: No  Code Status: Full Code    VTE Risk Mitigation         Ordered     enoxaparin injection 40 mg  Daily     Route:  Subcutaneous        02/09/18 0015     Medium Risk of VTE  Once      02/09/18 0015             Clare Pinto MD  Department of Hospital Medicine   [facility]

## 2018-02-10 NOTE — NURSING
Pt prescribed 2 home medication: Nexavar and Vosevi. Nexavar not carried by [facility], pt family member will bring tomorrow, 2/11. Pt and family member state that Vosevi is completed. MD notified.

## 2018-02-11 LAB
ALBUMIN SERPL BCP-MCNC: 2.7 G/DL
ANION GAP SERPL CALC-SCNC: 13 MMOL/L
BASOPHILS # BLD AUTO: 0.01 K/UL
BASOPHILS NFR BLD: 0.1 %
BUN SERPL-MCNC: 14 MG/DL
CALCIUM SERPL-MCNC: 8.7 MG/DL
CHLORIDE SERPL-SCNC: 103 MMOL/L
CO2 SERPL-SCNC: 17 MMOL/L
CREAT SERPL-MCNC: 0.9 MG/DL
DIFFERENTIAL METHOD: ABNORMAL
EOSINOPHIL # BLD AUTO: 0 K/UL
EOSINOPHIL NFR BLD: 0 %
ERYTHROCYTE [DISTWIDTH] IN BLOOD BY AUTOMATED COUNT: 17.2 %
EST. GFR  (AFRICAN AMERICAN): >60 ML/MIN/1.73 M^2
EST. GFR  (NON AFRICAN AMERICAN): >60 ML/MIN/1.73 M^2
GLUCOSE SERPL-MCNC: 76 MG/DL
HCT VFR BLD AUTO: 26.4 %
HGB BLD-MCNC: 7.9 G/DL
IMM GRANULOCYTES # BLD AUTO: 0.05 K/UL
IMM GRANULOCYTES NFR BLD AUTO: 0.5 %
LYMPHOCYTES # BLD AUTO: 0.6 K/UL
LYMPHOCYTES NFR BLD: 5.6 %
MAGNESIUM SERPL-MCNC: 1.8 MG/DL
MCH RBC QN AUTO: 32.9 PG
MCHC RBC AUTO-ENTMCNC: 29.9 G/DL
MCV RBC AUTO: 110 FL
MONOCYTES # BLD AUTO: 0.9 K/UL
MONOCYTES NFR BLD: 8.6 %
NEUTROPHILS # BLD AUTO: 8.6 K/UL
NEUTROPHILS NFR BLD: 85.2 %
NRBC BLD-RTO: 0 /100 WBC
PHOSPHATE SERPL-MCNC: 2.3 MG/DL
PLATELET # BLD AUTO: 313 K/UL
PMV BLD AUTO: 9.5 FL
POTASSIUM SERPL-SCNC: 4 MMOL/L
RBC # BLD AUTO: 2.4 M/UL
SODIUM SERPL-SCNC: 133 MMOL/L
WBC # BLD AUTO: 10.13 K/UL

## 2018-02-11 PROCEDURE — 83020 HEMOGLOBIN ELECTROPHORESIS: CPT

## 2018-02-11 PROCEDURE — 85025 COMPLETE CBC W/AUTO DIFF WBC: CPT

## 2018-02-11 PROCEDURE — 20600001 HC STEP DOWN PRIVATE ROOM

## 2018-02-11 PROCEDURE — 36415 COLL VENOUS BLD VENIPUNCTURE: CPT

## 2018-02-11 PROCEDURE — 99231 SBSQ HOSP IP/OBS SF/LOW 25: CPT | Mod: ,,, | Performed by: INTERNAL MEDICINE

## 2018-02-11 PROCEDURE — 94640 AIRWAY INHALATION TREATMENT: CPT

## 2018-02-11 PROCEDURE — 80069 RENAL FUNCTION PANEL: CPT

## 2018-02-11 PROCEDURE — 25000003 PHARM REV CODE 250: Performed by: INTERNAL MEDICINE

## 2018-02-11 PROCEDURE — 63600175 PHARM REV CODE 636 W HCPCS: Performed by: INTERNAL MEDICINE

## 2018-02-11 PROCEDURE — 83735 ASSAY OF MAGNESIUM: CPT

## 2018-02-11 PROCEDURE — 25000242 PHARM REV CODE 250 ALT 637 W/ HCPCS: Performed by: INTERNAL MEDICINE

## 2018-02-11 PROCEDURE — 94761 N-INVAS EAR/PLS OXIMETRY MLT: CPT

## 2018-02-11 RX ADMIN — PANTOPRAZOLE SODIUM 40 MG: 40 TABLET, DELAYED RELEASE ORAL at 09:02

## 2018-02-11 RX ADMIN — IPRATROPIUM BROMIDE AND ALBUTEROL SULFATE 3 ML: .5; 3 SOLUTION RESPIRATORY (INHALATION) at 11:02

## 2018-02-11 RX ADMIN — AZITHROMYCIN 250 MG: 250 TABLET, FILM COATED ORAL at 09:02

## 2018-02-11 RX ADMIN — Medication 12.5 MG: at 09:02

## 2018-02-11 RX ADMIN — IPRATROPIUM BROMIDE AND ALBUTEROL SULFATE 3 ML: .5; 3 SOLUTION RESPIRATORY (INHALATION) at 03:02

## 2018-02-11 RX ADMIN — IPRATROPIUM BROMIDE AND ALBUTEROL SULFATE 3 ML: .5; 3 SOLUTION RESPIRATORY (INHALATION) at 04:02

## 2018-02-11 RX ADMIN — LATANOPROST 1 DROP: 50 SOLUTION OPHTHALMIC at 09:02

## 2018-02-11 RX ADMIN — ENOXAPARIN SODIUM 40 MG: 100 INJECTION SUBCUTANEOUS at 05:02

## 2018-02-11 RX ADMIN — RIBAVIRIN 600 MG: 200 TABLET, FILM COATED ORAL at 09:02

## 2018-02-11 RX ADMIN — OXYBUTYNIN CHLORIDE 10 MG: 5 TABLET, FILM COATED, EXTENDED RELEASE ORAL at 09:02

## 2018-02-11 RX ADMIN — PREDNISONE 50 MG: 20 TABLET ORAL at 09:02

## 2018-02-11 RX ADMIN — ATORVASTATIN CALCIUM 40 MG: 10 TABLET, FILM COATED ORAL at 09:02

## 2018-02-11 RX ADMIN — POTASSIUM CHLORIDE 20 MEQ: 1500 TABLET, EXTENDED RELEASE ORAL at 09:02

## 2018-02-11 RX ADMIN — IPRATROPIUM BROMIDE AND ALBUTEROL SULFATE 3 ML: .5; 3 SOLUTION RESPIRATORY (INHALATION) at 12:02

## 2018-02-11 RX ADMIN — IPRATROPIUM BROMIDE AND ALBUTEROL SULFATE 3 ML: .5; 3 SOLUTION RESPIRATORY (INHALATION) at 07:02

## 2018-02-11 RX ADMIN — LISINOPRIL 20 MG: 20 TABLET ORAL at 04:02

## 2018-02-11 NOTE — NURSING
Pt 7am bp 124/59. Recheck at 9am 112/57, pulse 92. MD Millie stated give metoprolol dose, hold lisinopril and HCTZ. If pt SBP at noon > 100, give held lisinopril dose.
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
## 2018-02-11 NOTE — ASSESSMENT & PLAN NOTE
Pt with prior eval as outpatient including EGD, manometry and esophagram.   He has large HH with resultant reflux and dysmotility as noted on EGD and manometry.    However, do not suspect that his SOB can completely be explained by his large HH and reflux. He has not had recurrent PNAs.     Recs:  Continue PPI daily  Consider further eval for SOB, potential echo and bubble study, etc  Reflux tips discussed with patient.  TIPS for GERD:  Do NOT lie down within 2 -3 hours of eating  Elevate the head of your bed 6 inches (blocks under the head of the bed are better than pillows)  Exercise and weight loss     Do not feel that repeat EGD is warranted as unlikely to .
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
## 2018-02-11 NOTE — PLAN OF CARE
Problem: Patient Care Overview  Goal: Plan of Care Review  Outcome: Ongoing (interventions implemented as appropriate)  Pt AAOX4. AVSS. No c/o pain during shift. Pt home chemo medication verified by pharmacy, placed in pt specific bin. Pt safety maintained. Hourly rounding performed.

## 2018-02-11 NOTE — PROGRESS NOTES
[facility] Hospital Medicine  Progress Note    Patient Name: Robin Hawkins  MRN: [id number]  Patient Class: IP- Inpatient   Admission Date: 2/8/2018  Length of Stay: 3 days  JESSICA: 2/14/2018  Attending Physician: Clare Pinto MD  Primary Care Provider: Venancio Mcneil MD    [facility] Team: [facility]OSP MED D Clare Pinto MD    Subjective:     Principal Problem:COPD with acute exacerbation    Interval History: Patient with no events overnight, no new complaints. Feels better.    Review of Systems   Constitutional: Negative for fever.   Respiratory: Positive for shortness of breath.    Cardiovascular: Negative for chest pain.     Objective:     Vital Signs (Most Recent):  Temp: 97 °F (36.1 °C) (02/11/18 1241)  Pulse: 95 (02/11/18 1241)  Resp: 16 (02/11/18 1241)  BP: (!) 134/59 (02/11/18 1241)  SpO2: 99 % (02/11/18 1241) Vital Signs (24h Range):  Temp:  [96.7 °F (35.9 °C)-97.9 °F (36.6 °C)] 97 °F (36.1 °C)  Pulse:  [] 95  Resp:  [16-22] 16  SpO2:  [97 %-100 %] 99 %  BP: (106-134)/(56-68) 134/59     Weight: 73.4 kg (161 lb 13.1 oz)  Body mass index is 24.6 kg/m².    Intake/Output Summary (Last 24 hours) at 02/11/18 1501  Last data filed at 02/11/18 0900   Gross per 24 hour   Intake              960 ml   Output              740 ml   Net              220 ml      Physical Exam   Constitutional: He appears well-developed.   HENT:   Head: Normocephalic.   Mouth/Throat: Mucous membranes are not pale and not cyanotic.   Eyes: Conjunctivae and lids are normal.   Neck: Neck supple.   Cardiovascular: Normal rate, regular rhythm, S1 normal and S2 normal.    Pulmonary/Chest: Effort normal.   Abdominal: Soft. Bowel sounds are normal. There is no tenderness.   Musculoskeletal: He exhibits no edema.   Neurological: He is alert. He is not disoriented.   Skin: Skin is warm and dry. He is not diaphoretic. No cyanosis. Nails show no clubbing.   Psychiatric: He has a normal mood and affect.        Significant Labs:   A1C:     Recent Labs  Lab 12/22/17  0846   HGBA1C 4.7     BMP:     Recent Labs  Lab 02/11/18  0652   GLU 76   *   K 4.0      CO2 17*   BUN 14   CREATININE 0.9   CALCIUM 8.7   MG 1.8        CBC:     Recent Labs  Lab 02/10/18  0422 02/11/18  0652   WBC 9.16 10.13   HGB 7.1* 7.9*   HCT 22.9* 26.4*    313     CMP:     Recent Labs  Lab 02/10/18  0422 02/11/18  0652   * 133*   K 4.2 4.0    103   CO2 24 17*   * 76   BUN 13 14   CREATININE 0.9 0.9   CALCIUM 8.9 8.7   ALBUMIN  --  2.7*   ANIONGAP 5* 13   EGFRNONAA >60.0 >60.0     Magnesium:     Recent Labs  Lab 02/11/18  0652   MG 1.8     TSH:     Recent Labs  Lab 02/08/18  1609   TSH 3.235     Urine Studies:     Recent Labs  Lab 02/09/18  1901   COLORU Straw   APPEARANCEUA Clear   PHUR 5.0   SPECGRAV 1.005   PROTEINUA Negative   GLUCUA Negative   KETONESU Negative   BILIRUBINUA Negative   OCCULTUA Negative   NITRITE Negative   UROBILINOGEN Negative   LEUKOCYTESUR Negative     Assessment/Plan:      Brief HPI:      Hospital Course Overview:    Current Hospital Problem List:    Active Hospital Problems    Diagnosis  POA    *COPD with acute exacerbation [J44.1]  Yes     Priority: 1 - High    Dyspnea [R06.00]  Yes     Priority: 1 - High    Anemia [D64.9]  Yes    HCC (hepatocellular carcinoma) [C22.0]  Yes     Chronic     Enlarging liver mass consistent with HCC on CT 1/2016. +elevated AFP.  S/p TACE on 1/29/16, follow-up CT scans on 3/4/16 and 6/21/16 showed well-treated lesion  Following with Dr Scott for liver transplant evaluation      Hiatal hernia [K44.9]  Yes    Essential hypertension [I10]  Yes     Chronic    Reflux esophagitis [K21.0]  Yes     S/p EGD 5/2016        Resolved Hospital Problems    Diagnosis Date Resolved POA   No resolved problems to display.       Medications for management of current problems:      albuterol-ipratropium 2.5mg-0.5mg/3mL  3 mL Nebulization Q4H    atorvastatin  40 mg  Oral Daily    azithromycin  250 mg Oral Daily    enoxaparin  40 mg Subcutaneous Daily    hydroCHLOROthiazide  25 mg Oral Daily    latanoprost  1 drop Both Eyes QHS    lisinopril  20 mg Oral Daily    metoprolol tartrate  12.5 mg Oral BID    NEXAVAR (SORAFENIB) 200MG TABLET   400 mg Oral BID    oxybutynin  10 mg Oral Daily    pantoprazole  40 mg Oral Daily    potassium chloride SA  20 mEq Oral Daily    predniSONE  50 mg Oral Daily    ribavirin  600 mg Oral Daily    SORAfenib  200 mg Oral BID       PRN:   albuterol-ipratropium 2.5mg-0.5mg/3mL    calcium carbonate    dextrose 50%    dextrose 50%    glucagon (human recombinant)    glucose    glucose    ondansetron    oxyCODONE    senna-docusate 8.6-50 mg    sodium chloride 0.9%     IV Fluids:     Problems addressed today:    Shortness of breath, probable COPD exacerbation  Patient presenting with acute on chronic shortness of breath, undergoing outpatient workup given complicated h/o trach, PFTs with mild obstructive dx, pt reporting wheezing, improvement with nebs, some sputum production however not over baseline, will treat as COPD exacerbation, consider pulm eval, ENT has already evaluated patient, airway stenosis resolved unlikely etiology. Less likely pneumonia, no focal s/s infection, will hold further abx tx (s/p rocephin in ED)  -Duonebs Q4H  -Prednisone 50mg daily x5d  -Azithromycin 500mg x1, 250mg x4d  -Consider HRCT if no improvement, prior CT noted ground glass, some emphysematous changes. CTA negative for PE but revealed a fluid-filled esophagus and hiatal hernia. Concern for reflux and aspiration contributing to shortness of breath; consulted GI.  - no GI intervention likely to be beneficial. Continuing treatment for COPD.     H/o HCC   Per Heme Onc,   -Continue Nexavar, ribaviri  -Outpatient f/u  -Continue oxycodine PRN     HTN   Stable  Continue HCTZ, lisinopril     H/o Prostate / Colon Ca  Per Heme/onc, otherwise stable    Anemia,  multifactorial  FOBT ordered. Family history of anemia, HGB electrophoresis ordered.    Anticipated Disposition: Home or Self Care  JESSICA 2/12/2018    Goals of Care:  General Prognosis: fair  Goals: Curative  Comfort Only: No  Hospice: No  Code Status: Full Code    VTE Risk Mitigation         Ordered     enoxaparin injection 40 mg  Daily     Route:  Subcutaneous        02/09/18 0015     Medium Risk of VTE  Once      02/09/18 0015             Clare Pinto MD  Department of Hospital Medicine   [facility]

## 2018-02-11 NOTE — PLAN OF CARE
"Problem: Patient Care Overview  Goal: Plan of Care Review  Outcome: Ongoing (interventions implemented as appropriate)  No acute changes overnight. Pt is AAOx4. SPO2-99% on room air overnight. Pt is ambulating with standby assist. Denies SOB upon ambulation. Pt states, " I feel much better now!" will continue to follow plan of care.       "
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
## 2018-02-12 ENCOUNTER — TELEPHONE (OUTPATIENT)
Dept: PHARMACY | Facility: CLINIC | Age: 68
End: 2018-02-12

## 2018-02-12 VITALS
WEIGHT: 161.81 LBS | SYSTOLIC BLOOD PRESSURE: 111 MMHG | HEART RATE: 70 BPM | DIASTOLIC BLOOD PRESSURE: 55 MMHG | BODY MASS INDEX: 24.52 KG/M2 | OXYGEN SATURATION: 97 % | TEMPERATURE: 97 F | HEIGHT: 68 IN | RESPIRATION RATE: 20 BRPM

## 2018-02-12 LAB
ALBUMIN SERPL BCP-MCNC: 2.8 G/DL
ANION GAP SERPL CALC-SCNC: 10 MMOL/L
BASOPHILS # BLD AUTO: 0 K/UL
BASOPHILS NFR BLD: 0 %
BUN SERPL-MCNC: 10 MG/DL
CALCIUM SERPL-MCNC: 8.9 MG/DL
CHLORIDE SERPL-SCNC: 106 MMOL/L
CO2 SERPL-SCNC: 21 MMOL/L
CREAT SERPL-MCNC: 0.9 MG/DL
DIFFERENTIAL METHOD: ABNORMAL
EOSINOPHIL # BLD AUTO: 0 K/UL
EOSINOPHIL NFR BLD: 0 %
ERYTHROCYTE [DISTWIDTH] IN BLOOD BY AUTOMATED COUNT: 17.4 %
EST. GFR  (AFRICAN AMERICAN): >60 ML/MIN/1.73 M^2
EST. GFR  (NON AFRICAN AMERICAN): >60 ML/MIN/1.73 M^2
GLUCOSE SERPL-MCNC: 95 MG/DL
HCT VFR BLD AUTO: 26.2 %
HGB A2 MFR BLD HPLC: 2.7 %
HGB BLD-MCNC: 7.7 G/DL
HGB FRACT BLD ELPH-IMP: NORMAL
HGB FRACT BLD ELPH-IMP: NORMAL
IMM GRANULOCYTES # BLD AUTO: 0.05 K/UL
IMM GRANULOCYTES NFR BLD AUTO: 0.6 %
LYMPHOCYTES # BLD AUTO: 0.7 K/UL
LYMPHOCYTES NFR BLD: 8.8 %
MAGNESIUM SERPL-MCNC: 1.7 MG/DL
MCH RBC QN AUTO: 32.9 PG
MCHC RBC AUTO-ENTMCNC: 29.4 G/DL
MCV RBC AUTO: 112 FL
MONOCYTES # BLD AUTO: 0.7 K/UL
MONOCYTES NFR BLD: 8.1 %
NEUTROPHILS # BLD AUTO: 6.6 K/UL
NEUTROPHILS NFR BLD: 82.5 %
NRBC BLD-RTO: 0 /100 WBC
PHOSPHATE SERPL-MCNC: 2.3 MG/DL
PLATELET # BLD AUTO: 328 K/UL
PMV BLD AUTO: 9.1 FL
POTASSIUM SERPL-SCNC: 4.2 MMOL/L
RBC # BLD AUTO: 2.34 M/UL
SODIUM SERPL-SCNC: 137 MMOL/L
WBC # BLD AUTO: 8.03 K/UL

## 2018-02-12 PROCEDURE — 99239 HOSP IP/OBS DSCHRG MGMT >30: CPT | Mod: ,,, | Performed by: INTERNAL MEDICINE

## 2018-02-12 PROCEDURE — 83735 ASSAY OF MAGNESIUM: CPT

## 2018-02-12 PROCEDURE — 63600175 PHARM REV CODE 636 W HCPCS: Performed by: INTERNAL MEDICINE

## 2018-02-12 PROCEDURE — 85025 COMPLETE CBC W/AUTO DIFF WBC: CPT

## 2018-02-12 PROCEDURE — 94640 AIRWAY INHALATION TREATMENT: CPT

## 2018-02-12 PROCEDURE — 94761 N-INVAS EAR/PLS OXIMETRY MLT: CPT

## 2018-02-12 PROCEDURE — 80069 RENAL FUNCTION PANEL: CPT

## 2018-02-12 PROCEDURE — 25000242 PHARM REV CODE 250 ALT 637 W/ HCPCS: Performed by: INTERNAL MEDICINE

## 2018-02-12 PROCEDURE — 25000003 PHARM REV CODE 250: Performed by: INTERNAL MEDICINE

## 2018-02-12 PROCEDURE — 36415 COLL VENOUS BLD VENIPUNCTURE: CPT

## 2018-02-12 RX ORDER — PREDNISONE 10 MG/1
TABLET ORAL
Qty: 25 TABLET | Refills: 1 | Status: ON HOLD | OUTPATIENT
Start: 2018-02-12 | End: 2018-02-28 | Stop reason: HOSPADM

## 2018-02-12 RX ORDER — IPRATROPIUM BROMIDE AND ALBUTEROL SULFATE 2.5; .5 MG/3ML; MG/3ML
3 SOLUTION RESPIRATORY (INHALATION) EVERY 4 HOURS PRN
Qty: 1 BOX | Refills: 0 | Status: SHIPPED | OUTPATIENT
Start: 2018-02-12 | End: 2018-03-06 | Stop reason: SDUPTHER

## 2018-02-12 RX ORDER — AMOXICILLIN 250 MG
1 CAPSULE ORAL 2 TIMES DAILY
Status: DISCONTINUED | OUTPATIENT
Start: 2018-02-12 | End: 2018-02-12 | Stop reason: HOSPADM

## 2018-02-12 RX ADMIN — POTASSIUM CHLORIDE 20 MEQ: 1500 TABLET, EXTENDED RELEASE ORAL at 09:02

## 2018-02-12 RX ADMIN — IPRATROPIUM BROMIDE AND ALBUTEROL SULFATE 3 ML: .5; 3 SOLUTION RESPIRATORY (INHALATION) at 12:02

## 2018-02-12 RX ADMIN — PREDNISONE 50 MG: 20 TABLET ORAL at 09:02

## 2018-02-12 RX ADMIN — OXYBUTYNIN CHLORIDE 10 MG: 5 TABLET, FILM COATED, EXTENDED RELEASE ORAL at 09:02

## 2018-02-12 RX ADMIN — ATORVASTATIN CALCIUM 40 MG: 10 TABLET, FILM COATED ORAL at 09:02

## 2018-02-12 RX ADMIN — Medication 12.5 MG: at 09:02

## 2018-02-12 RX ADMIN — PANTOPRAZOLE SODIUM 40 MG: 40 TABLET, DELAYED RELEASE ORAL at 09:02

## 2018-02-12 RX ADMIN — HYDROCHLOROTHIAZIDE 25 MG: 25 TABLET ORAL at 10:02

## 2018-02-12 RX ADMIN — STANDARDIZED SENNA CONCENTRATE AND DOCUSATE SODIUM 1 TABLET: 8.6; 5 TABLET, FILM COATED ORAL at 12:02

## 2018-02-12 RX ADMIN — IPRATROPIUM BROMIDE AND ALBUTEROL SULFATE 3 ML: .5; 3 SOLUTION RESPIRATORY (INHALATION) at 03:02

## 2018-02-12 RX ADMIN — IPRATROPIUM BROMIDE AND ALBUTEROL SULFATE 3 ML: .5; 3 SOLUTION RESPIRATORY (INHALATION) at 07:02

## 2018-02-12 RX ADMIN — AZITHROMYCIN 250 MG: 250 TABLET, FILM COATED ORAL at 09:02

## 2018-02-12 RX ADMIN — RIBAVIRIN 600 MG: 200 TABLET, FILM COATED ORAL at 10:02

## 2018-02-12 NOTE — PHYSICIAN QUERY
PT Name: Robin Hawkins  MR #: [id number]    Physician Query Form - Respiratory Condition Clarification      CDS/: Enrrique Jarquin               Contact information:     This form is a permanent document in the medical record.    Query Date: February 12, 2018    By submitting this query, we are merely seeking further clarification of documentation. Please utilize your independent clinical judgment when addressing the question(s) below.    The Medical record contains the following   Indicators   Supporting Clinical Findings Location in Medical Record   X   SOB, TANNER, Wheezing, Productive Cough, Use of Accessory Muscles, etc. A 67 yr Patient presenting with respiratory distress and shortness of breath.       He has wheezes.     Diminished breath sounds bilaterally with prolonged expiratory phase and scattered expiratory     tachypneic to 30, increased WOB with accessory  muscle use     Patient presenting with acute on chronic  shortness of breath, undergoing outpatient workup given complicated h/o trach, PFTs with mild obstructive dx, pt reporting wheezing, improvement with nebs, some sputum production however not over baseline, will treat as COPD exacerbation     ED MD note 2/8                          H&P 2/9   X   Acute/Chronic Illness COPD exacerbation, consider pulm eval, ENT has already eval'd patient, airway stenosis resolved unlikely etiology.   HTN  H/o Prostate / Colon Ca    H&P 2/9      Radiology Findings     X   Respiratory Distress or Failure A 67 yr Patient presenting with respiratory distress and shortness of breath.   ED MD note 2/8      Hypoxia or Hypercapnia     X   RR         ABGs         O2 sat Initially tachypneic to 30 with increased WOB, O2 sat mid 90s on NRB, BP 90s/60s    RR 26, 35, 28, 40, 22, 75   ED MD note 2/8        VS Flowsheet 2/8, 2/9, 2/10, 2/11      BiPAP/Intubation     X   Supplemental O2 On NRB  O2 at 2l per NC VS Flowsheet 2/8, 2/9  2/10      Home O2, Oxygen Dependence     X    Treatment Patient improved with duonebs x 3, now tachypneic to low to mid 20s,  Received solumedrol and ceftriaxone/azithro to cover for possible underlying CAP. ED MD note 2/8   X   Other   Family at bedside also corroborates this, stating breathing is slowly worsening, worse today and improved with nebs in Ed.   ED MD note 2/8 and H&P 2/9     Provider, please specify diagnosis or diagnoses associated with above clinical findings.    [ xxx ] Acute Respiratory Failure with Hypoxia  [  ] Acute Respiratory Failure with Hypercapnia  [  ] Acute Respiratory Failure with Hypoxia and Hypercapnia  [  ] Other Acute Respiratory Failure  [  ] Other Chronic Respiratory Failure  [  ] Other Respiratory Diagnosis (please specify): _______________________________  [  ] Clinically Undetermined    Please document in your progress notes daily for the duration of treatment until resolved and include in your discharge summary.
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
## 2018-02-12 NOTE — NURSING
Preparing patient for discharge.  Called patient's sister, who will be by to collect him within the hour.  D/C instructions reviewed with patient.  Instructions include new prescription for albuterol, prednisone, verification of pharmacy, S/S of complications, when to seek medical attention and F/U appointments.  Patient verbalized understanding.  Will review with sister.  Peripheral IVs removed x2.  Awaiting ride.

## 2018-02-13 LAB
BACTERIA BLD CULT: NORMAL
BACTERIA BLD CULT: NORMAL

## 2018-02-13 NOTE — TELEPHONE ENCOUNTER
Called patient for QOL assessment at [facility] - na lvm for call back .    STONEY Rubin.Ph.  Clinical Pharmacist  [facility]  Phone: [phone]

## 2018-02-14 ENCOUNTER — TELEPHONE (OUTPATIENT)
Dept: PHARMACY | Facility: CLINIC | Age: 68
End: 2018-02-14

## 2018-02-14 ENCOUNTER — TELEPHONE (OUTPATIENT)
Dept: INTERNAL MEDICINE | Facility: CLINIC | Age: 68
End: 2018-02-14

## 2018-02-14 ENCOUNTER — PATIENT OUTREACH (OUTPATIENT)
Dept: ADMINISTRATIVE | Facility: CLINIC | Age: 68
End: 2018-02-14

## 2018-02-14 NOTE — TELEPHONE ENCOUNTER
Patient recently discharged after hospitalization for COPD exacerbation. Please call him to check in on how he is doing post-discharge. Does have a regular follow-up appt scheduled with me for 2/27, however if he is still having any shortness of breath or other concerning symptoms I would like him seen sooner for a hospital follow up visit.

## 2018-02-14 NOTE — TELEPHONE ENCOUNTER
Conducted QoL assessment at [facility]/RBV with patient's sister, Manju.  Patient recently discharged after hospitalization for COPD exacerbaction.  Patient has an appointment to follow up with his provider for his SOB.  His labwork show EOT HCV treatment is successful.  Reminded patient's sister the importance of keeping follow up appointments and SVR12.  Advised to call [facility] and provider if any issues arise

## 2018-02-14 NOTE — PATIENT INSTRUCTIONS
Discharge Instructions: COPD  You have been diagnosed with chronic obstructive pulmonary disease (COPD). This is a name given to a group of diseases that limit the flow of air in and out of your lungs. This makes it harder to breathe. With COPD, you are also more likely to get lung infections. COPD includes chronic bronchitis and emphysema. COPD is most often caused by heavy, long-term cigarette smoking.  Home care  Quit smoking  · If you smoke, quit. It is the best thing you can do for your COPD and your overall health.  · Join a stop-smoking program. There are even telephone, text message, and Internet programs to help you quit.  · Ask your healthcare provider about medicines or other methods to help you quit.  · Ask family members to quit smoking as well.  · Don't allow people to smoke in your home, in your car, or when they are around you.  Protect yourself from infection  · Wash your hands often. Do your best to keep your hands away from your face. Most germs are spread from your hands to your mouth.  · Get a flu shot every year. Also ask your provider about pneumonia vaccines.  · Avoid crowds. It's especially important to do this in the winter when more people have colds and flu.  · To stay healthy, get enough sleep, exercise regularly, and eat a balanced diet. You should:  ¨ Get about 8 hours of sleep every night.  ¨ Try to exercise for at least 30 minutes on most days.  ¨ Have healthy foods including fruits and vegetables, 100% whole grains, lean meats and fish, and low-fat dairy products. Try to stay away from foods high in fats and sugar.  Take your medicines  Take your medicines exactly as directed. Don't skip doses.  Manage your stress  Stress can make COPD worse. Use this stress management technique:  · Find a quiet place and sit or lie in a comfortable position.  · Close your eyes and perform breathing exercises for several minutes. Ask your provider about the best way to breathe.  Pulmonary  rehabilitation  · Pulmonary rehab can help you feel better. These programs include exercise, breathing techniques, information about COPD, counseling, and help for smokers.  · Ask your provider or your local hospital about programs in your area.  When to call your healthcare provider  Call your provider immediately if you have any of the following:  · Shortness of breath, wheezing, or coughing  · Increased mucus  · Yellow, green, bloody, or smelly mucus  · Fever or chills  · Tightness in your chest that does not go away with rest or medicine  · An irregular heartbeat or a feeling that your heart is beating very fast  · Swollen ankles   Date Last Reviewed: 5/1/2016  © 3172-8002 [employer]. [address] [place] [address], [place] [address]. All rights reserved. This information is not intended as a substitute for professional medical care. Always follow your healthcare professional's instructions.

## 2018-02-15 ENCOUNTER — TELEPHONE (OUTPATIENT)
Dept: HEPATOLOGY | Facility: CLINIC | Age: 68
End: 2018-02-15

## 2018-02-15 DIAGNOSIS — B18.2 CHRONIC HEPATITIS C WITHOUT HEPATIC COMA: ICD-10-CM

## 2018-02-15 DIAGNOSIS — C22.0 HCC (HEPATOCELLULAR CARCINOMA): Primary | ICD-10-CM

## 2018-02-15 DIAGNOSIS — C22.0 HCC (HEPATOCELLULAR CARCINOMA): Chronic | ICD-10-CM

## 2018-02-15 DIAGNOSIS — K74.60 CIRRHOSIS OF LIVER WITHOUT ASCITES, UNSPECIFIED HEPATIC CIRRHOSIS TYPE: Primary | ICD-10-CM

## 2018-02-15 RX ORDER — SORAFENIB 200 MG/1
TABLET, FILM COATED ORAL
Qty: 120 TABLET | Refills: 0 | Status: SHIPPED | OUTPATIENT
Start: 2018-02-15 | End: 2018-03-12 | Stop reason: SDUPTHER

## 2018-02-15 NOTE — TELEPHONE ENCOUNTER
HCV LAB REVIEW  Week 12 of Vosevi+RBV; [facility]     Pertinent labs:  CBC: stable, Hgb 9.2, baseline 12  CMP: stable  HCV RNA: <12, not detected.     pls call pt:  Labs look good. He should be done with Vosevi. He should also stop RBV if he hasn't. His blood counts were low in hospital so I want to make certain he stopped it. We will check HCV in 6 weeks and 12 weeks to determine a cure.     Next labs due/Please schedule:  CBC, HCV RNA in 6 weeks-SVR 6: 03/21/18  HCV RNA in 12 weeks- SVR 12: 05/02/18    Please schedule with Dr. Scott one week after SVR 12

## 2018-02-15 NOTE — TELEPHONE ENCOUNTER
S/w pts sister Manju who handles all his appts, let her know we linked his lab to his other lab done before his CT scan, also scheduled SVR 12 labs but unable to schedule Dr Tyler clayton at this time due to her schedule not being opened that far out for clinic.

## 2018-02-19 ENCOUNTER — TELEPHONE (OUTPATIENT)
Dept: PHARMACY | Facility: CLINIC | Age: 68
End: 2018-02-19

## 2018-02-19 NOTE — TELEPHONE ENCOUNTER
Nexavar follow up and refill confirmed with patient's sister/caregiver, Manju. We will ship Nexavar refill on  via fedex to arrive on . $0.00 copay- 004. Confirmed 2 patient identifiers - name and .      Confirmed patient is taking Nexavar, two tablets twice a day on an empty stomach with no difficulties.  Patient's sister does not have bottle with her to give dose count.  He had missed 2 days of medication while being admitted in the hospital earlier this month due to not having access to his medications.  Patient has been started on Duonebs for SOB.  There are no DDIs with Duoneb and Nexavar.  Advised patient to inform [facility] of any medication changes to ensure drug compatiblity.  No side effects noted except for occasional fatigue that is manageable.  No missed doses, no new medications, no new allergies or health conditions reported at this time. Patient counseled on importance of maintaining adherence and keeping follow up appointments.  Advised to call [facility] and provider if any issues arise.

## 2018-02-23 ENCOUNTER — NURSE TRIAGE (OUTPATIENT)
Dept: ADMINISTRATIVE | Facility: CLINIC | Age: 68
End: 2018-02-23

## 2018-02-23 ENCOUNTER — LAB VISIT (OUTPATIENT)
Dept: LAB | Facility: HOSPITAL | Age: 68
End: 2018-02-23
Attending: INTERNAL MEDICINE
Payer: MEDICARE

## 2018-02-23 DIAGNOSIS — C22.0 HCC (HEPATOCELLULAR CARCINOMA): Chronic | ICD-10-CM

## 2018-02-23 DIAGNOSIS — E87.6 HYPOKALEMIA: ICD-10-CM

## 2018-02-23 DIAGNOSIS — K74.60 CIRRHOSIS OF LIVER WITHOUT ASCITES, UNSPECIFIED HEPATIC CIRRHOSIS TYPE: Chronic | ICD-10-CM

## 2018-02-23 DIAGNOSIS — Z76.82 LIVER TRANSPLANT CANDIDATE: ICD-10-CM

## 2018-02-23 DIAGNOSIS — Z09 CHEMOTHERAPY FOLLOW-UP EXAMINATION: ICD-10-CM

## 2018-02-23 LAB
ALBUMIN SERPL BCP-MCNC: 3.4 G/DL
ALP SERPL-CCNC: 93 U/L
ALT SERPL W/O P-5'-P-CCNC: 29 U/L
ANION GAP SERPL CALC-SCNC: 9 MMOL/L
AST SERPL-CCNC: 32 U/L
BASOPHILS # BLD AUTO: 0.02 K/UL
BASOPHILS NFR BLD: 0.2 %
BILIRUB SERPL-MCNC: 0.8 MG/DL
BUN SERPL-MCNC: 13 MG/DL
CALCIUM SERPL-MCNC: 9.4 MG/DL
CHLORIDE SERPL-SCNC: 96 MMOL/L
CO2 SERPL-SCNC: 23 MMOL/L
CREAT SERPL-MCNC: 1.1 MG/DL
DIFFERENTIAL METHOD: ABNORMAL
EOSINOPHIL # BLD AUTO: 0.3 K/UL
EOSINOPHIL NFR BLD: 2.9 %
ERYTHROCYTE [DISTWIDTH] IN BLOOD BY AUTOMATED COUNT: 15.3 %
EST. GFR  (AFRICAN AMERICAN): >60 ML/MIN/1.73 M^2
EST. GFR  (NON AFRICAN AMERICAN): >60 ML/MIN/1.73 M^2
GLUCOSE SERPL-MCNC: 98 MG/DL
HCT VFR BLD AUTO: 39.5 %
HGB BLD-MCNC: 12 G/DL
IMM GRANULOCYTES # BLD AUTO: 0.05 K/UL
IMM GRANULOCYTES NFR BLD AUTO: 0.6 %
INR PPP: 0.9
LYMPHOCYTES # BLD AUTO: 0.5 K/UL
LYMPHOCYTES NFR BLD: 5.7 %
MCH RBC QN AUTO: 34.1 PG
MCHC RBC AUTO-ENTMCNC: 30.4 G/DL
MCV RBC AUTO: 112 FL
MONOCYTES # BLD AUTO: 0.5 K/UL
MONOCYTES NFR BLD: 5.3 %
NEUTROPHILS # BLD AUTO: 7.7 K/UL
NEUTROPHILS NFR BLD: 85.3 %
NRBC BLD-RTO: 0 /100 WBC
PLATELET # BLD AUTO: 317 K/UL
PMV BLD AUTO: 9.9 FL
POTASSIUM SERPL-SCNC: 4.3 MMOL/L
PROT SERPL-MCNC: 7.9 G/DL
PROTHROMBIN TIME: 10 SEC
RBC # BLD AUTO: 3.52 M/UL
SODIUM SERPL-SCNC: 128 MMOL/L
WBC # BLD AUTO: 9.02 K/UL

## 2018-02-23 PROCEDURE — 80053 COMPREHEN METABOLIC PANEL: CPT

## 2018-02-23 PROCEDURE — 85025 COMPLETE CBC W/AUTO DIFF WBC: CPT

## 2018-02-23 PROCEDURE — 80321 ALCOHOLS BIOMARKERS 1OR 2: CPT

## 2018-02-23 PROCEDURE — 85610 PROTHROMBIN TIME: CPT

## 2018-02-23 PROCEDURE — 36415 COLL VENOUS BLD VENIPUNCTURE: CPT | Mod: PO

## 2018-02-23 RX ORDER — POTASSIUM CHLORIDE 20 MEQ/1
TABLET, EXTENDED RELEASE ORAL
Qty: 360 TABLET | Refills: 0 | Status: SHIPPED | OUTPATIENT
Start: 2018-02-23 | End: 2018-05-24 | Stop reason: SDUPTHER

## 2018-02-23 NOTE — TELEPHONE ENCOUNTER
Reason for Disposition   SEVERE difficulty breathing (e.g., struggling for each breath, speaks in single words, pulse > 120)    Protocols used: ST BREATHING DIFFICULTY-A-OH    Sister states that pt is struggling to breath, speaking in singles words. Similar to last time he was in hospital. Care advice given.

## 2018-02-26 ENCOUNTER — TELEPHONE (OUTPATIENT)
Dept: INTERNAL MEDICINE | Facility: CLINIC | Age: 68
End: 2018-02-26

## 2018-02-27 ENCOUNTER — OFFICE VISIT (OUTPATIENT)
Dept: INTERNAL MEDICINE | Facility: CLINIC | Age: 68
DRG: 190 | End: 2018-02-27
Payer: MEDICARE

## 2018-02-27 ENCOUNTER — HOSPITAL ENCOUNTER (INPATIENT)
Facility: HOSPITAL | Age: 68
LOS: 2 days | Discharge: HOME HEALTH | DRG: 190 | End: 2018-03-01
Attending: EMERGENCY MEDICINE | Admitting: HOSPITALIST
Payer: MEDICARE

## 2018-02-27 VITALS
DIASTOLIC BLOOD PRESSURE: 60 MMHG | OXYGEN SATURATION: 94 % | BODY MASS INDEX: 23.52 KG/M2 | WEIGHT: 155.19 LBS | TEMPERATURE: 98 F | SYSTOLIC BLOOD PRESSURE: 99 MMHG | HEIGHT: 68 IN

## 2018-02-27 DIAGNOSIS — I10 ESSENTIAL HYPERTENSION: Chronic | ICD-10-CM

## 2018-02-27 DIAGNOSIS — E03.8 SUBCLINICAL HYPOTHYROIDISM: ICD-10-CM

## 2018-02-27 DIAGNOSIS — J44.1 DECOMPENSATED COPD WITH EXACERBATION (CHRONIC OBSTRUCTIVE PULMONARY DISEASE): Primary | ICD-10-CM

## 2018-02-27 DIAGNOSIS — I70.0 ATHEROSCLEROSIS OF ABDOMINAL AORTA: ICD-10-CM

## 2018-02-27 DIAGNOSIS — E87.1 HYPONATREMIA: ICD-10-CM

## 2018-02-27 DIAGNOSIS — K74.60 CIRRHOSIS OF LIVER WITHOUT ASCITES, UNSPECIFIED HEPATIC CIRRHOSIS TYPE: Chronic | ICD-10-CM

## 2018-02-27 DIAGNOSIS — I10 HTN (HYPERTENSION): ICD-10-CM

## 2018-02-27 DIAGNOSIS — J44.1 COPD WITH ACUTE EXACERBATION: Primary | ICD-10-CM

## 2018-02-27 DIAGNOSIS — R06.82 TACHYPNEA: ICD-10-CM

## 2018-02-27 DIAGNOSIS — R06.00 DYSPNEA, UNSPECIFIED TYPE: ICD-10-CM

## 2018-02-27 DIAGNOSIS — C22.0 HCC (HEPATOCELLULAR CARCINOMA): Chronic | ICD-10-CM

## 2018-02-27 DIAGNOSIS — R06.02 SHORTNESS OF BREATH: ICD-10-CM

## 2018-02-27 DIAGNOSIS — J38.6 LARYNGEAL STENOSIS: Chronic | ICD-10-CM

## 2018-02-27 PROBLEM — J96.21 ACUTE ON CHRONIC RESPIRATORY FAILURE WITH HYPOXIA: Status: ACTIVE | Noted: 2018-02-27

## 2018-02-27 PROBLEM — D53.9 MACROCYTIC ANEMIA: Status: ACTIVE | Noted: 2018-02-10

## 2018-02-27 LAB
ALBUMIN SERPL BCP-MCNC: 3 G/DL
ALP SERPL-CCNC: 94 U/L
ALT SERPL W/O P-5'-P-CCNC: 21 U/L
ANION GAP SERPL CALC-SCNC: 10 MMOL/L
ANION GAP SERPL CALC-SCNC: 11 MMOL/L
ANION GAP SERPL CALC-SCNC: 11 MMOL/L
AST SERPL-CCNC: 31 U/L
BASOPHILS # BLD AUTO: 0.03 K/UL
BASOPHILS NFR BLD: 0.5 %
BILIRUB SERPL-MCNC: 0.9 MG/DL
BUN SERPL-MCNC: 8 MG/DL
BUN SERPL-MCNC: 8 MG/DL
BUN SERPL-MCNC: 9 MG/DL
CALCIUM SERPL-MCNC: 7.9 MG/DL
CALCIUM SERPL-MCNC: 8.6 MG/DL
CALCIUM SERPL-MCNC: 8.6 MG/DL
CHLORIDE SERPL-SCNC: 95 MMOL/L
CHLORIDE SERPL-SCNC: 96 MMOL/L
CHLORIDE SERPL-SCNC: 98 MMOL/L
CO2 SERPL-SCNC: 16 MMOL/L
CO2 SERPL-SCNC: 17 MMOL/L
CO2 SERPL-SCNC: 19 MMOL/L
CREAT SERPL-MCNC: 0.8 MG/DL
CREAT SERPL-MCNC: 0.9 MG/DL
CREAT SERPL-MCNC: 1 MG/DL
DIFFERENTIAL METHOD: ABNORMAL
EOSINOPHIL # BLD AUTO: 0.3 K/UL
EOSINOPHIL NFR BLD: 4.1 %
ERYTHROCYTE [DISTWIDTH] IN BLOOD BY AUTOMATED COUNT: 13.3 %
EST. GFR  (AFRICAN AMERICAN): >60 ML/MIN/1.73 M^2
EST. GFR  (NON AFRICAN AMERICAN): >60 ML/MIN/1.73 M^2
FERRITIN SERPL-MCNC: 241 NG/ML
GLUCOSE SERPL-MCNC: 103 MG/DL
GLUCOSE SERPL-MCNC: 84 MG/DL
GLUCOSE SERPL-MCNC: 92 MG/DL
HCT VFR BLD AUTO: 34.2 %
HGB BLD-MCNC: 10.9 G/DL
IMM GRANULOCYTES # BLD AUTO: 0.03 K/UL
IMM GRANULOCYTES NFR BLD AUTO: 0.5 %
IRON SERPL-MCNC: 65 UG/DL
LYMPHOCYTES # BLD AUTO: 0.7 K/UL
LYMPHOCYTES NFR BLD: 11 %
MCH RBC QN AUTO: 34.4 PG
MCHC RBC AUTO-ENTMCNC: 31.9 G/DL
MCV RBC AUTO: 108 FL
MONOCYTES # BLD AUTO: 0.5 K/UL
MONOCYTES NFR BLD: 8.6 %
NEUTROPHILS # BLD AUTO: 4.7 K/UL
NEUTROPHILS NFR BLD: 75.3 %
NRBC BLD-RTO: 0 /100 WBC
OSMOLALITY SERPL: 255 MOSM/KG
OSMOLALITY UR: 133 MOSM/KG
PLATELET # BLD AUTO: 203 K/UL
PMV BLD AUTO: 10.1 FL
POCT GLUCOSE: 130 MG/DL (ref 70–110)
POTASSIUM SERPL-SCNC: 3.9 MMOL/L
POTASSIUM SERPL-SCNC: 4.4 MMOL/L
POTASSIUM SERPL-SCNC: 4.4 MMOL/L
PROCALCITONIN SERPL IA-MCNC: 0.04 NG/ML
PROT SERPL-MCNC: 7.3 G/DL
RBC # BLD AUTO: 3.17 M/UL
RETICS/RBC NFR AUTO: 2.3 %
SATURATED IRON: 22 %
SODIUM SERPL-SCNC: 122 MMOL/L
SODIUM SERPL-SCNC: 125 MMOL/L
SODIUM SERPL-SCNC: 126 MMOL/L
SODIUM UR-SCNC: 21 MMOL/L
TOTAL IRON BINDING CAPACITY: 292 UG/DL
TRANSFERRIN SERPL-MCNC: 197 MG/DL
TROPONIN I SERPL DL<=0.01 NG/ML-MCNC: 0.02 NG/ML
WBC # BLD AUTO: 6.27 K/UL

## 2018-02-27 PROCEDURE — 83935 ASSAY OF URINE OSMOLALITY: CPT

## 2018-02-27 PROCEDURE — 25000242 PHARM REV CODE 250 ALT 637 W/ HCPCS: Performed by: EMERGENCY MEDICINE

## 2018-02-27 PROCEDURE — 1159F MED LIST DOCD IN RCRD: CPT | Mod: S$GLB,,, | Performed by: INTERNAL MEDICINE

## 2018-02-27 PROCEDURE — 36415 COLL VENOUS BLD VENIPUNCTURE: CPT

## 2018-02-27 PROCEDURE — 93005 ELECTROCARDIOGRAM TRACING: CPT

## 2018-02-27 PROCEDURE — 11000001 HC ACUTE MED/SURG PRIVATE ROOM

## 2018-02-27 PROCEDURE — 83540 ASSAY OF IRON: CPT

## 2018-02-27 PROCEDURE — 25000242 PHARM REV CODE 250 ALT 637 W/ HCPCS: Performed by: STUDENT IN AN ORGANIZED HEALTH CARE EDUCATION/TRAINING PROGRAM

## 2018-02-27 PROCEDURE — 99999 PR PBB SHADOW E&M-EST. PATIENT-LVL III: CPT | Mod: PBBFAC,,, | Performed by: INTERNAL MEDICINE

## 2018-02-27 PROCEDURE — 3008F BODY MASS INDEX DOCD: CPT | Mod: S$GLB,,, | Performed by: INTERNAL MEDICINE

## 2018-02-27 PROCEDURE — 27000221 HC OXYGEN, UP TO 24 HOURS

## 2018-02-27 PROCEDURE — 99284 EMERGENCY DEPT VISIT MOD MDM: CPT | Mod: 25

## 2018-02-27 PROCEDURE — 99223 1ST HOSP IP/OBS HIGH 75: CPT | Mod: AI,GC,, | Performed by: HOSPITALIST

## 2018-02-27 PROCEDURE — 25000242 PHARM REV CODE 250 ALT 637 W/ HCPCS

## 2018-02-27 PROCEDURE — 85025 COMPLETE CBC W/AUTO DIFF WBC: CPT

## 2018-02-27 PROCEDURE — 99499 UNLISTED E&M SERVICE: CPT | Mod: S$GLB,,, | Performed by: INTERNAL MEDICINE

## 2018-02-27 PROCEDURE — 84300 ASSAY OF URINE SODIUM: CPT

## 2018-02-27 PROCEDURE — 94640 AIRWAY INHALATION TREATMENT: CPT

## 2018-02-27 PROCEDURE — 80048 BASIC METABOLIC PNL TOTAL CA: CPT

## 2018-02-27 PROCEDURE — 25000003 PHARM REV CODE 250: Performed by: STUDENT IN AN ORGANIZED HEALTH CARE EDUCATION/TRAINING PROGRAM

## 2018-02-27 PROCEDURE — 63600175 PHARM REV CODE 636 W HCPCS: Performed by: STUDENT IN AN ORGANIZED HEALTH CARE EDUCATION/TRAINING PROGRAM

## 2018-02-27 PROCEDURE — 85045 AUTOMATED RETICULOCYTE COUNT: CPT

## 2018-02-27 PROCEDURE — 82728 ASSAY OF FERRITIN: CPT

## 2018-02-27 PROCEDURE — 25000003 PHARM REV CODE 250: Performed by: EMERGENCY MEDICINE

## 2018-02-27 PROCEDURE — 1125F AMNT PAIN NOTED PAIN PRSNT: CPT | Mod: S$GLB,,, | Performed by: INTERNAL MEDICINE

## 2018-02-27 PROCEDURE — 94761 N-INVAS EAR/PLS OXIMETRY MLT: CPT

## 2018-02-27 PROCEDURE — 99215 OFFICE O/P EST HI 40 MIN: CPT | Mod: S$GLB,,, | Performed by: INTERNAL MEDICINE

## 2018-02-27 PROCEDURE — 84484 ASSAY OF TROPONIN QUANT: CPT

## 2018-02-27 PROCEDURE — 83930 ASSAY OF BLOOD OSMOLALITY: CPT

## 2018-02-27 PROCEDURE — 84145 PROCALCITONIN (PCT): CPT

## 2018-02-27 PROCEDURE — 93010 ELECTROCARDIOGRAM REPORT: CPT | Mod: ,,, | Performed by: INTERNAL MEDICINE

## 2018-02-27 PROCEDURE — 96361 HYDRATE IV INFUSION ADD-ON: CPT

## 2018-02-27 PROCEDURE — 99285 EMERGENCY DEPT VISIT HI MDM: CPT | Mod: ,,, | Performed by: EMERGENCY MEDICINE

## 2018-02-27 PROCEDURE — 80053 COMPREHEN METABOLIC PANEL: CPT

## 2018-02-27 PROCEDURE — 96360 HYDRATION IV INFUSION INIT: CPT

## 2018-02-27 PROCEDURE — 63600175 PHARM REV CODE 636 W HCPCS: Performed by: EMERGENCY MEDICINE

## 2018-02-27 RX ORDER — HYDROCHLOROTHIAZIDE 25 MG/1
25 TABLET ORAL DAILY
Status: DISCONTINUED | OUTPATIENT
Start: 2018-02-28 | End: 2018-02-28

## 2018-02-27 RX ORDER — SODIUM CHLORIDE 9 MG/ML
1000 INJECTION, SOLUTION INTRAVENOUS
Status: COMPLETED | OUTPATIENT
Start: 2018-02-27 | End: 2018-02-27

## 2018-02-27 RX ORDER — NAPROXEN SODIUM 220 MG/1
81 TABLET, FILM COATED ORAL EVERY MORNING
Status: DISCONTINUED | OUTPATIENT
Start: 2018-02-28 | End: 2018-03-01 | Stop reason: HOSPADM

## 2018-02-27 RX ORDER — IBUPROFEN 200 MG
24 TABLET ORAL
Status: DISCONTINUED | OUTPATIENT
Start: 2018-02-27 | End: 2018-03-01 | Stop reason: HOSPADM

## 2018-02-27 RX ORDER — PREDNISONE 20 MG/1
40 TABLET ORAL DAILY
Status: DISCONTINUED | OUTPATIENT
Start: 2018-02-28 | End: 2018-03-01 | Stop reason: HOSPADM

## 2018-02-27 RX ORDER — LISINOPRIL 20 MG/1
20 TABLET ORAL DAILY
Status: DISCONTINUED | OUTPATIENT
Start: 2018-02-28 | End: 2018-03-01 | Stop reason: HOSPADM

## 2018-02-27 RX ORDER — IPRATROPIUM BROMIDE AND ALBUTEROL SULFATE 2.5; .5 MG/3ML; MG/3ML
3 SOLUTION RESPIRATORY (INHALATION) EVERY 4 HOURS
Status: DISCONTINUED | OUTPATIENT
Start: 2018-02-27 | End: 2018-03-01 | Stop reason: HOSPADM

## 2018-02-27 RX ORDER — POTASSIUM CHLORIDE 20 MEQ/1
20 TABLET, EXTENDED RELEASE ORAL 2 TIMES DAILY
Status: DISCONTINUED | OUTPATIENT
Start: 2018-02-27 | End: 2018-03-01 | Stop reason: HOSPADM

## 2018-02-27 RX ORDER — TIOTROPIUM BROMIDE 18 UG/1
1 CAPSULE ORAL; RESPIRATORY (INHALATION) DAILY
Status: DISCONTINUED | OUTPATIENT
Start: 2018-02-28 | End: 2018-03-01 | Stop reason: HOSPADM

## 2018-02-27 RX ORDER — ATORVASTATIN CALCIUM 20 MG/1
40 TABLET, FILM COATED ORAL DAILY
Status: DISCONTINUED | OUTPATIENT
Start: 2018-02-28 | End: 2018-03-01 | Stop reason: HOSPADM

## 2018-02-27 RX ORDER — LATANOPROST 50 UG/ML
1 SOLUTION/ DROPS OPHTHALMIC NIGHTLY
Status: DISCONTINUED | OUTPATIENT
Start: 2018-02-27 | End: 2018-03-01 | Stop reason: HOSPADM

## 2018-02-27 RX ORDER — IPRATROPIUM BROMIDE AND ALBUTEROL SULFATE 2.5; .5 MG/3ML; MG/3ML
3 SOLUTION RESPIRATORY (INHALATION)
Status: COMPLETED | OUTPATIENT
Start: 2018-02-27 | End: 2018-02-27

## 2018-02-27 RX ORDER — IBUPROFEN 200 MG
16 TABLET ORAL
Status: DISCONTINUED | OUTPATIENT
Start: 2018-02-27 | End: 2018-03-01 | Stop reason: HOSPADM

## 2018-02-27 RX ORDER — PANTOPRAZOLE SODIUM 40 MG/1
40 TABLET, DELAYED RELEASE ORAL DAILY
Status: DISCONTINUED | OUTPATIENT
Start: 2018-02-27 | End: 2018-03-01 | Stop reason: HOSPADM

## 2018-02-27 RX ORDER — FLUTICASONE FUROATE AND VILANTEROL 100; 25 UG/1; UG/1
1 POWDER RESPIRATORY (INHALATION) DAILY
Status: DISCONTINUED | OUTPATIENT
Start: 2018-02-27 | End: 2018-03-01 | Stop reason: HOSPADM

## 2018-02-27 RX ORDER — ENOXAPARIN SODIUM 100 MG/ML
40 INJECTION SUBCUTANEOUS EVERY 24 HOURS
Status: DISCONTINUED | OUTPATIENT
Start: 2018-02-27 | End: 2018-03-01 | Stop reason: HOSPADM

## 2018-02-27 RX ORDER — CALCIUM CARBONATE 200(500)MG
500 TABLET,CHEWABLE ORAL 2 TIMES DAILY PRN
Status: DISCONTINUED | OUTPATIENT
Start: 2018-02-27 | End: 2018-03-01 | Stop reason: HOSPADM

## 2018-02-27 RX ORDER — SORAFENIB 200 MG/1
400 TABLET, FILM COATED ORAL 2 TIMES DAILY
Status: DISCONTINUED | OUTPATIENT
Start: 2018-02-27 | End: 2018-03-01 | Stop reason: HOSPADM

## 2018-02-27 RX ORDER — IPRATROPIUM BROMIDE AND ALBUTEROL SULFATE 2.5; .5 MG/3ML; MG/3ML
SOLUTION RESPIRATORY (INHALATION)
Status: COMPLETED
Start: 2018-02-27 | End: 2018-02-27

## 2018-02-27 RX ORDER — PREDNISONE 20 MG/1
60 TABLET ORAL
Status: COMPLETED | OUTPATIENT
Start: 2018-02-27 | End: 2018-02-27

## 2018-02-27 RX ORDER — SIMETHICONE 80 MG
1 TABLET,CHEWABLE ORAL 3 TIMES DAILY PRN
Status: DISCONTINUED | OUTPATIENT
Start: 2018-02-27 | End: 2018-03-01 | Stop reason: HOSPADM

## 2018-02-27 RX ORDER — METOPROLOL TARTRATE 25 MG/1
12.5 TABLET ORAL 2 TIMES DAILY
Status: DISCONTINUED | OUTPATIENT
Start: 2018-02-27 | End: 2018-03-01 | Stop reason: HOSPADM

## 2018-02-27 RX ORDER — GLUCAGON 1 MG
1 KIT INJECTION
Status: DISCONTINUED | OUTPATIENT
Start: 2018-02-27 | End: 2018-03-01 | Stop reason: HOSPADM

## 2018-02-27 RX ORDER — OXYBUTYNIN CHLORIDE 5 MG/1
10 TABLET, EXTENDED RELEASE ORAL DAILY
Status: DISCONTINUED | OUTPATIENT
Start: 2018-02-27 | End: 2018-03-01 | Stop reason: HOSPADM

## 2018-02-27 RX ADMIN — ENOXAPARIN SODIUM 40 MG: 100 INJECTION SUBCUTANEOUS at 10:02

## 2018-02-27 RX ADMIN — IPRATROPIUM BROMIDE AND ALBUTEROL SULFATE 3 ML: .5; 3 SOLUTION RESPIRATORY (INHALATION) at 07:02

## 2018-02-27 RX ADMIN — SODIUM CHLORIDE 1000 ML: 0.9 INJECTION, SOLUTION INTRAVENOUS at 11:02

## 2018-02-27 RX ADMIN — PANTOPRAZOLE SODIUM 40 MG: 40 TABLET, DELAYED RELEASE ORAL at 03:02

## 2018-02-27 RX ADMIN — IPRATROPIUM BROMIDE AND ALBUTEROL SULFATE 3 ML: .5; 3 SOLUTION RESPIRATORY (INHALATION) at 03:02

## 2018-02-27 RX ADMIN — PREDNISONE 60 MG: 20 TABLET ORAL at 10:02

## 2018-02-27 RX ADMIN — SIMETHICONE CHEW TAB 80 MG 80 MG: 80 TABLET ORAL at 10:02

## 2018-02-27 RX ADMIN — POTASSIUM CHLORIDE 20 MEQ: 1500 TABLET, EXTENDED RELEASE ORAL at 10:02

## 2018-02-27 RX ADMIN — Medication 12.5 MG: at 10:02

## 2018-02-27 RX ADMIN — SORAFENIB 400 MG: 200 TABLET, FILM COATED ORAL at 09:02

## 2018-02-27 RX ADMIN — LATANOPROST 1 DROP: 50 SOLUTION OPHTHALMIC at 10:02

## 2018-02-27 RX ADMIN — IPRATROPIUM BROMIDE AND ALBUTEROL SULFATE 3 ML: .5; 3 SOLUTION RESPIRATORY (INHALATION) at 11:02

## 2018-02-27 RX ADMIN — IPRATROPIUM BROMIDE AND ALBUTEROL SULFATE 3 ML: .5; 3 SOLUTION RESPIRATORY (INHALATION) at 10:02

## 2018-02-27 RX ADMIN — CALCIUM CARBONATE (ANTACID) CHEW TAB 500 MG 500 MG: 500 CHEW TAB at 10:02

## 2018-02-27 NOTE — ED NOTES
Pt resting at this time. Pt states spouse left to get home medication. Denies needs at this time. Bed rails are up and call light is within patient reach.

## 2018-02-27 NOTE — ASSESSMENT & PLAN NOTE
- Patient admitted with Na of 122   - S/p 1 L in the ED   - Will order serum and urin osm, and urine sodium   - Patient with free water deficit of 4.5L will get a repeat BMP and reassess fluids

## 2018-02-27 NOTE — TELEPHONE ENCOUNTER
Patient called [facility] with severe shortness of breath on 2/23/18, was recommended to call 911 and take EMS transport to hospital immediately. Does not appear to have gone to an [facility] facility. Please contact patient to confirm if possible that he did go to hospital.

## 2018-02-27 NOTE — Clinical Note
Dr Goyal - I saw Mr Hawkins today for his regular follow up appt but noted he had called [facility] last week and been instructed to go to ER for his dyspnea. See note for details - difficult to get good pulse ox read but does appear to be hypoxic today, exam not entirely consistent with COPD exacerbation but per patient/family he is only on one nebulizer med, which is most likely his duonebs; they don't know when he was last using pulmicort, but at the least not since his recent hospital discharge. Could you reach out to make sure he gets post-hospital follow up with you? Thanks! Venancio Mcneil MD
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
## 2018-02-27 NOTE — ED TRIAGE NOTES
C/o SOB that got worse this AM. Reports was admitted 2 weeks ago for same complaint. Denies fevers, chills, chest pain. Denies any lung disease or oxygen use at home. Does report white productive cough. Does report having to do multiple breathing tx's daily for SOB.

## 2018-02-27 NOTE — ED NOTES
Pharmacy called charting RN and stated needed pt to bring formulary medication for chemo due to no supply in hospital. Pt and spouse informed and spouse stated would go home and get it.

## 2018-02-27 NOTE — ED NOTES
Pt resting comfortably in bed with spouse at bedside. Pt denies needs at this time. Side rails up and call light within reach.
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
## 2018-02-27 NOTE — ASSESSMENT & PLAN NOTE
- Patient is on Nexavar 200 mg BID  - Will continue for now   - He was supposed to get TACE in the beginning of the year but was unable due to dyspnea
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
## 2018-02-27 NOTE — ED NOTES
Pt reports supplemental oxygen is helping with SOB but tachypnea remains. Will continue to monitor.

## 2018-02-27 NOTE — PLAN OF CARE
02/27/18 1717   Discharge Assessment   Assessment Type Discharge Planning Assessment   Confirmed/corrected address and phone number on facesheet? Yes   Assessment information obtained from? Patient;Medical Record;Other  (sister at bedside)   Expected Length of Stay (days) 3   Communicated expected length of stay with patient/caregiver no   Prior to hospitilization cognitive status: Alert/Oriented   Prior to hospitalization functional status: Assistive Equipment   Current cognitive status: Alert/Oriented   Current Functional Status: Assistive Equipment   Facility Arrived From: sent from PCP clinic   Lives With sibling(s);other relative(s)  (sister, Darby Hawkins and nephew, Cirilo Ellison)   Able to Return to Prior Arrangements yes   Is patient able to care for self after discharge? Yes   Patient's perception of discharge disposition home or selfcare   Readmission Within The Last 30 Days previous discharge plan unsuccessful   If yes, most recent facility name: [facility] [place]   Patient currently being followed by outpatient case management? No   Patient currently receives any other outside agency services? No   Equipment Currently Used at Home cane, straight;shower chair;rollator   Do you have any problems affording any of your prescribed medications? No   Is the patient taking medications as prescribed? yes   Does the patient have transportation home? Yes   Transportation Available car;family or friend will provide   Does the patient receive services at the [facility]mad[facility] Clinic? No   Discharge Plan A Home with family   Discharge Plan B Home Health   Patient/Family In Agreement With Plan yes   Readmission Questionnaire   At the time of your discharge, did someone talk to you about what your health problems were? Yes   At the time of discharge, did someone talk to you about what to watch out for regarding worsening of your health problem? Yes   At the time of discharge, did someone talk to you about what to do if you experienced  worsening of your health problem? Yes   At the time of discharge, did someone talk to you about which medication to take when you left the hospital and which ones to stop taking? Yes   At the time of discharge, did someone talk to you about when and where to follow up with a doctor after you left the hospital? Yes   What do you believe caused you to be sick enough to be re-admitted? trouble breathing   How often do you need to have someone help you when you read instructions, pamphlets, or other written material from your doctor or pharmacy? Rarely   Do you have problems taking your medications as prescribed? No   Do you have any problems affording any of  your prescribed medications? No
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
## 2018-02-27 NOTE — ED NOTES
"Tele called for box and stated "runner was on break and need to send someone for a box." Jacky Storm, ED charge notified.  "

## 2018-02-27 NOTE — ASSESSMENT & PLAN NOTE
- Patient with worsening dyspnea despite using his albuterol at home, in the ED improved on 60 mg of prednisone, and nebs   - Will continue treatment with prednisone 40 mg PO daily   - Tiotropium capsule daily   - fluticasone - vilanterol daily   - albuterol- ipratropium q4 hours  - no signs of infection at the moment will hold off on antibiotics, no cough no change in sputum

## 2018-02-27 NOTE — PROGRESS NOTES
"Subjective:       Patient ID: Robin Hawkins is a 67 y.o. male.    Chief Complaint: Follow-up    HPI  68 y/o man with COPD, HTN, HLD, GERD, h/o colon cancer, h/o prostate cancer, Hep C with HCC, glaucoma, osteopenia, borderline DM, subclinical hypothyroidism, and h/o tracheostomy here for follow-up visit.  Primary concern is request for oxygen, +shortness of breath.  Here with family member Winifred.    Worsening dyspnea - worse over past 2 months, treated twice for COPD exacerbations, but especially worse in past 2 weeks. Family and patient both report he is only using one nebulizer solution (probably albuterol/ipratropium) and do not think he has had an inhaled steroid in some time.   Using his nebulizer 6 times/day, feels this helps somewhat.   Not coughing much, no increase in sputum production.  Having progressively more dyspnea on exertion especially.    From review of chart as well as discussion with patient/family:  H/o tracheostomy, +h/o laryngeal stenosis - following with mahsa Howell decannulated 9/28/16. Saw Dr Araujo 2/8/18, at that visit reported worsening dyspnea over several months and was scheduled for urgent primary care visit for further evaluation; on exam at that visit, airway stenosis noted to be resolved.     COPD - saw Dr Goyal 1/8/18, was treated for COPD exacerbation with prednisone x 10 days at that time. On 2/8/18 also had urgent visit as noted above, and was sent directly to the ER for dyspnea and acute COPD exacerbation; was discharged 2/12/18. CTA negative for PE but did show fluid in esophagus and hiatal hernia. Previous CT did note some ground-glass opacities in addition to emphysematous changes - HRCT suggested if symptoms not improving.   Seen by GI during admission - "Full endoscopic and manometric workup has been done in 2016. He does state that he spends most of his day laying flat and there is likely some reflux contributing to worsening pulmonary function, however he is " "unlikely a surgical candidate due to his numerous comorbidities and cirrhosis. Conservative management explained to patient and family by GI. Pulmonary workup for hepatopulmonary syndrome also reasonable if not already done."      Hepatitis C, cirrhosis, +HCC - Follows with hepatology.  Completed Harvoni tx on 1/2016 but relapsed. CT surveillance showed enlarging liver mass consistent with HCC, +elevated AFP. Underwent TACE on 1/29/16 -- follow-up CT scans on 3/4/16 and 6/21/16 showed well-treated lesion per Hepatology notes. 1/2017 showed enlarging lesion and additional lesion, with elevated AFP. 4/2017 had embolization performed. 7/7/17 and 8/28/17, TACE. CT 9/28/17 showed one persistent lesion; AFP decreased from >5000 to <100. On nexavar, vosevi, ribavirin.  Was planned for repeat TACE but hasn't yet had this - at visit with Heme/Onc 2/7 noted   HCC, h/o colon cancer, h/o prostate cancer - following with Dr Holder, Dr Carrillo with Oncology - see those notes for details.    GERD, f/u of intestinal metaplasia of stomach - Had EGD and cscope done 5/6/16. Polyp found in gastric body (focal mild gastritis on path, benign), another polyp found in duodenum (adenomatous polyp). No H.pylori, other biopsies showed gastritis but no other abnormalities. Recommended to repeat EGD in 3 years and c-scope in 5 years.   Taking daily omeprazole    Osteopenia - no recent falls/fractures. Does take a PPI. DXA done 3/2016 showing osteopenia of hip and femoral neck. Endocrine recommended RDA of calcium and vitamin D, would consider 1 dose IV reclast prior to transplant if deemed transplant candidate.     H/o borderline DM - working on getting more active. Not following specific diabetic diet. Last A1c 4.7 12/2017, plan for recheck q 6-12 months    Elevated TSH - being monitored, recommended to start levothyroxine if TSH >10. Has been gradually improving. Due for recheck.    Review of Systems   Constitutional: Negative for activity " "change and fever.   HENT: Negative for congestion and sore throat. Voice change: chronic hoarseness.    Respiratory: Positive for cough (mild) and shortness of breath (significant). Negative for wheezing.    Cardiovascular: Negative for chest pain and palpitations.   Gastrointestinal: Negative for abdominal pain.   Endocrine: Negative.    Genitourinary: Negative.    Musculoskeletal: Negative for gait problem (related to dyspnea).   Skin: Negative.    Neurological: Positive for light-headedness. Negative for syncope, weakness and headaches.   Psychiatric/Behavioral: Negative for dysphoric mood.         Past medical history, surgical history, and family medical history reviewed and updated as appropriate.    Medications and allergies reviewed.     Objective:          Vitals:    02/27/18 0811 02/27/18 0830 02/27/18 0840 02/27/18 0855   BP: 99/60      BP Location: Right arm      Patient Position: Sitting      Temp: 97.7 °F (36.5 °C)      TempSrc: Axillary      SpO2:  (!) 76% (!) 86% (!) 94%   Weight: 70.4 kg (155 lb 3.3 oz)      Height: 5' 8" (1.727 m)        Body mass index is 23.6 kg/m².  Physical Exam   Constitutional: He is oriented to person, place, and time. He appears well-developed and well-nourished. No distress.   HENT:   Head: Normocephalic and atraumatic.   Mouth/Throat: Oropharynx is clear and moist.   Eyes: Conjunctivae and EOM are normal. No scleral icterus.   Neck: Neck supple.   Cardiovascular: Normal rate, regular rhythm and normal heart sounds.    No murmur heard.  Quiet heart sounds, best audible farther to left lateral chest. Regular rate and rhythm   Pulmonary/Chest: Respiratory distress: no acute distress but increased work of breathing. He has no rales.   Tachypnea (RR in 20s). Coarse breath sounds throughout, some decrease at both bases. Occasional wheezes.   Abdominal: Soft. He exhibits no distension. There is no tenderness.   Musculoskeletal: He exhibits no edema or tenderness. "   Lymphadenopathy:     He has no cervical adenopathy.   Neurological: He is alert and oriented to person, place, and time. No cranial nerve deficit.   Gait not assessed due to dyspnea on mild exertion   Skin: Skin is warm and dry.   Psychiatric: He has a normal mood and affect.   Vitals reviewed.      Lab Results   Component Value Date    WBC 9.02 02/23/2018    HGB 12.0 (L) 02/23/2018    HCT 39.5 (L) 02/23/2018     02/23/2018    CHOL 104 (L) 09/07/2015    TRIG 132 09/07/2015    HDL 23 (L) 09/07/2015    ALT 29 02/23/2018    AST 32 02/23/2018     (L) 02/23/2018    K 4.3 02/23/2018    CL 96 02/23/2018    CREATININE 1.1 02/23/2018    BUN 13 02/23/2018    CO2 23 02/23/2018    TSH 3.235 02/08/2018    PSA 0.01 12/30/2015    INR 0.9 02/23/2018    HGBA1C 4.7 12/22/2017       Assessment:       1. Decompensated COPD with exacerbation (chronic obstructive pulmonary disease)    2. Tachypnea    3. Dyspnea, unspecified type    4. Laryngeal stenosis    5. Essential hypertension    6. Atherosclerosis of abdominal aorta    7. HCC (hepatocellular carcinoma)    8. Cirrhosis of liver without ascites, unspecified hepatic cirrhosis type    9. Subclinical hypothyroidism        Plan:   Robin was seen today for follow-up.    Diagnoses and all orders for this visit:    Decompensated COPD with exacerbation (chronic obstructive pulmonary disease)  -     Refer to Emergency Dept.  Tachypnea  -     Refer to Emergency Dept.  Dyspnea, unspecified type  -     Refer to Emergency Dept.    Increased dyspnea on exertion - may be decompensated COPD / COPD exacerbation though does have air movement through lungs with only a few wheezes noted. Has been off his pulmicort nebs for unknown amount of time. Difficult to get good pulse ox reading, but best available show significant hypoxic reading on RA with improvement on supplemental O2 via NC. Sending to ER for treatment for COPD exacerbation as well as further evaluation for his progressive  dyspnea.     Will also forward message to Dr Goyal so he is aware and can help coordinate post-hospital follow up.    Laryngeal stenosis  Comments:  resolved per last ENT note, but continues to have notable upper airway/tracheal sounds on exam    Essential hypertension  Comments:  BP low-normal    Atherosclerosis of abdominal aorta  Comments:  continue ASA, statin    HCC (hepatocellular carcinoma)    Cirrhosis of liver without ascites, unspecified hepatic cirrhosis type  Comments:  continue to follow with hepatology, heme/onc    Subclinical hypothyroidism  Comments:  resolved on last labs    Follow-up in about 2 weeks (around 3/13/2018), or if symptoms worsen or fail to improve, for hospital follow up.    Venancio Mcneil MD  Internal Medicine  [facility]  2/27/2018

## 2018-02-27 NOTE — ED NOTES
Pt resting in bed, no distress noted. Denies needs at this time. Bed rails are up and call light is within patient reach.

## 2018-02-27 NOTE — ED NOTES
Charting RN spoke with pt regarding home medications. Pt states took morning meds prior to going to clinic. Pt currently eating without difficulty. Pt denies needs at this time.

## 2018-02-28 ENCOUNTER — TELEPHONE (OUTPATIENT)
Dept: HEPATOLOGY | Facility: CLINIC | Age: 68
End: 2018-02-28

## 2018-02-28 PROBLEM — J96.21 ACUTE ON CHRONIC RESPIRATORY FAILURE WITH HYPOXIA: Status: RESOLVED | Noted: 2018-02-27 | Resolved: 2018-02-28

## 2018-02-28 LAB
ANION GAP SERPL CALC-SCNC: 10 MMOL/L
ANION GAP SERPL CALC-SCNC: 10 MMOL/L
ANION GAP SERPL CALC-SCNC: 11 MMOL/L
ANION GAP SERPL CALC-SCNC: 12 MMOL/L
ANION GAP SERPL CALC-SCNC: 7 MMOL/L
ANION GAP SERPL CALC-SCNC: 9 MMOL/L
BASOPHILS # BLD AUTO: 0.01 K/UL
BASOPHILS NFR BLD: 0.2 %
BUN SERPL-MCNC: 10 MG/DL
BUN SERPL-MCNC: 11 MG/DL
BUN SERPL-MCNC: 12 MG/DL
BUN SERPL-MCNC: 12 MG/DL
CALCIUM SERPL-MCNC: 7.6 MG/DL
CALCIUM SERPL-MCNC: 7.8 MG/DL
CALCIUM SERPL-MCNC: 8.2 MG/DL
CALCIUM SERPL-MCNC: 8.2 MG/DL
CHLORIDE SERPL-SCNC: 100 MMOL/L
CHLORIDE SERPL-SCNC: 103 MMOL/L
CHLORIDE SERPL-SCNC: 104 MMOL/L
CHLORIDE SERPL-SCNC: 99 MMOL/L
CO2 SERPL-SCNC: 12 MMOL/L
CO2 SERPL-SCNC: 14 MMOL/L
CO2 SERPL-SCNC: 15 MMOL/L
CO2 SERPL-SCNC: 15 MMOL/L
CO2 SERPL-SCNC: 16 MMOL/L
CO2 SERPL-SCNC: 19 MMOL/L
CREAT SERPL-MCNC: 0.9 MG/DL
CREAT SERPL-MCNC: 1 MG/DL
DIFFERENTIAL METHOD: ABNORMAL
EOSINOPHIL # BLD AUTO: 0 K/UL
EOSINOPHIL NFR BLD: 0 %
ERYTHROCYTE [DISTWIDTH] IN BLOOD BY AUTOMATED COUNT: 13.4 %
EST. GFR  (AFRICAN AMERICAN): >60 ML/MIN/1.73 M^2
EST. GFR  (NON AFRICAN AMERICAN): >60 ML/MIN/1.73 M^2
GLUCOSE SERPL-MCNC: 108 MG/DL
GLUCOSE SERPL-MCNC: 109 MG/DL
GLUCOSE SERPL-MCNC: 113 MG/DL
GLUCOSE SERPL-MCNC: 118 MG/DL
GLUCOSE SERPL-MCNC: 124 MG/DL
GLUCOSE SERPL-MCNC: 80 MG/DL
HCT VFR BLD AUTO: 34.5 %
HGB BLD-MCNC: 10.4 G/DL
IMM GRANULOCYTES # BLD AUTO: 0.03 K/UL
IMM GRANULOCYTES NFR BLD AUTO: 0.5 %
LYMPHOCYTES # BLD AUTO: 0.3 K/UL
LYMPHOCYTES NFR BLD: 4.7 %
MCH RBC QN AUTO: 33.4 PG
MCHC RBC AUTO-ENTMCNC: 30.1 G/DL
MCV RBC AUTO: 111 FL
MONOCYTES # BLD AUTO: 0.1 K/UL
MONOCYTES NFR BLD: 2.3 %
NEUTROPHILS # BLD AUTO: 5.7 K/UL
NEUTROPHILS NFR BLD: 92.3 %
NRBC BLD-RTO: 0 /100 WBC
PLATELET # BLD AUTO: 172 K/UL
PMV BLD AUTO: 10.3 FL
POCT GLUCOSE: 124 MG/DL (ref 70–110)
POCT GLUCOSE: 125 MG/DL (ref 70–110)
POCT GLUCOSE: 126 MG/DL (ref 70–110)
POCT GLUCOSE: 143 MG/DL (ref 70–110)
POTASSIUM SERPL-SCNC: 3.7 MMOL/L
POTASSIUM SERPL-SCNC: 3.8 MMOL/L
POTASSIUM SERPL-SCNC: 4.2 MMOL/L
POTASSIUM SERPL-SCNC: 4.3 MMOL/L
POTASSIUM SERPL-SCNC: 4.4 MMOL/L
POTASSIUM SERPL-SCNC: 5.3 MMOL/L
RBC # BLD AUTO: 3.11 M/UL
SODIUM SERPL-SCNC: 124 MMOL/L
SODIUM SERPL-SCNC: 125 MMOL/L
SODIUM SERPL-SCNC: 125 MMOL/L
SODIUM SERPL-SCNC: 126 MMOL/L
SODIUM SERPL-SCNC: 127 MMOL/L
SODIUM SERPL-SCNC: 127 MMOL/L
WBC # BLD AUTO: 6.18 K/UL

## 2018-02-28 PROCEDURE — 25000003 PHARM REV CODE 250: Performed by: STUDENT IN AN ORGANIZED HEALTH CARE EDUCATION/TRAINING PROGRAM

## 2018-02-28 PROCEDURE — 94640 AIRWAY INHALATION TREATMENT: CPT

## 2018-02-28 PROCEDURE — 36415 COLL VENOUS BLD VENIPUNCTURE: CPT

## 2018-02-28 PROCEDURE — 97161 PT EVAL LOW COMPLEX 20 MIN: CPT

## 2018-02-28 PROCEDURE — 80048 BASIC METABOLIC PNL TOTAL CA: CPT | Mod: 91

## 2018-02-28 PROCEDURE — 63600175 PHARM REV CODE 636 W HCPCS: Performed by: STUDENT IN AN ORGANIZED HEALTH CARE EDUCATION/TRAINING PROGRAM

## 2018-02-28 PROCEDURE — 25000242 PHARM REV CODE 250 ALT 637 W/ HCPCS: Performed by: STUDENT IN AN ORGANIZED HEALTH CARE EDUCATION/TRAINING PROGRAM

## 2018-02-28 PROCEDURE — 97165 OT EVAL LOW COMPLEX 30 MIN: CPT

## 2018-02-28 PROCEDURE — 11000001 HC ACUTE MED/SURG PRIVATE ROOM

## 2018-02-28 PROCEDURE — 80048 BASIC METABOLIC PNL TOTAL CA: CPT

## 2018-02-28 PROCEDURE — 85025 COMPLETE CBC W/AUTO DIFF WBC: CPT

## 2018-02-28 PROCEDURE — 27000221 HC OXYGEN, UP TO 24 HOURS

## 2018-02-28 PROCEDURE — 99232 SBSQ HOSP IP/OBS MODERATE 35: CPT | Mod: GC,,, | Performed by: HOSPITALIST

## 2018-02-28 PROCEDURE — 94761 N-INVAS EAR/PLS OXIMETRY MLT: CPT

## 2018-02-28 RX ORDER — MOXIFLOXACIN HYDROCHLORIDE 400 MG/1
400 TABLET ORAL DAILY
Status: DISCONTINUED | OUTPATIENT
Start: 2018-02-28 | End: 2018-03-01 | Stop reason: HOSPADM

## 2018-02-28 RX ORDER — ALBUTEROL SULFATE 0.63 MG/3ML
0.63 SOLUTION RESPIRATORY (INHALATION)
Status: ON HOLD | COMMUNITY
End: 2018-10-24

## 2018-02-28 RX ORDER — FLUTICASONE FUROATE AND VILANTEROL 100; 25 UG/1; UG/1
1 POWDER RESPIRATORY (INHALATION) DAILY
Qty: 1 EACH | Refills: 4 | Status: ON HOLD | OUTPATIENT
Start: 2018-03-01 | End: 2018-10-24 | Stop reason: HOSPADM

## 2018-02-28 RX ORDER — TIOTROPIUM BROMIDE 18 UG/1
1 CAPSULE ORAL; RESPIRATORY (INHALATION) DAILY
Qty: 360 CAPSULE | Refills: 0 | Status: SHIPPED | OUTPATIENT
Start: 2018-02-28 | End: 2018-05-28 | Stop reason: SDUPTHER

## 2018-02-28 RX ORDER — SODIUM CHLORIDE 9 MG/ML
INJECTION, SOLUTION INTRAVENOUS CONTINUOUS
Status: DISCONTINUED | OUTPATIENT
Start: 2018-02-28 | End: 2018-03-01 | Stop reason: HOSPADM

## 2018-02-28 RX ORDER — SODIUM CHLORIDE 9 MG/ML
1000 INJECTION, SOLUTION INTRAVENOUS CONTINUOUS
Status: DISCONTINUED | OUTPATIENT
Start: 2018-02-28 | End: 2018-02-28

## 2018-02-28 RX ORDER — LEVOFLOXACIN 750 MG/1
750 TABLET ORAL DAILY
Qty: 4 TABLET | Refills: 0 | Status: SHIPPED | OUTPATIENT
Start: 2018-03-01 | End: 2018-03-05

## 2018-02-28 RX ORDER — PREDNISONE 20 MG/1
40 TABLET ORAL DAILY
Qty: 6 TABLET | Refills: 0 | Status: SHIPPED | OUTPATIENT
Start: 2018-03-01 | End: 2018-03-04

## 2018-02-28 RX ADMIN — Medication 12.5 MG: at 09:02

## 2018-02-28 RX ADMIN — IPRATROPIUM BROMIDE AND ALBUTEROL SULFATE 3 ML: .5; 3 SOLUTION RESPIRATORY (INHALATION) at 11:02

## 2018-02-28 RX ADMIN — SORAFENIB 400 MG: 200 TABLET, FILM COATED ORAL at 09:02

## 2018-02-28 RX ADMIN — IPRATROPIUM BROMIDE AND ALBUTEROL SULFATE 3 ML: .5; 3 SOLUTION RESPIRATORY (INHALATION) at 12:02

## 2018-02-28 RX ADMIN — ATORVASTATIN CALCIUM 40 MG: 20 TABLET, FILM COATED ORAL at 09:02

## 2018-02-28 RX ADMIN — POTASSIUM CHLORIDE 20 MEQ: 1500 TABLET, EXTENDED RELEASE ORAL at 10:02

## 2018-02-28 RX ADMIN — OXYBUTYNIN CHLORIDE 10 MG: 5 TABLET, FILM COATED, EXTENDED RELEASE ORAL at 10:02

## 2018-02-28 RX ADMIN — Medication 12.5 MG: at 10:02

## 2018-02-28 RX ADMIN — PREDNISONE 40 MG: 20 TABLET ORAL at 09:02

## 2018-02-28 RX ADMIN — LATANOPROST 1 DROP: 50 SOLUTION OPHTHALMIC at 09:02

## 2018-02-28 RX ADMIN — SODIUM CHLORIDE 1000 ML: 0.9 INJECTION, SOLUTION INTRAVENOUS at 10:02

## 2018-02-28 RX ADMIN — POTASSIUM CHLORIDE 20 MEQ: 1500 TABLET, EXTENDED RELEASE ORAL at 09:02

## 2018-02-28 RX ADMIN — LISINOPRIL 20 MG: 20 TABLET ORAL at 09:02

## 2018-02-28 RX ADMIN — CALCIUM CARBONATE (ANTACID) CHEW TAB 500 MG 500 MG: 500 CHEW TAB at 04:02

## 2018-02-28 RX ADMIN — SIMETHICONE CHEW TAB 80 MG 80 MG: 80 TABLET ORAL at 02:02

## 2018-02-28 RX ADMIN — ENOXAPARIN SODIUM 40 MG: 100 INJECTION SUBCUTANEOUS at 04:02

## 2018-02-28 RX ADMIN — MOXIFLOXACIN HYDROCHLORIDE 400 MG: 400 TABLET, FILM COATED ORAL at 09:02

## 2018-02-28 RX ADMIN — IPRATROPIUM BROMIDE AND ALBUTEROL SULFATE 3 ML: .5; 3 SOLUTION RESPIRATORY (INHALATION) at 07:02

## 2018-02-28 RX ADMIN — FLUTICASONE FUROATE AND VILANTEROL TRIFENATATE 1 PUFF: 100; 25 POWDER RESPIRATORY (INHALATION) at 10:02

## 2018-02-28 RX ADMIN — PANTOPRAZOLE SODIUM 40 MG: 40 TABLET, DELAYED RELEASE ORAL at 09:02

## 2018-02-28 RX ADMIN — TIOTROPIUM BROMIDE 18 MCG: 18 CAPSULE ORAL; RESPIRATORY (INHALATION) at 10:02

## 2018-02-28 RX ADMIN — IPRATROPIUM BROMIDE AND ALBUTEROL SULFATE 3 ML: .5; 3 SOLUTION RESPIRATORY (INHALATION) at 04:02

## 2018-02-28 RX ADMIN — SODIUM CHLORIDE: 0.9 INJECTION, SOLUTION INTRAVENOUS at 06:02

## 2018-02-28 RX ADMIN — ASPIRIN 81 MG 81 MG: 81 TABLET ORAL at 09:02

## 2018-02-28 RX ADMIN — IPRATROPIUM BROMIDE AND ALBUTEROL SULFATE 3 ML: .5; 3 SOLUTION RESPIRATORY (INHALATION) at 03:02
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
## 2018-02-28 NOTE — TELEPHONE ENCOUNTER
S/w pt he is to follow up per Landen 1 week after his may lab appt but the schedule is not open for that time yet for Dr Scott we will schedule that appt when those schedules open.

## 2018-02-28 NOTE — HOSPITAL COURSE
Patient was admitted for COPD exacerbation in addition to hyponatremia.  He was started on dueonebs, steroids, abx, Spiriva and Breo.  Patient's SOB quickly improved.  His hypernatremia corrected with light hydration and holding hctz.  He was discharged home with Breo, Spiriva, and steroids and abx to complete a 5 day course. Hctz was held on discharge.  He was schedule for follow up with priority care.
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
## 2018-02-28 NOTE — PLAN OF CARE
Problem: Occupational Therapy Goal  Goal: Occupational Therapy Goal  Goals to be met by: 3/15/18     Patient will increase functional independence with ADLs by performing:    UE Dressing with [place].  LE Dressing with Modified [place].  Grooming while standing at sink with Modified [place].  Toileting from toilet with [place] for hygiene and clothing management.   Stand pivot transfers with Modified [place].    Outcome: Ongoing (interventions implemented as appropriate)  OT eval completed. The above goals are established to improve functional (I) and mobility.    ALVARADO Cheung  2/28/2018  Pager: [phone]

## 2018-02-28 NOTE — TELEPHONE ENCOUNTER
----- Message from Brittni Bales RN sent at 2/28/2018  1:30 PM CST -----  Contact: Winifred (sister)  MOHSEN  ----- Message -----  From: Lucia Perez  Sent: 2/28/2018  12:58 PM  To: Hepatology Scheduling    Pt received a letter to schedule an appt    Pt contact number [phone]  Thanks

## 2018-02-28 NOTE — NURSING
Home Oxygen Evaluation    Date Performed: 2018    1) Patient's  O2 Sat on room air, while at rest: 99%.        If O2 sats on room air at rest are 88% or below, patient qualifies. No additional testing needed. Document N/A in steps 2 and 3. If 89% or above, complete steps 2.      2) Patient's O2 Sat on room air while exercisin%.        If O2 sats on room air while exercising remain 89% or above patient does not qualify, no further testing needed Document N/A in step 3. If O2 sats on room air while exercising are 88% or below, continue to step 3.      3) Patient's O2 Sat while exercising on O2: NA at NA LPM         (Must show improvement from #2 for patients to qualify)    If O2 sats improve on oxygen, patient qualifies for portable oxygen. If not, the patient does not qualify.
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
## 2018-02-28 NOTE — PLAN OF CARE
Patient O2 sats at rest on RA 99%; patient completed 5 minute walk test (patient requested that walk end 2/2 lack of endurance) and O2 sats never less than 95%. Patient does not need home O2. CM spoke with Eduardo LUIS who recommends home health therapy. Patient has no preference for agency. [facility] unable to staff, [facility] can see on Friday; ok per Dr Ambriz. Face sheet to [facility] via .

## 2018-02-28 NOTE — PLAN OF CARE
[facility]    HOME HEALTH ORDERS  FACE TO FACE ENCOUNTER    Patient Name: Robin Hawkins  YOB: 1950    PCP: Venancio Mcneil MD   PCP Address: [address] [facility] [place] [address]  PCP Phone Number: [phone]  PCP Fax: [phone]    Encounter Date: 02/28/2018    Admit to Home Health    Diagnoses:  Active Hospital Problems    Diagnosis  POA    *COPD with acute exacerbation [J44.1]  Yes    Hyponatremia [E87.1]  Yes    Macrocytic anemia [D53.9]  Yes    Gastroesophageal reflux disease [K21.9]  Yes     Chronic     S/p EGD 5/2016 +gastritis      HCC (hepatocellular carcinoma) [C22.0]  Yes     Chronic     Enlarging liver mass consistent with HCC on CT 1/2016. +elevated AFP.  S/p TACE on 1/29/16, follow-up CT scans on 3/4/16 and 6/21/16 showed well-treated lesion  Following with Dr Scott for liver transplant evaluation      Chronic hepatitis C without hepatic coma [B18.2]  Yes     Genotype 1b; HCV viral load - 16,097,131 IU/ml, treatment naive   Harvoni 10/8/15 x 12 weeks, 1/5/16 - treatment completed, HCV RNA - not detected.   Recurrence of +HCV viral load after completion of treatment.      Essential hypertension [I10]  Yes     Chronic    Hyperlipidemia [E78.5]  Yes     Chronic      Resolved Hospital Problems    Diagnosis Date Resolved POA    Acute on chronic respiratory failure with hypoxia [J96.21] 02/28/2018 Yes       Future Appointments  Date Time Provider Department Center   3/2/2018 7:30 AM [facility] IR3-189 [facility] RAD IR [facility]   3/2/2018 8:00 AM [facility]   3/5/2018 9:00 AM MAYLIN Hodges [facility] IMPRICL Don [facility] W   3/6/2018 11:00 AM Ortiz Carrillo DO, FACP [facility] HEM ONC [facility]   3/23/2018 9:30 AM [facility] OIC-CT1 500 LB LIMIT [facility] CTSC IC Don Grijalva   3/23/2018 10:30 AM LAB, APPOINTMENT [facility] INTMED [facility] LAB LANCE MANE   5/2/2018 8:30 AM ALESSANDRO HOPKINS     Follow-up Information     [facility] On 3/5/2018.    Why:  9:00am  Contact information:  [address]  [facility] [address]  [phone]             Ortiz Carrillo DO, FACP On 3/6/2018.    Specialty:  Hematology and Oncology  Why:  11am  Contact information:  [address]  [facility] [address]  [phone]                     I have seen and examined this patient face to face today. My clinical findings that support the need for the home health skilled services and home bound status are the following:  Weakness/numbness causing balance and gait disturbance due to Weakness/Debility and Malignancy/Cancer making it taxing to leave home.    Allergies:Review of patient's allergies indicates:  No Known Allergies    Diet: regular diet    Activities: activity as tolerated    Nursing:   SN to complete comprehensive assessment including routine vital signs. Instruct on disease process and s/s of complications to report to MD. Review/verify medication list sent home with the patient at time of discharge  and instruct patient/caregiver as needed. Frequency may be adjusted depending on start of care date.    Notify MD if SBP > 160 or < 90; DBP > 90 or < 50; HR > 120 or < 50; Temp > 101; Other:         CONSULTS:    Physical Therapy to evaluate and treat. Evaluate for home safety and equipment needs; Establish/upgrade home exercise program. Perform / instruct on therapeutic exercises, gait training, transfer training, and Range of Motion.  Occupational Therapy to evaluate and treat. Evaluate home environment for safety and equipment needs. Perform/Instruct on transfers, ADL training, ROM, and therapeutic exercises.    MISCELLANEOUS CARE:  N/A    WOUND CARE ORDERS  n/a      Medications: Review discharge medications with patient and family and provide education.      Current Discharge Medication List      START taking these medications    Details   fluticasone-vilanterol (BREO) 100-25 mcg/dose diskus inhaler Inhale 1 puff into the  lungs once daily. Controller  Qty: 1 each, Refills: 4      levoFLOXacin (LEVAQUIN) 750 MG tablet Take 1 tablet (750 mg total) by mouth once daily.  Qty: 4 tablet, Refills: 0      nutritional supplements Liqd Take 237 mLs by mouth 3 (three) times daily.  Qty: 90 Bottle, Refills: 11      tiotropium (SPIRIVA) 18 mcg inhalation capsule Inhale 1 capsule (18 mcg total) into the lungs once daily. Controller  Qty: 360 capsule, Refills: 0         CONTINUE these medications which have CHANGED    Details   predniSONE (DELTASONE) 20 MG tablet Take 2 tablets (40 mg total) by mouth once daily.  Qty: 6 tablet, Refills: 0         CONTINUE these medications which have NOT CHANGED    Details   albuterol (ACCUNEB) 0.63 mg/3 mL Nebu Take 0.63 mg by nebulization 5 (five) times daily. Rescue      albuterol-ipratropium 2.5mg-0.5mg/3mL (DUO-NEB) 0.5 mg-3 mg(2.5 mg base)/3 mL nebulizer solution Take 3 mLs by nebulization every 4 (four) hours as needed for Wheezing. Rescue  Qty: 1 Box, Refills: 0      aspirin 81 MG Chew Take 81 mg by mouth every morning.       atorvastatin (LIPITOR) 40 MG tablet TAKE 1 TABLET BY MOUTH EVERY DAY  Qty: 90 tablet, Refills: 3      dorzolamide-timolol 2-0.5% (COSOPT) 22.3-6.8 mg/mL ophthalmic solution Place 1 drop into both eyes 2 (two) times daily.  Qty: 10 mL, Refills: 12    Associated Diagnoses: Primary open angle glaucoma of both eyes, moderate stage         latanoprost 0.005 % ophthalmic solution Place 1 drop into both eyes every evening.  Qty: 7.5 mL, Refills: 4    Comments: **Patient requests 90 days supply**  Associated Diagnoses: Primary open angle glaucoma of both eyes, moderate stage      lisinopril (PRINIVIL,ZESTRIL) 20 MG tablet TAKE 1 TABLET BY MOUTH EVERY DAY  Qty: 90 tablet, Refills: 1    Associated Diagnoses: Essential hypertension      magnesium oxide (MAGOX) 400 mg tablet Take 1 tablet (400 mg total) by mouth 2 (two) times daily.  Qty: 10 tablet, Refills: 1    Associated Diagnoses: HCC  (hepatocellular carcinoma); Chemotherapy follow-up examination; Hypokalemia      metoprolol tartrate (LOPRESSOR) 25 MG tablet TAKE ONE-HALF TABLET BY MOUTH TWICE DAILY  Qty: 90 tablet, Refills: 0      mirabegron (MYRBETRIQ) 50 mg Tb24 Take 1 tablet (50 mg total) by mouth every morning.  Qty: 90 tablet, Refills: 3    Associated Diagnoses: Urge incontinence      multivitamin capsule Take 1 capsule by mouth every morning.       NEXAVAR 200 mg tablet TAKE TWO TABLETS (400 MG TOTAL) BY MOUTH TWICE A DAY  Qty: 120 tablet, Refills: 0    Associated Diagnoses: HCC (hepatocellular carcinoma)      omeprazole (PRILOSEC) 20 MG capsule Take 1 capsule (20 mg total) by mouth once daily. Take on an empty stomach  Qty: 90 capsule, Refills: 1    Associated Diagnoses: Reflux esophagitis; Gastroesophageal reflux disease with esophagitis      oxybutynin (DITROPAN-XL) 10 MG 24 hr tablet Take 1 tablet (10 mg total) by mouth once daily.  Qty: 90 tablet, Refills: 3      potassium chloride SA (K-DUR,KLOR-CON) 20 MEQ tablet TAKE 2 TABLETS(40 MEQ) BY MOUTH TWICE DAILY  Qty: 360 tablet, Refills: 0    Associated Diagnoses: HCC (hepatocellular carcinoma); Chemotherapy follow-up examination; Hypokalemia         STOP taking these medications       ondansetron (ZOFRAN) 4 MG tablet Comments:   Reason for Stopping:         Budesonide (Symbicort)  Comments:   Reason for Stopping:               I certify that this patient is confined to his home and needs intermittent skilled nursing care, physical therapy and occupational therapy.

## 2018-02-28 NOTE — ASSESSMENT & PLAN NOTE
- Patient will Hgb 10.9   - will work up anemia iron studies normal please follow up B12 and folate out patient   - transfuse is less than 7 or symptomatic
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
## 2018-02-28 NOTE — NURSING
Patient ambulated up and down the hallways.  RN monitored during exercise.  While patient did become quite short of breath, oxygen saturation remained 99 -100%.  Heart rate increased from 77 to 94.  Patient instructed about paced activity with frequent rest periods.  Voiced good understanding.

## 2018-02-28 NOTE — ASSESSMENT & PLAN NOTE
- Patient with worsening dyspnea despite using his albuterol at home, in the ED improved on 60 mg of prednisone, and nebs   - Will continue treatment with prednisone 40 mg PO daily  For 5 days   - Tiotropium capsule daily   - albuterol- ipratropium   - will also continue antibiotics for anti-inflammatory reason to complete 5 days
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
## 2018-02-28 NOTE — PLAN OF CARE
Problem: Physical Therapy Goal  Goal: Physical Therapy Goal  Goals to be met by: 03/10/18    Patient will increase functional independence with mobility by performin. Supine to sit with Set-up [place]  2. Sit to supine with Set-up [place]  3. Bed to chair transfer with Supervision using LRAD  4. Gait  x 100 feet with Stand-by Assistance using LRAD  5. Ascend/descend 6 stair with bilateral Handrails Stand-by Assistance.  6. Lower extremity exercise program x15 reps in standing, with assistance as needed    Outcome: Ongoing (interventions implemented as appropriate)  PT Goals established following initial jennifer Hawkins, PT  2018

## 2018-02-28 NOTE — NURSING
"Physician here and review POC with patient and his sister.  Na level monitored with BMP q 6 hrs.  Saline infusion complete at this time and disconnected.  Patient  Complaining of "gas" and upper gastric pain.  Sister relates this is not new pain.  "

## 2018-02-28 NOTE — PHARMACY MED REC
"Admission Medication Reconciliation - Pharmacy Consult Note    The home medication history was taken by Melly Waggoner, pharmacy technician.  Based on information gathered and subsequent review by the clinical pharmacist, the items below may need attention.     You may go to "Admission" then "Reconcile Home Medications" tabs to review and/or act upon these items. Based on information gathered and subsequent review by the clinical pharmacist, the items below may need attention.    Potentially problematic discrepancies with current MAR  o Patient IS taking the following which was not ordered upon admit  o Mirabegron 50 mg QAM   o Cosopt 2-0.5% 1 gtt OU BID     Please address this information as you see fit.  Feel free to contact us if you have any questions or require assistance.    [facility]  EXT 40365    Patient's prior to admission medication regimen was as follows:  Current Outpatient Prescriptions on File Prior to Encounter   Medication Sig Dispense Refill Last Dose    albuterol-ipratropium 2.5mg-0.5mg/3mL (DUO-NEB) 0.5 mg-3 mg(2.5 mg base)/3 mL nebulizer solution Take 3 mLs by nebulization every 4 (four) hours as needed for Wheezing. Rescue (Patient taking differently: Take 3 mLs by nebulization 5 (five) times daily. Rescue) 1 Box 0 Taking    aspirin 81 MG Chew Take 81 mg by mouth every morning.    Taking    atorvastatin (LIPITOR) 40 MG tablet TAKE 1 TABLET BY MOUTH EVERY DAY 90 tablet 3 Taking    dorzolamide-timolol 2-0.5% (COSOPT) 22.3-6.8 mg/mL ophthalmic solution Place 1 drop into both eyes 2 (two) times daily. 10 mL 12 Taking    hydrochlorothiazide (HYDRODIURIL) 25 MG tablet TAKE 1 TABLET(25 MG) BY MOUTH EVERY DAY 90 tablet 0 Taking    latanoprost 0.005 % ophthalmic solution Place 1 drop into both eyes every evening. 7.5 mL 4 Taking    lisinopril (PRINIVIL,ZESTRIL) 20 MG tablet TAKE 1 TABLET BY MOUTH EVERY DAY 90 tablet 1 Taking    magnesium oxide (MAGOX) 400 mg tablet Take 1 tablet (400 mg " total) by mouth 2 (two) times daily. 10 tablet 1 Taking    metoprolol tartrate (LOPRESSOR) 25 MG tablet TAKE ONE-HALF TABLET BY MOUTH TWICE DAILY 90 tablet 0 Taking    mirabegron (MYRBETRIQ) 50 mg Tb24 Take 1 tablet (50 mg total) by mouth every morning. 90 tablet 3 Taking    multivitamin capsule Take 1 capsule by mouth every morning.    Taking    NEXAVAR 200 mg tablet TAKE TWO TABLETS (400 MG TOTAL) BY MOUTH TWICE A  tablet 0 Taking    omeprazole (PRILOSEC) 20 MG capsule Take 1 capsule (20 mg total) by mouth once daily. Take on an empty stomach 90 capsule 1 Taking    oxybutynin (DITROPAN-XL) 10 MG 24 hr tablet Take 1 tablet (10 mg total) by mouth once daily. 90 tablet 3 Taking    potassium chloride SA (K-DUR,KLOR-CON) 20 MEQ tablet TAKE 2 TABLETS(40 MEQ) BY MOUTH TWICE DAILY 360 tablet 0 Taking    [DISCONTINUED] budesonide 1 mg/2 mL NbSp USE CONTENT OF 1 VIAL IN NEBULIZER TWICE DAILY 120 mL 6 Taking    [DISCONTINUED] ondansetron (ZOFRAN) 4 MG tablet Take 2 tablets (8 mg total) by mouth every 8 (eight) hours as needed for Nausea. 30 tablet 0 Taking    [DISCONTINUED] ondansetron (ZOFRAN-ODT) 4 MG TbDL Take 1 tablet (4 mg total) by mouth every 6 (six) hours as needed. 20 tablet 0 Taking    [DISCONTINUED] oxyCODONE (ROXICODONE) 5 MG immediate release tablet Take 1 tablet (5 mg total) by mouth every 6 (six) hours as needed for Pain. 28 tablet 0 Taking    [DISCONTINUED] oxycodone-acetaminophen (PERCOCET) 5-325 mg per tablet Take 1 tablet by mouth every 6 (six) hours as needed for Pain. 20 tablet 0 Taking    [DISCONTINUED] predniSONE (DELTASONE) 10 MG tablet Take 3 tabs per day x 5 days, then 2 tabs per day x 5 days, then stop. Take with food 25 tablet 1 Taking         .    .

## 2018-03-01 VITALS
HEIGHT: 71 IN | HEART RATE: 71 BPM | BODY MASS INDEX: 22.37 KG/M2 | SYSTOLIC BLOOD PRESSURE: 129 MMHG | DIASTOLIC BLOOD PRESSURE: 61 MMHG | RESPIRATION RATE: 17 BRPM | OXYGEN SATURATION: 99 % | TEMPERATURE: 96 F | WEIGHT: 159.81 LBS

## 2018-03-01 DIAGNOSIS — I10 ESSENTIAL HYPERTENSION: ICD-10-CM

## 2018-03-01 LAB
ANION GAP SERPL CALC-SCNC: 9 MMOL/L
ANION GAP SERPL CALC-SCNC: 9 MMOL/L
BASOPHILS # BLD AUTO: 0.01 K/UL
BASOPHILS NFR BLD: 0.1 %
BUN SERPL-MCNC: 10 MG/DL
BUN SERPL-MCNC: 10 MG/DL
CALCIUM SERPL-MCNC: 7.8 MG/DL
CALCIUM SERPL-MCNC: 8 MG/DL
CHLORIDE SERPL-SCNC: 103 MMOL/L
CHLORIDE SERPL-SCNC: 99 MMOL/L
CO2 SERPL-SCNC: 18 MMOL/L
CO2 SERPL-SCNC: 20 MMOL/L
CORTIS SERPL-MCNC: 6.1 UG/DL
CREAT SERPL-MCNC: 0.9 MG/DL
CREAT SERPL-MCNC: 1 MG/DL
DIFFERENTIAL METHOD: ABNORMAL
EOSINOPHIL # BLD AUTO: 0 K/UL
EOSINOPHIL NFR BLD: 0 %
ERYTHROCYTE [DISTWIDTH] IN BLOOD BY AUTOMATED COUNT: 13.1 %
EST. GFR  (AFRICAN AMERICAN): >60 ML/MIN/1.73 M^2
EST. GFR  (AFRICAN AMERICAN): >60 ML/MIN/1.73 M^2
EST. GFR  (NON AFRICAN AMERICAN): >60 ML/MIN/1.73 M^2
EST. GFR  (NON AFRICAN AMERICAN): >60 ML/MIN/1.73 M^2
GLUCOSE SERPL-MCNC: 124 MG/DL
GLUCOSE SERPL-MCNC: 132 MG/DL
HCT VFR BLD AUTO: 28.6 %
HGB BLD-MCNC: 8.9 G/DL
IMM GRANULOCYTES # BLD AUTO: 0.04 K/UL
IMM GRANULOCYTES NFR BLD AUTO: 0.5 %
LYMPHOCYTES # BLD AUTO: 0.5 K/UL
LYMPHOCYTES NFR BLD: 6.1 %
MCH RBC QN AUTO: 33.6 PG
MCHC RBC AUTO-ENTMCNC: 31.1 G/DL
MCV RBC AUTO: 108 FL
MONOCYTES # BLD AUTO: 0.5 K/UL
MONOCYTES NFR BLD: 6.8 %
NEUTROPHILS # BLD AUTO: 6.3 K/UL
NEUTROPHILS NFR BLD: 86.5 %
NRBC BLD-RTO: 0 /100 WBC
PLATELET # BLD AUTO: 196 K/UL
PMV BLD AUTO: 9.7 FL
POCT GLUCOSE: 118 MG/DL (ref 70–110)
POCT GLUCOSE: 131 MG/DL (ref 70–110)
POTASSIUM SERPL-SCNC: 3.8 MMOL/L
POTASSIUM SERPL-SCNC: 3.9 MMOL/L
RBC # BLD AUTO: 2.65 M/UL
SODIUM SERPL-SCNC: 128 MMOL/L
SODIUM SERPL-SCNC: 130 MMOL/L
T4 FREE SERPL-MCNC: 0.84 NG/DL
TSH SERPL DL<=0.005 MIU/L-ACNC: 0.36 UIU/ML
WBC # BLD AUTO: 7.33 K/UL

## 2018-03-01 PROCEDURE — 94640 AIRWAY INHALATION TREATMENT: CPT

## 2018-03-01 PROCEDURE — 80048 BASIC METABOLIC PNL TOTAL CA: CPT

## 2018-03-01 PROCEDURE — 63600175 PHARM REV CODE 636 W HCPCS: Performed by: STUDENT IN AN ORGANIZED HEALTH CARE EDUCATION/TRAINING PROGRAM

## 2018-03-01 PROCEDURE — 84443 ASSAY THYROID STIM HORMONE: CPT

## 2018-03-01 PROCEDURE — 85025 COMPLETE CBC W/AUTO DIFF WBC: CPT

## 2018-03-01 PROCEDURE — 25000003 PHARM REV CODE 250: Performed by: STUDENT IN AN ORGANIZED HEALTH CARE EDUCATION/TRAINING PROGRAM

## 2018-03-01 PROCEDURE — 82533 TOTAL CORTISOL: CPT

## 2018-03-01 PROCEDURE — 94761 N-INVAS EAR/PLS OXIMETRY MLT: CPT

## 2018-03-01 PROCEDURE — 99238 HOSP IP/OBS DSCHRG MGMT 30/<: CPT | Mod: GC,,, | Performed by: HOSPITALIST

## 2018-03-01 PROCEDURE — 80048 BASIC METABOLIC PNL TOTAL CA: CPT | Mod: 91

## 2018-03-01 PROCEDURE — 84439 ASSAY OF FREE THYROXINE: CPT

## 2018-03-01 PROCEDURE — 25000242 PHARM REV CODE 250 ALT 637 W/ HCPCS: Performed by: STUDENT IN AN ORGANIZED HEALTH CARE EDUCATION/TRAINING PROGRAM

## 2018-03-01 PROCEDURE — 36415 COLL VENOUS BLD VENIPUNCTURE: CPT

## 2018-03-01 RX ORDER — HYDROCHLOROTHIAZIDE 25 MG/1
TABLET ORAL
Qty: 90 TABLET | Refills: 0 | OUTPATIENT
Start: 2018-03-01

## 2018-03-01 RX ADMIN — POTASSIUM CHLORIDE 20 MEQ: 1500 TABLET, EXTENDED RELEASE ORAL at 10:03

## 2018-03-01 RX ADMIN — SORAFENIB 400 MG: 200 TABLET, FILM COATED ORAL at 09:03

## 2018-03-01 RX ADMIN — LISINOPRIL 20 MG: 20 TABLET ORAL at 10:03

## 2018-03-01 RX ADMIN — PREDNISONE 40 MG: 20 TABLET ORAL at 10:03

## 2018-03-01 RX ADMIN — ATORVASTATIN CALCIUM 40 MG: 20 TABLET, FILM COATED ORAL at 10:03

## 2018-03-01 RX ADMIN — PANTOPRAZOLE SODIUM 40 MG: 40 TABLET, DELAYED RELEASE ORAL at 10:03

## 2018-03-01 RX ADMIN — MOXIFLOXACIN HYDROCHLORIDE 400 MG: 400 TABLET, FILM COATED ORAL at 10:03

## 2018-03-01 RX ADMIN — IPRATROPIUM BROMIDE AND ALBUTEROL SULFATE 3 ML: .5; 3 SOLUTION RESPIRATORY (INHALATION) at 03:03

## 2018-03-01 RX ADMIN — IPRATROPIUM BROMIDE AND ALBUTEROL SULFATE 3 ML: .5; 3 SOLUTION RESPIRATORY (INHALATION) at 08:03

## 2018-03-01 RX ADMIN — Medication 12.5 MG: at 10:03

## 2018-03-01 RX ADMIN — ASPIRIN 81 MG 81 MG: 81 TABLET ORAL at 10:03

## 2018-03-01 RX ADMIN — IPRATROPIUM BROMIDE AND ALBUTEROL SULFATE 3 ML: .5; 3 SOLUTION RESPIRATORY (INHALATION) at 11:03

## 2018-03-01 RX ADMIN — TIOTROPIUM BROMIDE 18 MCG: 18 CAPSULE ORAL; RESPIRATORY (INHALATION) at 10:03

## 2018-03-01 RX ADMIN — FLUTICASONE FUROATE AND VILANTEROL TRIFENATATE 1 PUFF: 100; 25 POWDER RESPIRATORY (INHALATION) at 10:03

## 2018-03-01 RX ADMIN — SODIUM CHLORIDE: 0.9 INJECTION, SOLUTION INTRAVENOUS at 05:03
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
## 2018-03-01 NOTE — PLAN OF CARE
Problem: Fall Risk (Adult)  Goal: Absence of Falls  Patient will demonstrate the desired outcomes by discharge/transition of care.   Outcome: Outcome(s) achieved Date Met: 03/01/18  Pt is free of falls and injuries at the time of discharge . Fall precautions maintained .     Problem: Patient Care Overview  Goal: Plan of Care Review  Outcome: Outcome(s) achieved Date Met: 03/01/18  Plan of care reviewed with pt . Pt is A,A,O x 4 . Stable V/s. No C/O pain . Pt resting  In his bed . Pt is able to ambulate in the room independently and use the bathroom . Medications given as scheduled . Pt is ready for discharge to home per MD order . Pt is waiting for his sister to take him home .

## 2018-03-01 NOTE — PROGRESS NOTES
AVS d/c instructions given to sister and patient. Voices understanding. PIV d/perri with cath. Intact, gauze dressing applied. Telemetry d/perri. Waiting on hospital w/c transport.

## 2018-03-01 NOTE — NURSING
Patient request ADT RN to wait for his sister to arrive and go over d/c instructions with her; will wait for family arrival per patient request.

## 2018-03-01 NOTE — PLAN OF CARE
03/01/18 1201   Final Note   Assessment Type Final Discharge Note   Discharge Disposition Home-Health  ([facility])   Hospital Follow Up  Appt(s) scheduled? Yes

## 2018-03-02 ENCOUNTER — SURGERY (OUTPATIENT)
Age: 68
End: 2018-03-02

## 2018-03-02 ENCOUNTER — HOSPITAL ENCOUNTER (OUTPATIENT)
Facility: HOSPITAL | Age: 68
Discharge: HOME | End: 2018-03-02
Attending: INTERNAL MEDICINE | Admitting: INTERNAL MEDICINE
Payer: MEDICARE

## 2018-03-02 VITALS
TEMPERATURE: 97 F | BODY MASS INDEX: 21.56 KG/M2 | HEART RATE: 70 BPM | DIASTOLIC BLOOD PRESSURE: 60 MMHG | OXYGEN SATURATION: 100 % | RESPIRATION RATE: 18 BRPM | HEIGHT: 71 IN | SYSTOLIC BLOOD PRESSURE: 105 MMHG | WEIGHT: 154 LBS

## 2018-03-02 DIAGNOSIS — C22.0 HCC (HEPATOCELLULAR CARCINOMA): ICD-10-CM

## 2018-03-02 DIAGNOSIS — C22.0 HCC (HEPATOCELLULAR CARCINOMA): Primary | ICD-10-CM

## 2018-03-02 LAB
ALBUMIN SERPL BCP-MCNC: 3.2 G/DL
ALP SERPL-CCNC: 91 U/L
ALT SERPL W/O P-5'-P-CCNC: 27 U/L
ANION GAP SERPL CALC-SCNC: 10 MMOL/L
AST SERPL-CCNC: 29 U/L
BASOPHILS # BLD AUTO: 0.01 K/UL
BASOPHILS NFR BLD: 0.1 %
BILIRUB SERPL-MCNC: 0.9 MG/DL
BILIRUB SERPL-MCNC: 1 MG/DL
BUN SERPL-MCNC: 11 MG/DL
CALCIUM SERPL-MCNC: 8.9 MG/DL
CHLORIDE SERPL-SCNC: 107 MMOL/L
CO2 SERPL-SCNC: 18 MMOL/L
CREAT SERPL-MCNC: 0.9 MG/DL
DIFFERENTIAL METHOD: ABNORMAL
EOSINOPHIL # BLD AUTO: 0 K/UL
EOSINOPHIL NFR BLD: 0.1 %
ERYTHROCYTE [DISTWIDTH] IN BLOOD BY AUTOMATED COUNT: 13.5 %
EST. GFR  (AFRICAN AMERICAN): >60 ML/MIN/1.73 M^2
EST. GFR  (NON AFRICAN AMERICAN): >60 ML/MIN/1.73 M^2
GLUCOSE SERPL-MCNC: 76 MG/DL
HCT VFR BLD AUTO: 34.1 %
HGB BLD-MCNC: 10.5 G/DL
IMM GRANULOCYTES # BLD AUTO: 0.11 K/UL
IMM GRANULOCYTES NFR BLD AUTO: 1 %
INR PPP: 1
LYMPHOCYTES # BLD AUTO: 0.5 K/UL
LYMPHOCYTES NFR BLD: 4.5 %
MCH RBC QN AUTO: 33.8 PG
MCHC RBC AUTO-ENTMCNC: 30.8 G/DL
MCV RBC AUTO: 110 FL
MONOCYTES # BLD AUTO: 0.4 K/UL
MONOCYTES NFR BLD: 3.9 %
NEUTROPHILS # BLD AUTO: 9.7 K/UL
NEUTROPHILS NFR BLD: 90.4 %
NRBC BLD-RTO: 0 /100 WBC
PLATELET # BLD AUTO: 214 K/UL
PMV BLD AUTO: 9.9 FL
POTASSIUM SERPL-SCNC: 4.2 MMOL/L
PROT SERPL-MCNC: 7.3 G/DL
PROTHROMBIN TIME: 10.2 SEC
RBC # BLD AUTO: 3.11 M/UL
SODIUM SERPL-SCNC: 135 MMOL/L
WBC # BLD AUTO: 10.68 K/UL

## 2018-03-02 PROCEDURE — 82247 BILIRUBIN TOTAL: CPT

## 2018-03-02 PROCEDURE — 80053 COMPREHEN METABOLIC PANEL: CPT

## 2018-03-02 PROCEDURE — 63600175 PHARM REV CODE 636 W HCPCS: Performed by: RADIOLOGY

## 2018-03-02 PROCEDURE — 63600175 PHARM REV CODE 636 W HCPCS: Performed by: STUDENT IN AN ORGANIZED HEALTH CARE EDUCATION/TRAINING PROGRAM

## 2018-03-02 PROCEDURE — 85025 COMPLETE CBC W/AUTO DIFF WBC: CPT

## 2018-03-02 PROCEDURE — 25500020 PHARM REV CODE 255: Performed by: INTERNAL MEDICINE

## 2018-03-02 PROCEDURE — 85610 PROTHROMBIN TIME: CPT

## 2018-03-02 PROCEDURE — 25000003 PHARM REV CODE 250: Performed by: STUDENT IN AN ORGANIZED HEALTH CARE EDUCATION/TRAINING PROGRAM

## 2018-03-02 RX ORDER — ONDANSETRON HYDROCHLORIDE 8 MG/1
8 TABLET, FILM COATED ORAL EVERY 8 HOURS PRN
Qty: 20 TABLET | Refills: 0 | Status: SHIPPED | OUTPATIENT
Start: 2018-03-02 | End: 2018-03-06

## 2018-03-02 RX ORDER — AMOXICILLIN AND CLAVULANATE POTASSIUM 500; 125 MG/1; MG/1
1 TABLET, FILM COATED ORAL 3 TIMES DAILY
Qty: 21 TABLET | Refills: 0 | Status: SHIPPED | OUTPATIENT
Start: 2018-03-02 | End: 2018-03-09

## 2018-03-02 RX ORDER — ONDANSETRON 2 MG/ML
INJECTION INTRAMUSCULAR; INTRAVENOUS CODE/TRAUMA/SEDATION MEDICATION
Status: COMPLETED | OUTPATIENT
Start: 2018-03-02 | End: 2018-03-02

## 2018-03-02 RX ORDER — DIPHENHYDRAMINE HYDROCHLORIDE 50 MG/ML
50 INJECTION INTRAMUSCULAR; INTRAVENOUS ONCE
Status: COMPLETED | OUTPATIENT
Start: 2018-03-02 | End: 2018-03-02

## 2018-03-02 RX ORDER — SODIUM CHLORIDE 9 MG/ML
INJECTION, SOLUTION INTRAVENOUS CONTINUOUS
Status: DISCONTINUED | OUTPATIENT
Start: 2018-03-02 | End: 2018-03-05 | Stop reason: HOSPADM

## 2018-03-02 RX ORDER — OXYCODONE HYDROCHLORIDE 5 MG/1
5 TABLET ORAL EVERY 6 HOURS PRN
Qty: 28 TABLET | Refills: 0 | Status: ON HOLD | OUTPATIENT
Start: 2018-03-02 | End: 2018-04-16

## 2018-03-02 RX ORDER — MIDAZOLAM HYDROCHLORIDE 1 MG/ML
INJECTION INTRAMUSCULAR; INTRAVENOUS CODE/TRAUMA/SEDATION MEDICATION
Status: COMPLETED | OUTPATIENT
Start: 2018-03-02 | End: 2018-03-02

## 2018-03-02 RX ORDER — DIPHENHYDRAMINE HCL 25 MG
25 CAPSULE ORAL ONCE
Status: DISCONTINUED | OUTPATIENT
Start: 2018-03-02 | End: 2018-03-02

## 2018-03-02 RX ORDER — FENTANYL CITRATE 50 UG/ML
INJECTION, SOLUTION INTRAMUSCULAR; INTRAVENOUS CODE/TRAUMA/SEDATION MEDICATION
Status: COMPLETED | OUTPATIENT
Start: 2018-03-02 | End: 2018-03-02

## 2018-03-02 RX ADMIN — ONDANSETRON 4 MG: 2 INJECTION INTRAMUSCULAR; INTRAVENOUS at 11:03

## 2018-03-02 RX ADMIN — HYDROCORTISONE SODIUM SUCCINATE 200 MG: 100 INJECTION, POWDER, FOR SOLUTION INTRAMUSCULAR; INTRAVENOUS at 10:03

## 2018-03-02 RX ADMIN — FENTANYL CITRATE 25 MCG: 50 INJECTION, SOLUTION INTRAMUSCULAR; INTRAVENOUS at 11:03

## 2018-03-02 RX ADMIN — DIPHENHYDRAMINE HYDROCHLORIDE 50 MG: 50 INJECTION, SOLUTION INTRAMUSCULAR; INTRAVENOUS at 10:03

## 2018-03-02 RX ADMIN — SODIUM CHLORIDE: 0.9 INJECTION, SOLUTION INTRAVENOUS at 09:03

## 2018-03-02 RX ADMIN — FENTANYL CITRATE 25 MCG: 50 INJECTION, SOLUTION INTRAMUSCULAR; INTRAVENOUS at 10:03

## 2018-03-02 RX ADMIN — MIDAZOLAM HYDROCHLORIDE 1 MG: 1 INJECTION, SOLUTION INTRAMUSCULAR; INTRAVENOUS at 10:03

## 2018-03-02 RX ADMIN — DOXORUBICIN HYDROCHLORIDE 15 MG: 2 INJECTION, SOLUTION INTRAVENOUS at 10:03

## 2018-03-02 RX ADMIN — AMPICILLIN SODIUM AND SULBACTAM SODIUM 3 G: 2; 1 INJECTION, POWDER, FOR SOLUTION INTRAMUSCULAR; INTRAVENOUS at 10:03

## 2018-03-02 RX ADMIN — IOHEXOL 80 ML: 300 INJECTION, SOLUTION INTRAVENOUS at 12:03

## 2018-03-02 NOTE — H&P
Radiology Post-Procedure Note    Pre Op Diagnosis: Hepatocellular carcinoma    Post Op Diagnosis: Hepatocellular carcinoma    Procedure: Chemoembolization    Procedure Performed by: Santos Ortez MD    Written Informed Consent Obtained: Yes    Specimen Removed: None    Estimated Blood Loss: Minimal    Findings:     After placement of a right femoral artery sheath, a 5-Slovak catheter was inserted and angiography of the celiac artery and superior mesenteric artery for anatomic evaluation and localization of hepatic tumor.  A microcatheter was introduced into feeding arteries of a left hepatic lobe tumor and LC beads coated with doxorubicin were injected until near stagnant flow was achieved.  Post procedural angiography revealed no complications.    Right femoral artery angiogram was performed and the sheath was removed.  Hemostasis was achieved using exoseal technique.  There was no hematoma at the time of hemostasis.    The patient tolerated procedure well.  Please see Imaging report for further details.    Mason Golden MD  Radiology

## 2018-03-02 NOTE — PROGRESS NOTES
Pt arrived to [facility] bay [facility]. Report received from MADDI Stauffer. Dressing to groin dry and intact. Family at bedside.

## 2018-03-02 NOTE — PROGRESS NOTES
Pre-Procedure assessment and documentation complete. Awaiting lab results,consent, and H&P Note to begin procedure (Pt and family aware). No complaints at this time.

## 2018-03-02 NOTE — ASSESSMENT & PLAN NOTE
Resolving   - Patient with worsening dyspnea despite using his albuterol at home, in the ED improved on 60 mg of prednisone, and nebs   - Will continue treatment with prednisone 40 mg PO daily  For 5 days   - Tiotropium capsule daily   - albuterol- ipratropium   - will also continue antibiotics for anti-inflammatory reason to complete 5 days   - Will discharge on steroids, abx, start breo and spiriva
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
## 2018-03-02 NOTE — DISCHARGE INSTRUCTIONS
For scheduling: Call Mag at [phone]    For questions or concerns call: TORREY MON-FRI 8 AM- 5PM [phone]. Radiology resident on call [phone].    For immediate concerns that are not emergent, you may call our radiology clinic at: [phone]

## 2018-03-02 NOTE — ASSESSMENT & PLAN NOTE
- Patient admitted with Na of 122   - serum and urine sodium suggest hypovolemia.  - S/p 1 L in the ED   -NA now 127 will continue to monitor

## 2018-03-02 NOTE — PROGRESS NOTES
Pt discharged to home.  Discharge instructions given, pt and sister stated understanding.  Dressing to groin dry and intact, IV removed.  Pt left via wheelchair with sister to home.

## 2018-03-04 ENCOUNTER — TELEPHONE (OUTPATIENT)
Dept: HEPATOLOGY | Facility: CLINIC | Age: 68
End: 2018-03-04

## 2018-03-04 DIAGNOSIS — C22.0 HCC (HEPATOCELLULAR CARCINOMA): Primary | ICD-10-CM

## 2018-03-05 DIAGNOSIS — C22.0 HCC (HEPATOCELLULAR CARCINOMA): Primary | ICD-10-CM

## 2018-03-05 LAB — PHOSPHATIDYLETHANOL (PETH): NEGATIVE NG/ML

## 2018-03-06 ENCOUNTER — OFFICE VISIT (OUTPATIENT)
Dept: HEMATOLOGY/ONCOLOGY | Facility: CLINIC | Age: 68
End: 2018-03-06
Payer: MEDICARE

## 2018-03-06 ENCOUNTER — OFFICE VISIT (OUTPATIENT)
Dept: PRIMARY CARE CLINIC | Facility: CLINIC | Age: 68
End: 2018-03-06
Payer: MEDICARE

## 2018-03-06 VITALS
BODY MASS INDEX: 22.37 KG/M2 | HEART RATE: 80 BPM | HEIGHT: 71 IN | SYSTOLIC BLOOD PRESSURE: 111 MMHG | DIASTOLIC BLOOD PRESSURE: 68 MMHG | WEIGHT: 159.81 LBS | TEMPERATURE: 97 F | RESPIRATION RATE: 20 BRPM | OXYGEN SATURATION: 100 %

## 2018-03-06 VITALS
SYSTOLIC BLOOD PRESSURE: 98 MMHG | HEIGHT: 69 IN | WEIGHT: 161.19 LBS | DIASTOLIC BLOOD PRESSURE: 54 MMHG | BODY MASS INDEX: 23.87 KG/M2 | HEART RATE: 81 BPM | OXYGEN SATURATION: 99 %

## 2018-03-06 DIAGNOSIS — I10 ESSENTIAL HYPERTENSION: Chronic | ICD-10-CM

## 2018-03-06 DIAGNOSIS — K74.60 CIRRHOSIS OF LIVER WITHOUT ASCITES, UNSPECIFIED HEPATIC CIRRHOSIS TYPE: Chronic | ICD-10-CM

## 2018-03-06 DIAGNOSIS — C22.0 HCC (HEPATOCELLULAR CARCINOMA): Primary | Chronic | ICD-10-CM

## 2018-03-06 DIAGNOSIS — J43.2 CENTRILOBULAR EMPHYSEMA: Primary | Chronic | ICD-10-CM

## 2018-03-06 DIAGNOSIS — Z09 CHEMOTHERAPY FOLLOW-UP EXAMINATION: ICD-10-CM

## 2018-03-06 DIAGNOSIS — C22.0 HCC (HEPATOCELLULAR CARCINOMA): Chronic | ICD-10-CM

## 2018-03-06 DIAGNOSIS — K22.4 ESOPHAGEAL DYSMOTILITY: ICD-10-CM

## 2018-03-06 PROBLEM — R91.1 LUNG NODULE: Status: RESOLVED | Noted: 2017-02-01 | Resolved: 2018-03-06

## 2018-03-06 PROBLEM — J44.1 COPD WITH ACUTE EXACERBATION: Status: RESOLVED | Noted: 2018-02-08 | Resolved: 2018-03-06

## 2018-03-06 PROCEDURE — 99499 UNLISTED E&M SERVICE: CPT | Mod: S$GLB,,, | Performed by: INTERNAL MEDICINE

## 2018-03-06 PROCEDURE — 3078F DIAST BP <80 MM HG: CPT | Mod: S$GLB,,, | Performed by: INTERNAL MEDICINE

## 2018-03-06 PROCEDURE — 99214 OFFICE O/P EST MOD 30 MIN: CPT | Mod: S$GLB,,, | Performed by: NURSE PRACTITIONER

## 2018-03-06 PROCEDURE — 3074F SYST BP LT 130 MM HG: CPT | Mod: S$GLB,,, | Performed by: NURSE PRACTITIONER

## 2018-03-06 PROCEDURE — 99999 PR PBB SHADOW E&M-EST. PATIENT-LVL V: CPT | Mod: PBBFAC,,, | Performed by: NURSE PRACTITIONER

## 2018-03-06 PROCEDURE — 99499 UNLISTED E&M SERVICE: CPT | Mod: S$GLB,,, | Performed by: NURSE PRACTITIONER

## 2018-03-06 PROCEDURE — 3078F DIAST BP <80 MM HG: CPT | Mod: S$GLB,,, | Performed by: NURSE PRACTITIONER

## 2018-03-06 PROCEDURE — 99999 PR PBB SHADOW E&M-EST. PATIENT-LVL IV: CPT | Mod: PBBFAC,,, | Performed by: INTERNAL MEDICINE

## 2018-03-06 PROCEDURE — 3074F SYST BP LT 130 MM HG: CPT | Mod: S$GLB,,, | Performed by: INTERNAL MEDICINE

## 2018-03-06 PROCEDURE — 99214 OFFICE O/P EST MOD 30 MIN: CPT | Mod: S$GLB,,, | Performed by: INTERNAL MEDICINE

## 2018-03-06 RX ORDER — IPRATROPIUM BROMIDE AND ALBUTEROL SULFATE 2.5; .5 MG/3ML; MG/3ML
3 SOLUTION RESPIRATORY (INHALATION) EVERY 4 HOURS PRN
Qty: 1 BOX | Refills: 6 | Status: SHIPPED | OUTPATIENT
Start: 2018-03-06 | End: 2018-04-12 | Stop reason: SDUPTHER

## 2018-03-06 NOTE — PATIENT INSTRUCTIONS
1) Monitor your blood pressures and call me next week.    2) Call with questions    [facility] Visit (Post Discharge Follow-up) Today:   - Our clinic physicians and nurses plan to follow the patient up for any medical issues in the [facility] for 30 days post discharge.    Future Appointments  Date Time Provider Department Center   3/16/2018 10:30 AM Kirstie Anaya NP [facility] PULMSVC [facility]   3/23/2018 9:30 AM [facility] OIC-CT1 500 LB LIMIT [facility] CTS IC [facility]   3/23/2018 10:30 AM LAB, APPOINTMENT [facility] CONG [facility] LAB [facility]   5/1/2018 9:20 AM Venancio Mcneil MD [facility]   5/2/2018 8:30 AM LAB, [facility] LAB [place]

## 2018-03-06 NOTE — TELEPHONE ENCOUNTER
Called patient and left a voicemail requesting he returns our call to schedule MRI to monitor his liver post-TACE.
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
## 2018-03-07 ENCOUNTER — TELEPHONE (OUTPATIENT)
Dept: HEPATOLOGY | Facility: CLINIC | Age: 68
End: 2018-03-07

## 2018-03-07 ENCOUNTER — TELEPHONE (OUTPATIENT)
Dept: HEMATOLOGY/ONCOLOGY | Facility: CLINIC | Age: 68
End: 2018-03-07

## 2018-03-07 DIAGNOSIS — C22.0 HCC (HEPATOCELLULAR CARCINOMA): Primary | Chronic | ICD-10-CM

## 2018-03-07 DIAGNOSIS — C22.0 HCC (HEPATOCELLULAR CARCINOMA): Primary | ICD-10-CM

## 2018-03-07 NOTE — TELEPHONE ENCOUNTER
----- Message from Park Lora sent at 3/7/2018 10:00 AM CST -----  Contact: Sister Manju   States she would like to schedule MRI for patient.     Fareed

## 2018-03-08 NOTE — TELEPHONE ENCOUNTER
----- Message from Ortiz Carrillo DO, FACP sent at 3/7/2018  4:19 PM CST -----  picc ordered to be done prior to mri

## 2018-03-09 RX ORDER — METOPROLOL TARTRATE 25 MG/1
TABLET, FILM COATED ORAL
Qty: 90 TABLET | Refills: 0 | Status: SHIPPED | OUTPATIENT
Start: 2018-03-09 | End: 2018-06-06 | Stop reason: SDUPTHER

## 2018-03-12 DIAGNOSIS — C22.0 HCC (HEPATOCELLULAR CARCINOMA): Chronic | ICD-10-CM

## 2018-03-16 RX ORDER — SORAFENIB 200 MG/1
TABLET, FILM COATED ORAL
Qty: 120 TABLET | Refills: 0 | Status: SHIPPED | OUTPATIENT
Start: 2018-03-16 | End: 2018-04-09

## 2018-03-20 ENCOUNTER — TELEPHONE (OUTPATIENT)
Dept: PHARMACY | Facility: CLINIC | Age: 68
End: 2018-03-20

## 2018-03-23 ENCOUNTER — TELEPHONE (OUTPATIENT)
Dept: HEPATOLOGY | Facility: CLINIC | Age: 68
End: 2018-03-23

## 2018-03-23 ENCOUNTER — HOSPITAL ENCOUNTER (OUTPATIENT)
Dept: RADIOLOGY | Facility: HOSPITAL | Age: 68
Discharge: HOME | End: 2018-03-23
Attending: INTERNAL MEDICINE
Payer: MEDICARE

## 2018-03-23 DIAGNOSIS — C22.0 HCC (HEPATOCELLULAR CARCINOMA): ICD-10-CM

## 2018-03-23 NOTE — TELEPHONE ENCOUNTER
----- Message from Johanne Díaz sent at 3/23/2018 10:31 AM CDT -----  Contact: pt  Calling to have labs rescheduled   pt needed pic line to have labs done    Pt contact [phone]
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
## 2018-03-28 ENCOUNTER — TELEPHONE (OUTPATIENT)
Dept: HEPATOLOGY | Facility: CLINIC | Age: 68
End: 2018-03-28

## 2018-03-28 NOTE — TELEPHONE ENCOUNTER
----- Message from Apple Deluca MA sent at 3/28/2018  2:03 PM CDT -----  Contact: sister/Manju  Ms Susi, this is the pt who needed a PICC line and now needs a sedative for his MRI, our providers cannot do that I believe Dr Scott is pts doctor, I thought I sent something over on this matter to Dr Scott yesterday or the day before concerning the sedative, however I do not see anything in the chart has been prescribed can you check on Dr Scott on this please thanks.  ----- Message -----  From: Johanne Díaz  Sent: 3/28/2018  12:23 PM  To: [facility] Hepatitis C Staff    Calling to get sedative for pt to have MRI done on 4/6/18    Pt states she called last week regarding this same issue    Pt contact [phone]

## 2018-03-30 DIAGNOSIS — J44.9 CHRONIC OBSTRUCTIVE PULMONARY DISEASE, UNSPECIFIED COPD TYPE: Chronic | ICD-10-CM

## 2018-04-02 RX ORDER — IPRATROPIUM BROMIDE AND ALBUTEROL SULFATE 2.5; .5 MG/3ML; MG/3ML
SOLUTION RESPIRATORY (INHALATION)
Qty: 360 ML | Refills: 0 | OUTPATIENT
Start: 2018-04-02

## 2018-04-02 NOTE — TELEPHONE ENCOUNTER
Pt has not picked up Rx. Rx has been phoned in. Pt will be notified when Rx is ready for pick-up.

## 2018-04-02 NOTE — TELEPHONE ENCOUNTER
This medication was just prescribed 3/6/18 with 6 refills, please check with patient to make sure he has gone to pharmacy to get this

## 2018-04-03 ENCOUNTER — TELEPHONE (OUTPATIENT)
Dept: HEPATOLOGY | Facility: CLINIC | Age: 68
End: 2018-04-03

## 2018-04-03 ENCOUNTER — OUTPATIENT CASE MANAGEMENT (OUTPATIENT)
Dept: ADMINISTRATIVE | Facility: OTHER | Age: 68
End: 2018-04-03

## 2018-04-03 DIAGNOSIS — B18.2 CHRONIC HEPATITIS C WITHOUT HEPATIC COMA: Primary | ICD-10-CM

## 2018-04-03 NOTE — TELEPHONE ENCOUNTER
HCV LAB REVIEW  SVR 6    Pertinent labs:  HCV RNA: 238,096 IU/mL     Results discussed with patient's sister, labs concerning for relapse. I would like to rule out lab error and get  NS5a testing. Pt planned to have picc line placed 04/06/18; please coordinate lab draw on that day so it can be drawn from picc. Please schedule f/u 1 week following labs.

## 2018-04-04 ENCOUNTER — TELEPHONE (OUTPATIENT)
Dept: HEPATOLOGY | Facility: CLINIC | Age: 68
End: 2018-04-04

## 2018-04-04 ENCOUNTER — TELEPHONE (OUTPATIENT)
Dept: TRANSPLANT | Facility: CLINIC | Age: 68
End: 2018-04-04

## 2018-04-04 DIAGNOSIS — F41.9 ANXIETY: Primary | ICD-10-CM

## 2018-04-04 RX ORDER — DIAZEPAM 5 MG/1
5 TABLET ORAL
Qty: 1 TABLET | Refills: 0 | Status: SHIPPED | OUTPATIENT
Start: 2018-04-04 | End: 2018-08-22 | Stop reason: ALTCHOICE

## 2018-04-04 NOTE — TELEPHONE ENCOUNTER
----- Message from Landen Steinberg PA-C sent at 4/3/2018  1:30 PM CDT -----  Regarding: RE: VOLUME FOR MRI  Please let sister know that I will have to forward the request for valium to Dr Scott   ----- Message -----  From: Cici Hernandez LPN  Sent: 4/3/2018   1:22 PM  To: Landen Steinberg PA-C  Subject: VOLUME FOR MRI                                   Patient sister called and states patient has a MRI on 4/6/18 and wants to know if patient could have 1 volume to be able to tolerate the test better? Please advise. Thank you.

## 2018-04-04 NOTE — TELEPHONE ENCOUNTER
Writer spoke with patients sister Manju. She states understanding I advised her we would call when Dr. Scott or Landen Steinberg responds. She states understanding.

## 2018-04-05 ENCOUNTER — OUTPATIENT CASE MANAGEMENT (OUTPATIENT)
Dept: ADMINISTRATIVE | Facility: OTHER | Age: 68
End: 2018-04-05

## 2018-04-05 DIAGNOSIS — Z95.828 STATUS POST PICC CENTRAL LINE PLACEMENT: Primary | ICD-10-CM

## 2018-04-06 ENCOUNTER — HOSPITAL ENCOUNTER (OUTPATIENT)
Dept: INTERVENTIONAL RADIOLOGY/VASCULAR | Facility: HOSPITAL | Age: 68
Discharge: HOME | End: 2018-04-06
Attending: INTERNAL MEDICINE
Payer: MEDICARE

## 2018-04-06 ENCOUNTER — HOSPITAL ENCOUNTER (OUTPATIENT)
Dept: RADIOLOGY | Facility: HOSPITAL | Age: 68
Discharge: HOME | End: 2018-04-06
Attending: INTERNAL MEDICINE
Payer: MEDICARE

## 2018-04-06 ENCOUNTER — OFFICE VISIT (OUTPATIENT)
Dept: INTERVENTIONAL RADIOLOGY/VASCULAR | Facility: CLINIC | Age: 68
End: 2018-04-06
Payer: MEDICARE

## 2018-04-06 VITALS
DIASTOLIC BLOOD PRESSURE: 73 MMHG | HEART RATE: 77 BPM | RESPIRATION RATE: 16 BRPM | SYSTOLIC BLOOD PRESSURE: 144 MMHG | OXYGEN SATURATION: 100 %

## 2018-04-06 DIAGNOSIS — C22.0 HCC (HEPATOCELLULAR CARCINOMA): Chronic | ICD-10-CM

## 2018-04-06 DIAGNOSIS — C22.0 HCC (HEPATOCELLULAR CARCINOMA): Primary | ICD-10-CM

## 2018-04-06 DIAGNOSIS — C22.0 HCC (HEPATOCELLULAR CARCINOMA): ICD-10-CM

## 2018-04-06 PROCEDURE — 36415 COLL VENOUS BLD VENIPUNCTURE: CPT

## 2018-04-06 PROCEDURE — 76937 US GUIDE VASCULAR ACCESS: CPT | Mod: 26,,, | Performed by: RADIOLOGY

## 2018-04-06 PROCEDURE — 77001 FLUOROGUIDE FOR VEIN DEVICE: CPT | Mod: 26,,, | Performed by: RADIOLOGY

## 2018-04-06 PROCEDURE — 74177 CT ABD & PELVIS W/CONTRAST: CPT | Mod: 26,,, | Performed by: RADIOLOGY

## 2018-04-06 PROCEDURE — 87902 NFCT AGT GNTYP ALYS HEP C: CPT

## 2018-04-06 PROCEDURE — 3078F DIAST BP <80 MM HG: CPT | Mod: CPTII,S$GLB,, | Performed by: FAMILY MEDICINE

## 2018-04-06 PROCEDURE — 25500020 PHARM REV CODE 255: Performed by: INTERNAL MEDICINE

## 2018-04-06 PROCEDURE — 71260 CT THORAX DX C+: CPT | Mod: 26,,, | Performed by: RADIOLOGY

## 2018-04-06 PROCEDURE — 71260 CT THORAX DX C+: CPT | Mod: TC

## 2018-04-06 PROCEDURE — 74181 MRI ABDOMEN W/O CONTRAST: CPT | Mod: TC

## 2018-04-06 PROCEDURE — 74177 CT ABD & PELVIS W/CONTRAST: CPT | Mod: TC

## 2018-04-06 PROCEDURE — 3077F SYST BP >= 140 MM HG: CPT | Mod: CPTII,S$GLB,, | Performed by: FAMILY MEDICINE

## 2018-04-06 PROCEDURE — 87902 NFCT AGT GNTYP ALYS HEP C: CPT | Mod: 59

## 2018-04-06 PROCEDURE — 76937 US GUIDE VASCULAR ACCESS: CPT | Mod: TC

## 2018-04-06 PROCEDURE — 36569 INSJ PICC 5 YR+ W/O IMAGING: CPT | Mod: RT

## 2018-04-06 PROCEDURE — 74181 MRI ABDOMEN W/O CONTRAST: CPT | Mod: 26,,, | Performed by: RADIOLOGY

## 2018-04-06 PROCEDURE — 99999 PR PBB SHADOW E&M-EST. PATIENT-LVL I: CPT | Mod: PBBFAC,,,

## 2018-04-06 PROCEDURE — 36569 INSJ PICC 5 YR+ W/O IMAGING: CPT | Mod: RT,,, | Performed by: RADIOLOGY

## 2018-04-06 PROCEDURE — 99212 OFFICE O/P EST SF 10 MIN: CPT | Mod: S$GLB,,, | Performed by: FAMILY MEDICINE

## 2018-04-06 PROCEDURE — 87522 HEPATITIS C REVRS TRNSCRPJ: CPT

## 2018-04-06 RX ADMIN — IOHEXOL 15 ML: 350 INJECTION, SOLUTION INTRAVENOUS at 11:04

## 2018-04-06 RX ADMIN — IOHEXOL 75 ML: 350 INJECTION, SOLUTION INTRAVENOUS at 01:04

## 2018-04-06 RX ADMIN — IOHEXOL 15 ML: 350 INJECTION, SOLUTION INTRAVENOUS at 12:04

## 2018-04-06 NOTE — PROGRESS NOTES
Subjective:       Patient ID: Robin Hawkins is a 67 y.o. male.    Chief Complaint: Hepatocellular Carcinoma    Patient here for follow up of his hepatocellular carcinoma last treated with chemoembolization on 3/2/2018. He reports feeling tired today. He has had an MRI and a CT scan this morning. He is accompanied by his sister.       Review of Systems   Constitutional: Positive for fatigue. Negative for activity change, appetite change, chills and fever.   Respiratory: Positive for shortness of breath. Negative for cough, wheezing and stridor.    Cardiovascular: Negative for chest pain, palpitations and leg swelling.   Gastrointestinal: Negative for abdominal distention, abdominal pain, constipation, diarrhea, nausea and vomiting.       Objective:      Physical Exam   Constitutional: He is oriented to person, place, and time. He appears well-developed. No distress.   Pulmonary/Chest: Effort normal.   Neurological: He is alert and oriented to person, place, and time. Gait normal.   Skin: Skin is warm and dry. He is not diaphoretic. No cyanosis. Nails show no clubbing.   Psychiatric: He has a normal mood and affect.   Vitals reviewed.      Assessment:       1. HCC (hepatocellular carcinoma)        Plan:         Reviewed MRI from today. Unfortunately, MRI is not complete due to patient experiencing SOB. His sister tells me she tried calling the primary care provider for a valium for this test. She tells me she still has not heard back. I reassure her she can call me, if it's a test that I have ordered. I would be happy to prescribe Mr. Hawkins medication to help with his anxiety during an MRI that I have ordered. The results of his CT scan are not posted. In looking at the images from the CT scan, and the measurements marked, the two lesions on the left lobe have increased slightly in size when compared to the MRI that was done in December 2017. I will message Dr. Ortez and call patient back with his  recommendations. Patient and sister verbalize understanding and agreement.
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
## 2018-04-06 NOTE — DISCHARGE INSTRUCTIONS
For scheduling call Mag [phone].    [facility] 8am-5pm [phone]    After hours IR resident [phone]

## 2018-04-08 ENCOUNTER — TELEPHONE (OUTPATIENT)
Dept: TRANSPLANT | Facility: CLINIC | Age: 68
End: 2018-04-08

## 2018-04-08 NOTE — TELEPHONE ENCOUNTER
Patient: Robin Hawkins       MRN: [id number]      : 1950     Age: 67 y.o.  [address] [facility] [address]    Provider: Hepatologist - [place]    Urgency of review: urgent    Patient Transplant Status: Not a candidate    Reason for presentation: Reassessment    Clinical Summary: A ct scan from 12/30/15 showed a 2.5 cm enhancing mass in segment VI that was previously 2 cm in Oct 2015 and 1.2 cm in 2015 and was c/w HCC radiologically. AFP was 109 16 prior to tace. He underwent a TACE on 16. Post TACE sequential CT scans revealed a well treated lesion      1/10/17: The previously treated lesion, although not enhancing had grown in size. In addition a new lesion mearsuring 2.7 cm was noted. AFP had risen to 313 (was <20).     17: Y90 embolization. Post treatment scan today not done since unable to get IV access. Most recent AFPs 5/10/17 5556; today 5080 . In addition the Ct chest done on 1/10/17 showed 2 new lesions, the larger measuring 1.2 cm. These are of unclear significance.     17 and 17: TACE  17: RFA in IR  3/2/18: TACE      Imaging to be reviewed: 18    HCC Treatment History: as above    ABO: A POS    Platelets:   Lab Results   Component Value Date/Time     2018 08:22 AM     2018 08:22 AM     Creatinine:   Lab Results   Component Value Date/Time    CREATININE 1.0 2018 08:22 AM     Bilirubin:   Lab Results   Component Value Date/Time    BILITOT 0.5 2018 08:22 AM     AFP Last 3 each if available:   Lab Results   Component Value Date/Time    AFP 65 (H) 2017 08:46 AM    AFP 81 (H) 10/31/2017 01:41 PM    AFP 85 (H) 10/16/2017 06:55 AM       MELD: MELD-Na score: 6 at 3/23/2018  8:22 AM  MELD score: 6 at 3/23/2018  8:22 AM  Calculated from:  Serum Creatinine: 1.0 mg/dL at 3/23/2018  8:22 AM  Serum Sodium: 134 mmol/L at 3/23/2018  8:22 AM  Total Bilirubin: 0.5 mg/dL (Rounded to 1) at 3/23/2018  8:22 AM  INR(ratio): 1.0 at 3/23/2018   8:22 AM  Age: 67 years    Plan:     Follow-up Provider:

## 2018-04-09 ENCOUNTER — OFFICE VISIT (OUTPATIENT)
Dept: HEMATOLOGY/ONCOLOGY | Facility: CLINIC | Age: 68
End: 2018-04-09
Payer: MEDICARE

## 2018-04-09 VITALS
TEMPERATURE: 97 F | HEIGHT: 71 IN | WEIGHT: 149.5 LBS | DIASTOLIC BLOOD PRESSURE: 56 MMHG | BODY MASS INDEX: 20.93 KG/M2 | SYSTOLIC BLOOD PRESSURE: 109 MMHG | OXYGEN SATURATION: 97 % | HEART RATE: 81 BPM | RESPIRATION RATE: 18 BRPM

## 2018-04-09 DIAGNOSIS — Z09 CHEMOTHERAPY FOLLOW-UP EXAMINATION: ICD-10-CM

## 2018-04-09 DIAGNOSIS — C22.0 HCC (HEPATOCELLULAR CARCINOMA): Primary | Chronic | ICD-10-CM

## 2018-04-09 PROCEDURE — 99214 OFFICE O/P EST MOD 30 MIN: CPT | Mod: S$GLB,,, | Performed by: INTERNAL MEDICINE

## 2018-04-09 PROCEDURE — 3078F DIAST BP <80 MM HG: CPT | Mod: CPTII,S$GLB,, | Performed by: INTERNAL MEDICINE

## 2018-04-09 PROCEDURE — 3074F SYST BP LT 130 MM HG: CPT | Mod: CPTII,S$GLB,, | Performed by: INTERNAL MEDICINE

## 2018-04-09 PROCEDURE — 99999 PR PBB SHADOW E&M-EST. PATIENT-LVL V: CPT | Mod: PBBFAC,,, | Performed by: INTERNAL MEDICINE

## 2018-04-09 PROCEDURE — 99499 UNLISTED E&M SERVICE: CPT | Mod: S$PBB,,, | Performed by: INTERNAL MEDICINE

## 2018-04-09 RX ORDER — SODIUM CHLORIDE 0.9 % (FLUSH) 0.9 %
10 SYRINGE (ML) INJECTION
Status: CANCELLED | OUTPATIENT
Start: 2018-04-16

## 2018-04-09 RX ORDER — HEPARIN 100 UNIT/ML
500 SYRINGE INTRAVENOUS
Status: CANCELLED | OUTPATIENT
Start: 2018-04-16

## 2018-04-09 NOTE — Clinical Note
Refer to general surgery for port preferably this week Start nivolumab next week prior to the 16th See me with cycle 2 and 4 weeks with CBC, CMP, AFP

## 2018-04-09 NOTE — TELEPHONE ENCOUNTER
Patient: Robin Hawkins       MRN: [id number]      : 1950     Age: 67 y.o.  [address] [facility] [address]    Provider: Hepatologist - [place]    Urgency of review: urgent    Patient Transplant Status: Not a candidate    Reason for presentation: Reassessment    Clinical Summary: A ct scan from 12/30/15 showed a 2.5 cm enhancing mass in segment VI that was previously 2 cm in Oct 2015 and 1.2 cm in 2015 and was c/w HCC radiologically. AFP was 109 16 prior to tace. He underwent a TACE on 16. Post TACE sequential CT scans revealed a well treated lesion      1/10/17: The previously treated lesion, although not enhancing had grown in size. In addition a new lesion mearsuring 2.7 cm was noted. AFP had risen to 313 (was <20).     17: Y90 embolization. Post treatment scan today not done since unable to get IV access. Most recent AFPs 5/10/17 5556; today 5080 . In addition the Ct chest done on 1/10/17 showed 2 new lesions, the larger measuring 1.2 cm. These are of unclear significance.     17 and 17: TACE  17: RFA in IR  3/2/18: TACE      Imaging to be reviewed: 18    HCC Treatment History: as above    ABO: A POS    Platelets:   Lab Results   Component Value Date/Time     2018 08:22 AM     2018 08:22 AM     Creatinine:   Lab Results   Component Value Date/Time    CREATININE 1.0 2018 08:22 AM     Bilirubin:   Lab Results   Component Value Date/Time    BILITOT 0.5 2018 08:22 AM     AFP Last 3 each if available:   Lab Results   Component Value Date/Time    AFP 65 (H) 2017 08:46 AM    AFP 81 (H) 10/31/2017 01:41 PM    AFP 85 (H) 10/16/2017 06:55 AM       MELD: MELD-Na score: 6 at 3/23/2018  8:22 AM  MELD score: 6 at 3/23/2018  8:22 AM  Calculated from:  Serum Creatinine: 1.0 mg/dL at 3/23/2018  8:22 AM  Serum Sodium: 134 mmol/L at 3/23/2018  8:22 AM  Total Bilirubin: 0.5 mg/dL (Rounded to 1) at 3/23/2018  8:22 AM  INR(ratio): 1.0 at 3/23/2018   8:22 AM  Age: 67 years    Plan: Bilobar involvement.  Previously treated left lobe with poor response.  Weight loss and declilning functional status. Hem-Onc referral.  Consider retreatment with Y90.    Note forwarded to Cici Hernandez LPN to coordinate follow-up     Follow-up Provider: Octavia Scott MD
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
## 2018-04-10 ENCOUNTER — CONFERENCE (OUTPATIENT)
Dept: TRANSPLANT | Facility: CLINIC | Age: 68
End: 2018-04-10

## 2018-04-10 ENCOUNTER — TELEPHONE (OUTPATIENT)
Dept: OPTOMETRY | Facility: CLINIC | Age: 68
End: 2018-04-10

## 2018-04-10 ENCOUNTER — TELEPHONE (OUTPATIENT)
Dept: INTERVENTIONAL RADIOLOGY/VASCULAR | Facility: CLINIC | Age: 68
End: 2018-04-10

## 2018-04-10 NOTE — TELEPHONE ENCOUNTER
----- Message from Kusum Muhammad, OD sent at 4/10/2018  2:45 PM CDT -----  Regarding: call to schedule  Hello,    This patient is due for a 6-month follow-up on glaucoma. A recall was not sent out, so please call to offer to schedule HVF 24-2ss, RNFL OCT, and Posterior Pole OCT followed by IOP check with me.    Thank you,  Kusum Muhammad, OD

## 2018-04-11 ENCOUNTER — TELEPHONE (OUTPATIENT)
Dept: SURGERY | Facility: CLINIC | Age: 68
End: 2018-04-11

## 2018-04-11 ENCOUNTER — TELEPHONE (OUTPATIENT)
Dept: TRANSPLANT | Facility: CLINIC | Age: 68
End: 2018-04-11

## 2018-04-11 DIAGNOSIS — C22.0 HCC (HEPATOCELLULAR CARCINOMA): Chronic | ICD-10-CM

## 2018-04-11 DIAGNOSIS — C22.0 HEPATOCELLULAR CARCINOMA: Primary | ICD-10-CM

## 2018-04-11 DIAGNOSIS — C22.0 HCC (HEPATOCELLULAR CARCINOMA): Primary | ICD-10-CM

## 2018-04-11 LAB
DACLATASVIR RESISTANCE RESULT1: ABNORMAL
ELBASVIR RESISTANCE RESULT1: ABNORMAL
HCV GENTYP SERPL NAA+PROBE: ABNORMAL
HCV NS3 MUT DET ISLT GENOTYP: ABNORMAL
HCV QUALITATIVE RESULT: DETECTED
HCV QUANTITATIVE LOG: 5.43 LOG (10) IU/ML
HCV RNA SPEC NAA+PROBE-ACNC: ABNORMAL IU/ML
LEDIPASVIR RESISTANCE RESULT1: ABNORMAL
OMBITASVIR RESISTANCE: ABNORMAL
VELPATASVIR RESISTANCE RESULT1: ABNORMAL

## 2018-04-11 NOTE — TELEPHONE ENCOUNTER
----- Message from Angie Sanz sent at 4/11/2018  9:08 AM CDT -----  Look in Gerardo on 4/9 in plan, he states send for port.  Thanks

## 2018-04-11 NOTE — TELEPHONE ENCOUNTER
Call to pt. Spoke with sister, Manju Canales. Informed that Dr Alas can place port requested by Gerardo on Monday 4/16/18. General pre-surgery instructions given. Pt to be called with arrival time on Friday 4/13/18 after 12:00pm. Consent to be obtained the AM of surgery.

## 2018-04-11 NOTE — TELEPHONE ENCOUNTER
HCC radiology review: rec hemonc referral and Y-90 to be considered. Pt to be seen in IR clinic. Please schedule

## 2018-04-12 ENCOUNTER — OFFICE VISIT (OUTPATIENT)
Dept: PULMONOLOGY | Facility: CLINIC | Age: 68
End: 2018-04-12
Payer: MEDICARE

## 2018-04-12 ENCOUNTER — OUTPATIENT CASE MANAGEMENT (OUTPATIENT)
Dept: ADMINISTRATIVE | Facility: OTHER | Age: 68
End: 2018-04-12

## 2018-04-12 ENCOUNTER — OFFICE VISIT (OUTPATIENT)
Dept: HEPATOLOGY | Facility: CLINIC | Age: 68
End: 2018-04-12
Payer: MEDICARE

## 2018-04-12 VITALS
HEART RATE: 91 BPM | HEIGHT: 71 IN | BODY MASS INDEX: 20.87 KG/M2 | RESPIRATION RATE: 14 BRPM | DIASTOLIC BLOOD PRESSURE: 62 MMHG | WEIGHT: 149.06 LBS | OXYGEN SATURATION: 98 % | SYSTOLIC BLOOD PRESSURE: 124 MMHG

## 2018-04-12 VITALS
SYSTOLIC BLOOD PRESSURE: 128 MMHG | HEIGHT: 71 IN | RESPIRATION RATE: 18 BRPM | WEIGHT: 149.06 LBS | OXYGEN SATURATION: 100 % | HEART RATE: 89 BPM | DIASTOLIC BLOOD PRESSURE: 69 MMHG | BODY MASS INDEX: 20.87 KG/M2

## 2018-04-12 DIAGNOSIS — C22.0 HCC (HEPATOCELLULAR CARCINOMA): Chronic | ICD-10-CM

## 2018-04-12 DIAGNOSIS — B18.2 CHRONIC HEPATITIS C WITHOUT HEPATIC COMA: ICD-10-CM

## 2018-04-12 DIAGNOSIS — K74.60 CIRRHOSIS OF LIVER WITHOUT ASCITES, UNSPECIFIED HEPATIC CIRRHOSIS TYPE: Primary | ICD-10-CM

## 2018-04-12 DIAGNOSIS — J43.2 CENTRILOBULAR EMPHYSEMA: Primary | Chronic | ICD-10-CM

## 2018-04-12 DIAGNOSIS — Z79.899 LONG TERM CURRENT USE OF THERAPEUTIC DRUG: ICD-10-CM

## 2018-04-12 DIAGNOSIS — J38.02 BILATERAL COMPLETE VOCAL FOLD PARALYSIS: ICD-10-CM

## 2018-04-12 DIAGNOSIS — Z98.890 H/O TRACHEOSTOMY: Chronic | ICD-10-CM

## 2018-04-12 DIAGNOSIS — C22.0 HCC (HEPATOCELLULAR CARCINOMA): ICD-10-CM

## 2018-04-12 PROCEDURE — 99214 OFFICE O/P EST MOD 30 MIN: CPT | Mod: S$GLB,,, | Performed by: INTERNAL MEDICINE

## 2018-04-12 PROCEDURE — 3078F DIAST BP <80 MM HG: CPT | Mod: CPTII,S$GLB,, | Performed by: INTERNAL MEDICINE

## 2018-04-12 PROCEDURE — 3074F SYST BP LT 130 MM HG: CPT | Mod: CPTII,S$GLB,, | Performed by: PHYSICIAN ASSISTANT

## 2018-04-12 PROCEDURE — 3078F DIAST BP <80 MM HG: CPT | Mod: CPTII,S$GLB,, | Performed by: PHYSICIAN ASSISTANT

## 2018-04-12 PROCEDURE — 99214 OFFICE O/P EST MOD 30 MIN: CPT | Mod: S$GLB,,, | Performed by: PHYSICIAN ASSISTANT

## 2018-04-12 PROCEDURE — 99999 PR PBB SHADOW E&M-EST. PATIENT-LVL III: CPT | Mod: PBBFAC,,, | Performed by: INTERNAL MEDICINE

## 2018-04-12 PROCEDURE — 99499 UNLISTED E&M SERVICE: CPT | Mod: S$PBB,,, | Performed by: PHYSICIAN ASSISTANT

## 2018-04-12 PROCEDURE — 3074F SYST BP LT 130 MM HG: CPT | Mod: CPTII,S$GLB,, | Performed by: INTERNAL MEDICINE

## 2018-04-12 PROCEDURE — 99499 UNLISTED E&M SERVICE: CPT | Mod: S$PBB,,, | Performed by: INTERNAL MEDICINE

## 2018-04-12 PROCEDURE — 99999 PR PBB SHADOW E&M-EST. PATIENT-LVL V: CPT | Mod: PBBFAC,,, | Performed by: PHYSICIAN ASSISTANT

## 2018-04-12 RX ORDER — SORAFENIB 200 MG/1
TABLET, FILM COATED ORAL
Qty: 120 TABLET | Refills: 0 | OUTPATIENT
Start: 2018-04-12

## 2018-04-12 RX ORDER — SODIUM CHLORIDE 9 MG/ML
INJECTION, SOLUTION INTRAVENOUS CONTINUOUS
Status: CANCELLED | OUTPATIENT
Start: 2018-04-12

## 2018-04-12 RX ORDER — IPRATROPIUM BROMIDE AND ALBUTEROL SULFATE 2.5; .5 MG/3ML; MG/3ML
3 SOLUTION RESPIRATORY (INHALATION) EVERY 4 HOURS PRN
Qty: 100 VIAL | Refills: 6 | Status: SHIPPED | OUTPATIENT
Start: 2018-04-12 | End: 2018-07-24 | Stop reason: SDUPTHER

## 2018-04-12 NOTE — PROGRESS NOTES
Summary:Pt gives permission to speak with his sister Winifred Canales. Ms. Canales reports that they are at [facility] for an appt. Scheduled to call back on tomorrow.

## 2018-04-13 ENCOUNTER — TELEPHONE (OUTPATIENT)
Dept: HEMATOLOGY/ONCOLOGY | Facility: CLINIC | Age: 68
End: 2018-04-13

## 2018-04-13 ENCOUNTER — ANESTHESIA EVENT (OUTPATIENT)
Dept: SURGERY | Facility: HOSPITAL | Age: 68
End: 2018-04-13
Payer: MEDICARE

## 2018-04-13 ENCOUNTER — TELEPHONE (OUTPATIENT)
Dept: SURGERY | Facility: CLINIC | Age: 68
End: 2018-04-13

## 2018-04-13 ENCOUNTER — TELEPHONE (OUTPATIENT)
Dept: PHARMACY | Facility: CLINIC | Age: 68
End: 2018-04-13

## 2018-04-13 DIAGNOSIS — R18.8 OTHER ASCITES: ICD-10-CM

## 2018-04-13 DIAGNOSIS — Z51.12 ENCOUNTER FOR ANTINEOPLASTIC IMMUNOTHERAPY: ICD-10-CM

## 2018-04-13 DIAGNOSIS — C22.0 HCC (HEPATOCELLULAR CARCINOMA): Primary | ICD-10-CM

## 2018-04-13 NOTE — PATIENT INSTRUCTIONS
Continue present respiratory medications (roles reviewed at today's visit).  Refills ordered for DuoNeb.  Discussed the early role for antibiotics for treatment of respiratory infection.  Call/return as needed.

## 2018-04-13 NOTE — TELEPHONE ENCOUNTER
Nexavar discontinued 4/9, spoke with Manju and informed her to turn in tablets to provider or pharmacy for disposal.

## 2018-04-13 NOTE — PRE-PROCEDURE INSTRUCTIONS
PreOp Instructions given TO PT'S SISTER - SRINI:     - Verbal medication information (what to hold and what to take)   - NPO guidelines   - Arrival place directions given;     - Bathing with antibacterial soap   - Don't wear any jewelry or bring any valuables AM of surgery   - No makeup or moisturizer to face   - No perfume/cologne, powder, lotions or aftershave     Pt.'S SISTER, SRINI verbalized understanding.    PT Denies any history of side effects or issues with anesthesia or sedation.

## 2018-04-13 NOTE — PROGRESS NOTES
"Subjective:       Patient ID: Robin Hawkins is a 67 y.o. male.    Chief Complaint: COPD    HPI Mr. Hawkins is a 67-year-old former smoker who returns for interval assessment   of chronic obstructive lung disease.  Following his visit here in January, he   had a flare of increased respiratory symptoms and was admitted into the hospital   at the end of February.  At that time, he was treated for a "COPD   exacerbation."  He also had evaluation by the Ear, Nose and Throat   Department to rule out significant upper airway obstruction in view of his past   history of bilateral vocal cord paralysis and previous tracheostomy.    Fortunately, no significant upper airway obstruction was identified.    Mr. Hawkins is currently using Spiriva and Breo as maintenance respiratory   medications.  He also takes DuoNeb aerosol treatments.  He feels that he is   tolerating these medications well.  He feels that his respiratory symptoms are   fairly stable at present.    Finally, Mr. Hawkins is followed in the [facility] for treatment   of hepatocellular carcinoma.  He anticipates a change in medical therapy for   this malignancy since his recent treatment has not been entirely effective.    DATA:  I have reviewed the images from the chest x-ray done in February at the   time of his hospital admission.  The cardiac silhouette is not enlarged.  There   are no pleural abnormalities.  There are chronic rib abnormalities on the right.    There do not appear to be any areas of consolidation.      CB/IN  dd: 04/12/2018 20:34:02 (CDT)  td: 04/13/2018 12:32:08 (CDT)  Doc ID   #[id number]  Job ID #[id number]    CC:       Review of Systems   Constitutional: Negative for fever and fatigue.   HENT: Positive for voice change. Negative for postnasal drip, sinus pressure and congestion.    Respiratory: Positive for cough and dyspnea on extertion. Negative for sputum production, shortness of breath and wheezing.    Cardiovascular: Negative " for chest pain and leg swelling.   Genitourinary: Negative for difficulty urinating.   Musculoskeletal: Negative for arthralgias and back pain.   Skin: Negative for rash.   Gastrointestinal: Negative for abdominal pain and acid reflux.   Neurological: Negative for dizziness and weakness.   Hematological: Negative for adenopathy.       Objective:      Physical Exam   Constitutional: He is oriented to person, place, and time. He appears well-developed and well-nourished.   HENT:   Head: Normocephalic.   Mouth/Throat: Oropharynx is clear and moist. No oropharyngeal exudate.   Neck: Normal range of motion. Neck supple. No JVD present. No tracheal deviation present. No thyromegaly present.   Cardiovascular: Normal rate, regular rhythm and normal heart sounds.    No murmur heard.  Pulmonary/Chest: Symmetric chest wall expansion. No stridor (moderate hoarseness without stridor). He has no wheezes. He has no rhonchi. He has no rales. He exhibits no tenderness.   Abdominal: Soft.   Musculoskeletal: He exhibits no edema.   Lymphadenopathy:     He has no cervical adenopathy.   Neurological: He is alert and oriented to person, place, and time.   Skin: Skin is warm and dry. No rash noted. No erythema. Nails show no clubbing.   Psychiatric: He has a normal mood and affect.   Vitals reviewed.    Personal Diagnostic Review    No flowsheet data found.      Assessment:       1. Centrilobular emphysema    2. HCC (hepatocellular carcinoma)    3. Bilateral complete vocal fold paralysis    4. H/O tracheostomy        Outpatient Encounter Prescriptions as of 4/12/2018   Medication Sig Dispense Refill    albuterol (ACCUNEB) 0.63 mg/3 mL Nebu Take 0.63 mg by nebulization 5 (five) times daily. Rescue      albuterol-ipratropium 2.5mg-0.5mg/3mL (DUO-NEB) 0.5 mg-3 mg(2.5 mg base)/3 mL nebulizer solution Take 3 mLs by nebulization every 4 (four) hours as needed for Wheezing. Rescue 100 vial 6    aspirin 81 MG Chew Take 81 mg by mouth every  morning.       atorvastatin (LIPITOR) 40 MG tablet TAKE 1 TABLET BY MOUTH EVERY DAY 90 tablet 3    diazePAM (VALIUM) 5 MG tablet Take 1 tablet (5 mg total) by mouth On call Procedure for Anxiety. 1 tablet 0    dorzolamide-timolol 2-0.5% (COSOPT) 22.3-6.8 mg/mL ophthalmic solution Place 1 drop into both eyes 2 (two) times daily. 10 mL 12    fluticasone-vilanterol (BREO) 100-25 mcg/dose diskus inhaler Inhale 1 puff into the lungs once daily. Controller 1 each 4    latanoprost 0.005 % ophthalmic solution Place 1 drop into both eyes every evening. 7.5 mL 4    lisinopril (PRINIVIL,ZESTRIL) 20 MG tablet TAKE 1 TABLET BY MOUTH EVERY DAY 90 tablet 1    magnesium oxide (MAGOX) 400 mg tablet Take 1 tablet (400 mg total) by mouth 2 (two) times daily. 10 tablet 1    metoprolol tartrate (LOPRESSOR) 25 MG tablet TAKE ONE-HALF TABLET BY MOUTH TWICE DAILY 90 tablet 0    mirabegron (MYRBETRIQ) 50 mg Tb24 Take 1 tablet (50 mg total) by mouth every morning. 90 tablet 3    multivitamin capsule Take 1 capsule by mouth every morning.       nutritional supplements Liqd Take 237 mLs by mouth 3 (three) times daily. 90 Bottle 11    omeprazole (PRILOSEC) 20 MG capsule Take 1 capsule (20 mg total) by mouth once daily. Take on an empty stomach 90 capsule 1    oxybutynin (DITROPAN-XL) 10 MG 24 hr tablet Take 1 tablet (10 mg total) by mouth once daily. 90 tablet 3    oxyCODONE (ROXICODONE) 5 MG immediate release tablet Take 1 tablet (5 mg total) by mouth every 6 (six) hours as needed for Pain. 28 tablet 0    potassium chloride SA (K-DUR,KLOR-CON) 20 MEQ tablet TAKE 2 TABLETS(40 MEQ) BY MOUTH TWICE DAILY 360 tablet 0    tiotropium (SPIRIVA) 18 mcg inhalation capsule Inhale 1 capsule (18 mcg total) into the lungs once daily. Controller 360 capsule 0    [DISCONTINUED] albuterol-ipratropium 2.5mg-0.5mg/3mL (DUO-NEB) 0.5 mg-3 mg(2.5 mg base)/3 mL nebulizer solution Take 3 mLs by nebulization every 4 (four) hours as needed for  Wheezing. Rescue 1 Box 6     No facility-administered encounter medications on file as of 4/12/2018.      No orders of the defined types were placed in this encounter.    Plan:     Continue present respiratory medications (roles reviewed at today's visit).  Refills ordered for DuoNeb.  Discussed the early role for antibiotics for treatment of respiratory infection.  Call/return as needed.

## 2018-04-16 ENCOUNTER — SURGERY (OUTPATIENT)
Age: 68
End: 2018-04-16

## 2018-04-16 ENCOUNTER — HOSPITAL ENCOUNTER (OUTPATIENT)
Facility: HOSPITAL | Age: 68
Discharge: HOME | End: 2018-04-16
Attending: SURGERY | Admitting: SURGERY
Payer: MEDICARE

## 2018-04-16 ENCOUNTER — ANESTHESIA (OUTPATIENT)
Dept: SURGERY | Facility: HOSPITAL | Age: 68
End: 2018-04-16
Payer: MEDICARE

## 2018-04-16 VITALS
WEIGHT: 145 LBS | BODY MASS INDEX: 20.3 KG/M2 | RESPIRATION RATE: 22 BRPM | HEART RATE: 71 BPM | OXYGEN SATURATION: 95 % | SYSTOLIC BLOOD PRESSURE: 111 MMHG | TEMPERATURE: 97 F | DIASTOLIC BLOOD PRESSURE: 68 MMHG | HEIGHT: 71 IN

## 2018-04-16 DIAGNOSIS — C22.0 HEPATOCELLULAR CARCINOMA: Primary | Chronic | ICD-10-CM

## 2018-04-16 DIAGNOSIS — Z79.899 LONG TERM CURRENT USE OF THERAPEUTIC DRUG: ICD-10-CM

## 2018-04-16 PROCEDURE — D9220A PRA ANESTHESIA: Mod: CRNA,,, | Performed by: NURSE ANESTHETIST, CERTIFIED REGISTERED

## 2018-04-16 PROCEDURE — 63600175 PHARM REV CODE 636 W HCPCS: Performed by: ANESTHESIOLOGY

## 2018-04-16 PROCEDURE — 63600175 PHARM REV CODE 636 W HCPCS: Performed by: SURGERY

## 2018-04-16 PROCEDURE — 63600175 PHARM REV CODE 636 W HCPCS: Performed by: PHYSICIAN ASSISTANT

## 2018-04-16 PROCEDURE — 36561 INSERT TUNNELED CV CATH: CPT | Mod: LT,,, | Performed by: SURGERY

## 2018-04-16 PROCEDURE — S0028 INJECTION, FAMOTIDINE, 20 MG: HCPCS | Performed by: ANESTHESIOLOGY

## 2018-04-16 PROCEDURE — D9220A PRA ANESTHESIA: Mod: ANES,,, | Performed by: ANESTHESIOLOGY

## 2018-04-16 PROCEDURE — 27200651 HC AIRWAY, LMA: Performed by: NURSE ANESTHETIST, CERTIFIED REGISTERED

## 2018-04-16 PROCEDURE — 94761 N-INVAS EAR/PLS OXIMETRY MLT: CPT

## 2018-04-16 PROCEDURE — 36000707: Performed by: SURGERY

## 2018-04-16 PROCEDURE — C1788 PORT, INDWELLING, IMP: HCPCS | Performed by: SURGERY

## 2018-04-16 PROCEDURE — 37000009 HC ANESTHESIA EA ADD 15 MINS: Performed by: SURGERY

## 2018-04-16 PROCEDURE — 25000242 PHARM REV CODE 250 ALT 637 W/ HCPCS: Performed by: STUDENT IN AN ORGANIZED HEALTH CARE EDUCATION/TRAINING PROGRAM

## 2018-04-16 PROCEDURE — 94640 AIRWAY INHALATION TREATMENT: CPT

## 2018-04-16 PROCEDURE — 37000008 HC ANESTHESIA 1ST 15 MINUTES: Performed by: SURGERY

## 2018-04-16 PROCEDURE — 25000003 PHARM REV CODE 250: Performed by: PHYSICIAN ASSISTANT

## 2018-04-16 PROCEDURE — 25000003 PHARM REV CODE 250

## 2018-04-16 PROCEDURE — 71000033 HC RECOVERY, INTIAL HOUR: Performed by: SURGERY

## 2018-04-16 PROCEDURE — 36000706: Performed by: SURGERY

## 2018-04-16 PROCEDURE — 25000003 PHARM REV CODE 250: Performed by: ANESTHESIOLOGY

## 2018-04-16 PROCEDURE — 25000003 PHARM REV CODE 250: Performed by: NURSE ANESTHETIST, CERTIFIED REGISTERED

## 2018-04-16 PROCEDURE — 27000221 HC OXYGEN, UP TO 24 HOURS

## 2018-04-16 PROCEDURE — 71000015 HC POSTOP RECOV 1ST HR: Performed by: SURGERY

## 2018-04-16 PROCEDURE — 77001 FLUOROGUIDE FOR VEIN DEVICE: CPT | Mod: 26,,, | Performed by: SURGERY

## 2018-04-16 PROCEDURE — 63600175 PHARM REV CODE 636 W HCPCS: Performed by: NURSE ANESTHETIST, CERTIFIED REGISTERED

## 2018-04-16 DEVICE — KIT POWERPORT SINGLE 8FR: Type: IMPLANTABLE DEVICE | Site: CHEST | Status: FUNCTIONAL

## 2018-04-16 RX ORDER — DEXAMETHASONE SODIUM PHOSPHATE 4 MG/ML
INJECTION, SOLUTION INTRA-ARTICULAR; INTRALESIONAL; INTRAMUSCULAR; INTRAVENOUS; SOFT TISSUE
Status: DISCONTINUED | OUTPATIENT
Start: 2018-04-16 | End: 2018-04-16

## 2018-04-16 RX ORDER — FENTANYL CITRATE 50 UG/ML
25 INJECTION, SOLUTION INTRAMUSCULAR; INTRAVENOUS EVERY 5 MIN PRN
Status: DISCONTINUED | OUTPATIENT
Start: 2018-04-16 | End: 2018-04-16 | Stop reason: HOSPADM

## 2018-04-16 RX ORDER — ALBUTEROL SULFATE 0.83 MG/ML
2.5 SOLUTION RESPIRATORY (INHALATION) EVERY 4 HOURS PRN
Status: DISCONTINUED | OUTPATIENT
Start: 2018-04-16 | End: 2018-04-16 | Stop reason: HOSPADM

## 2018-04-16 RX ORDER — OXYCODONE HYDROCHLORIDE 5 MG/1
5 TABLET ORAL EVERY 4 HOURS PRN
Status: DISCONTINUED | OUTPATIENT
Start: 2018-04-16 | End: 2018-04-16 | Stop reason: HOSPADM

## 2018-04-16 RX ORDER — SODIUM CHLORIDE 9 MG/ML
INJECTION, SOLUTION INTRAVENOUS CONTINUOUS
Status: DISCONTINUED | OUTPATIENT
Start: 2018-04-16 | End: 2018-04-16 | Stop reason: HOSPADM

## 2018-04-16 RX ORDER — FAMOTIDINE 10 MG/ML
20 INJECTION INTRAVENOUS
Status: COMPLETED | OUTPATIENT
Start: 2018-04-16 | End: 2018-04-16

## 2018-04-16 RX ORDER — HEPARIN SODIUM (PORCINE) LOCK FLUSH IV SOLN 100 UNIT/ML 100 UNIT/ML
SOLUTION INTRAVENOUS
Status: DISCONTINUED | OUTPATIENT
Start: 2018-04-16 | End: 2018-04-16 | Stop reason: HOSPADM

## 2018-04-16 RX ORDER — LIDOCAINE HCL/PF 100 MG/5ML
SYRINGE (ML) INTRAVENOUS
Status: DISCONTINUED | OUTPATIENT
Start: 2018-04-16 | End: 2018-04-16

## 2018-04-16 RX ORDER — SODIUM CITRATE AND CITRIC ACID MONOHYDRATE 334; 500 MG/5ML; MG/5ML
30 SOLUTION ORAL
Status: COMPLETED | OUTPATIENT
Start: 2018-04-16 | End: 2018-04-16

## 2018-04-16 RX ORDER — OXYCODONE HYDROCHLORIDE 5 MG/1
5 TABLET ORAL EVERY 6 HOURS PRN
Qty: 20 TABLET | Refills: 0 | Status: SHIPPED | OUTPATIENT
Start: 2018-04-16 | End: 2019-01-02

## 2018-04-16 RX ORDER — CEFAZOLIN SODIUM 1 G/3ML
2 INJECTION, POWDER, FOR SOLUTION INTRAMUSCULAR; INTRAVENOUS
Status: COMPLETED | OUTPATIENT
Start: 2018-04-16 | End: 2018-04-16

## 2018-04-16 RX ORDER — OXYCODONE HYDROCHLORIDE 5 MG/1
TABLET ORAL
Status: COMPLETED
Start: 2018-04-16 | End: 2018-04-16

## 2018-04-16 RX ORDER — DEXMEDETOMIDINE HYDROCHLORIDE 100 UG/ML
INJECTION, SOLUTION INTRAVENOUS
Status: DISCONTINUED | OUTPATIENT
Start: 2018-04-16 | End: 2018-04-16

## 2018-04-16 RX ORDER — PHENYLEPHRINE HYDROCHLORIDE 10 MG/ML
INJECTION INTRAVENOUS
Status: DISCONTINUED | OUTPATIENT
Start: 2018-04-16 | End: 2018-04-16

## 2018-04-16 RX ORDER — PROPOFOL 10 MG/ML
VIAL (ML) INTRAVENOUS
Status: DISCONTINUED | OUTPATIENT
Start: 2018-04-16 | End: 2018-04-16

## 2018-04-16 RX ORDER — SODIUM CHLORIDE 0.9 % (FLUSH) 0.9 %
3 SYRINGE (ML) INJECTION
Status: DISCONTINUED | OUTPATIENT
Start: 2018-04-16 | End: 2018-04-16 | Stop reason: HOSPADM

## 2018-04-16 RX ORDER — ONDANSETRON 2 MG/ML
INJECTION INTRAMUSCULAR; INTRAVENOUS
Status: DISCONTINUED | OUTPATIENT
Start: 2018-04-16 | End: 2018-04-16

## 2018-04-16 RX ORDER — FENTANYL CITRATE 50 UG/ML
INJECTION, SOLUTION INTRAMUSCULAR; INTRAVENOUS
Status: DISCONTINUED | OUTPATIENT
Start: 2018-04-16 | End: 2018-04-16

## 2018-04-16 RX ORDER — OXYCODONE AND ACETAMINOPHEN 5; 325 MG/1; MG/1
1 TABLET ORAL
Qty: 20 TABLET | Refills: 0 | Status: SHIPPED | OUTPATIENT
Start: 2018-04-16 | End: 2018-04-16 | Stop reason: HOSPADM

## 2018-04-16 RX ADMIN — HEPARIN SODIUM (PORCINE) LOCK FLUSH IV SOLN 100 UNIT/ML 5 ML: 100 SOLUTION at 07:04

## 2018-04-16 RX ADMIN — PHENYLEPHRINE HYDROCHLORIDE 100 MCG: 10 INJECTION INTRAVENOUS at 07:04

## 2018-04-16 RX ADMIN — FENTANYL CITRATE 25 MCG: 50 INJECTION, SOLUTION INTRAMUSCULAR; INTRAVENOUS at 07:04

## 2018-04-16 RX ADMIN — OXYCODONE HYDROCHLORIDE 5 MG: 5 TABLET ORAL at 08:04

## 2018-04-16 RX ADMIN — SODIUM CHLORIDE: 0.9 INJECTION, SOLUTION INTRAVENOUS at 06:04

## 2018-04-16 RX ADMIN — PROPOFOL 50 MG: 10 INJECTION, EMULSION INTRAVENOUS at 07:04

## 2018-04-16 RX ADMIN — ALBUTEROL SULFATE 2.5 MG: 2.5 SOLUTION RESPIRATORY (INHALATION) at 08:04

## 2018-04-16 RX ADMIN — LIDOCAINE HYDROCHLORIDE 100 MG: 20 INJECTION, SOLUTION INTRAVENOUS at 07:04

## 2018-04-16 RX ADMIN — DEXAMETHASONE SODIUM PHOSPHATE 4 MG: 4 INJECTION, SOLUTION INTRAMUSCULAR; INTRAVENOUS at 07:04

## 2018-04-16 RX ADMIN — SODIUM CITRATE AND CITRIC ACID MONOHYDRATE 30 ML: 500; 334 SOLUTION ORAL at 06:04

## 2018-04-16 RX ADMIN — FAMOTIDINE 20 MG: 10 INJECTION INTRAVENOUS at 06:04

## 2018-04-16 RX ADMIN — DEXMEDETOMIDINE HYDROCHLORIDE 20 MCG: 100 INJECTION, SOLUTION, CONCENTRATE INTRAVENOUS at 08:04

## 2018-04-16 RX ADMIN — MIDAZOLAM HYDROCHLORIDE 2 MG: 1 INJECTION, SOLUTION INTRAMUSCULAR; INTRAVENOUS at 06:04

## 2018-04-16 RX ADMIN — PROPOFOL 150 MG: 10 INJECTION, EMULSION INTRAVENOUS at 07:04

## 2018-04-16 RX ADMIN — ONDANSETRON 4 MG: 2 INJECTION INTRAMUSCULAR; INTRAVENOUS at 07:04

## 2018-04-16 RX ADMIN — CEFAZOLIN 2 G: 330 INJECTION, POWDER, FOR SOLUTION INTRAMUSCULAR; INTRAVENOUS at 07:04

## 2018-04-16 NOTE — PLAN OF CARE
Discharge instructions given. Patient verbalized understanding. Consents in chart. Paper prescriptions given. No complaints at this time.

## 2018-04-16 NOTE — BRIEF OP NOTE
[facility]  Brief Operative Note     SUMMARY     Surgery Date: 4/16/2018     Surgeon(s) and Role:     * Lawrence Alas MD - Primary     * Marnie Marquez MD - Resident - Assisting    Pre-op Diagnosis:  Hepatocellular carcinoma [C22.0]    Post-op Diagnosis:  Post-Op Diagnosis Codes:     * Hepatocellular carcinoma [C22.0]    Procedure(s) (LRB):  Hrzaqwmmm-Bpkw-Z-Cath- Left vs right side (Left)    Anesthesia: General    Description of the findings of the procedure: L- subclavian port a cath, cutdown technique, fluoroscopic confirmation     Findings/Key Components: as above    Estimated Blood Loss: 10cc         Specimens:   Specimen (12h ago through future)    None          Discharge Note    SUMMARY     Admit Date: 4/16/2018    Discharge Date and Time:  04/16/2018 8:02 AM    Hospital Course (synopsis of major diagnoses, care, treatment, and services provided during the course of the hospital stay): Patient admitted for outpatient procedure. Tolerated well, with no immediate complications. Post op CXR without pneumothorax. Discharged from PACU.      Final Diagnosis: Post-Op Diagnosis Codes:     * Hepatocellular carcinoma [C22.0]    Disposition: Home or Self Care    Follow Up/Patient Instructions: prn    Medications:  Reconciled Home Medications:      Medication List      CONTINUE taking these medications    albuterol 0.63 mg/3 mL Nebu  Commonly known as:  ACCUNEB  Take 0.63 mg by nebulization 5 (five) times daily. Rescue     albuterol-ipratropium 2.5mg-0.5mg/3mL 0.5 mg-3 mg(2.5 mg base)/3 mL nebulizer solution  Commonly known as:  DUO-NEB  Take 3 mLs by nebulization every 4 (four) hours as needed for Wheezing. Rescue     aspirin 81 MG Chew  Take 81 mg by mouth every morning.     atorvastatin 40 MG tablet  Commonly known as:  LIPITOR  TAKE 1 TABLET BY MOUTH EVERY DAY     diazePAM 5 MG tablet  Commonly known as:  VALIUM  Take 1 tablet (5 mg total) by mouth On call Procedure for Anxiety.      dorzolamide-timolol 2-0.5% 22.3-6.8 mg/mL ophthalmic solution  Commonly known as:  COSOPT  Place 1 drop into both eyes 2 (two) times daily.     fluticasone-vilanterol 100-25 mcg/dose diskus inhaler  Commonly known as:  BREO  Inhale 1 puff into the lungs once daily. Controller     latanoprost 0.005 % ophthalmic solution  Place 1 drop into both eyes every evening.     lisinopril 20 MG tablet  Commonly known as:  PRINIVIL,ZESTRIL  TAKE 1 TABLET BY MOUTH EVERY DAY     magnesium oxide 400 mg tablet  Commonly known as:  MAGOX  Take 1 tablet (400 mg total) by mouth 2 (two) times daily.     metoprolol tartrate 25 MG tablet  Commonly known as:  LOPRESSOR  TAKE ONE-HALF TABLET BY MOUTH TWICE DAILY     mirabegron 50 mg Tb24  Commonly known as:  MYRBETRIQ  Take 1 tablet (50 mg total) by mouth every morning.     multivitamin capsule  Take 1 capsule by mouth every morning.     nutritional supplements Liqd  Take 237 mLs by mouth 3 (three) times daily.     omeprazole 20 MG capsule  Commonly known as:  PRILOSEC  Take 1 capsule (20 mg total) by mouth once daily. Take on an empty stomach     oxybutynin 10 MG 24 hr tablet  Commonly known as:  DITROPAN-XL  Take 1 tablet (10 mg total) by mouth once daily.     oxyCODONE 5 MG immediate release tablet  Commonly known as:  ROXICODONE  Take 1 tablet (5 mg total) by mouth every 6 (six) hours as needed for Pain.     potassium chloride SA 20 MEQ tablet  Commonly known as:  K-DUR,KLOR-CON  TAKE 2 TABLETS(40 MEQ) BY MOUTH TWICE DAILY     tiotropium 18 mcg inhalation capsule  Commonly known as:  SPIRIVA  Inhale 1 capsule (18 mcg total) into the lungs once daily. Controller            Discharge Procedure Orders  Activity as tolerated     Shower on day dressing removed (No bath)     Notify your health care provider if you experience any of the following:  temperature >100.4     Notify your health care provider if you experience any of the following:  persistent nausea and vomiting or diarrhea      Notify your health care provider if you experience any of the following:  severe uncontrolled pain     Notify your health care provider if you experience any of the following:  redness, tenderness, or signs of infection (pain, swelling, redness, odor or green/yellow discharge around incision site)     Notify your health care provider if you experience any of the following:  difficulty breathing or increased cough     Notify your health care provider if you experience any of the following:  severe persistent headache     Notify your health care provider if you experience any of the following:  worsening rash     Notify your health care provider if you experience any of the following:  persistent dizziness, light-headedness, or visual disturbances     Notify your health care provider if you experience any of the following:  increased confusion or weakness     Remove dressing in 48 hours
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
## 2018-04-16 NOTE — TRANSFER OF CARE
"Anesthesia Transfer of Care Note    Patient: Robin Hawkins    Procedure(s) Performed: Procedure(s) (LRB):  Fgdydiyrc-Agmd-B-Cath- Left vs right side (Left)    Patient location: PACU    Anesthesia Type: general    Transport from OR: Transported from OR on 6-10 L/min O2 by face mask with adequate spontaneous ventilation    Post pain: adequate analgesia    Post assessment: no apparent anesthetic complications and tolerated procedure well    Post vital signs: stable    Level of consciousness: awake, alert and oriented    Nausea/Vomiting: no nausea/vomiting    Complications: none    Transfer of care protocol was followed      Last vitals:   Visit Vitals  /67 (BP Location: Left arm, Patient Position: Lying)   Pulse 79   Temp 36.4 °C (97.6 °F) (Axillary)   Resp (!) 26   Ht 5' 11" (1.803 m)   Wt 65.8 kg (145 lb)   SpO2 (!) 93%   BMI 20.22 kg/m²     "

## 2018-04-16 NOTE — DISCHARGE INSTRUCTIONS
POSTOPERATIVE INSTRUCTIONS FOLLOWING PORT-A-CATH PLACMENT    The following are post-operative instructions that will help you to recover from your surgery.  Please read over these instructions carefully and contact us if we can answer any of your questions or concerns.    Dressing  You may remove the clear top dressing (plastic wrap looking dressing with attached gauze) after 48 hours. Please do not remove the white strips of tape (steri-strips) that cover your incision- they will be removed at your clinic visit. After 48 hours you can shower/wash over the incision/steri-strips with antibacterial soap and warm water. Do not take a tub bath and do not soak the surgical site.     Activity   You should be able to return to your regular activities 2 days after your surgery.  However, do not engage in strenuous activities in which you use your upper body such as:  golf, tennis, aerobics, washing windows, raking the yard, mopping, vacuuming, heavy lifting (e.g children) until you are seen for your follow-up appointment in clinic. Do not lift anything heavier than a gallon of milk.    Medication for pain  You may find that over the counter pain medications may be sufficient for your pain.  You will be given a prescription for pain medication for more severe pain.  You should not drive or operate machinery while taking these.  Please take prescription pain medicine (narcotics) with food.  Narcotics can cause, or worsen, constipation.  You will need to increase your fluid intake, eat high fiber foods (such as fruits and bran) and make sure that you are up and walking. You may need to take an over the counter stool softener for constipation.    Please report the following:   Temperature greater than 101 degrees   Discharge or bad odor from the wound   Excessive bleeding, such as saturated bloody dressing or extreme bruising   Redness at incision and/or drain sites   Swelling or buildup of fluid around  incision   Shortness of breath    Additional information  Your surgeon or medical oncologist will see you approximately 1-2 weeks following your surgery to check the incision.  If this follow-up appointment has not been made, please call the office.

## 2018-04-16 NOTE — OP NOTE
DATE OF PROCEDURE:  04/16/2018    PREOPERATIVE DIAGNOSIS:  Appendicitis cancer.    POSTOPERATIVE DIAGNOSIS:  Appendicitis cancer.    PROCEDURE:  Insertion of left subclavian Port-A-Cath.    SURGEON:  Lawrence Alas M.D.    ASSISTANT:  Marnie Marquez M.D. (RES)    ANESTHESIA:  General.    BLOOD LOSS:  Minimal.    COMPLICATIONS:  None.    INDICATIONS:  This is a 67-year-old patient with a diagnosis of hepatocellular   cancer, referred for port to facilitate management.    OPERATIVE REPORT:  The patient was brought to the operating room and placed in   the supine position, prepped and draped in sterile fashion.  Once satisfactory   general anesthesia was induced, an incision was made in the left deltopectoral   groove.  The brachiocephalic vein was identified.  The vein was proximally and   distally controlled between 3-0 silk ligatures.  An 8-[place]aro[place] PowerPort was   advanced into the superior vena cava under fluoroscopic guidance through an   anterior venotomy in the brachiocephalic vein.  Proximal and distal sutures were   gently ligated.  The port was noted to aspirate blood freely and was flushed   with heparinized solution.  The port was then secured to underlying pectoralis   fascia with #0 Vicryl suture, then the overlying soft tissues were closed with   absorbable suture.  Needle, sponge and instrument counts were correct.  The   patient tolerated the procedure well and was stable at the completion of the   operation.      GF/HN  dd: 04/16/2018 08:01:29 (CDT)  td: 04/16/2018 09:12:52 (CDT)  Doc ID   #[id number]  Job ID #[id number]    CC:
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
## 2018-04-16 NOTE — INTERVAL H&P NOTE
The patient has been examined and the H&P has been reviewed:    I concur with the findings and no changes have occurred since H&P was written.     No changes since above H&P.     No prior central lines per patient, possible scars from L and R subclavians?  Baby ASA held.   No blood thinners.    Proceed to OR for L vs R chest port a cath.     Anesthesia/Surgery risks, benefits and alternative options discussed and understood by patient/family.          Active Hospital Problems    Diagnosis  POA    Long term current use of therapeutic drug [Z79.899]  Not Applicable      Resolved Hospital Problems    Diagnosis Date Resolved POA   No resolved problems to display.

## 2018-04-16 NOTE — ANESTHESIA POSTPROCEDURE EVALUATION
"Anesthesia Post Evaluation    Patient: Robin Hawkins    Procedure(s) Performed: Procedure(s) (LRB):  [id number]-Cath- Left vs right side (Left)    Final Anesthesia Type: general  Patient location during evaluation: PACU  Patient participation: Yes- Able to Participate  Level of consciousness: awake and alert  Post-procedure vital signs: reviewed and stable  Pain management: adequate  Airway patency: patent  PONV status at discharge: No PONV  Anesthetic complications: no      Cardiovascular status: hemodynamically stable  Respiratory status: unassisted  Hydration status: euvolemic  Follow-up not needed.        Visit Vitals  /68 (BP Location: Left arm, Patient Position: Lying)   Pulse 71   Temp 36.1 °C (97 °F) (Temporal)   Resp (!) 22   Ht 5' 11" (1.803 m)   Wt 65.8 kg (145 lb)   SpO2 95%   BMI 20.22 kg/m²       Pain/Patrick Score: Pain Assessment Performed: Yes (4/16/2018  9:43 AM)  Presence of Pain: denies (4/16/2018  9:43 AM)  Pain Rating Prior to Med Admin: 5 (4/16/2018  8:34 AM)  Patrick Score: 10 (4/16/2018  8:45 AM)      "

## 2018-04-18 RX ORDER — MIDAZOLAM HYDROCHLORIDE 1 MG/ML
INJECTION, SOLUTION INTRAMUSCULAR; INTRAVENOUS
Status: DISCONTINUED | OUTPATIENT
Start: 2018-04-16 | End: 2018-04-18

## 2018-04-18 NOTE — ADDENDUM NOTE
Addendum  created 04/18/18 2949 by Ayse Rowe, CRNA    Anesthesia Intra Meds edited, Orders acknowledged in Narrator

## 2018-04-19 ENCOUNTER — OUTPATIENT CASE MANAGEMENT (OUTPATIENT)
Dept: ADMINISTRATIVE | Facility: OTHER | Age: 68
End: 2018-04-19

## 2018-04-19 NOTE — PROGRESS NOTES
Summary:  Sister, Winifred Canales, reports that she has POA. Reports that the patient lives with his wife and other family members in the family home. Reports that the home is in need of repair. Reports that the home is old and requires repairs in the bathroom. Reports that the home was lifted and an elevator lift was placed do to the elevation of the home .  Reports that the patient is disabled. Problem list reviewed. Reports that the patient is not having any financial difficulties at the present time. Reports that she brings the patient to all of his medical appointments. Reports that she picks up the pt's medications. Reports that the patient is able to prepare his medications. Reports that all of the medications are accurate. Reports that the patient is taking his medicines as prescribed. Reports that the patient is doing much better. Reports that the patient has lost a lot of wt, but he now has an increase in appetite. Reports that the patient is ambulating without assistance. Reports that the patient is in need of a mattress. Reports that she does not think that he will want a hospital bed if he qualified for a bed. Reports that the patient may need [place] [employer]. Reports that the patient is no longer on home health.

## 2018-04-19 NOTE — PATIENT INSTRUCTIONS
COPD Flare    You have had a flare-up of your COPD.  COPD, or chronic obstructive pulmonary disease, is a common lung disease. It causes your airways to become irritated and narrower. This makes it harder for you to breathe. Emphysema and chronic bronchitis are both types of COPD. This is a chronic condition, which means you always have it. Sometimes it gets worse. When this happens, it is called a flare-up.  Symptoms of COPD  People with COPD may have symptoms most of the time. In a flare-up, your symptoms get worse. These symptoms may mean you are having a flare-up:  · Shortness of breath, shallow or rapid breathing, or wheezing that gets worse  · Lung infection  · Cough that gets worse  · More mucus, thicker mucus or mucus of a different color  · Tiredness, decreased energy, or trouble doing your usual activities  · Fever  · Chest tightness  · Your symptoms dont get better even when you use your usual medicines, inhalers, and nebulizer  · Trouble talking  · You feel confused  Causes of flare-ups  Unfortunately, a flare-up can happen even though you did everything right, and you followed your doctors instructions. Some causes of flare-ups are:  · Smoking or secondhand smoke  · Colds, the flu, or respiratory infections  · Air pollution  · Sudden change in the weather  · Dust, irritating chemicals, or strong fumes  · Not taking your medicines as prescribed  Home care  Here are some things you can do at home to treat a flare-up:  · Try not to panic. This makes it harder to breathe, and keeps you from doing the right things.  · Dont smoke or be around others who are smoking.  · Try to drink more fluids than usual during a flare-up, unless your doctor has told you not to because of heart and kidney problems. More fluids can help loosen the mucus.  · Use your inhalers and nebulizer, if you have one, as you have been told to.  · If you were given antibiotics, take them until they are used up or your doctor tells you  to stop. Its important to finish the antibiotics, even though you feel better. This will make sure the infection has cleared.  · If you were given prednisone or another steroid, finish it even if you feel better.  Preventing a flare-up  Even though flare-ups happen, the best way to treat one is to prevent it before it starts. Here are some pointers:  · Dont smoke or be around others who are smoking.  · Take your medicines as you have been told.  · Talk with your doctor about getting a flu shot every year. Also find out if you need a pneumonia shot.  · If there is a weather advisory warning to stay indoors, try to stay inside when possible.  · Try to eat healthy and get plenty of sleep.  · Try to avoid things that usually set you off, like dust, chemical fumes, hairsprays, or strong perfumes.  Follow-up care  Follow up with your healthcare provider, or as advised.  If a culture was done, you will be told if your treatment needs to be changed. You can call as directed for the results.  If X-rays were done, you will be notified of any new findings that may affect your care.  Call 911  Call 911 if any of these occur:  · You have trouble breathing  · You feel confused or its difficult to wake you up  · You faint or lose consciousness  · You have a rapid heart rate  · You have new pain in your chest, arm, shoulder, neck or upper back  When to seek medical advice  Call your healthcare provider right away if any of these occur:  · Wheezing or shortness of breath gets worse  · You need to use your inhalers more often than usual without relief  · Fever of 100.4°F (38ºC) or higher, or as directed by your healthcare provider  · Coughing up lots of dark-colored or bloody mucus (sputum)  · Chest pain with each breath  · You do not start to get better within 24 hours  · Swelling of your ankles gets worse  · Dizziness or weakness  Date Last Reviewed: 9/1/2016  © 9808-6645 [employer]. [address] [facility],  [place] [address]. All rights reserved. This information is not intended as a substitute for professional medical care. Always follow your healthcare professional's instructions.        Chronic Lung Disease: Coping Tips for Caregivers    When someone you love has chronic lung disease, such as COPD, it can change both of your lives. As a caregiver, you may have to support your loved one in new ways. This may be true whether you are caring for your spouse, your partner, a family member, or a friend. Learning some ways to cope can help you and your loved one.  Coping with your emotions  Its normal to feel a range of emotions. You may feel sad, or afraid. At times, you may be angry, or frustrated. Its important to accept these feelings. They are normal. If you find yourself feeling stressed, seek help by reaching out to family members or friends. Also, speak with a healthcare provider. He or she can refer you to a counselor and other support services.  Taking care of yourself  Take time to manage your health and to refresh your mind and spirit. This gives you the energy to do your daily routine. Here are some basics that are important to your health:  · Get enough sleep. Aim for 8 hours a day. Keep naps short so you can sleep at night. Limit alcohol and caffeine. These can affect how well you sleep.  · Eat right. What you eat affects how you feel. Dont skip meals. Eat balanced meals with whole grains, fruits and vegetables, and low-fat meat and dairy products.  · Exercise. Try for at least 30 minutes of physical activity a day. Breaking up your activity into three 10-minute sessions can make it easier.  Making time for you  Your needs are also important. Give yourself permission to maintain a life of your own. Take breaks from caregiving to relax and have fun. Here are some suggestions:  · Share a meal with a friend.  · Think about ways to relax. Take a walk, try yoga, deep breathing, or meditation.  · Create a space  in your home where you can be alone when needed.  · Try to get away, even if it's just for a short time. Go to a friend's house, or take a drive or a long walk.  Accepting help  You may need help with caregiving. Knowing you can count on others can be a relief. Keep these tips in mind:  · Make a list of things other people can help with. Ask family and friends to handle tasks such as running errands, giving rides, or preparing meals.  · Make use of adult  and respite services, and home health aide programs. These provide temporary care for your loved one, when needed. Check the Internet or the phone book for organizations.  Support groups  Look for resources in your area:  · There may be a support group for people with chronic lung disease and their caregivers. This can help you feel that youre not alone. Youll learn coping strategies and get advice from others going through the same things you are.  · Voodoo and tonia-based organizations may be a source of strength and support for you also. Reach out to a leader in your tonia community to discuss your spiritual needs.  · The American Lung Association has an online support community. Their website is www.lung.org. There is a link from the Lung Disease page.  Date Last Reviewed: 5/1/2016  © 8417-3343 [employer]. [address] [place] [address], [place] [address]. All rights reserved. This information is not intended as a substitute for professional medical care. Always follow your healthcare professional's instructions.        Resources for Chronic Lung Disease  There are many chronic lung diseases, such as chronic obstructive lung disease (COPD), which includes chronic bronchitis and emphysema. There are other like pulmonary fibrosis or sarcoidosis. These resources can help you learn more about chronic lung disease, pulmonary rehabilitation, and what you can do to breathe better. They can also help you find support groups in your area.  Alpha-1  Foundation  346.314.6184  www.alpha-1foundation.org  American Academy of Allergy, Asthma & Immunology  816.231.5090  www.aaaai.org  American Association of Cardiovascular and Pulmonary Rehabilitation   927.410.6882  www.aacvpr.org  American Cancer Society  298.807.3792  www.cancer.org  American Lung Association  787.467.3539  www.lung.org  American Thoracic Society  638.559.9878  www.thoracic.org   Asthma and Allergy Foundation of [place]  824.915.3390  www.aafa.org  Coalition for Pulmonary Fibrosis  645.991.7929  www.coalitionforpf.org  Cystic Fibrosis Foundation  142.971.5727  www.cff.org  Emphysema Foundation for Our Right to Survive (EFFORTS)  www.emphysema.net  National Heart, Lung, and Blood [place]  152.523.5395  www.nhlbi.nih.gov  National Home Oxygen Patients Association  www.homeoxygen.org  [facility]  823.265.8686  www.nationaljewish.org  National Lung Health Education Program  www.nlhep.org  Pulmonary Education and Research Foundation (PERF)  www.naow7xwkuks.org  Pulmonary Hypertension Association  125.279.6246  www.phassociation.org  The Pulmonary Paper  725.195.5989  www.pulmonarypaper.org  [facility] Inc[facility]  134.757.8037  www.2ndwind.org  Smokefree.gov  610.729.7205  www.smokefree.gov  Well Spouse Association (for family members and other caregivers)  681.124.8322  www.wellspouse.org  YourLungHealth.org  637.520.5432  www.yourlunghealth.org  Date Last Reviewed: 5/1/2016  © 3451-9294 [employer]. [address] [place] [address], [place] [address]. All rights reserved. This information is not intended as a substitute for professional medical care. Always follow your healthcare professional's instructions.        Chronic Lung Disease: Avoiding Irritants and Allergens    Many people with chronic lung disease, such as asthma or COPD, need to avoid irritants that can trigger symptoms making it more difficult to breathe. Irritants are certain  substances in the air that irritate the airways. Some people are also sensitive to certain allergens. These are substances that cause inflammation in the lungs. Allergens can also cause runny nose, or itchy, watery eyes. You probably cant avoid all these things, all the time. But youll most likely breathe better if you stay away from the substances that bother you.   Try to avoid  Smoke. This includes cigarettes, cigars, pipes, and fireplaces.  · Dont smoke. And dont allow anyone else to smoke near you or in your home.  · Ask for smoke-free hotel rooms and rental cars.  · Make sure fireplaces and wood stoves are well ventilated, and sit well away from them.  Smog. This is made up of car exhaust and other air pollutants.  · Read or listen to local air quality reports. These let you know when air quality is poor.  · Stay indoors as much as you can on smoggy days.  Strong odors. These include scented room fresheners, mothballs, and insect sprays. Perfume and cooking can be other causes of strong odors.  · Avoid using bleach and ammonia for cleaning.  · Use scent-free deodorant, lotion, and other products.  Other irritants. These include dust, aerosol sprays, and fine powders.  · Wear a mask while doing tasks like dusting, sweeping, and yard work.  Cold weather. This can make breathing more difficult.  · Protect your lungs by wearing a scarf over your nose and mouth.   You may also need to avoid  If you have allergies, you should try to avoid the allergens that cause them. Ask your healthcare provider if you need to avoid any of these:  Pollen. This is a fine powder made by trees, grasses, and weeds.  · Try to learn what types of pollen affect you the most. Pollen levels vary during the year.  · Avoid outdoor activities when pollen levels are high. Use air conditioning instead of opening the windows in your home and car.  Animal dander. This is shed by animals with fur or feathers. The particles can float through  the air and stick to carpet, clothing, and furniture.  · Wash your hands and clothes after handling pets.  Dust mites. These are tiny bugs too small to see. They live in mattresses, bedding, carpets, curtains, and indoor dust.  · Wash bedding in hot water (130°F/54.4°C) each week.  · Cover mattresses and pillows with special mite-proof cases.  Mold. This grows in damp places, such as bathrooms, basements, and closets.  · Run an exhaust fan while bathing. Or, leave a window open in the bathroom.  · Use a dehumidifier in damp areas.  Date Last Reviewed: 5/1/2016 © 2000-2017 [employer]. [address] [place] [address], [place] [address]. All rights reserved. This information is not intended as a substitute for professional medical care. Always follow your healthcare professional's instructions.        Chronic Lung Disease: Controlling Stress    Stress and anxiety can make breathing harder. When its hard to breathe, its natural to get anxious and start to panic. This makes you even more short of breath. This sequence is known as the dyspnea cycle, and its common among people with chronic lung disease. Talk with your healthcare provider about how you are feeling. It's important for him or her to understand what is going on and how it is affecting your life. Breathing training and coping strategies can help you manage stress and anxiety.  Understanding the cycle  When youre short of breath, your breathing muscles get tense. Its hard to take a deep breath. You may worry that youre not getting enough air. Then you start breathing faster and become more short of breath. You may even start to panic, which makes symptoms seem worse. Often, people with chronic lung disease try to prevent this cycle by limiting activity, staying at home, and avoiding anything that could cause shortness of breath. You dont have to live this way.   Ways to relax  When you find yourself getting stressed or anxious, make an effort to  relax. Doing so will help break the dyspnea cycle. Sit in a quiet, comfortable place. Do pursed-lip and diaphragmatic breathing. You may also find the following helpful:  · Certain activities can help you relax. These can include reading a good book, listening to music or relaxation tapes, practicing yoga or bradnon chi, meditating, and praying. Find activities that work for you.  · Try visualization. Picture yourself in a peaceful place, such as the beach. Feel the warm sand. Hear the waves. Smell the ocean. Doing this may help you feel more relaxed.  · Your healthcare provider may advise using a bronchodilator along with these or other relaxation techniques.     What you can do to break the cycle  To prevent shortness of breath from limiting your life:  · Right now. Learn to stop an attack with pursed-lip breathing, diaphragmatic breathing, and relaxation techniques. If you dont know how to do these, ask your healthcare provider.  · In day-to-day life. Learn to maximize your energy and to breathe during activity, so you can do more.  · Over time. Start exercising, so your body can start to handle more activity.   Date Last Reviewed: 5/1/2016  © 2194-4840 [employer]. [address] [place] [address], [place] [address]. All rights reserved. This information is not intended as a substitute for professional medical care. Always follow your healthcare professional's instructions.        Chronic Lung Disease: Maximizing Your Energy  Fear of shortness of breath may stop you from being as active as you once were. You dont have to live this way. Managing your time and pacing yourself can help you conserve energy and do more. Its even OK if youre short of breath sometimes. You can learn to work through this without limiting your activities.    Manage your time  Shortness of breath can make everyday tasks take longer. This means theres less time to do the things you enjoy. You can help prevent this by managing your  time. Try these tips:  · Plan ahead so your tasks are spaced throughout the day. As you plan, keep in mind the times of day you tend to have the most energy.  · Do only one thing at a time. Finish one task before starting another.  · Assemble everything you need before you start a task. This cuts out unnecessary steps while youre working.  · Think about what you really need to do. Be realistic about what you can get done in a day.  Balance activity and rest  When youre tired, your activities will take longer. Fatigue also makes you more prone to infection. To avoid fatigue:  · Stop and rest when you need to. Dont wait until youre overtired  · Alternate between hard tasks and easy ones.  · Give yourself plenty of time for each task, so you dont have to hurry.  · Take 20 to 30-minute rest breaks after meals and throughout the day.  · If an activity takes a lot of energy, break it into smaller parts. For instance, fold the laundry first. Then take a break before putting it away.  · Try not to exert yourself in extreme cold or heat.   Find ways to conserve energy  The way you use your body during a task can help you conserve energy. For some tasks, you can also use special aids designed to reduce the amount of energy needed. Here are some tips:  · Sit to dress and undress, shave, brush your teeth, and comb your hair. Use a long-handled reacher to pull on socks and shoes.  · Sit on a bench to shower. Use warm water, not hot. (Steam can make breathing harder.) Dry off by wrapping yourself in a terrycloth robe.  · Use energy-saving appliances, such as an electric can opener, a power toothbrush, and a .  · Use a cart with wheels to move groceries, laundry, and other items around the house. Some carts have seats so you can rest when you need to.  · Keep the things you use most at waist level, so you can get them without reaching or bending.  Remember to breathe  People with chronic lung disease often try to  avoid shortness of breath by rushing through tasks. This uses more energy and can actually increase shortness of breath. Instead, slow down and pace your breathing. These tips may help:  · Move slowly during tasks that take a lot of effort, such as climbing stairs or pushing a shopping cart.  · Use pursed-lip and diaphragmatic breathing while you go about a task.  · Exhale when you exert effort. For example, breathe out as you lift up a grocery bag. Once youre holding the bag, breathe in.  · Concentrate on taking slow, deep breaths. If your breathing is shallow, you dont take in as much air.  · Remember that its OK to be short of breath. Just pace yourself and do pursed-lip breathing.  Pursed-lip breathing  This type of breathing helps you exhale better. Breathing this way during activity will help you reduce shortness of breath:  · Relax your neck and shoulder muscles. Inhale slowly through your nose for 2 counts or more.  · Pucker your lips as if you are going to blow out a candle. Exhale slowly and gently through your lips for at least twice as long as you inhaled.   Date Last Reviewed: 5/1/2016  © 6906-2774 [employer]. [address] [place] [address], [place] [address]. All rights reserved. This information is not intended as a substitute for professional medical care. Always follow your healthcare professional's instructions.        Chronic Lung Disease: Notes for Family and Friends    Being close to someone with chronic lung disease will likely mean some changes in your life. As your loved one kolton with chronic lung disease, you may be asked to be a helper, caregiver, or source of support.  Helpful tips  Here are general tips to follow:   · Learn as much as you can about chronic lung disease. This will help you know what to expect. It will also show you ways that you can help.  · Talk to your loved ones healthcare team. Ask any questions you have. Make sure you understand your role in  treatment.  · Spend time with your loved one. Take time to talk and to do things you both enjoy.  · Join a local support group. Or, contact the Well Spouse Association at 313-067-8497 or www.FlowPlaypouse.org.  Tips for caregivers  Caregivers' tips include the following:   · Keep in mind that you cant take good care of someone else if you dont take care of yourself, too. Take breaks when you need them. Considering your own needs is not selfish, its vital.  · You may want to do things for your loved one to help save time. But let your loved one do some things for him- or herself. This can help your loved one feel involved and independent. Encourage your loved one to stick with old hobbies or try new ones. And ask friends to visit if your loved one agrees.  · Be on the lookout for signs of depression in your loved one. These include trouble sleeping; loss of interest in activities, food, or people; and talk about feeling hopeless or very sad. If you notice these signs, tell your loved ones healthcare provider.  · Watch for depression in yourself. If you feel sad, guilty, tired, hopeless, or helpless most of the time, talk to your healthcare provider. Depression can and should be treated.  Date Last Reviewed: 5/1/2016 © 2000-2017 [employer]. [address] [place] [address], [place] [address]. All rights reserved. This information is not intended as a substitute for professional medical care. Always follow your healthcare professional's instructions.        Good Nutrition for Chronic Lung Disease    Good nutrition helps keep you healthy. Your weight and the foods you eat relate directly to how much energy you have. But shortness of breath during meals can keep you from getting the nutrition your body needs. A dietitian or other healthcare provider can work with you to set up a healthy meal plan that includes foods you like.  What you should eat  Try to maintain a balanced diet that includes a variety of these  foods:  · Protein, such as meat, beans, and soy products, helps build muscle mass.  · Dairy products, such as milk, cheese, and yogurt, help keep bones and teeth strong. Dairy is also high in protein.  · Fruits and vegetables give you the vitamins you need to stay healthy.  · Breads and starches (carbs) help you sustain energy. Carbs that are also high in fiber, such as whole-grain breads, may have longer-lasting effects than other carbs.  · Fluids keep you hydrated. Drinking fluids may also thin mucus. Its good to drink 6 to 8 glasses of water a day (unless told otherwise by your health care provider).  If youre having trouble eating  The stomach sits right under the diaphragm (a muscle that helps you breathe). A full stomach makes it harder for the diaphragm to move down. This can make breathing more difficult, causing shortness of breath during and after meals. These tips can help:  · Eat smaller meals throughout the day. This way your stomach doesnt get as full and your lungs have more room to expand.  · Chew slowly with your mouth closed. This helps you avoid swallowing air.  · Try to avoid or limit foods that cause gas. Gas makes the stomach swell and press on the diaphragm. These foods can include onions and cabbage. Not all foods have the same effects on all people. Keep track of the ones that cause problems for you.  Vitamins and supplements  If youre not getting enough vitamins and other nutrients, you may be told to take them in pill form. Supplement drinks can also help you get the nutrients you need without getting too full. Make sure to talk to your healthcare provider before trying any over-the-counter vitamins or supplements.  If you have acid reflux  A lot of people with chronic lung disease have problems with acid reflux. This can cause symptoms such as coughing, heartburn, and upset stomach. Here are some things you can do:  · Limit foods that increase acid in the stomach. These include spicy  foods, caffeinated drinks, and alcohol.  · Avoid lying flat for 2 hours after eating. At night, prop yourself up on pillows.  · Talk to your healthcare provider or a dietitian about developing a special diet to avoid acid reflux. Also ask your healthcare provider about medicines that may help.  Date Last Reviewed: 5/1/2016  © 7508-7546 [employer]. [address] [place] [address], [place] [address]. All rights reserved. This information is not intended as a substitute for professional medical care. Always follow your healthcare professional's instructions.        Resources for People with Cancer    You cant fight cancer alone. Reach out. Seek support from family, friends, and others who care about you. Let other people assist you. It can help you feel better both during and after your treatment.  Support groups  When you have cancer, support groups can be a great help. Meeting and talking with others with cancer can help you cope. There are also support groups for families of people with cancer. To find a support group, start by talking to your hospitals patient education department. Ask your healthcare provider. You can also do a search for cancer support groups on the Internet.  For more information  Contact the sources below for more information about cancer and cancer support groups:  · American Cancer Society (ACS)  470.993.4438  www.cancer.org  · National Cancer [place]  883.213.4220  www.cancer.gov  Local resources  Ask your healthcare provider about your local ACS or other support groups:  _____________________________________________________________________  _____________________________________________________________________  _____________________________________________________________________  Date Last Reviewed: 1/6/2016  © 2878-4087 [employer]. [address] [place] [address], [place] [address]. All rights reserved. This information is not intended as a substitute for professional  medical care. Always follow your healthcare professional's instructions.

## 2018-04-23 ENCOUNTER — OUTPATIENT CASE MANAGEMENT (OUTPATIENT)
Dept: ADMINISTRATIVE | Facility: OTHER | Age: 68
End: 2018-04-23

## 2018-04-23 NOTE — PROGRESS NOTES
Summary:  LCSW received a referral from OPCM-RN, Malinda for assistance with new mattress and meal resources. Pt is a a 68y/o male who resides with his sister and her children (household of 4) in his parents' home. The SW assessment was completed with pt's sister, Manju Canales. She reports that pt has MPOA; however, LCSW could not find find a copy of the forms. Ms. Canales reports pt is doing well at this time. She denies support, financial, housing, transportation, mental health or health barriers. She transports pt to his appointments and is aware of his [employer] transportation benefit. She would like for a referral to be initiated for Meals on Wheels. Ms. Canales inquired about mattress resources. Unfortunately, there are none at this time and pt does not want a hospital bed. Pt scored 0 on the PHQ-2 assessment. No other concerns were voiced at this time. LCSW was going to transfer the case over to Lucia SAAVEDRA for follow-up; however, Ms. Canales asked that the case not be transferred.    Intervention:  Facilitated referral to [facility] for Meals on Wheels  Encouraged compliance with medical treatment/provider    Plan for next encounter:  Discuss if [facility] made contact with pt's family  Re-assess for further needs

## 2018-04-24 ENCOUNTER — INFUSION (OUTPATIENT)
Dept: INFUSION THERAPY | Facility: HOSPITAL | Age: 68
End: 2018-04-24
Attending: INTERNAL MEDICINE
Payer: MEDICARE

## 2018-04-24 VITALS
SYSTOLIC BLOOD PRESSURE: 93 MMHG | HEART RATE: 83 BPM | DIASTOLIC BLOOD PRESSURE: 51 MMHG | TEMPERATURE: 98 F | RESPIRATION RATE: 20 BRPM

## 2018-04-24 DIAGNOSIS — C22.0 HEPATOCELLULAR CARCINOMA: Primary | ICD-10-CM

## 2018-04-24 PROCEDURE — 63600175 PHARM REV CODE 636 W HCPCS: Performed by: INTERNAL MEDICINE

## 2018-04-24 PROCEDURE — 25000003 PHARM REV CODE 250: Performed by: INTERNAL MEDICINE

## 2018-04-24 PROCEDURE — 96413 CHEMO IV INFUSION 1 HR: CPT

## 2018-04-24 RX ORDER — SODIUM CHLORIDE 0.9 % (FLUSH) 0.9 %
10 SYRINGE (ML) INJECTION
Status: DISCONTINUED | OUTPATIENT
Start: 2018-04-24 | End: 2018-04-24 | Stop reason: HOSPADM

## 2018-04-24 RX ORDER — HEPARIN 100 UNIT/ML
500 SYRINGE INTRAVENOUS
Status: DISCONTINUED | OUTPATIENT
Start: 2018-04-24 | End: 2018-04-24 | Stop reason: HOSPADM

## 2018-04-24 RX ADMIN — HEPARIN 500 UNITS: 100 SYRINGE at 04:04

## 2018-04-24 RX ADMIN — SODIUM CHLORIDE: 9 INJECTION, SOLUTION INTRAVENOUS at 03:04

## 2018-04-24 RX ADMIN — SODIUM CHLORIDE 480 MG: 9 INJECTION, SOLUTION INTRAVENOUS at 03:04

## 2018-04-24 NOTE — PLAN OF CARE
Problem: Patient Care Overview  Goal: Plan of Care Review  Outcome: Ongoing (interventions implemented as appropriate)  Pt tolerated Opdivo with no complications. Pt instructed to call MD with any problems. NAD. AVS given to patient and patient discharged home with spouse at side.

## 2018-04-27 DIAGNOSIS — K21.00 GASTROESOPHAGEAL REFLUX DISEASE WITH ESOPHAGITIS: Chronic | ICD-10-CM

## 2018-04-27 DIAGNOSIS — K21.00 REFLUX ESOPHAGITIS: ICD-10-CM

## 2018-04-27 RX ORDER — ATORVASTATIN CALCIUM 40 MG/1
TABLET, FILM COATED ORAL
Qty: 90 TABLET | Refills: 0 | Status: SHIPPED | OUTPATIENT
Start: 2018-04-27 | End: 2018-07-24 | Stop reason: SDUPTHER

## 2018-04-27 RX ORDER — OMEPRAZOLE 20 MG/1
CAPSULE, DELAYED RELEASE ORAL
Qty: 90 CAPSULE | Refills: 0 | Status: SHIPPED | OUTPATIENT
Start: 2018-04-27 | End: 2018-07-25 | Stop reason: SDUPTHER

## 2018-05-01 ENCOUNTER — PES CALL (OUTPATIENT)
Dept: ADMINISTRATIVE | Facility: CLINIC | Age: 68
End: 2018-05-01

## 2018-05-01 ENCOUNTER — EPISODE CHANGES (OUTPATIENT)
Dept: HEPATOLOGY | Facility: CLINIC | Age: 68
End: 2018-05-01

## 2018-05-03 ENCOUNTER — OUTPATIENT CASE MANAGEMENT (OUTPATIENT)
Dept: ADMINISTRATIVE | Facility: OTHER | Age: 68
End: 2018-05-03

## 2018-05-07 ENCOUNTER — OUTPATIENT CASE MANAGEMENT (OUTPATIENT)
Dept: ADMINISTRATIVE | Facility: OTHER | Age: 68
End: 2018-05-07

## 2018-05-07 NOTE — PROGRESS NOTES
"Summary:  LCSW contacted pt's sister, Manju Canales for follow up. Pt's sister was out of town on a cruise all last week. She believes ever since pt had a change in medications, he is looking "fantastic". Pt is eating more often and looking more vibrant. Ms. Canales reported she received a call from [facility] and is on the waiting list for meals on wheels. No other concerns were voiced at this time.     Intervention:  Food resources - Meals on Wheels  Encouraged family involvement in care  Collaborated with OPCM-RN     Plan for next encounter:  Re-assess for further needs    "

## 2018-05-09 ENCOUNTER — DOCUMENTATION ONLY (OUTPATIENT)
Dept: INTERNAL MEDICINE | Facility: CLINIC | Age: 68
End: 2018-05-09

## 2018-05-09 ENCOUNTER — CLINICAL SUPPORT (OUTPATIENT)
Dept: OPHTHALMOLOGY | Facility: CLINIC | Age: 68
End: 2018-05-09
Payer: MEDICARE

## 2018-05-09 DIAGNOSIS — H40.1132 PRIMARY OPEN ANGLE GLAUCOMA OF BOTH EYES, MODERATE STAGE: ICD-10-CM

## 2018-05-09 PROCEDURE — 92083 EXTENDED VISUAL FIELD XM: CPT | Mod: S$GLB,,, | Performed by: OPTOMETRIST

## 2018-05-09 PROCEDURE — 99499 UNLISTED E&M SERVICE: CPT | Mod: S$PBB,,, | Performed by: OPTOMETRIST

## 2018-05-09 PROCEDURE — 92133 CPTRZD OPH DX IMG PST SGM ON: CPT | Mod: S$GLB,,, | Performed by: OPTOMETRIST

## 2018-05-09 NOTE — PROGRESS NOTES
ADDENDUM 5/9/2018     Pt here today for 6-month repeat HVF/OCT to monitor for glaucoma progression (POAG, moderate OD and moderate-severe OS) - see Glaucoma history below for full interpretation.    ASSESSMENT: POAG (moderate stage OD, moderate-severe OS) - relatively stable OU.  PLAN: Continue same treatment (Latanoprost QHS OU and Cosopt qDay OU) (pending IOP check tomorrow).    Kusum Muhammad, OD   5/9/2018     ==============================================     GLAUCOMA HISTORY (updated 5/9/2018):  Diagnosis: POAG (moderate stage OD, moderate-severe OS)  (+)Family history of glaucoma (sister and father).    Tmax: 20/22 (04/13/15 per outside records, while pt was on Cosopt BID OU with poor compliance)  Target IOP: mid-teens OU  Started treatment on: unknown (prior to 04/13/15)  Current treatment: Latanoprost QHS OU, Cosopt qDay OU  CCT: ??  Last RNFL OCT (5/9/2018):              OD: Borderline thinning ST, N, and global. Relatively stable.              OS: Significant thinning ST and global. Relatively stable.                            Date     ST        T          IT         IN         N          SN       G               OD       01/16   89(y)    68        121      89        58        104      83                           05/16   89(y)    77        114      71        33(r)     28(r)     66(r)                           09/17   89(y)  73        131      81        40(y)    77        77(y)    Possible progression vs baseline in IN, N, SN, and global sectors vs inter-test variability.     05/18 89(y) 76 121 74 42(y) 81 76(y) Relatively stable.               OS       01/16   52(r)     70        117      98        59        88        77y)                           05/16   53(r)     71        108      84        53        90        73(r)                           09/17   55(r)     65        109      92        58        82        73(r)     Possible progression in T and SN sectors vs inter-test variability.       05/18 47(r) 65 111 90 59 79 72(r) Possible progression in ST sector.   Last Posterior Pole OCT (5/9/2018):              OD: Inferior hemisphere thinner than superior. Normal foveal contour and macular morphology.              OS: Superior hemisphere thinner than inferior (likely due to large superior-temporal chorioretinal scar). Slightly irregular foveal contour (stable). Normal macular morphology except for large scar superior-temporal with atrophy of RPE and outer retinal layers.   Last HVF 24-2ss (5/9/2018):   OD: Good reliability (fixation losses 0/15, false positives 1%, false negatives 4%). Shallow misses in superior hemisphere Possible early shallow superior-nasal step (repeatable but improved, no correlation with RNFL OCT but correlates with inferior thinning on posterior pole OCT and thin inferior rim on clinical appearance). GHT WNL.   OS: Good reliability (fixation losses 0/16, false positives 0%, false negatives 0%). Dense misses in inferior-nasal quadrant (relatively stable, correlates with superior thinning on RNFL and Posterior Pole OCTs, possibly due to chorioretinal scar). Stable. GHT ONL.  Last Stereo disc photos: 01/12/16  Last DFE: 09/12/17

## 2018-05-10 ENCOUNTER — OFFICE VISIT (OUTPATIENT)
Dept: OPTOMETRY | Facility: CLINIC | Age: 68
End: 2018-05-10
Payer: MEDICARE

## 2018-05-10 DIAGNOSIS — I10 ESSENTIAL HYPERTENSION: ICD-10-CM

## 2018-05-10 DIAGNOSIS — H40.1132 PRIMARY OPEN ANGLE GLAUCOMA OF BOTH EYES, MODERATE STAGE: Primary | ICD-10-CM

## 2018-05-10 PROCEDURE — 99999 PR PBB SHADOW E&M-EST. PATIENT-LVL II: CPT | Mod: PBBFAC,,, | Performed by: OPTOMETRIST

## 2018-05-10 PROCEDURE — 92012 INTRM OPH EXAM EST PATIENT: CPT | Mod: S$GLB,,, | Performed by: OPTOMETRIST

## 2018-05-10 PROCEDURE — 99499 UNLISTED E&M SERVICE: CPT | Mod: S$PBB,,, | Performed by: OPTOMETRIST
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
## 2018-05-11 RX ORDER — LISINOPRIL 20 MG/1
TABLET ORAL
Qty: 90 TABLET | Refills: 1 | Status: SHIPPED | OUTPATIENT
Start: 2018-05-11 | End: 2018-11-13 | Stop reason: SDUPTHER

## 2018-05-15 ENCOUNTER — OUTPATIENT CASE MANAGEMENT (OUTPATIENT)
Dept: ADMINISTRATIVE | Facility: OTHER | Age: 68
End: 2018-05-15

## 2018-05-15 NOTE — PROGRESS NOTES
Summary: Sister reports that the patient is doing great. Reports that he is eating very well. Reports that the patient is much more active. Reports that the patient is cutting grass. Reports that he has more energy with the change in his medications.   Interventions: Reviewed the risk factors of cancer and prevention of complications                         Change to episodic status  Plan: Review risk factors of COPD

## 2018-05-21 ENCOUNTER — OFFICE VISIT (OUTPATIENT)
Dept: HEMATOLOGY/ONCOLOGY | Facility: CLINIC | Age: 68
End: 2018-05-21
Payer: MEDICARE

## 2018-05-21 ENCOUNTER — OUTPATIENT CASE MANAGEMENT (OUTPATIENT)
Dept: ADMINISTRATIVE | Facility: OTHER | Age: 68
End: 2018-05-21

## 2018-05-21 ENCOUNTER — INFUSION (OUTPATIENT)
Dept: INFUSION THERAPY | Facility: HOSPITAL | Age: 68
End: 2018-05-21
Attending: INTERNAL MEDICINE
Payer: MEDICARE

## 2018-05-21 VITALS
DIASTOLIC BLOOD PRESSURE: 68 MMHG | OXYGEN SATURATION: 98 % | HEIGHT: 71 IN | BODY MASS INDEX: 22.77 KG/M2 | HEART RATE: 61 BPM | RESPIRATION RATE: 20 BRPM | WEIGHT: 162.69 LBS | SYSTOLIC BLOOD PRESSURE: 117 MMHG | TEMPERATURE: 98 F

## 2018-05-21 DIAGNOSIS — Z09 CHEMOTHERAPY FOLLOW-UP EXAMINATION: ICD-10-CM

## 2018-05-21 DIAGNOSIS — C22.0 HCC (HEPATOCELLULAR CARCINOMA): Primary | Chronic | ICD-10-CM

## 2018-05-21 DIAGNOSIS — C22.0 HEPATOCELLULAR CARCINOMA: ICD-10-CM

## 2018-05-21 DIAGNOSIS — R18.8 OTHER ASCITES: ICD-10-CM

## 2018-05-21 DIAGNOSIS — Z51.12 ENCOUNTER FOR ANTINEOPLASTIC IMMUNOTHERAPY: ICD-10-CM

## 2018-05-21 DIAGNOSIS — C22.0 HEPATOCELLULAR CARCINOMA: Primary | Chronic | ICD-10-CM

## 2018-05-21 DIAGNOSIS — B18.2 CHRONIC HEPATITIS C WITHOUT HEPATIC COMA: ICD-10-CM

## 2018-05-21 LAB
AFP SERPL-MCNC: 447 NG/ML
ALBUMIN SERPL BCP-MCNC: 3 G/DL
ALP SERPL-CCNC: 121 U/L
ALT SERPL W/O P-5'-P-CCNC: 24 U/L
ANION GAP SERPL CALC-SCNC: 6 MMOL/L
AST SERPL-CCNC: 36 U/L
BILIRUB SERPL-MCNC: 0.3 MG/DL
BUN SERPL-MCNC: 18 MG/DL
CALCIUM SERPL-MCNC: 9 MG/DL
CHLORIDE SERPL-SCNC: 105 MMOL/L
CO2 SERPL-SCNC: 24 MMOL/L
CREAT SERPL-MCNC: 0.9 MG/DL
ERYTHROCYTE [DISTWIDTH] IN BLOOD BY AUTOMATED COUNT: 15.2 %
EST. GFR  (AFRICAN AMERICAN): >60 ML/MIN/1.73 M^2
EST. GFR  (NON AFRICAN AMERICAN): >60 ML/MIN/1.73 M^2
GLUCOSE SERPL-MCNC: 106 MG/DL
HCT VFR BLD AUTO: 31.5 %
HGB BLD-MCNC: 10 G/DL
IMM GRANULOCYTES # BLD AUTO: 0.03 K/UL
INR PPP: 1
MCH RBC QN AUTO: 31 PG
MCHC RBC AUTO-ENTMCNC: 31.7 G/DL
MCV RBC AUTO: 98 FL
NEUTROPHILS # BLD AUTO: 4.5 K/UL
PLATELET # BLD AUTO: 220 K/UL
PMV BLD AUTO: 10.9 FL
POTASSIUM SERPL-SCNC: 4.1 MMOL/L
PROT SERPL-MCNC: 6.9 G/DL
PROTHROMBIN TIME: 10.8 SEC
RBC # BLD AUTO: 3.23 M/UL
SODIUM SERPL-SCNC: 135 MMOL/L
TSH SERPL DL<=0.005 MIU/L-ACNC: 2.25 UIU/ML
WBC # BLD AUTO: 7.36 K/UL

## 2018-05-21 PROCEDURE — 87522 HEPATITIS C REVRS TRNSCRPJ: CPT

## 2018-05-21 PROCEDURE — 3078F DIAST BP <80 MM HG: CPT | Mod: CPTII,S$GLB,, | Performed by: INTERNAL MEDICINE

## 2018-05-21 PROCEDURE — 36592 COLLECT BLOOD FROM PICC: CPT

## 2018-05-21 PROCEDURE — 80053 COMPREHEN METABOLIC PANEL: CPT

## 2018-05-21 PROCEDURE — 99499 UNLISTED E&M SERVICE: CPT | Mod: S$PBB,,, | Performed by: INTERNAL MEDICINE

## 2018-05-21 PROCEDURE — 85027 COMPLETE CBC AUTOMATED: CPT

## 2018-05-21 PROCEDURE — 87902 NFCT AGT GNTYP ALYS HEP C: CPT

## 2018-05-21 PROCEDURE — 36591 DRAW BLOOD OFF VENOUS DEVICE: CPT

## 2018-05-21 PROCEDURE — 99999 PR PBB SHADOW E&M-EST. PATIENT-LVL IV: CPT | Mod: PBBFAC,,, | Performed by: INTERNAL MEDICINE

## 2018-05-21 PROCEDURE — 82105 ALPHA-FETOPROTEIN SERUM: CPT

## 2018-05-21 PROCEDURE — A4216 STERILE WATER/SALINE, 10 ML: HCPCS | Performed by: INTERNAL MEDICINE

## 2018-05-21 PROCEDURE — 25000003 PHARM REV CODE 250: Performed by: INTERNAL MEDICINE

## 2018-05-21 PROCEDURE — 85610 PROTHROMBIN TIME: CPT

## 2018-05-21 PROCEDURE — 84443 ASSAY THYROID STIM HORMONE: CPT

## 2018-05-21 PROCEDURE — 3074F SYST BP LT 130 MM HG: CPT | Mod: CPTII,S$GLB,, | Performed by: INTERNAL MEDICINE

## 2018-05-21 PROCEDURE — 99214 OFFICE O/P EST MOD 30 MIN: CPT | Mod: S$GLB,,, | Performed by: INTERNAL MEDICINE

## 2018-05-21 PROCEDURE — 63600175 PHARM REV CODE 636 W HCPCS: Performed by: INTERNAL MEDICINE

## 2018-05-21 RX ORDER — SODIUM CHLORIDE 0.9 % (FLUSH) 0.9 %
10 SYRINGE (ML) INJECTION
Status: CANCELLED | OUTPATIENT
Start: 2018-05-21

## 2018-05-21 RX ORDER — HEPARIN 100 UNIT/ML
500 SYRINGE INTRAVENOUS
Status: CANCELLED | OUTPATIENT
Start: 2018-05-21

## 2018-05-21 RX ORDER — SODIUM CHLORIDE 0.9 % (FLUSH) 0.9 %
10 SYRINGE (ML) INJECTION
Status: COMPLETED | OUTPATIENT
Start: 2018-05-21 | End: 2018-05-21

## 2018-05-21 RX ORDER — HEPARIN 100 UNIT/ML
500 SYRINGE INTRAVENOUS
Status: COMPLETED | OUTPATIENT
Start: 2018-05-21 | End: 2018-05-21

## 2018-05-21 RX ADMIN — SODIUM CHLORIDE, PRESERVATIVE FREE 10 ML: 5 INJECTION INTRAVENOUS at 09:05

## 2018-05-21 RX ADMIN — SODIUM CHLORIDE, PRESERVATIVE FREE 500 UNITS: 5 INJECTION INTRAVENOUS at 09:05

## 2018-05-21 NOTE — PROGRESS NOTES
Summary:  Chart review. Pt is currently at main campus for scheduled lab and hem/onc office visits. LCSW will follow up with pt at a later time.     Intervention;  Chart review - compliance with treatment    Plan for next encounter:  Re-assess for further needs

## 2018-05-21 NOTE — NURSING
0949 -- Patient's PAC accessed.  Labs obtained.  Needle heparinized and left in place for scheduled chemo tomorrow.
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
## 2018-05-21 NOTE — Clinical Note
All labs need to be drawn from his port Follow back with MARCOS on 6/25.  He wishes to have his chemo the 26th. He will get chemo tomorrow

## 2018-05-22 ENCOUNTER — INFUSION (OUTPATIENT)
Dept: INFUSION THERAPY | Facility: HOSPITAL | Age: 68
End: 2018-05-22
Attending: INTERNAL MEDICINE
Payer: MEDICARE

## 2018-05-22 ENCOUNTER — EPISODE CHANGES (OUTPATIENT)
Dept: HEPATOLOGY | Facility: CLINIC | Age: 68
End: 2018-05-22

## 2018-05-22 VITALS
WEIGHT: 162.69 LBS | RESPIRATION RATE: 18 BRPM | SYSTOLIC BLOOD PRESSURE: 105 MMHG | HEART RATE: 66 BPM | HEIGHT: 71 IN | DIASTOLIC BLOOD PRESSURE: 50 MMHG | BODY MASS INDEX: 22.77 KG/M2

## 2018-05-22 DIAGNOSIS — C22.0 HEPATOCELLULAR CARCINOMA: Primary | ICD-10-CM

## 2018-05-22 PROCEDURE — 25000003 PHARM REV CODE 250: Performed by: INTERNAL MEDICINE

## 2018-05-22 PROCEDURE — A4216 STERILE WATER/SALINE, 10 ML: HCPCS | Performed by: INTERNAL MEDICINE

## 2018-05-22 PROCEDURE — 63600175 PHARM REV CODE 636 W HCPCS: Mod: JG | Performed by: INTERNAL MEDICINE

## 2018-05-22 PROCEDURE — 96413 CHEMO IV INFUSION 1 HR: CPT

## 2018-05-22 RX ORDER — HEPARIN 100 UNIT/ML
500 SYRINGE INTRAVENOUS
Status: DISCONTINUED | OUTPATIENT
Start: 2018-05-22 | End: 2018-05-22 | Stop reason: HOSPADM

## 2018-05-22 RX ORDER — SODIUM CHLORIDE 0.9 % (FLUSH) 0.9 %
10 SYRINGE (ML) INJECTION
Status: DISCONTINUED | OUTPATIENT
Start: 2018-05-22 | End: 2018-05-22 | Stop reason: HOSPADM

## 2018-05-22 RX ADMIN — SODIUM CHLORIDE: 9 INJECTION, SOLUTION INTRAVENOUS at 02:05

## 2018-05-22 RX ADMIN — HEPARIN 500 UNITS: 100 SYRINGE at 03:05

## 2018-05-22 RX ADMIN — SODIUM CHLORIDE 480 MG: 9 INJECTION, SOLUTION INTRAVENOUS at 02:05

## 2018-05-22 RX ADMIN — SODIUM CHLORIDE, PRESERVATIVE FREE 10 ML: 5 INJECTION INTRAVENOUS at 03:05

## 2018-05-22 NOTE — PLAN OF CARE
Problem: Chemotherapy Effects (Adult)  Goal: Signs and Symptoms of Listed Potential Problems Will be Absent, Minimized or Managed (Chemotherapy Effects)  Signs and symptoms of listed potential problems will be absent, minimized or managed by discharge/transition of care (reference Chemotherapy Effects (Adult) CPG).   Outcome: Ongoing (interventions implemented as appropriate)  Pt here for D1C2 Opdivo. VSS. No complaints voiced. Consent/labs/meds/allergies reviewed. PAC accessed on arrival- blood return noted. All questions answered. Will continue to monitor.

## 2018-05-24 DIAGNOSIS — Z09 CHEMOTHERAPY FOLLOW-UP EXAMINATION: ICD-10-CM

## 2018-05-24 DIAGNOSIS — E87.6 HYPOKALEMIA: ICD-10-CM

## 2018-05-24 DIAGNOSIS — C22.0 HCC (HEPATOCELLULAR CARCINOMA): Chronic | ICD-10-CM

## 2018-05-25 LAB
HCV GENTYP SERPL NAA+PROBE: ABNORMAL
HCV RNA SERPL NAA+PROBE-LOG IU: 5.21 LOG (10) IU/ML
HCV RNA SERPL QL NAA+PROBE: DETECTED
HCV RNA SPEC NAA+PROBE-ACNC: ABNORMAL IU/ML

## 2018-05-28 RX ORDER — TIOTROPIUM BROMIDE 18 UG/1
1 CAPSULE ORAL; RESPIRATORY (INHALATION) DAILY
Qty: 90 CAPSULE | Refills: 0 | Status: SHIPPED | OUTPATIENT
Start: 2018-05-28 | End: 2018-08-26 | Stop reason: SDUPTHER

## 2018-05-30 RX ORDER — POTASSIUM CHLORIDE 20 MEQ/1
TABLET, EXTENDED RELEASE ORAL
Qty: 360 TABLET | Refills: 0 | Status: ON HOLD | OUTPATIENT
Start: 2018-05-30 | End: 2018-10-24 | Stop reason: HOSPADM

## 2018-05-31 ENCOUNTER — OFFICE VISIT (OUTPATIENT)
Dept: INTERNAL MEDICINE | Facility: CLINIC | Age: 68
End: 2018-05-31
Payer: MEDICARE

## 2018-05-31 VITALS
OXYGEN SATURATION: 98 % | SYSTOLIC BLOOD PRESSURE: 113 MMHG | BODY MASS INDEX: 23.61 KG/M2 | HEIGHT: 71 IN | DIASTOLIC BLOOD PRESSURE: 59 MMHG | WEIGHT: 168.63 LBS | HEART RATE: 71 BPM

## 2018-05-31 DIAGNOSIS — I10 ESSENTIAL HYPERTENSION: Chronic | ICD-10-CM

## 2018-05-31 DIAGNOSIS — E03.8 SUBCLINICAL HYPOTHYROIDISM: ICD-10-CM

## 2018-05-31 DIAGNOSIS — K44.9 HIATAL HERNIA: ICD-10-CM

## 2018-05-31 DIAGNOSIS — J43.2 CENTRILOBULAR EMPHYSEMA: Primary | Chronic | ICD-10-CM

## 2018-05-31 DIAGNOSIS — K21.9 GASTROESOPHAGEAL REFLUX DISEASE, ESOPHAGITIS PRESENCE NOT SPECIFIED: Chronic | ICD-10-CM

## 2018-05-31 PROCEDURE — 3078F DIAST BP <80 MM HG: CPT | Mod: CPTII,GC,S$GLB, | Performed by: STUDENT IN AN ORGANIZED HEALTH CARE EDUCATION/TRAINING PROGRAM

## 2018-05-31 PROCEDURE — 99214 OFFICE O/P EST MOD 30 MIN: CPT | Mod: GC,S$GLB,, | Performed by: STUDENT IN AN ORGANIZED HEALTH CARE EDUCATION/TRAINING PROGRAM

## 2018-05-31 PROCEDURE — 3074F SYST BP LT 130 MM HG: CPT | Mod: CPTII,GC,S$GLB, | Performed by: STUDENT IN AN ORGANIZED HEALTH CARE EDUCATION/TRAINING PROGRAM

## 2018-05-31 PROCEDURE — 99999 PR PBB SHADOW E&M-EST. PATIENT-LVL III: CPT | Mod: PBBFAC,GC,, | Performed by: STUDENT IN AN ORGANIZED HEALTH CARE EDUCATION/TRAINING PROGRAM

## 2018-05-31 NOTE — PROGRESS NOTES
Subjective:       Patient ID: Robin Hawkins is a 67 y.o. male.    Chief Complaint: Follow-up    HPI     67 years old male with multiple medical problems patient of Dr Mcneil, present to the resident clinic for follow up visit.     Patient had no active complaints and he feels well, he cut the grass yesterday. He reported improving mood.   actively being treated for HCC, followed by hem/onc and hepatology.     COPD: complaints with home inhalers ( breo and spiriva ), he had no exacerbation since last hospital admission. Denied using rescue inhalers. Reported better control of his reflux Sx after behavior change and avoidence of flat position.     High TSH: noticed when he was hospitalized, repeated TSH WNL and patient denied any Hx of hyothyroid Sx in the last 2 weeks.     GERD: reflux Sx, had EGD and found hiatal hernia with prohibitive surgical risks. Patient Sx improved greatly after behavior changes, currently on Omeprazole 20 mg daily.     Updated on his vaccines and other health maintenance.            Review of Systems   Constitutional: Negative for fever and unexpected weight change.   HENT: Negative for ear pain, mouth sores, sore throat and trouble swallowing.    Eyes: Negative for pain and redness.   Respiratory: Negative for chest tightness and shortness of breath.    Cardiovascular: Negative for chest pain and palpitations.   Gastrointestinal: Negative for abdominal distention, abdominal pain, blood in stool, constipation and diarrhea.   Genitourinary: Negative for dysuria, flank pain and hematuria.   Musculoskeletal: Negative for gait problem and joint swelling.   Skin: Negative for rash and wound.   Neurological: Negative for seizures, syncope, weakness, light-headedness and numbness.   Hematological: Does not bruise/bleed easily.   Psychiatric/Behavioral: Negative for confusion and sleep disturbance. The patient is not nervous/anxious.        Objective:      Physical Exam   Constitutional: He is  oriented to person, place, and time. He appears well-developed and well-nourished. No distress.   Eyes: Conjunctivae are normal. No scleral icterus.   Neck: Neck supple.   Cardiovascular: Normal rate and regular rhythm.    No murmur heard.  Pulmonary/Chest: Effort normal. No accessory muscle usage. No tachypnea. No respiratory distress. He has no decreased breath sounds. He has no wheezes.   Radiated bronchial breathing from trachea.    Abdominal: Soft. He exhibits no distension. There is no tenderness.   Musculoskeletal: Normal range of motion. He exhibits no edema or tenderness.   Neurological: He is alert and oriented to person, place, and time. No cranial nerve deficit. He exhibits normal muscle tone.   Skin: Skin is warm. No rash noted. He is not diaphoretic.   Psychiatric: He has a normal mood and affect.   Vitals reviewed.      Assessment:       1. Centrilobular emphysema    2. Essential hypertension    3. Subclinical hypothyroidism    4. Gastroesophageal reflux disease, esophagitis presence not specified    5. Hiatal hernia        Plan:       Centrilobular emphysema:   COPD controled   Continue home meds    Continue reflux managements     Essential hypertension:   At goal in today visit    Inform patient to avoid daily BP check without Sx, check home BP at most twice weekly.     Subclinical hypothyroidism:   Watch clinical Sx.        Gastroesophageal reflux disease, esophagitis presence not specified:   Avoid flat position   Continue omeprazole 20 mg daily      Hiatal hernia:   Look GERD           Jenny Bruce   PGY 3  Internal medicine   [phone]

## 2018-06-01 ENCOUNTER — OFFICE VISIT (OUTPATIENT)
Dept: HEPATOLOGY | Facility: CLINIC | Age: 68
End: 2018-06-01
Payer: MEDICARE

## 2018-06-01 VITALS
RESPIRATION RATE: 18 BRPM | HEIGHT: 71 IN | OXYGEN SATURATION: 100 % | HEART RATE: 70 BPM | BODY MASS INDEX: 23.58 KG/M2 | DIASTOLIC BLOOD PRESSURE: 56 MMHG | SYSTOLIC BLOOD PRESSURE: 113 MMHG | WEIGHT: 168.44 LBS

## 2018-06-01 DIAGNOSIS — C22.0 HCC (HEPATOCELLULAR CARCINOMA): Primary | ICD-10-CM

## 2018-06-01 DIAGNOSIS — K74.60 CIRRHOSIS OF LIVER WITHOUT ASCITES, UNSPECIFIED HEPATIC CIRRHOSIS TYPE: Chronic | ICD-10-CM

## 2018-06-01 DIAGNOSIS — C22.0 HEPATOCELLULAR CARCINOMA: Chronic | ICD-10-CM

## 2018-06-01 DIAGNOSIS — R77.2 ELEVATED AFP: ICD-10-CM

## 2018-06-01 DIAGNOSIS — B18.2 CHRONIC HEPATITIS C WITHOUT HEPATIC COMA: ICD-10-CM

## 2018-06-01 PROCEDURE — 3074F SYST BP LT 130 MM HG: CPT | Mod: CPTII,S$GLB,, | Performed by: INTERNAL MEDICINE

## 2018-06-01 PROCEDURE — 3078F DIAST BP <80 MM HG: CPT | Mod: CPTII,S$GLB,, | Performed by: INTERNAL MEDICINE

## 2018-06-01 PROCEDURE — 99499 UNLISTED E&M SERVICE: CPT | Mod: S$PBB,,, | Performed by: INTERNAL MEDICINE

## 2018-06-01 PROCEDURE — 99214 OFFICE O/P EST MOD 30 MIN: CPT | Mod: S$GLB,,, | Performed by: INTERNAL MEDICINE

## 2018-06-01 PROCEDURE — 99999 PR PBB SHADOW E&M-EST. PATIENT-LVL IV: CPT | Mod: PBBFAC,,, | Performed by: INTERNAL MEDICINE
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
## 2018-06-04 ENCOUNTER — OUTPATIENT CASE MANAGEMENT (OUTPATIENT)
Dept: ADMINISTRATIVE | Facility: OTHER | Age: 68
End: 2018-06-04

## 2018-06-04 ENCOUNTER — TELEPHONE (OUTPATIENT)
Dept: HEMATOLOGY/ONCOLOGY | Facility: CLINIC | Age: 68
End: 2018-06-04

## 2018-06-04 DIAGNOSIS — E87.6 HYPOKALEMIA: ICD-10-CM

## 2018-06-04 DIAGNOSIS — Z09 CHEMOTHERAPY FOLLOW-UP EXAMINATION: ICD-10-CM

## 2018-06-04 DIAGNOSIS — C22.0 HCC (HEPATOCELLULAR CARCINOMA): Chronic | ICD-10-CM

## 2018-06-04 RX ORDER — POTASSIUM CHLORIDE 20 MEQ/1
TABLET, EXTENDED RELEASE ORAL
Qty: 360 TABLET | Refills: 0 | Status: CANCELLED | OUTPATIENT
Start: 2018-06-04

## 2018-06-04 NOTE — PROGRESS NOTES
Summary: Pt's caregiver reports that he is doing very well. Reports that his appetite is good. Reports that he has increased activity. Reports that he has no N/V or complaints of pain.  Intervention: Reviewed th risk factors and s/s of COPD.  Plan: Review with caregiver the prevention of complications of COPD

## 2018-06-04 NOTE — PROGRESS NOTES
Summary:  LCSW spoke to pt's sister, Manju Wooten for follow up. She reports pt is doing great and continues to thrive ever since the change in meds. She believes pt is now more functioning and able to do for himself such as cutting the grass and cookinig for himself. No other concerns were voiced at this time. Case Closure was discussed in which pt's sister was in agreement. Contact information was provided to LCSW should there be future needs.     Intervention:  Collaborated with OPCM-RN regarding case closure  Case closed    Plan for next encounter:  None

## 2018-06-06 RX ORDER — METOPROLOL TARTRATE 25 MG/1
TABLET, FILM COATED ORAL
Qty: 90 TABLET | Refills: 0 | Status: SHIPPED | OUTPATIENT
Start: 2018-06-06 | End: 2018-09-06 | Stop reason: SDUPTHER

## 2018-06-11 PROBLEM — Z09 CHEMOTHERAPY FOLLOW-UP EXAMINATION: Status: RESOLVED | Noted: 2018-03-06 | Resolved: 2018-06-11

## 2018-06-22 ENCOUNTER — EPISODE CHANGES (OUTPATIENT)
Dept: HEPATOLOGY | Facility: CLINIC | Age: 68
End: 2018-06-22

## 2018-06-22 ENCOUNTER — OUTPATIENT CASE MANAGEMENT (OUTPATIENT)
Dept: ADMINISTRATIVE | Facility: OTHER | Age: 68
End: 2018-06-22
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
## 2018-06-25 ENCOUNTER — OFFICE VISIT (OUTPATIENT)
Dept: HEMATOLOGY/ONCOLOGY | Facility: CLINIC | Age: 68
End: 2018-06-25
Payer: MEDICARE

## 2018-06-25 ENCOUNTER — INFUSION (OUTPATIENT)
Dept: INFUSION THERAPY | Facility: HOSPITAL | Age: 68
End: 2018-06-25
Attending: INTERNAL MEDICINE
Payer: MEDICARE

## 2018-06-25 VITALS
SYSTOLIC BLOOD PRESSURE: 131 MMHG | HEIGHT: 69 IN | HEART RATE: 60 BPM | WEIGHT: 174.19 LBS | DIASTOLIC BLOOD PRESSURE: 63 MMHG | BODY MASS INDEX: 25.8 KG/M2 | OXYGEN SATURATION: 100 % | TEMPERATURE: 98 F | RESPIRATION RATE: 18 BRPM

## 2018-06-25 DIAGNOSIS — C22.0 HCC (HEPATOCELLULAR CARCINOMA): ICD-10-CM

## 2018-06-25 DIAGNOSIS — D63.0 ANEMIA IN NEOPLASTIC DISEASE: ICD-10-CM

## 2018-06-25 DIAGNOSIS — Z09 CHEMOTHERAPY FOLLOW-UP EXAMINATION: ICD-10-CM

## 2018-06-25 DIAGNOSIS — R18.8 OTHER ASCITES: ICD-10-CM

## 2018-06-25 DIAGNOSIS — C22.0 HEPATOCELLULAR CARCINOMA: Primary | Chronic | ICD-10-CM

## 2018-06-25 DIAGNOSIS — E03.8 SUBCLINICAL HYPOTHYROIDISM: ICD-10-CM

## 2018-06-25 DIAGNOSIS — Z51.12 ENCOUNTER FOR ANTINEOPLASTIC IMMUNOTHERAPY: ICD-10-CM

## 2018-06-25 DIAGNOSIS — R77.2 ELEVATED AFP: ICD-10-CM

## 2018-06-25 DIAGNOSIS — C22.0 HEPATOCELLULAR CARCINOMA: Primary | ICD-10-CM

## 2018-06-25 LAB
AFP SERPL-MCNC: 685 NG/ML
ALBUMIN SERPL BCP-MCNC: 3 G/DL
ALP SERPL-CCNC: 127 U/L
ALT SERPL W/O P-5'-P-CCNC: 23 U/L
ANION GAP SERPL CALC-SCNC: 5 MMOL/L
AST SERPL-CCNC: 42 U/L
BILIRUB SERPL-MCNC: 0.3 MG/DL
BUN SERPL-MCNC: 13 MG/DL
CALCIUM SERPL-MCNC: 8.6 MG/DL
CHLORIDE SERPL-SCNC: 109 MMOL/L
CO2 SERPL-SCNC: 25 MMOL/L
CREAT SERPL-MCNC: 1 MG/DL
ERYTHROCYTE [DISTWIDTH] IN BLOOD BY AUTOMATED COUNT: 15.4 %
EST. GFR  (AFRICAN AMERICAN): >60 ML/MIN/1.73 M^2
EST. GFR  (NON AFRICAN AMERICAN): >60 ML/MIN/1.73 M^2
GLUCOSE SERPL-MCNC: 112 MG/DL
HCT VFR BLD AUTO: 29.3 %
HGB BLD-MCNC: 9.2 G/DL
IMM GRANULOCYTES # BLD AUTO: 0.02 K/UL
MAGNESIUM SERPL-MCNC: 1.8 MG/DL
MCH RBC QN AUTO: 29.4 PG
MCHC RBC AUTO-ENTMCNC: 31.4 G/DL
MCV RBC AUTO: 94 FL
NEUTROPHILS # BLD AUTO: 3.4 K/UL
PLATELET # BLD AUTO: 205 K/UL
PMV BLD AUTO: 11 FL
POTASSIUM SERPL-SCNC: 4.1 MMOL/L
PROT SERPL-MCNC: 6.9 G/DL
RBC # BLD AUTO: 3.13 M/UL
SODIUM SERPL-SCNC: 139 MMOL/L
T4 FREE SERPL-MCNC: 0.86 NG/DL
TSH SERPL DL<=0.005 MIU/L-ACNC: 4.12 UIU/ML
WBC # BLD AUTO: 6.04 K/UL

## 2018-06-25 PROCEDURE — 36591 DRAW BLOOD OFF VENOUS DEVICE: CPT

## 2018-06-25 PROCEDURE — 84439 ASSAY OF FREE THYROXINE: CPT

## 2018-06-25 PROCEDURE — 82105 ALPHA-FETOPROTEIN SERUM: CPT

## 2018-06-25 PROCEDURE — 85027 COMPLETE CBC AUTOMATED: CPT

## 2018-06-25 PROCEDURE — A4216 STERILE WATER/SALINE, 10 ML: HCPCS | Performed by: INTERNAL MEDICINE

## 2018-06-25 PROCEDURE — 99999 PR PBB SHADOW E&M-EST. PATIENT-LVL V: CPT | Mod: PBBFAC,,, | Performed by: NURSE PRACTITIONER

## 2018-06-25 PROCEDURE — 99499 UNLISTED E&M SERVICE: CPT | Mod: S$PBB,,, | Performed by: NURSE PRACTITIONER

## 2018-06-25 PROCEDURE — 84443 ASSAY THYROID STIM HORMONE: CPT

## 2018-06-25 PROCEDURE — 99214 OFFICE O/P EST MOD 30 MIN: CPT | Mod: S$GLB,,, | Performed by: NURSE PRACTITIONER

## 2018-06-25 PROCEDURE — 3078F DIAST BP <80 MM HG: CPT | Mod: CPTII,S$GLB,, | Performed by: NURSE PRACTITIONER

## 2018-06-25 PROCEDURE — 83735 ASSAY OF MAGNESIUM: CPT

## 2018-06-25 PROCEDURE — 25000003 PHARM REV CODE 250: Performed by: INTERNAL MEDICINE

## 2018-06-25 PROCEDURE — 80053 COMPREHEN METABOLIC PANEL: CPT

## 2018-06-25 PROCEDURE — 63600175 PHARM REV CODE 636 W HCPCS: Performed by: INTERNAL MEDICINE

## 2018-06-25 PROCEDURE — 3075F SYST BP GE 130 - 139MM HG: CPT | Mod: CPTII,S$GLB,, | Performed by: NURSE PRACTITIONER

## 2018-06-25 RX ORDER — HEPARIN 100 UNIT/ML
500 SYRINGE INTRAVENOUS
Status: CANCELLED | OUTPATIENT
Start: 2018-06-26

## 2018-06-25 RX ORDER — HEPARIN 100 UNIT/ML
500 SYRINGE INTRAVENOUS
Status: CANCELLED | OUTPATIENT
Start: 2018-06-25

## 2018-06-25 RX ORDER — SODIUM CHLORIDE 0.9 % (FLUSH) 0.9 %
10 SYRINGE (ML) INJECTION
Status: COMPLETED | OUTPATIENT
Start: 2018-06-25 | End: 2018-06-25

## 2018-06-25 RX ORDER — HEPARIN 100 UNIT/ML
500 SYRINGE INTRAVENOUS
Status: COMPLETED | OUTPATIENT
Start: 2018-06-25 | End: 2018-06-25

## 2018-06-25 RX ORDER — SODIUM CHLORIDE 0.9 % (FLUSH) 0.9 %
10 SYRINGE (ML) INJECTION
Status: CANCELLED | OUTPATIENT
Start: 2018-06-26

## 2018-06-25 RX ORDER — SODIUM CHLORIDE 0.9 % (FLUSH) 0.9 %
10 SYRINGE (ML) INJECTION
Status: CANCELLED | OUTPATIENT
Start: 2018-06-25

## 2018-06-25 RX ADMIN — SODIUM CHLORIDE, PRESERVATIVE FREE 10 ML: 5 INJECTION INTRAVENOUS at 01:06

## 2018-06-25 RX ADMIN — HEPARIN 500 UNITS: 100 SYRINGE at 01:06

## 2018-06-25 NOTE — Clinical Note
RTC  In 1 month to see Dr. Carrillo with a CBC, CMP, TSH, T4 and for Nivolumab. Labs from [facility].

## 2018-06-26 ENCOUNTER — INFUSION (OUTPATIENT)
Dept: INFUSION THERAPY | Facility: HOSPITAL | Age: 68
End: 2018-06-26
Attending: INTERNAL MEDICINE
Payer: MEDICARE

## 2018-06-26 VITALS
RESPIRATION RATE: 18 BRPM | HEART RATE: 71 BPM | SYSTOLIC BLOOD PRESSURE: 105 MMHG | TEMPERATURE: 98 F | DIASTOLIC BLOOD PRESSURE: 56 MMHG

## 2018-06-26 DIAGNOSIS — C22.0 HEPATOCELLULAR CARCINOMA: Primary | ICD-10-CM

## 2018-06-26 PROCEDURE — 25000003 PHARM REV CODE 250: Performed by: INTERNAL MEDICINE

## 2018-06-26 PROCEDURE — 96413 CHEMO IV INFUSION 1 HR: CPT

## 2018-06-26 PROCEDURE — 63600175 PHARM REV CODE 636 W HCPCS: Mod: JG | Performed by: INTERNAL MEDICINE

## 2018-06-26 RX ORDER — HEPARIN 100 UNIT/ML
500 SYRINGE INTRAVENOUS
Status: DISCONTINUED | OUTPATIENT
Start: 2018-06-26 | End: 2018-06-26 | Stop reason: HOSPADM

## 2018-06-26 RX ORDER — SODIUM CHLORIDE 0.9 % (FLUSH) 0.9 %
10 SYRINGE (ML) INJECTION
Status: DISCONTINUED | OUTPATIENT
Start: 2018-06-26 | End: 2018-06-26 | Stop reason: HOSPADM

## 2018-06-26 RX ADMIN — SODIUM CHLORIDE: 9 INJECTION, SOLUTION INTRAVENOUS at 03:06

## 2018-06-26 RX ADMIN — SODIUM CHLORIDE 480 MG: 9 INJECTION, SOLUTION INTRAVENOUS at 03:06

## 2018-06-26 NOTE — PLAN OF CARE
Problem: Patient Care Overview  Goal: Discharge Needs Assessment  Outcome: Ongoing (interventions implemented as appropriate)  Pt tolerated opdivo well no adverse reaction, PAC flushed hep locked de accessed site covered with band aid, leaves clinic with family member NAD noted. avs given instructed to contact MD office with any future concerns

## 2018-06-27 DIAGNOSIS — N39.41 URGE INCONTINENCE: ICD-10-CM

## 2018-06-28 ENCOUNTER — OUTPATIENT CASE MANAGEMENT (OUTPATIENT)
Dept: ADMINISTRATIVE | Facility: OTHER | Age: 68
End: 2018-06-28

## 2018-06-28 NOTE — PROGRESS NOTES
Summary: Sister reports that the patient is doing very well. Reports that the patient has more energy. Reports that the patient is cutting grass and riding his bike. Reports that the patient has no new complaints or concerns at this time. Informed of upcoming case closure.     Intervention: Reviewed with sister the importance of maintaining a safe environment for the patient.  Plan: Review strategies for preventing complications from COPD amd fatigue.

## 2018-07-10 ENCOUNTER — OUTPATIENT CASE MANAGEMENT (OUTPATIENT)
Dept: ADMINISTRATIVE | Facility: OTHER | Age: 68
End: 2018-07-10

## 2018-07-10 NOTE — PROGRESS NOTES
Summary: Pt's sister, Winifred Canales, reports that the pt is doing very well. Reports that he continues to cut grass. Reports that he has had no complaints of being sensitive to the heat or humidity. Reports that he is eating well and has gained weight. Reports that he has no new complaints.Verbalized his upcoming appts.  Interventions: Reviewed prevention of complications of COPD  Plan: Mail additional [facility] information           Close case on next encounter in 2 weeks

## 2018-07-12 RX ORDER — OXYBUTYNIN CHLORIDE 10 MG/1
10 TABLET, EXTENDED RELEASE ORAL DAILY
Qty: 30 TABLET | Refills: 0 | Status: SHIPPED | OUTPATIENT
Start: 2018-07-12 | End: 2018-08-10 | Stop reason: SDUPTHER

## 2018-07-12 RX ORDER — OXYBUTYNIN CHLORIDE 10 MG/1
TABLET, EXTENDED RELEASE ORAL
Qty: 90 TABLET | Refills: 0 | OUTPATIENT
Start: 2018-07-12

## 2018-07-16 ENCOUNTER — OFFICE VISIT (OUTPATIENT)
Dept: INTERNAL MEDICINE | Facility: CLINIC | Age: 68
End: 2018-07-16
Payer: MEDICARE

## 2018-07-16 ENCOUNTER — OFFICE VISIT (OUTPATIENT)
Dept: HEMATOLOGY/ONCOLOGY | Facility: CLINIC | Age: 68
End: 2018-07-16
Payer: MEDICARE

## 2018-07-16 ENCOUNTER — TELEPHONE (OUTPATIENT)
Dept: HEMATOLOGY/ONCOLOGY | Facility: CLINIC | Age: 68
End: 2018-07-16

## 2018-07-16 VITALS
TEMPERATURE: 98 F | SYSTOLIC BLOOD PRESSURE: 107 MMHG | WEIGHT: 177.69 LBS | HEART RATE: 64 BPM | DIASTOLIC BLOOD PRESSURE: 54 MMHG | OXYGEN SATURATION: 100 % | HEIGHT: 69 IN | RESPIRATION RATE: 16 BRPM | BODY MASS INDEX: 26.32 KG/M2

## 2018-07-16 VITALS
DIASTOLIC BLOOD PRESSURE: 62 MMHG | HEIGHT: 69 IN | OXYGEN SATURATION: 99 % | WEIGHT: 177.25 LBS | BODY MASS INDEX: 26.25 KG/M2 | SYSTOLIC BLOOD PRESSURE: 100 MMHG | HEART RATE: 60 BPM

## 2018-07-16 DIAGNOSIS — C22.0 HEPATOCELLULAR CARCINOMA: Primary | Chronic | ICD-10-CM

## 2018-07-16 DIAGNOSIS — Z85.46 HISTORY OF PROSTATE CANCER: Chronic | ICD-10-CM

## 2018-07-16 DIAGNOSIS — E03.8 SUBCLINICAL HYPOTHYROIDISM: ICD-10-CM

## 2018-07-16 DIAGNOSIS — Z09 CHEMOTHERAPY FOLLOW-UP EXAMINATION: ICD-10-CM

## 2018-07-16 DIAGNOSIS — Z00.00 ENCOUNTER FOR PREVENTIVE HEALTH EXAMINATION: Primary | ICD-10-CM

## 2018-07-16 DIAGNOSIS — B18.2 CHRONIC HEPATITIS C WITHOUT HEPATIC COMA: ICD-10-CM

## 2018-07-16 DIAGNOSIS — E78.5 HYPERLIPIDEMIA, UNSPECIFIED HYPERLIPIDEMIA TYPE: Chronic | ICD-10-CM

## 2018-07-16 DIAGNOSIS — K74.60 CIRRHOSIS OF LIVER WITHOUT ASCITES, UNSPECIFIED HEPATIC CIRRHOSIS TYPE: Chronic | ICD-10-CM

## 2018-07-16 DIAGNOSIS — I25.10 CORONARY ARTERY CALCIFICATION SEEN ON CT SCAN: ICD-10-CM

## 2018-07-16 DIAGNOSIS — I70.0 ATHEROSCLEROSIS OF ABDOMINAL AORTA: ICD-10-CM

## 2018-07-16 DIAGNOSIS — C22.0 HEPATOCELLULAR CARCINOMA: Chronic | ICD-10-CM

## 2018-07-16 DIAGNOSIS — Z85.038 HISTORY OF COLON CANCER: ICD-10-CM

## 2018-07-16 DIAGNOSIS — I10 ESSENTIAL HYPERTENSION: Chronic | ICD-10-CM

## 2018-07-16 DIAGNOSIS — J43.2 CENTRILOBULAR EMPHYSEMA: Chronic | ICD-10-CM

## 2018-07-16 DIAGNOSIS — C22.0 HCC (HEPATOCELLULAR CARCINOMA): Primary | ICD-10-CM

## 2018-07-16 DIAGNOSIS — J44.9 CHRONIC OBSTRUCTIVE PULMONARY DISEASE, UNSPECIFIED COPD TYPE: Chronic | ICD-10-CM

## 2018-07-16 PROCEDURE — 99499 UNLISTED E&M SERVICE: CPT | Mod: S$GLB,,, | Performed by: INTERNAL MEDICINE

## 2018-07-16 PROCEDURE — G0439 PPPS, SUBSEQ VISIT: HCPCS | Mod: S$GLB,,, | Performed by: NURSE PRACTITIONER

## 2018-07-16 PROCEDURE — 99999 PR PBB SHADOW E&M-EST. PATIENT-LVL III: CPT | Mod: PBBFAC,,, | Performed by: NURSE PRACTITIONER

## 2018-07-16 PROCEDURE — 99999 PR PBB SHADOW E&M-EST. PATIENT-LVL IV: CPT | Mod: PBBFAC,,, | Performed by: INTERNAL MEDICINE

## 2018-07-16 PROCEDURE — 3078F DIAST BP <80 MM HG: CPT | Mod: CPTII,S$GLB,, | Performed by: NURSE PRACTITIONER

## 2018-07-16 PROCEDURE — 99214 OFFICE O/P EST MOD 30 MIN: CPT | Mod: S$GLB,,, | Performed by: INTERNAL MEDICINE

## 2018-07-16 PROCEDURE — 3074F SYST BP LT 130 MM HG: CPT | Mod: CPTII,S$GLB,, | Performed by: NURSE PRACTITIONER

## 2018-07-16 PROCEDURE — 3074F SYST BP LT 130 MM HG: CPT | Mod: CPTII,S$GLB,, | Performed by: INTERNAL MEDICINE

## 2018-07-16 PROCEDURE — 3078F DIAST BP <80 MM HG: CPT | Mod: CPTII,S$GLB,, | Performed by: INTERNAL MEDICINE

## 2018-07-16 NOTE — PATIENT INSTRUCTIONS
Counseling and Referral of Other Preventative  (Italic type indicates deductible and co-insurance are waived)    Patient Name: Robin Hawkins  Today's Date: 7/16/2018    Health Maintenance       Date Due Completion Date    Zoster Vaccine 11/01/2018 (Originally 8/5/2008) postponed till fall due to shortage    Influenza Vaccine 08/01/2018 10/31/2017    Lipid Panel 09/07/2020 9/7/2015    Colonoscopy 05/06/2021 5/6/2016    Override on 9/6/2002: Done    TETANUS VACCINE 03/24/2026 3/24/2016        No orders of the defined types were placed in this encounter.    The following information is provided to all patients.  This information is to help you find resources for any of the problems found today that may be affecting your health:                Living healthy guide: www.[facility].[place].gov      Understanding Diabetes: www.diabetes.org      Eating healthy: www.cdc.gov/healthyweight      [facility] home safety checklist: www.cdc.gov/steadi/patient.html      Agency on Aging: www.goea.[place].gov      Alcoholics anonymous (AA): www.aa.org      Physical Activity: www.adam.nih.gov/bo7tuth      Tobacco use: www.quitwithusla.org

## 2018-07-16 NOTE — PROGRESS NOTES
"Robin Hawkins presented for a  Medicare AWV and comprehensive Health Risk Assessment today. The following components were reviewed and updated:    · Medical history  · Family History  · Social history  · Allergies and Current Medications  · Health Risk Assessment  · Health Maintenance  · Care Team     ** See Completed Assessments for Annual Wellness Visit within the encounter summary.**       The following assessments were completed:  · Living Situation  · CAGE  · Depression Screening  · Timed Get Up and Go  · Whisper Test  · Cognitive Function Screening        · Nutrition Screening  · ADL Screening  · PAQ Screening    Vitals:    07/16/18 1021   BP: 100/62   BP Location: Left arm   Patient Position: Sitting   BP Method: Large (Manual)   Pulse: 60   SpO2: 99%   Weight: 80.4 kg (177 lb 4 oz)   Height: 5' 9" (1.753 m)     Body mass index is 26.18 kg/m².  Physical Exam   Constitutional: He is oriented to person, place, and time. He appears well-developed and well-nourished.   Overweight    In wheelchair   HENT:   Head: Normocephalic.   Eyes: Conjunctivae, EOM and lids are normal. Pupils are equal, round, and reactive to light. Lids are everted and swept, no foreign bodies found.   Neck: Trachea normal, normal range of motion and full passive range of motion without pain. Neck supple. No JVD present. Carotid bruit is not present.   Cardiovascular: Normal rate, regular rhythm, normal heart sounds, intact distal pulses and normal pulses.    Pulmonary/Chest: Effort normal and breath sounds normal.   Abdominal: Soft. Normal appearance and bowel sounds are normal. There is no hepatosplenomegaly. There is no CVA tenderness.   Musculoskeletal: Normal range of motion.   In wheelchair   Neurological: He is alert and oriented to person, place, and time.   Skin: Skin is warm, dry and intact. Capillary refill takes less than 2 seconds.   Psychiatric: He has a normal mood and affect. His speech is normal and behavior is normal. " Judgment and thought content normal. Cognition and memory are normal.   Nursing note and vitals reviewed.        Diagnoses and health risks identified today and associated recommendations/orders:    1. Encounter for preventive health examination  Exam done    Health Maintenance reviewed and updated    Hearing test not done due to left ear hearing loss reported by pt    Records reviewed    2. Centrilobular emphysema  Stable.   Continue current medications.  Followed by Pulmonology.     3. Hyperlipidemia, unspecified hyperlipidemia type  Stable.   Followed by PCP.     Continue cholesterol med, low fat diet, and exercise.     4. Essential hypertension  Stable.   Followed by PCP.     Take medications as prescribed.    Monitor BP at home, goal BP < or = 140/80, call office if consistently above this range.    Follow low salt DASH diet and exercise.    BMI reviewed.    Go to ED if Headaches, blurred vision, chest pain, or SOB occurs along with elevated readings > or = 160/90.      5. Coronary artery calcification seen on CT scan  Stable.   Followed by PCP.     6. Atherosclerosis of abdominal aorta  Stable.   Seen on imaging from 8/4/2017.  Followed by PCP.     7. History of prostate cancer  Stable.   Followed by Urology.     8. History of colon cancer  Stable.   Followed by PCP.     9. Hepatocellular carcinoma  Stable.   Continue current medication.  Followed by Hem/Onc, Hepatology, and Liver Transplant.     10. Subclinical hypothyroidism  Stable.   Followed by PCP.     On thyroid meds, advised to take separate from other meds and vitamins    11. Chronic hepatitis C without hepatic coma  Stable.   Followed by Hepatology.     12. Cirrhosis of liver without ascites, unspecified hepatic cirrhosis type  Stable.   Followed by Hepatology.     13. BMI 26.0-26.9,adult  BMI reviewed    14. Chronic obstructive pulmonary disease, unspecified COPD type  Stable.   Continue current medications.  Followed by Pulmonology.       Provided  Robin with a 5-10 year written screening schedule and personal prevention plan. Recommendations were developed using the USPSTF age appropriate recommendations. Education, counseling, and referrals were provided as needed. After Visit Summary printed and given to patient which includes a list of additional screenings\tests needed.    Keep upcoming specialists and Chemo appts on AVS as scheduled    Jacey Chambers DNP
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
## 2018-07-16 NOTE — Clinical Note
Scans this week Ok for opdivo next week if scans ok If he gets opdivo then follow up in 5 weeks for next tx

## 2018-07-18 ENCOUNTER — HOSPITAL ENCOUNTER (OUTPATIENT)
Dept: RADIOLOGY | Facility: HOSPITAL | Age: 68
Discharge: HOME | End: 2018-07-18
Attending: INTERNAL MEDICINE
Payer: MEDICARE

## 2018-07-18 ENCOUNTER — INFUSION (OUTPATIENT)
Dept: INFUSION THERAPY | Facility: HOSPITAL | Age: 68
End: 2018-07-18
Attending: INTERNAL MEDICINE
Payer: MEDICARE

## 2018-07-18 DIAGNOSIS — C22.0 HCC (HEPATOCELLULAR CARCINOMA): ICD-10-CM

## 2018-07-18 DIAGNOSIS — C22.0 HEPATOCELLULAR CARCINOMA: Primary | ICD-10-CM

## 2018-07-18 LAB
ALBUMIN SERPL BCP-MCNC: 3.2 G/DL
ALP SERPL-CCNC: 137 U/L
ALT SERPL W/O P-5'-P-CCNC: 30 U/L
ANION GAP SERPL CALC-SCNC: 4 MMOL/L
AST SERPL-CCNC: 45 U/L
BASOPHILS # BLD AUTO: 0.02 K/UL
BASOPHILS NFR BLD: 0.4 %
BILIRUB SERPL-MCNC: 0.4 MG/DL
BUN SERPL-MCNC: 16 MG/DL
CALCIUM SERPL-MCNC: 8.9 MG/DL
CHLORIDE SERPL-SCNC: 110 MMOL/L
CO2 SERPL-SCNC: 23 MMOL/L
CREAT SERPL-MCNC: 1 MG/DL
DIFFERENTIAL METHOD: ABNORMAL
EOSINOPHIL # BLD AUTO: 0.3 K/UL
EOSINOPHIL NFR BLD: 5.7 %
ERYTHROCYTE [DISTWIDTH] IN BLOOD BY AUTOMATED COUNT: 15.2 %
EST. GFR  (AFRICAN AMERICAN): >60 ML/MIN/1.73 M^2
EST. GFR  (NON AFRICAN AMERICAN): >60 ML/MIN/1.73 M^2
GLUCOSE SERPL-MCNC: 83 MG/DL
HCT VFR BLD AUTO: 32.3 %
HGB BLD-MCNC: 10.2 G/DL
IMM GRANULOCYTES # BLD AUTO: 0.02 K/UL
IMM GRANULOCYTES NFR BLD AUTO: 0.4 %
INR PPP: 1
LYMPHOCYTES # BLD AUTO: 1.1 K/UL
LYMPHOCYTES NFR BLD: 19.9 %
MCH RBC QN AUTO: 28.7 PG
MCHC RBC AUTO-ENTMCNC: 31.6 G/DL
MCV RBC AUTO: 91 FL
MONOCYTES # BLD AUTO: 0.9 K/UL
MONOCYTES NFR BLD: 16.3 %
NEUTROPHILS # BLD AUTO: 3 K/UL
NEUTROPHILS NFR BLD: 57.3 %
NRBC BLD-RTO: 0 /100 WBC
PLATELET # BLD AUTO: 234 K/UL
PMV BLD AUTO: 10.3 FL
POTASSIUM SERPL-SCNC: 4.6 MMOL/L
PROT SERPL-MCNC: 7.4 G/DL
PROTHROMBIN TIME: 10.8 SEC
RBC # BLD AUTO: 3.55 M/UL
SODIUM SERPL-SCNC: 137 MMOL/L
WBC # BLD AUTO: 5.27 K/UL

## 2018-07-18 PROCEDURE — 25000003 PHARM REV CODE 250: Performed by: INTERNAL MEDICINE

## 2018-07-18 PROCEDURE — 85610 PROTHROMBIN TIME: CPT

## 2018-07-18 PROCEDURE — 25500020 PHARM REV CODE 255: Performed by: INTERNAL MEDICINE

## 2018-07-18 PROCEDURE — 74183 MRI ABD W/O CNTR FLWD CNTR: CPT | Mod: 26,,, | Performed by: RADIOLOGY

## 2018-07-18 PROCEDURE — 85025 COMPLETE CBC W/AUTO DIFF WBC: CPT

## 2018-07-18 PROCEDURE — 36591 DRAW BLOOD OFF VENOUS DEVICE: CPT

## 2018-07-18 PROCEDURE — 80053 COMPREHEN METABOLIC PANEL: CPT

## 2018-07-18 PROCEDURE — 71260 CT THORAX DX C+: CPT | Mod: 26,,, | Performed by: RADIOLOGY

## 2018-07-18 PROCEDURE — A9585 GADOBUTROL INJECTION: HCPCS | Performed by: INTERNAL MEDICINE

## 2018-07-18 PROCEDURE — A4216 STERILE WATER/SALINE, 10 ML: HCPCS | Performed by: INTERNAL MEDICINE

## 2018-07-18 PROCEDURE — 74177 CT ABD & PELVIS W/CONTRAST: CPT | Mod: TC

## 2018-07-18 PROCEDURE — 74183 MRI ABD W/O CNTR FLWD CNTR: CPT | Mod: TC

## 2018-07-18 PROCEDURE — 74177 CT ABD & PELVIS W/CONTRAST: CPT | Mod: 26,,, | Performed by: RADIOLOGY

## 2018-07-18 PROCEDURE — 63600175 PHARM REV CODE 636 W HCPCS: Performed by: INTERNAL MEDICINE

## 2018-07-18 RX ORDER — GADOBUTROL 604.72 MG/ML
10 INJECTION INTRAVENOUS
Status: COMPLETED | OUTPATIENT
Start: 2018-07-18 | End: 2018-07-18

## 2018-07-18 RX ORDER — SODIUM CHLORIDE 0.9 % (FLUSH) 0.9 %
10 SYRINGE (ML) INJECTION
Status: CANCELLED | OUTPATIENT
Start: 2018-07-18

## 2018-07-18 RX ORDER — SODIUM CHLORIDE 0.9 % (FLUSH) 0.9 %
10 SYRINGE (ML) INJECTION
Status: COMPLETED | OUTPATIENT
Start: 2018-07-18 | End: 2018-07-18

## 2018-07-18 RX ORDER — HEPARIN 100 UNIT/ML
500 SYRINGE INTRAVENOUS
Status: CANCELLED | OUTPATIENT
Start: 2018-07-18

## 2018-07-18 RX ORDER — HEPARIN 100 UNIT/ML
500 SYRINGE INTRAVENOUS
Status: COMPLETED | OUTPATIENT
Start: 2018-07-18 | End: 2018-07-18

## 2018-07-18 RX ADMIN — GADOBUTROL 10 ML: 604.72 INJECTION INTRAVENOUS at 05:07

## 2018-07-18 RX ADMIN — SODIUM CHLORIDE, PRESERVATIVE FREE 10 ML: 5 INJECTION INTRAVENOUS at 01:07

## 2018-07-18 RX ADMIN — HEPARIN 500 UNITS: 100 SYRINGE at 01:07

## 2018-07-18 RX ADMIN — IOHEXOL 100 ML: 350 INJECTION, SOLUTION INTRAVENOUS at 04:07

## 2018-07-18 NOTE — NURSING
Patient here for blood draw from left chest port-accesses easily with good blood return-blood to lab-port left accessed fro scans today-patient tolerated well.

## 2018-07-20 ENCOUNTER — TELEPHONE (OUTPATIENT)
Dept: HEMATOLOGY/ONCOLOGY | Facility: CLINIC | Age: 68
End: 2018-07-20

## 2018-07-20 NOTE — TELEPHONE ENCOUNTER
----- Message from Santos Ortez MD sent at 7/20/2018  6:21 AM CDT -----  If he's amenable to more LDT I would offer him Y90. Let me know and we can bring him to clinic. He needs a left lobe treatment. He is compensated now but treatment may decompensate him.    Santos      ----- Message -----  From: Ortiz Carrillo DO, FACP  Sent: 7/19/2018   8:52 AM  To: Santos Ortez MD, Terese Yang, RN    Jacinto Graham, This is a man with HCC on opdivo with somewhat a mixed response.  Has been TACE'd in the past.  You think additional TACE  Would be beneficial?  Nahun

## 2018-07-24 ENCOUNTER — INFUSION (OUTPATIENT)
Dept: INFUSION THERAPY | Facility: HOSPITAL | Age: 68
End: 2018-07-24
Attending: INTERNAL MEDICINE
Payer: MEDICARE

## 2018-07-24 VITALS
DIASTOLIC BLOOD PRESSURE: 61 MMHG | HEART RATE: 69 BPM | RESPIRATION RATE: 16 BRPM | SYSTOLIC BLOOD PRESSURE: 112 MMHG | TEMPERATURE: 98 F

## 2018-07-24 DIAGNOSIS — J43.2 CENTRILOBULAR EMPHYSEMA: Chronic | ICD-10-CM

## 2018-07-24 DIAGNOSIS — C22.0 HEPATOCELLULAR CARCINOMA: Primary | ICD-10-CM

## 2018-07-24 PROCEDURE — 25000003 PHARM REV CODE 250: Performed by: INTERNAL MEDICINE

## 2018-07-24 PROCEDURE — 63600175 PHARM REV CODE 636 W HCPCS: Mod: JG | Performed by: INTERNAL MEDICINE

## 2018-07-24 PROCEDURE — 96413 CHEMO IV INFUSION 1 HR: CPT

## 2018-07-24 RX ORDER — HEPARIN 100 UNIT/ML
500 SYRINGE INTRAVENOUS
Status: DISCONTINUED | OUTPATIENT
Start: 2018-07-24 | End: 2018-07-24 | Stop reason: HOSPADM

## 2018-07-24 RX ORDER — HEPARIN 100 UNIT/ML
500 SYRINGE INTRAVENOUS
Status: CANCELLED | OUTPATIENT
Start: 2018-07-24

## 2018-07-24 RX ORDER — IPRATROPIUM BROMIDE AND ALBUTEROL SULFATE 2.5; .5 MG/3ML; MG/3ML
SOLUTION RESPIRATORY (INHALATION)
Qty: 270 ML | Refills: 6 | Status: SHIPPED | OUTPATIENT
Start: 2018-07-24 | End: 2018-11-07 | Stop reason: SDUPTHER

## 2018-07-24 RX ORDER — SODIUM CHLORIDE 0.9 % (FLUSH) 0.9 %
10 SYRINGE (ML) INJECTION
Status: CANCELLED | OUTPATIENT
Start: 2018-07-24

## 2018-07-24 RX ORDER — SODIUM CHLORIDE 0.9 % (FLUSH) 0.9 %
10 SYRINGE (ML) INJECTION
Status: DISCONTINUED | OUTPATIENT
Start: 2018-07-24 | End: 2018-07-24 | Stop reason: HOSPADM

## 2018-07-24 RX ADMIN — SODIUM CHLORIDE: 0.9 INJECTION, SOLUTION INTRAVENOUS at 01:07

## 2018-07-24 RX ADMIN — HEPARIN 500 UNITS: 100 SYRINGE at 02:07

## 2018-07-24 RX ADMIN — SODIUM CHLORIDE 480 MG: 9 INJECTION, SOLUTION INTRAVENOUS at 01:07

## 2018-07-24 NOTE — PLAN OF CARE
Problem: Patient Care Overview  Goal: Plan of Care Review  Outcome: Ongoing (interventions implemented as appropriate)  Patient tolerated infusion well.  No reaction suspected.  AVS given to patient.  No questions or concerns.  Patient left unit in WC accompanied by family member.

## 2018-07-25 DIAGNOSIS — K21.00 GASTROESOPHAGEAL REFLUX DISEASE WITH ESOPHAGITIS: Chronic | ICD-10-CM

## 2018-07-25 DIAGNOSIS — K21.00 REFLUX ESOPHAGITIS: ICD-10-CM

## 2018-07-25 RX ORDER — ATORVASTATIN CALCIUM 40 MG/1
TABLET, FILM COATED ORAL
Qty: 90 TABLET | Refills: 0 | Status: SHIPPED | OUTPATIENT
Start: 2018-07-25 | End: 2018-10-22 | Stop reason: SDUPTHER

## 2018-07-26 ENCOUNTER — TELEPHONE (OUTPATIENT)
Dept: HEPATOLOGY | Facility: CLINIC | Age: 68
End: 2018-07-26

## 2018-07-26 DIAGNOSIS — N39.41 URGE INCONTINENCE: ICD-10-CM

## 2018-07-26 RX ORDER — MIRABEGRON 50 MG/1
TABLET, FILM COATED, EXTENDED RELEASE ORAL
Qty: 30 TABLET | Refills: 0 | Status: SHIPPED | OUTPATIENT
Start: 2018-07-26 | End: 2018-08-10 | Stop reason: SDUPTHER

## 2018-07-26 NOTE — TELEPHONE ENCOUNTER
Pt's sister returned call and f/u appt with Dr. Scott scheduled on 8/22. She verbalized understanding. Appt slip printed and mailed.     [facility]

## 2018-07-26 NOTE — TELEPHONE ENCOUNTER
----- Message from Ga Quinones MA sent at 7/25/2018  2:40 PM CDT -----  Contact: patient sister Manju      ----- Message -----  From: Yanna Durand RN  Sent: 7/25/2018   2:18 PM  To: [facility] Non-Clinical Staff        ----- Message -----  From: Nella Fan  Sent: 7/25/2018  10:32 AM  To: [facility] Hepatitis C Staff    Patient sister Manju calling to schedule an follow up/lab appointment.           Please call [phone]

## 2018-07-27 ENCOUNTER — OUTPATIENT CASE MANAGEMENT (OUTPATIENT)
Dept: ADMINISTRATIVE | Facility: OTHER | Age: 68
End: 2018-07-27

## 2018-07-27 RX ORDER — OMEPRAZOLE 20 MG/1
CAPSULE, DELAYED RELEASE ORAL
Qty: 90 CAPSULE | Refills: 0 | Status: SHIPPED | OUTPATIENT
Start: 2018-07-27 | End: 2018-10-22 | Stop reason: SDUPTHER

## 2018-07-27 NOTE — PROGRESS NOTES
Summary: Spoke with pt's sister, Manju, who reports that the patient is doing well. Reports that his continues to cut the grass and ride his bike. Reports that he has a very good appetite. Reports that he has no new concerns or complaints at this time.Informed of case closure.  Intervention: Reviewed ways to prevent complications of DM.  Plan: Close case with this encounter.

## 2018-07-30 ENCOUNTER — TELEPHONE (OUTPATIENT)
Dept: HEMATOLOGY/ONCOLOGY | Facility: CLINIC | Age: 68
End: 2018-07-30

## 2018-07-30 NOTE — TELEPHONE ENCOUNTER
Returned call, spoke with patients sister and assisted wit changing her brothers lab apt for his labs

## 2018-08-10 ENCOUNTER — OFFICE VISIT (OUTPATIENT)
Dept: UROLOGY | Facility: CLINIC | Age: 68
End: 2018-08-10
Payer: MEDICARE

## 2018-08-10 VITALS
SYSTOLIC BLOOD PRESSURE: 128 MMHG | WEIGHT: 177.25 LBS | DIASTOLIC BLOOD PRESSURE: 75 MMHG | HEART RATE: 65 BPM | BODY MASS INDEX: 26.25 KG/M2 | HEIGHT: 69 IN

## 2018-08-10 DIAGNOSIS — N39.46 MIXED STRESS AND URGE URINARY INCONTINENCE: ICD-10-CM

## 2018-08-10 DIAGNOSIS — Z85.46 HISTORY OF PROSTATE CANCER: Primary | Chronic | ICD-10-CM

## 2018-08-10 DIAGNOSIS — N39.41 URGE INCONTINENCE: ICD-10-CM

## 2018-08-10 PROCEDURE — 81002 URINALYSIS NONAUTO W/O SCOPE: CPT | Mod: S$GLB,,, | Performed by: UROLOGY

## 2018-08-10 PROCEDURE — 3078F DIAST BP <80 MM HG: CPT | Mod: CPTII,S$GLB,, | Performed by: UROLOGY

## 2018-08-10 PROCEDURE — 99213 OFFICE O/P EST LOW 20 MIN: CPT | Mod: 25,S$GLB,, | Performed by: UROLOGY

## 2018-08-10 PROCEDURE — 3074F SYST BP LT 130 MM HG: CPT | Mod: CPTII,S$GLB,, | Performed by: UROLOGY

## 2018-08-10 PROCEDURE — 99999 PR PBB SHADOW E&M-EST. PATIENT-LVL IV: CPT | Mod: PBBFAC,,, | Performed by: UROLOGY

## 2018-08-10 RX ORDER — OXYBUTYNIN CHLORIDE 10 MG/1
10 TABLET, EXTENDED RELEASE ORAL DAILY
Qty: 30 TABLET | Refills: 11 | Status: SHIPPED | OUTPATIENT
Start: 2018-08-10 | End: 2018-08-27 | Stop reason: SDUPTHER

## 2018-08-20 ENCOUNTER — OFFICE VISIT (OUTPATIENT)
Dept: HEMATOLOGY/ONCOLOGY | Facility: CLINIC | Age: 68
End: 2018-08-20
Payer: MEDICARE

## 2018-08-20 ENCOUNTER — INFUSION (OUTPATIENT)
Dept: INFUSION THERAPY | Facility: HOSPITAL | Age: 68
End: 2018-08-20
Attending: INTERNAL MEDICINE
Payer: MEDICARE

## 2018-08-20 VITALS
DIASTOLIC BLOOD PRESSURE: 67 MMHG | BODY MASS INDEX: 25.71 KG/M2 | HEART RATE: 56 BPM | TEMPERATURE: 98 F | WEIGHT: 183.63 LBS | RESPIRATION RATE: 14 BRPM | HEIGHT: 71 IN | OXYGEN SATURATION: 99 % | SYSTOLIC BLOOD PRESSURE: 145 MMHG

## 2018-08-20 DIAGNOSIS — E03.8 SUBCLINICAL HYPOTHYROIDISM: ICD-10-CM

## 2018-08-20 DIAGNOSIS — D63.0 ANEMIA IN NEOPLASTIC DISEASE: ICD-10-CM

## 2018-08-20 DIAGNOSIS — C22.0 HEPATOCELLULAR CARCINOMA: Primary | Chronic | ICD-10-CM

## 2018-08-20 DIAGNOSIS — J43.2 CENTRILOBULAR EMPHYSEMA: Chronic | ICD-10-CM

## 2018-08-20 DIAGNOSIS — Z85.46 HISTORY OF PROSTATE CANCER: Chronic | ICD-10-CM

## 2018-08-20 DIAGNOSIS — C22.0 HCC (HEPATOCELLULAR CARCINOMA): ICD-10-CM

## 2018-08-20 DIAGNOSIS — R77.2 ELEVATED AFP: ICD-10-CM

## 2018-08-20 DIAGNOSIS — E07.9 THYROID DISORDER: ICD-10-CM

## 2018-08-20 LAB
AFP SERPL-MCNC: 1752 NG/ML
ALBUMIN SERPL BCP-MCNC: 3.2 G/DL
ALP SERPL-CCNC: 145 U/L
ALT SERPL W/O P-5'-P-CCNC: 37 U/L
ANION GAP SERPL CALC-SCNC: 8 MMOL/L
AST SERPL-CCNC: 55 U/L
BILIRUB SERPL-MCNC: 0.3 MG/DL
BUN SERPL-MCNC: 17 MG/DL
CALCIUM SERPL-MCNC: 8.7 MG/DL
CHLORIDE SERPL-SCNC: 109 MMOL/L
CO2 SERPL-SCNC: 22 MMOL/L
COMPLEXED PSA SERPL-MCNC: 0.05 NG/ML
CREAT SERPL-MCNC: 1.1 MG/DL
ERYTHROCYTE [DISTWIDTH] IN BLOOD BY AUTOMATED COUNT: 16.4 %
EST. GFR  (AFRICAN AMERICAN): >60 ML/MIN/1.73 M^2
EST. GFR  (NON AFRICAN AMERICAN): >60 ML/MIN/1.73 M^2
GLUCOSE SERPL-MCNC: 100 MG/DL
HCT VFR BLD AUTO: 30.8 %
HGB BLD-MCNC: 9.7 G/DL
IMM GRANULOCYTES # BLD AUTO: 0.01 K/UL
MAGNESIUM SERPL-MCNC: 1.8 MG/DL
MCH RBC QN AUTO: 27.6 PG
MCHC RBC AUTO-ENTMCNC: 31.5 G/DL
MCV RBC AUTO: 88 FL
NEUTROPHILS # BLD AUTO: 2.4 K/UL
PLATELET # BLD AUTO: 204 K/UL
PMV BLD AUTO: 10.5 FL
POTASSIUM SERPL-SCNC: 4 MMOL/L
PROT SERPL-MCNC: 7.2 G/DL
RBC # BLD AUTO: 3.52 M/UL
SODIUM SERPL-SCNC: 139 MMOL/L
T4 FREE SERPL-MCNC: 0.83 NG/DL
TSH SERPL DL<=0.005 MIU/L-ACNC: 3.94 UIU/ML
WBC # BLD AUTO: 4.31 K/UL

## 2018-08-20 PROCEDURE — 82105 ALPHA-FETOPROTEIN SERUM: CPT

## 2018-08-20 PROCEDURE — 25000003 PHARM REV CODE 250: Performed by: INTERNAL MEDICINE

## 2018-08-20 PROCEDURE — 36415 COLL VENOUS BLD VENIPUNCTURE: CPT

## 2018-08-20 PROCEDURE — 85027 COMPLETE CBC AUTOMATED: CPT

## 2018-08-20 PROCEDURE — A4216 STERILE WATER/SALINE, 10 ML: HCPCS | Performed by: INTERNAL MEDICINE

## 2018-08-20 PROCEDURE — 3078F DIAST BP <80 MM HG: CPT | Mod: CPTII,S$GLB,, | Performed by: NURSE PRACTITIONER

## 2018-08-20 PROCEDURE — 84443 ASSAY THYROID STIM HORMONE: CPT

## 2018-08-20 PROCEDURE — 84439 ASSAY OF FREE THYROXINE: CPT

## 2018-08-20 PROCEDURE — 3077F SYST BP >= 140 MM HG: CPT | Mod: CPTII,S$GLB,, | Performed by: NURSE PRACTITIONER

## 2018-08-20 PROCEDURE — 99499 UNLISTED E&M SERVICE: CPT | Mod: S$GLB,,, | Performed by: NURSE PRACTITIONER

## 2018-08-20 PROCEDURE — 83735 ASSAY OF MAGNESIUM: CPT

## 2018-08-20 PROCEDURE — 99999 PR PBB SHADOW E&M-EST. PATIENT-LVL V: CPT | Mod: PBBFAC,,, | Performed by: NURSE PRACTITIONER

## 2018-08-20 PROCEDURE — 99215 OFFICE O/P EST HI 40 MIN: CPT | Mod: S$GLB,,, | Performed by: NURSE PRACTITIONER

## 2018-08-20 PROCEDURE — 80053 COMPREHEN METABOLIC PANEL: CPT

## 2018-08-20 PROCEDURE — 84153 ASSAY OF PSA TOTAL: CPT

## 2018-08-20 PROCEDURE — 63600175 PHARM REV CODE 636 W HCPCS: Performed by: INTERNAL MEDICINE

## 2018-08-20 PROCEDURE — 36591 DRAW BLOOD OFF VENOUS DEVICE: CPT

## 2018-08-20 RX ORDER — HEPARIN 100 UNIT/ML
500 SYRINGE INTRAVENOUS
Status: COMPLETED | OUTPATIENT
Start: 2018-08-20 | End: 2018-08-20

## 2018-08-20 RX ORDER — HEPARIN 100 UNIT/ML
500 SYRINGE INTRAVENOUS
Status: CANCELLED | OUTPATIENT
Start: 2018-08-21

## 2018-08-20 RX ORDER — SODIUM CHLORIDE 0.9 % (FLUSH) 0.9 %
10 SYRINGE (ML) INJECTION
Status: COMPLETED | OUTPATIENT
Start: 2018-08-20 | End: 2018-08-20

## 2018-08-20 RX ORDER — SODIUM CHLORIDE 0.9 % (FLUSH) 0.9 %
10 SYRINGE (ML) INJECTION
Status: CANCELLED | OUTPATIENT
Start: 2018-08-21

## 2018-08-20 RX ORDER — SODIUM CHLORIDE 0.9 % (FLUSH) 0.9 %
10 SYRINGE (ML) INJECTION
Status: CANCELLED | OUTPATIENT
Start: 2018-08-20

## 2018-08-20 RX ORDER — HEPARIN 100 UNIT/ML
500 SYRINGE INTRAVENOUS
Status: CANCELLED | OUTPATIENT
Start: 2018-08-20

## 2018-08-20 RX ADMIN — Medication 10 ML: at 11:08

## 2018-08-20 RX ADMIN — HEPARIN 500 UNITS: 100 SYRINGE at 11:08
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
## 2018-08-21 ENCOUNTER — TELEPHONE (OUTPATIENT)
Dept: HEMATOLOGY/ONCOLOGY | Facility: CLINIC | Age: 68
End: 2018-08-21

## 2018-08-21 ENCOUNTER — INFUSION (OUTPATIENT)
Dept: INFUSION THERAPY | Facility: HOSPITAL | Age: 68
End: 2018-08-21
Attending: INTERNAL MEDICINE
Payer: MEDICARE

## 2018-08-21 VITALS
TEMPERATURE: 97 F | HEART RATE: 61 BPM | DIASTOLIC BLOOD PRESSURE: 65 MMHG | SYSTOLIC BLOOD PRESSURE: 140 MMHG | RESPIRATION RATE: 20 BRPM

## 2018-08-21 DIAGNOSIS — Z51.12 MAINTENANCE ANTINEOPLASTIC IMMUNOTHERAPY: ICD-10-CM

## 2018-08-21 DIAGNOSIS — C22.0 HCC (HEPATOCELLULAR CARCINOMA): ICD-10-CM

## 2018-08-21 DIAGNOSIS — E03.8 SUBCLINICAL HYPOTHYROIDISM: Primary | ICD-10-CM

## 2018-08-21 DIAGNOSIS — Z92.89 HISTORY OF IMMUNOTHERAPY: ICD-10-CM

## 2018-08-21 DIAGNOSIS — Z51.12 ENCOUNTER FOR ANTINEOPLASTIC IMMUNOTHERAPY: ICD-10-CM

## 2018-08-21 DIAGNOSIS — C22.0 HEPATOCELLULAR CARCINOMA: Primary | ICD-10-CM

## 2018-08-21 PROCEDURE — 25000003 PHARM REV CODE 250: Performed by: INTERNAL MEDICINE

## 2018-08-21 PROCEDURE — 63600175 PHARM REV CODE 636 W HCPCS: Mod: JG | Performed by: INTERNAL MEDICINE

## 2018-08-21 PROCEDURE — 96413 CHEMO IV INFUSION 1 HR: CPT

## 2018-08-21 RX ORDER — HEPARIN 100 UNIT/ML
500 SYRINGE INTRAVENOUS
Status: DISCONTINUED | OUTPATIENT
Start: 2018-08-21 | End: 2018-08-21 | Stop reason: HOSPADM

## 2018-08-21 RX ORDER — SODIUM CHLORIDE 0.9 % (FLUSH) 0.9 %
10 SYRINGE (ML) INJECTION
Status: DISCONTINUED | OUTPATIENT
Start: 2018-08-21 | End: 2018-08-21 | Stop reason: HOSPADM

## 2018-08-21 RX ADMIN — HEPARIN 500 UNITS: 100 SYRINGE at 03:08

## 2018-08-21 RX ADMIN — SODIUM CHLORIDE 480 MG: 9 INJECTION, SOLUTION INTRAVENOUS at 02:08

## 2018-08-21 RX ADMIN — SODIUM CHLORIDE: 9 INJECTION, SOLUTION INTRAVENOUS at 02:08

## 2018-08-21 NOTE — PLAN OF CARE
Problem: Patient Care Overview  Goal: Plan of Care Review  Outcome: Ongoing (interventions implemented as appropriate)  Pt received Opdivo; tolerated well. VSS and NAD. Pt instructed to call MD with any concerns. Pt discharged home independently

## 2018-08-21 NOTE — TELEPHONE ENCOUNTER
----- Message from Yue Newby sent at 8/20/2018  1:53 PM CDT -----  please put labs in as clinic collect    Message   Received: Today   Message Contents   Ignacio Link, MIKALA Newby         RTC in 4 weeks to see Dr. Carrillo with a CBC, CMP, TSH and for Nivolumab.

## 2018-08-22 ENCOUNTER — OFFICE VISIT (OUTPATIENT)
Dept: HEPATOLOGY | Facility: CLINIC | Age: 68
End: 2018-08-22
Attending: INTERNAL MEDICINE
Payer: MEDICARE

## 2018-08-22 VITALS
SYSTOLIC BLOOD PRESSURE: 140 MMHG | HEIGHT: 71 IN | BODY MASS INDEX: 26.33 KG/M2 | TEMPERATURE: 98 F | OXYGEN SATURATION: 100 % | HEART RATE: 58 BPM | WEIGHT: 188.06 LBS | RESPIRATION RATE: 18 BRPM | DIASTOLIC BLOOD PRESSURE: 69 MMHG

## 2018-08-22 DIAGNOSIS — R77.2 ELEVATED AFP: ICD-10-CM

## 2018-08-22 DIAGNOSIS — C22.0 HEPATOCELLULAR CARCINOMA: Primary | Chronic | ICD-10-CM

## 2018-08-22 DIAGNOSIS — B18.2 CHRONIC HEPATITIS C WITHOUT HEPATIC COMA: ICD-10-CM

## 2018-08-22 DIAGNOSIS — K74.60 CIRRHOSIS OF LIVER WITHOUT ASCITES, UNSPECIFIED HEPATIC CIRRHOSIS TYPE: Chronic | ICD-10-CM

## 2018-08-22 PROCEDURE — 3078F DIAST BP <80 MM HG: CPT | Mod: CPTII,S$GLB,, | Performed by: INTERNAL MEDICINE

## 2018-08-22 PROCEDURE — 99499 UNLISTED E&M SERVICE: CPT | Mod: S$GLB,,, | Performed by: INTERNAL MEDICINE

## 2018-08-22 PROCEDURE — 99215 OFFICE O/P EST HI 40 MIN: CPT | Mod: S$GLB,,, | Performed by: INTERNAL MEDICINE

## 2018-08-22 PROCEDURE — 3077F SYST BP >= 140 MM HG: CPT | Mod: CPTII,S$GLB,, | Performed by: INTERNAL MEDICINE
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
## 2018-08-22 NOTE — PATIENT INSTRUCTIONS
1. Schedule AFP lab for 9/17/18  2. Interested in RFA just no TACE. Will put on for radiology review  Return 3 months  [phone]

## 2018-08-24 DIAGNOSIS — N39.41 URGE INCONTINENCE: ICD-10-CM

## 2018-08-26 ENCOUNTER — TELEPHONE (OUTPATIENT)
Dept: HEPATOLOGY | Facility: CLINIC | Age: 68
End: 2018-08-26

## 2018-08-26 NOTE — TELEPHONE ENCOUNTER
Patient: Robin Hawkins       MRN: [id number]      : 1950     Age: 68 y.o.  [address] [facility] [address]    Provider: Hepatologist - Tyler    Urgency of review: urgent    Patient Transplant Status: Not a candidate    Reason for presentation: Other assess for RFA; patient not interested in TACE    Clinical Summary: HCC hx:  A ct scan from 12/30/15 showed a 2.5 cm enhancing mass in segment VI that was previously 2 cm in Oct 2015 and 1.2 cm in 2015 and was c/w HCC radiologically. AFP was 109 16 prior to tace. He underwent a TACE on 16. Post TACE sequential CT scans revealed a well treated lesion      1/10/17: The previously treated lesion, although not enhancing had grown in size. In addition a new lesion mearsuring 2.7 cm was noted. AFP had risen to 313 (was <20).     17: Y90 embolization. Post treatment scan today not done since unable to get IV access. Most recent AFPs 5/10/17 5556; today 5080 . In addition the Ct chest done on 1/10/17 showed 2 new lesions, the larger measuring 1.2 cm. These are of unclear significance.     17 and 17 and 3/18: TACE  Previously on nexavar- intolerant     MRI in 2018 had indicated progressive disease.  He was started on nivolumab in 2018. AFP is rising. Most recently it is 447 on 18. It was 65 in Dec/17 . He has declined further locoregional therapy.      MRI in 2018 had indicated progressive disease.  He was started on nivolumab in 2018. Having no side effects except hair loss.     MRI 18: Post ablation changes of segment 6 and 7 with and adjacent area of nodular enhancement and washout consistent with residual disease. Two segment II hyper enhancing lesions with washout which meet diagnostic criteria for HCC.  Multiple subcentimeter hyperenhancing foci, some of which appear new in the interval are nonspecific. AFP 1752 (rising).    Imaging to be reviewed: 18    HCC Treatment History: see above    ABO: A  POS    Platelets:   Lab Results   Component Value Date/Time     08/20/2018 10:49 AM     Creatinine:   Lab Results   Component Value Date/Time    CREATININE 1.1 08/20/2018 10:49 AM     Bilirubin:   Lab Results   Component Value Date/Time    BILITOT 0.3 08/20/2018 10:49 AM     AFP Last 3 each if available:   Lab Results   Component Value Date/Time    AFP 1752 (H) 08/20/2018 10:49 AM     (H) 06/25/2018 12:55 PM     (H) 05/21/2018 09:30 AM       MELD: MELD-Na score: 6 at 7/18/2018  1:46 PM  MELD score: 6 at 7/18/2018  1:46 PM  Calculated from:  Serum Creatinine: 1 mg/dL at 7/18/2018  1:46 PM  Serum Sodium: 137 mmol/L at 7/18/2018  1:46 PM  Total Bilirubin: 0.4 mg/dL (Rounded to 1 mg/dL) at 7/18/2018  1:46 PM  INR(ratio): 1 at 7/18/2018  1:46 PM  Age: 67 years    Plan:     Follow-up Provider:

## 2018-08-27 RX ORDER — MIRABEGRON 50 MG/1
TABLET, FILM COATED, EXTENDED RELEASE ORAL
Qty: 30 TABLET | Refills: 11 | Status: SHIPPED | OUTPATIENT
Start: 2018-08-27 | End: 2019-03-21 | Stop reason: CLARIF

## 2018-08-27 RX ORDER — TIOTROPIUM BROMIDE 18 UG/1
CAPSULE ORAL; RESPIRATORY (INHALATION)
Qty: 90 CAPSULE | Refills: 0 | Status: ON HOLD | OUTPATIENT
Start: 2018-08-27 | End: 2018-10-24 | Stop reason: SDUPTHER

## 2018-08-29 ENCOUNTER — TELEPHONE (OUTPATIENT)
Dept: HEMATOLOGY/ONCOLOGY | Facility: CLINIC | Age: 68
End: 2018-08-29

## 2018-08-29 NOTE — TELEPHONE ENCOUNTER
----- Message from Frank Bobby sent at 8/29/2018 10:46 AM CDT -----  Contact: Pharmacy   Will like to clarify instructions on potassium chloride SA (K-DUR,KLOR-CON) 20 MEQ tablet    Contact::[phone]

## 2018-08-29 NOTE — TELEPHONE ENCOUNTER
Patient: Robin Hawkins       MRN: [id number]      : 1950     Age: 68 y.o.  [address] [facility] [address]    Provider: Hepatologist - Tyler    Urgency of review: urgent    Patient Transplant Status: Not a candidate    Reason for presentation: Other assess for RFA; patient not interested in TACE    Clinical Summary: HCC hx:  A ct scan from 12/30/15 showed a 2.5 cm enhancing mass in segment VI that was previously 2 cm in Oct 2015 and 1.2 cm in 2015 and was c/w HCC radiologically. AFP was 109 16 prior to tace. He underwent a TACE on 16. Post TACE sequential CT scans revealed a well treated lesion      1/10/17: The previously treated lesion, although not enhancing had grown in size. In addition a new lesion mearsuring 2.7 cm was noted. AFP had risen to 313 (was <20).     17: Y90 embolization. Post treatment scan today not done since unable to get IV access. Most recent AFPs 5/10/17 5556; today 5080 . In addition the Ct chest done on 1/10/17 showed 2 new lesions, the larger measuring 1.2 cm. These are of unclear significance.     17 and 17 and 3/18: TACE  Previously on nexavar- intolerant     MRI in 2018 had indicated progressive disease.  He was started on nivolumab in 2018. AFP is rising. Most recently it is 447 on 18. It was 65 in Dec/17 . He has declined further locoregional therapy.      MRI in 2018 had indicated progressive disease.  He was started on nivolumab in 2018. Having no side effects except hair loss.     MRI 18: Post ablation changes of segment 6 and 7 with and adjacent area of nodular enhancement and washout consistent with residual disease. Two segment II hyper enhancing lesions with washout which meet diagnostic criteria for HCC.  Multiple subcentimeter hyperenhancing foci, some of which appear new in the interval are nonspecific. AFP 1752 (rising).    Imaging to be reviewed: 18    HCC Treatment History: see above    ABO: A  POS    Platelets:   Lab Results   Component Value Date/Time     08/20/2018 10:49 AM     Creatinine:   Lab Results   Component Value Date/Time    CREATININE 1.1 08/20/2018 10:49 AM     Bilirubin:   Lab Results   Component Value Date/Time    BILITOT 0.3 08/20/2018 10:49 AM     AFP Last 3 each if available:   Lab Results   Component Value Date/Time    AFP 1752 (H) 08/20/2018 10:49 AM     (H) 06/25/2018 12:55 PM     (H) 05/21/2018 09:30 AM       MELD: MELD-Na score: 6 at 7/18/2018  1:46 PM  MELD score: 6 at 7/18/2018  1:46 PM  Calculated from:  Serum Creatinine: 1 mg/dL at 7/18/2018  1:46 PM  Serum Sodium: 137 mmol/L at 7/18/2018  1:46 PM  Total Bilirubin: 0.4 mg/dL (Rounded to 1 mg/dL) at 7/18/2018  1:46 PM  INR(ratio): 1 at 7/18/2018  1:46 PM  Age: 67 years    Plan: Most recent study shows interval enlargement of left lobe lesions and recurrence at margin in right lobe superiorly. Multiple non specific foci of enhancement are concerning and require continued surveillance. Will discuss ablation during conference.    Recurrence at prior treatment site as well as 2 lesions that meet criteria; multiple additional areas of enhancement which are concerning for future development. MD to discuss with patient and family regarding goals of care and options for treatment at this point.       Follow-up Provider: Octavia Scott MD

## 2018-09-03 RX ORDER — METOPROLOL TARTRATE 25 MG/1
TABLET, FILM COATED ORAL
Qty: 90 TABLET | Refills: 0 | OUTPATIENT
Start: 2018-09-03

## 2018-09-04 ENCOUNTER — TELEPHONE (OUTPATIENT)
Dept: HEPATOLOGY | Facility: CLINIC | Age: 68
End: 2018-09-04

## 2018-09-04 ENCOUNTER — CONFERENCE (OUTPATIENT)
Dept: TRANSPLANT | Facility: CLINIC | Age: 68
End: 2018-09-04

## 2018-09-06 DIAGNOSIS — I10 ESSENTIAL HYPERTENSION: Primary | Chronic | ICD-10-CM

## 2018-09-06 RX ORDER — METOPROLOL TARTRATE 25 MG/1
12.5 TABLET, FILM COATED ORAL 2 TIMES DAILY
Qty: 90 TABLET | Refills: 0 | Status: SHIPPED | OUTPATIENT
Start: 2018-09-06 | End: 2018-12-03 | Stop reason: SDUPTHER

## 2018-09-17 ENCOUNTER — INFUSION (OUTPATIENT)
Dept: INFUSION THERAPY | Facility: HOSPITAL | Age: 68
End: 2018-09-17
Attending: INTERNAL MEDICINE
Payer: MEDICARE

## 2018-09-17 ENCOUNTER — TELEPHONE (OUTPATIENT)
Dept: OPTOMETRY | Facility: CLINIC | Age: 68
End: 2018-09-17

## 2018-09-17 ENCOUNTER — TELEPHONE (OUTPATIENT)
Dept: HEMATOLOGY/ONCOLOGY | Facility: CLINIC | Age: 68
End: 2018-09-17

## 2018-09-17 ENCOUNTER — OFFICE VISIT (OUTPATIENT)
Dept: HEMATOLOGY/ONCOLOGY | Facility: CLINIC | Age: 68
End: 2018-09-17
Payer: MEDICARE

## 2018-09-17 VITALS
HEIGHT: 71 IN | RESPIRATION RATE: 20 BRPM | OXYGEN SATURATION: 100 % | HEART RATE: 71 BPM | WEIGHT: 188.5 LBS | SYSTOLIC BLOOD PRESSURE: 130 MMHG | TEMPERATURE: 98 F | BODY MASS INDEX: 26.39 KG/M2 | DIASTOLIC BLOOD PRESSURE: 72 MMHG

## 2018-09-17 DIAGNOSIS — E03.8 SUBCLINICAL HYPOTHYROIDISM: ICD-10-CM

## 2018-09-17 DIAGNOSIS — C22.0 HEPATOCELLULAR CARCINOMA: Primary | Chronic | ICD-10-CM

## 2018-09-17 DIAGNOSIS — Z09 CHEMOTHERAPY FOLLOW-UP EXAMINATION: ICD-10-CM

## 2018-09-17 DIAGNOSIS — C22.0 HCC (HEPATOCELLULAR CARCINOMA): ICD-10-CM

## 2018-09-17 DIAGNOSIS — Z51.12 ENCOUNTER FOR ANTINEOPLASTIC IMMUNOTHERAPY: Primary | ICD-10-CM

## 2018-09-17 DIAGNOSIS — C22.0 HEPATOCELLULAR CARCINOMA: ICD-10-CM

## 2018-09-17 LAB
AFP SERPL-MCNC: 2995 NG/ML
ALBUMIN SERPL BCP-MCNC: 3.4 G/DL
ALP SERPL-CCNC: 136 U/L
ALT SERPL W/O P-5'-P-CCNC: 26 U/L
ANION GAP SERPL CALC-SCNC: 6 MMOL/L
AST SERPL-CCNC: 38 U/L
BILIRUB SERPL-MCNC: 0.5 MG/DL
BUN SERPL-MCNC: 18 MG/DL
CALCIUM SERPL-MCNC: 9.4 MG/DL
CHLORIDE SERPL-SCNC: 106 MMOL/L
CO2 SERPL-SCNC: 22 MMOL/L
CREAT SERPL-MCNC: 1.2 MG/DL
ERYTHROCYTE [DISTWIDTH] IN BLOOD BY AUTOMATED COUNT: 17 %
EST. GFR  (AFRICAN AMERICAN): >60 ML/MIN/1.73 M^2
EST. GFR  (NON AFRICAN AMERICAN): >60 ML/MIN/1.73 M^2
GLUCOSE SERPL-MCNC: 98 MG/DL
HCT VFR BLD AUTO: 34.5 %
HGB BLD-MCNC: 11.1 G/DL
IMM GRANULOCYTES # BLD AUTO: 0.02 K/UL
MCH RBC QN AUTO: 27.8 PG
MCHC RBC AUTO-ENTMCNC: 32.2 G/DL
MCV RBC AUTO: 87 FL
NEUTROPHILS # BLD AUTO: 6.8 K/UL
PLATELET # BLD AUTO: 237 K/UL
PMV BLD AUTO: 10.6 FL
POTASSIUM SERPL-SCNC: 4.8 MMOL/L
PROT SERPL-MCNC: 7.9 G/DL
RBC # BLD AUTO: 3.99 M/UL
SODIUM SERPL-SCNC: 134 MMOL/L
T4 FREE SERPL-MCNC: 0.84 NG/DL
TSH SERPL DL<=0.005 MIU/L-ACNC: 4.35 UIU/ML
WBC # BLD AUTO: 9.56 K/UL

## 2018-09-17 PROCEDURE — 3075F SYST BP GE 130 - 139MM HG: CPT | Mod: CPTII,,, | Performed by: INTERNAL MEDICINE

## 2018-09-17 PROCEDURE — 84443 ASSAY THYROID STIM HORMONE: CPT

## 2018-09-17 PROCEDURE — 3078F DIAST BP <80 MM HG: CPT | Mod: CPTII,,, | Performed by: INTERNAL MEDICINE

## 2018-09-17 PROCEDURE — 36591 DRAW BLOOD OFF VENOUS DEVICE: CPT

## 2018-09-17 PROCEDURE — 80053 COMPREHEN METABOLIC PANEL: CPT

## 2018-09-17 PROCEDURE — 63600175 PHARM REV CODE 636 W HCPCS: Performed by: INTERNAL MEDICINE

## 2018-09-17 PROCEDURE — 99499 UNLISTED E&M SERVICE: CPT | Mod: S$GLB,,, | Performed by: INTERNAL MEDICINE

## 2018-09-17 PROCEDURE — 99999 PR PBB SHADOW E&M-EST. PATIENT-LVL IV: CPT | Mod: PBBFAC,,, | Performed by: INTERNAL MEDICINE

## 2018-09-17 PROCEDURE — 1101F PT FALLS ASSESS-DOCD LE1/YR: CPT | Mod: CPTII,,, | Performed by: INTERNAL MEDICINE

## 2018-09-17 PROCEDURE — 85027 COMPLETE CBC AUTOMATED: CPT

## 2018-09-17 PROCEDURE — 99214 OFFICE O/P EST MOD 30 MIN: CPT | Mod: S$PBB,,, | Performed by: INTERNAL MEDICINE

## 2018-09-17 PROCEDURE — A4216 STERILE WATER/SALINE, 10 ML: HCPCS | Performed by: INTERNAL MEDICINE

## 2018-09-17 PROCEDURE — 25000003 PHARM REV CODE 250: Performed by: INTERNAL MEDICINE

## 2018-09-17 PROCEDURE — 82105 ALPHA-FETOPROTEIN SERUM: CPT

## 2018-09-17 PROCEDURE — 84439 ASSAY OF FREE THYROXINE: CPT

## 2018-09-17 PROCEDURE — 99214 OFFICE O/P EST MOD 30 MIN: CPT | Mod: PBBFAC,25 | Performed by: INTERNAL MEDICINE

## 2018-09-17 RX ORDER — SODIUM CHLORIDE 0.9 % (FLUSH) 0.9 %
10 SYRINGE (ML) INJECTION
Status: COMPLETED | OUTPATIENT
Start: 2018-09-17 | End: 2018-09-17

## 2018-09-17 RX ORDER — HEPARIN 100 UNIT/ML
500 SYRINGE INTRAVENOUS
Status: COMPLETED | OUTPATIENT
Start: 2018-09-17 | End: 2018-09-17

## 2018-09-17 RX ORDER — SODIUM CHLORIDE 0.9 % (FLUSH) 0.9 %
10 SYRINGE (ML) INJECTION
Status: CANCELLED | OUTPATIENT
Start: 2018-09-17

## 2018-09-17 RX ORDER — HEPARIN 100 UNIT/ML
500 SYRINGE INTRAVENOUS
Status: CANCELLED | OUTPATIENT
Start: 2018-09-17

## 2018-09-17 RX ADMIN — HEPARIN 500 UNITS: 100 SYRINGE at 02:09

## 2018-09-17 RX ADMIN — HEPARIN 500 UNITS: 100 SYRINGE at 12:09

## 2018-09-17 RX ADMIN — Medication 10 ML: at 12:09

## 2018-09-17 RX ADMIN — Medication 10 ML: at 02:09

## 2018-09-17 NOTE — Clinical Note
afp todayHold nivolumab tomorrowMRI now (based on mri we will consider adding him to chemo schedule versus clinic appointmentHe wishes to have labs/clinic visit/chemo all in one day.

## 2018-09-24 ENCOUNTER — TELEPHONE (OUTPATIENT)
Dept: OPTOMETRY | Facility: CLINIC | Age: 68
End: 2018-09-24

## 2018-09-24 ENCOUNTER — HOSPITAL ENCOUNTER (OUTPATIENT)
Dept: RADIOLOGY | Facility: HOSPITAL | Age: 68
Discharge: HOME | End: 2018-09-24
Attending: INTERNAL MEDICINE
Payer: MEDICARE

## 2018-09-24 DIAGNOSIS — C22.0 HEPATOCELLULAR CARCINOMA: Chronic | ICD-10-CM

## 2018-09-24 PROBLEM — Z09 CHEMOTHERAPY FOLLOW-UP EXAMINATION: Status: RESOLVED | Noted: 2017-12-13 | Resolved: 2018-09-24

## 2018-09-24 PROCEDURE — 25500020 PHARM REV CODE 255: Performed by: INTERNAL MEDICINE

## 2018-09-24 PROCEDURE — 74183 MRI ABD W/O CNTR FLWD CNTR: CPT | Mod: 26,,, | Performed by: RADIOLOGY

## 2018-09-24 PROCEDURE — A9585 GADOBUTROL INJECTION: HCPCS | Performed by: INTERNAL MEDICINE

## 2018-09-24 PROCEDURE — 74183 MRI ABD W/O CNTR FLWD CNTR: CPT | Mod: TC

## 2018-09-24 RX ORDER — GADOBUTROL 604.72 MG/ML
10 INJECTION INTRAVENOUS
Status: COMPLETED | OUTPATIENT
Start: 2018-09-24 | End: 2018-09-24

## 2018-09-24 RX ADMIN — GADOBUTROL 10 ML: 604.72 INJECTION INTRAVENOUS at 12:09

## 2018-09-26 ENCOUNTER — TELEPHONE (OUTPATIENT)
Dept: OPTOMETRY | Facility: CLINIC | Age: 68
End: 2018-09-26

## 2018-09-26 ENCOUNTER — CLINICAL SUPPORT (OUTPATIENT)
Dept: OPHTHALMOLOGY | Facility: CLINIC | Age: 68
End: 2018-09-26
Payer: MEDICARE

## 2018-09-26 ENCOUNTER — OFFICE VISIT (OUTPATIENT)
Dept: OPTOMETRY | Facility: CLINIC | Age: 68
End: 2018-09-26
Payer: MEDICARE

## 2018-09-26 DIAGNOSIS — H31.002 CHORIORETINAL SCAR OF LEFT EYE: ICD-10-CM

## 2018-09-26 DIAGNOSIS — H04.123 BILATERAL DRY EYES: ICD-10-CM

## 2018-09-26 DIAGNOSIS — Z96.1 PSEUDOPHAKIA OF BOTH EYES: ICD-10-CM

## 2018-09-26 DIAGNOSIS — H40.1132 PRIMARY OPEN ANGLE GLAUCOMA OF BOTH EYES, MODERATE STAGE: ICD-10-CM

## 2018-09-26 DIAGNOSIS — H52.4 MYOPIA WITH ASTIGMATISM AND PRESBYOPIA, BILATERAL: ICD-10-CM

## 2018-09-26 DIAGNOSIS — H52.203 MYOPIA WITH ASTIGMATISM AND PRESBYOPIA, BILATERAL: ICD-10-CM

## 2018-09-26 DIAGNOSIS — Z01.00 ROUTINE EYE EXAM: Primary | ICD-10-CM

## 2018-09-26 DIAGNOSIS — H52.13 MYOPIA WITH ASTIGMATISM AND PRESBYOPIA, BILATERAL: ICD-10-CM

## 2018-09-26 PROCEDURE — 99213 OFFICE O/P EST LOW 20 MIN: CPT | Mod: PBBFAC,25 | Performed by: OPTOMETRIST

## 2018-09-26 PROCEDURE — 92083 EXTENDED VISUAL FIELD XM: CPT | Mod: 26,S$PBB,, | Performed by: OPTOMETRIST

## 2018-09-26 PROCEDURE — 92133 CPTRZD OPH DX IMG PST SGM ON: CPT | Mod: PBBFAC

## 2018-09-26 PROCEDURE — 92083 EXTENDED VISUAL FIELD XM: CPT | Mod: PBBFAC

## 2018-09-26 PROCEDURE — 92014 COMPRE OPH EXAM EST PT 1/>: CPT | Mod: S$PBB,,, | Performed by: OPTOMETRIST

## 2018-09-26 PROCEDURE — 99999 PR PBB SHADOW E&M-EST. PATIENT-LVL III: CPT | Mod: PBBFAC,,, | Performed by: OPTOMETRIST

## 2018-09-26 PROCEDURE — 92015 DETERMINE REFRACTIVE STATE: CPT | Mod: ,,, | Performed by: OPTOMETRIST

## 2018-09-26 PROCEDURE — 92133 CPTRZD OPH DX IMG PST SGM ON: CPT | Mod: 26,S$PBB,, | Performed by: OPTOMETRIST
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
## 2018-09-26 NOTE — TELEPHONE ENCOUNTER
Called pt with results from testing. Results are stable no changes will be made with eye drops at this time will follow up in 6 months

## 2018-10-02 DIAGNOSIS — C22.0 HCC (HEPATOCELLULAR CARCINOMA): Chronic | ICD-10-CM

## 2018-10-02 DIAGNOSIS — Z09 CHEMOTHERAPY FOLLOW-UP EXAMINATION: ICD-10-CM

## 2018-10-02 DIAGNOSIS — E87.6 HYPOKALEMIA: ICD-10-CM

## 2018-10-02 RX ORDER — POTASSIUM CHLORIDE 20 MEQ/1
TABLET, EXTENDED RELEASE ORAL
Qty: 360 TABLET | Refills: 0 | Status: SHIPPED | OUTPATIENT
Start: 2018-10-02 | End: 2019-03-02 | Stop reason: SDUPTHER

## 2018-10-08 ENCOUNTER — TELEPHONE (OUTPATIENT)
Dept: HEMATOLOGY/ONCOLOGY | Facility: CLINIC | Age: 68
End: 2018-10-08

## 2018-10-08 NOTE — TELEPHONE ENCOUNTER
Spoke to patient's sister Manju. Informed her that a appointment with Dr Carrillo has been scheduled for Monday 10/15 with labs. Mailed appt slip.      ----- Message from Terese Yang RN sent at 10/8/2018 11:35 AM CDT -----  Please call to set up appt.  We have 940 on Monday open, he would need labs from his port.     ----- Message -----  From: Ortiz Carrillo DO, FACP  Sent: 10/8/2018   8:06 AM  To: Terese Yang RN    Needs an appointment

## 2018-10-08 NOTE — TELEPHONE ENCOUNTER
----- Message from Ortiz Carrillo DO, FACP sent at 10/8/2018  8:06 AM CDT -----  Needs an appointment

## 2018-10-15 ENCOUNTER — INFUSION (OUTPATIENT)
Dept: INFUSION THERAPY | Facility: HOSPITAL | Age: 68
End: 2018-10-15
Attending: INTERNAL MEDICINE
Payer: MEDICARE

## 2018-10-15 ENCOUNTER — OFFICE VISIT (OUTPATIENT)
Dept: HEMATOLOGY/ONCOLOGY | Facility: CLINIC | Age: 68
End: 2018-10-15
Payer: MEDICARE

## 2018-10-15 ENCOUNTER — TELEPHONE (OUTPATIENT)
Dept: PHARMACY | Facility: CLINIC | Age: 68
End: 2018-10-15

## 2018-10-15 VITALS
SYSTOLIC BLOOD PRESSURE: 140 MMHG | HEIGHT: 71 IN | WEIGHT: 186.75 LBS | HEART RATE: 73 BPM | TEMPERATURE: 97 F | OXYGEN SATURATION: 96 % | DIASTOLIC BLOOD PRESSURE: 95 MMHG | RESPIRATION RATE: 14 BRPM | BODY MASS INDEX: 26.15 KG/M2

## 2018-10-15 DIAGNOSIS — C22.0 HEPATOCELLULAR CARCINOMA: ICD-10-CM

## 2018-10-15 DIAGNOSIS — C22.0 HCC (HEPATOCELLULAR CARCINOMA): Primary | ICD-10-CM

## 2018-10-15 DIAGNOSIS — C22.0 HEPATOCELLULAR CARCINOMA: Primary | Chronic | ICD-10-CM

## 2018-10-15 LAB
ALBUMIN SERPL BCP-MCNC: 3.3 G/DL
ALP SERPL-CCNC: 145 U/L
ALT SERPL W/O P-5'-P-CCNC: 27 U/L
ANION GAP SERPL CALC-SCNC: 5 MMOL/L
AST SERPL-CCNC: 41 U/L
BILIRUB SERPL-MCNC: 0.6 MG/DL
BUN SERPL-MCNC: 16 MG/DL
CALCIUM SERPL-MCNC: 8.8 MG/DL
CHLORIDE SERPL-SCNC: 105 MMOL/L
CO2 SERPL-SCNC: 25 MMOL/L
CREAT SERPL-MCNC: 1.2 MG/DL
ERYTHROCYTE [DISTWIDTH] IN BLOOD BY AUTOMATED COUNT: 18.3 %
EST. GFR  (AFRICAN AMERICAN): >60 ML/MIN/1.73 M^2
EST. GFR  (NON AFRICAN AMERICAN): >60 ML/MIN/1.73 M^2
GLUCOSE SERPL-MCNC: 101 MG/DL
HCT VFR BLD AUTO: 35.3 %
HGB BLD-MCNC: 10.8 G/DL
IMM GRANULOCYTES # BLD AUTO: 0.03 K/UL
MCH RBC QN AUTO: 27.6 PG
MCHC RBC AUTO-ENTMCNC: 30.6 G/DL
MCV RBC AUTO: 90 FL
NEUTROPHILS # BLD AUTO: 4.1 K/UL
PLATELET # BLD AUTO: 240 K/UL
PMV BLD AUTO: 10.2 FL
POTASSIUM SERPL-SCNC: 4.5 MMOL/L
PROT SERPL-MCNC: 7.6 G/DL
RBC # BLD AUTO: 3.92 M/UL
SODIUM SERPL-SCNC: 135 MMOL/L
WBC # BLD AUTO: 7.61 K/UL

## 2018-10-15 PROCEDURE — 85027 COMPLETE CBC AUTOMATED: CPT

## 2018-10-15 PROCEDURE — 1101F PT FALLS ASSESS-DOCD LE1/YR: CPT | Mod: CPTII,,, | Performed by: INTERNAL MEDICINE

## 2018-10-15 PROCEDURE — 80053 COMPREHEN METABOLIC PANEL: CPT

## 2018-10-15 PROCEDURE — 99214 OFFICE O/P EST MOD 30 MIN: CPT | Mod: S$PBB,,, | Performed by: INTERNAL MEDICINE

## 2018-10-15 PROCEDURE — 25000003 PHARM REV CODE 250: Performed by: INTERNAL MEDICINE

## 2018-10-15 PROCEDURE — 99215 OFFICE O/P EST HI 40 MIN: CPT | Mod: PBBFAC,25 | Performed by: INTERNAL MEDICINE

## 2018-10-15 PROCEDURE — 99999 PR PBB SHADOW E&M-EST. PATIENT-LVL V: CPT | Mod: PBBFAC,,, | Performed by: INTERNAL MEDICINE

## 2018-10-15 PROCEDURE — 99499 UNLISTED E&M SERVICE: CPT | Mod: HCNC,S$GLB,, | Performed by: INTERNAL MEDICINE

## 2018-10-15 PROCEDURE — A4216 STERILE WATER/SALINE, 10 ML: HCPCS | Performed by: INTERNAL MEDICINE

## 2018-10-15 PROCEDURE — 63600175 PHARM REV CODE 636 W HCPCS: Performed by: INTERNAL MEDICINE

## 2018-10-15 PROCEDURE — 3080F DIAST BP >= 90 MM HG: CPT | Mod: CPTII,,, | Performed by: INTERNAL MEDICINE

## 2018-10-15 PROCEDURE — 3077F SYST BP >= 140 MM HG: CPT | Mod: CPTII,,, | Performed by: INTERNAL MEDICINE

## 2018-10-15 PROCEDURE — 36591 DRAW BLOOD OFF VENOUS DEVICE: CPT

## 2018-10-15 RX ORDER — OXYBUTYNIN CHLORIDE 10 MG/1
TABLET, EXTENDED RELEASE ORAL
Refills: 11 | Status: ON HOLD | COMMUNITY
Start: 2018-10-05 | End: 2018-10-24

## 2018-10-15 RX ORDER — HEPARIN 100 UNIT/ML
500 SYRINGE INTRAVENOUS
Status: CANCELLED | OUTPATIENT
Start: 2018-10-15

## 2018-10-15 RX ORDER — SODIUM CHLORIDE 0.9 % (FLUSH) 0.9 %
10 SYRINGE (ML) INJECTION
Status: CANCELLED | OUTPATIENT
Start: 2018-10-15

## 2018-10-15 RX ORDER — HEPARIN 100 UNIT/ML
500 SYRINGE INTRAVENOUS
Status: COMPLETED | OUTPATIENT
Start: 2018-10-15 | End: 2018-10-15

## 2018-10-15 RX ORDER — DORZOLAMIDE HYDROCHLORIDE AND TIMOLOL MALEATE 20; 5 MG/ML; MG/ML
SOLUTION/ DROPS OPHTHALMIC
Refills: 2 | COMMUNITY
Start: 2018-09-24

## 2018-10-15 RX ORDER — ASPIRIN 81 MG/1
81 TABLET ORAL
Status: ON HOLD | COMMUNITY
End: 2018-10-24 | Stop reason: HOSPADM

## 2018-10-15 RX ORDER — SODIUM CHLORIDE 0.9 % (FLUSH) 0.9 %
10 SYRINGE (ML) INJECTION
Status: COMPLETED | OUTPATIENT
Start: 2018-10-15 | End: 2018-10-15

## 2018-10-15 RX ADMIN — Medication 10 ML: at 08:10

## 2018-10-15 RX ADMIN — HEPARIN 500 UNITS: 100 SYRINGE at 08:10

## 2018-10-15 NOTE — PATIENT INSTRUCTIONS
Regorafenib tablets  What is this medicine?  REGORAFENIB (RE sheelae NAYLA e nib) is a medicine that targets proteins in cancer cells and stops the cancer cells from growing. It is used to treat colorectal cancer and gastrointestinal stromal tumors (GIST).  How should I use this medicine?  Take this medicine by mouth with a glass of water. Follow the directions on the prescription label. Do not take it more often than directed. Take this medicine with food. Do not take with grapefruit juice. Do not stop taking except on your doctor's advice.  Talk to your pediatrician regarding the use of this medicine in children. Special care may be needed.  What side effects may I notice from receiving this medicine?  Side effects that you should report to your doctor or health care professional as soon as possible:  · allergic reactions like skin rash, itching or hives, swelling of the face, lips, or tongue  · bloody or black, tarry stools  · breathing problems  · changes in vision  · chest pain or chest tightness  · confusion  · dizziness  · feeling faint or lightheaded  · high fever  · light-colored stools  · nausea, vomiting  · red or dark-brown urine  · red spots on the skin  · right upper belly pain  · seizures  · severe headache  · sores on the hands or feet  · spitting up blood or brown material that looks like coffee grounds  · stomach pain  · unusual bruising or bleeding from the eye, gums, or nose  · unusually weak or tired  · yellowing of the eyes or skin  Side effects that usually do not require medical attention (Report these to your doctor or health care professional if they continue or are bothersome.):  · diarrhea  · hoarseness  · loss of appetite  · sore throat  · tiredness  · weight loss  What may interact with this medicine?  This medicine may interact with the following:  · carbamazepine  · irinotecan  · itraconazole  · ketoconazole  · phenobarbital  · phenytoin  · posaconazole  · rifampin  · Brianna's  Wort  · telithromycin  · voriconazole  · warfarin  What if I miss a dose?  If you miss a dose, take it as soon as you can. If it is almost time for your next dose, take only that dose. Do not take double or extra doses.  Where should I keep my medicine?  Keep out of the reach of children.  Store between 20 and 25 degrees C (68 and 77 degrees F). Keep this medicine in the original container. Throw away any unused medicine after the expiration date.  What should I tell my health care provider before I take this medicine?  They need to know if you have any of these conditions:  · bleeding disorders  · heart disease  · high blood pressure  · liver disease  · recent surgery  · an unusual or allergic reaction to regorafenib, other medicines, foods, dyes, or preservatives  · pregnant or trying to get pregnant  · breast-feeding  What should I watch for while using this medicine?  This drug may make you feel generally unwell. This is not uncommon, as chemotherapy can affect healthy cells as well as cancer cells. Report any side effects. Continue your course of treatment even though you feel ill unless your doctor tells you to stop.  You may need blood work done while you are taking this medicine.  Do not become pregnant while taking this medicine or for 2 months after stopping it. Women should inform their doctor if they wish to become pregnant or think they might be pregnant. Men should not father a child while taking this medicine and for 2 months after stopping it. There is a potential for serious side effects to an unborn child. Talk to your health care professional or pharmacist for more information. Do not breast-feed an infant while taking this medicine.  If you are going to have surgery or any other procedures, tell your doctor you are taking this medicine.  Talk to your doctor about your risk of cancer. You may be more at risk for certain types of cancers if you take this medicine.  NOTE:This sheet is a summary. It  may not cover all possible information. If you have questions about this medicine, talk to your doctor, pharmacist, or health care provider. Copyright© 2017 [employer]
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
## 2018-10-16 ENCOUNTER — TELEPHONE (OUTPATIENT)
Dept: PHARMACY | Facility: CLINIC | Age: 68
End: 2018-10-16

## 2018-10-16 NOTE — TELEPHONE ENCOUNTER
Initial Stivarga consult completed on 10/16/2018 with Manju. Stivarga (regorafenib) will be shipped on 10/17/2018 to arrive at patient's home on 10/18/2018 via [employer]. $0.00 copay. Patient plans to start Stivarga on 10/19/2018. Address confirmed. Confirmed 2 patient identifiers - name and . Therapy Appropriate.    Patient was counseled on the administration directions:  -Take 4 tablets (160mg) by mouth once daily, same time of the day.  Start 2 tablets (80 mg) 1st week, 3 tablets (120mg) week 2 then 4 tablets (160mg) week 3 and beyond.   -Take with a low-fat meal.  Handout provided with examples  -Do not chew, crush, or break the tablets. If possible, patient was instructed tip the tablets from the RX bottle to the cap, and take directly from the cap to the mouth. Patient may handle the medication with their hands if they wear with a latex or nitrile glove and wash their hands before and after handling the tablets.    Patient was counseled on the following possible side effects, which include, but are not limited to:  Hypertension, fatigue, hand-foot syndrome, rash, diarrhea, nausea, mouth sores, change in voice, increased risk for infection, and increased risk of bleeding.  Patient was given a sample of Eucerin cream for hand-foot syndrome and HC cream for dermatitis.     DDIs: Medication list reviewed, Regorafenib may enhance the bradycardic effect of Beta-Blockers (C). Patient will monitor HR and BP    Patient was given 2 patient education handouts on how to handle oral chemotherapy and specific recommendations- do's and don'ts. Instructed the patient that if they have any remaining oral chemotherapy, not to flush down the toilet or throw away in the trash; the patient or caregiver should return the unused oral chemotherapy to either the clinic or to myself in the Pharmacy where the oral chemotherapy can be disposed of properly.     Patient confirmed understanding. Patient did not have additional  questions.  Patient plans to start Stivarga on 10/19/2018. Consultation included: indication; goals of treatment; administration; storage and handling; side effects; how to handle side effects; the importance of compliance; how to handle missed doses; the importance of laboratory monitoring; the importance of keeping all follow up appointments.  Patient understands to report any medication changes to [facility] and provider. All questions answered and addressed to patients satisfaction. I will f/u with her in 1 week from start, [facility] to contact patient in 3 weeks for refills.

## 2018-10-16 NOTE — TELEPHONE ENCOUNTER
DOCUMENTATION ONLY:  Prior authorization for Stivarga approved from 10/15/2018 to 04/15/2019  Co-pay: $0.00  Patient Assistance IS NOT required  The pharmacist will be reaching out for  and shipment.  JOSE G

## 2018-10-20 DIAGNOSIS — J43.2 CENTRILOBULAR EMPHYSEMA: Chronic | ICD-10-CM

## 2018-10-20 RX ORDER — IPRATROPIUM BROMIDE AND ALBUTEROL SULFATE 2.5; .5 MG/3ML; MG/3ML
SOLUTION RESPIRATORY (INHALATION)
Qty: 1,620 ML | Refills: 6 | OUTPATIENT
Start: 2018-10-20

## 2018-10-22 DIAGNOSIS — K21.00 REFLUX ESOPHAGITIS: ICD-10-CM

## 2018-10-22 DIAGNOSIS — K21.00 GASTROESOPHAGEAL REFLUX DISEASE WITH ESOPHAGITIS: Chronic | ICD-10-CM

## 2018-10-22 RX ORDER — OMEPRAZOLE 20 MG/1
CAPSULE, DELAYED RELEASE ORAL
Qty: 90 CAPSULE | Refills: 0 | Status: SHIPPED | OUTPATIENT
Start: 2018-10-22 | End: 2018-11-29

## 2018-10-22 RX ORDER — ATORVASTATIN CALCIUM 40 MG/1
TABLET, FILM COATED ORAL
Qty: 90 TABLET | Refills: 0 | Status: SHIPPED | OUTPATIENT
Start: 2018-10-22

## 2018-10-23 ENCOUNTER — HOSPITAL ENCOUNTER (INPATIENT)
Facility: HOSPITAL | Age: 68
LOS: 1 days | Discharge: HOME | DRG: 192 | End: 2018-10-24
Attending: EMERGENCY MEDICINE | Admitting: INTERNAL MEDICINE
Payer: MEDICARE

## 2018-10-23 ENCOUNTER — TELEPHONE (OUTPATIENT)
Dept: HEMATOLOGY/ONCOLOGY | Facility: CLINIC | Age: 68
End: 2018-10-23

## 2018-10-23 DIAGNOSIS — R06.2 WHEEZING: ICD-10-CM

## 2018-10-23 DIAGNOSIS — J44.1 COPD WITH ACUTE EXACERBATION: Primary | ICD-10-CM

## 2018-10-23 DIAGNOSIS — R06.02 SOB (SHORTNESS OF BREATH) ON EXERTION: ICD-10-CM

## 2018-10-23 LAB
ALBUMIN SERPL BCP-MCNC: 2.9 G/DL
ALLENS TEST: ABNORMAL
ALP SERPL-CCNC: 115 U/L
ALT SERPL W/O P-5'-P-CCNC: 22 U/L
ANION GAP SERPL CALC-SCNC: 6 MMOL/L
ANISOCYTOSIS BLD QL SMEAR: SLIGHT
AST SERPL-CCNC: 42 U/L
BASOPHILS # BLD AUTO: 0.04 K/UL
BASOPHILS NFR BLD: 0.4 %
BILIRUB SERPL-MCNC: 0.3 MG/DL
BNP SERPL-MCNC: 26 PG/ML
BUN SERPL-MCNC: 14 MG/DL
BURR CELLS BLD QL SMEAR: ABNORMAL
CALCIUM SERPL-MCNC: 7.7 MG/DL
CHLORIDE SERPL-SCNC: 113 MMOL/L
CO2 SERPL-SCNC: 17 MMOL/L
CREAT SERPL-MCNC: 0.9 MG/DL
DELSYS: ABNORMAL
DIFFERENTIAL METHOD: ABNORMAL
EOSINOPHIL # BLD AUTO: 2.1 K/UL
EOSINOPHIL NFR BLD: 22.3 %
ERYTHROCYTE [DISTWIDTH] IN BLOOD BY AUTOMATED COUNT: 18.2 %
EST. GFR  (AFRICAN AMERICAN): >60 ML/MIN/1.73 M^2
EST. GFR  (NON AFRICAN AMERICAN): >60 ML/MIN/1.73 M^2
GLUCOSE SERPL-MCNC: 96 MG/DL
HCO3 UR-SCNC: 24.7 MMOL/L (ref 24–28)
HCT VFR BLD AUTO: 34 %
HGB BLD-MCNC: 10.5 G/DL
HYPOCHROMIA BLD QL SMEAR: ABNORMAL
IMM GRANULOCYTES # BLD AUTO: 0.04 K/UL
IMM GRANULOCYTES NFR BLD AUTO: 0.4 %
LYMPHOCYTES # BLD AUTO: 1 K/UL
LYMPHOCYTES NFR BLD: 10.9 %
MCH RBC QN AUTO: 28.2 PG
MCHC RBC AUTO-ENTMCNC: 30.9 G/DL
MCV RBC AUTO: 91 FL
MODE: ABNORMAL
MONOCYTES # BLD AUTO: 0.8 K/UL
MONOCYTES NFR BLD: 8.9 %
NEUTROPHILS # BLD AUTO: 5.3 K/UL
NEUTROPHILS NFR BLD: 57.1 %
NRBC BLD-RTO: 0 /100 WBC
OVALOCYTES BLD QL SMEAR: ABNORMAL
PCO2 BLDA: 46.2 MMHG (ref 35–45)
PH SMN: 7.34 [PH] (ref 7.35–7.45)
PLATELET # BLD AUTO: 193 K/UL
PLATELET BLD QL SMEAR: ABNORMAL
PMV BLD AUTO: 10.7 FL
PO2 BLDA: 29 MMHG (ref 40–60)
POC BE: -1 MMOL/L
POC SATURATED O2: 51 % (ref 95–100)
POC TCO2: 26 MMOL/L (ref 24–29)
POCT GLUCOSE: 101 MG/DL (ref 70–110)
POCT GLUCOSE: 108 MG/DL (ref 70–110)
POIKILOCYTOSIS BLD QL SMEAR: SLIGHT
POLYCHROMASIA BLD QL SMEAR: ABNORMAL
POTASSIUM SERPL-SCNC: 4 MMOL/L
PROCALCITONIN SERPL IA-MCNC: 0.06 NG/ML
PROT SERPL-MCNC: 6.7 G/DL
RBC # BLD AUTO: 3.72 M/UL
SAMPLE: ABNORMAL
SITE: ABNORMAL
SODIUM SERPL-SCNC: 136 MMOL/L
SP02: 95
TROPONIN I SERPL DL<=0.01 NG/ML-MCNC: 0.06 NG/ML
TROPONIN I SERPL DL<=0.01 NG/ML-MCNC: 0.06 NG/ML
WBC # BLD AUTO: 9.24 K/UL

## 2018-10-23 PROCEDURE — 99900035 HC TECH TIME PER 15 MIN (STAT): Mod: HCNC

## 2018-10-23 PROCEDURE — 84145 PROCALCITONIN (PCT): CPT | Mod: HCNC

## 2018-10-23 PROCEDURE — 25000003 PHARM REV CODE 250: Mod: HCNC | Performed by: STUDENT IN AN ORGANIZED HEALTH CARE EDUCATION/TRAINING PROGRAM

## 2018-10-23 PROCEDURE — 84484 ASSAY OF TROPONIN QUANT: CPT | Mod: 91,HCNC

## 2018-10-23 PROCEDURE — 82803 BLOOD GASES ANY COMBINATION: CPT | Mod: HCNC

## 2018-10-23 PROCEDURE — 20600001 HC STEP DOWN PRIVATE ROOM: Mod: HCNC

## 2018-10-23 PROCEDURE — 83880 ASSAY OF NATRIURETIC PEPTIDE: CPT | Mod: HCNC

## 2018-10-23 PROCEDURE — 80053 COMPREHEN METABOLIC PANEL: CPT | Mod: HCNC

## 2018-10-23 PROCEDURE — 99285 EMERGENCY DEPT VISIT HI MDM: CPT | Mod: 25,HCNC

## 2018-10-23 PROCEDURE — 82962 GLUCOSE BLOOD TEST: CPT | Mod: HCNC

## 2018-10-23 PROCEDURE — 99223 1ST HOSP IP/OBS HIGH 75: CPT | Mod: HCNC,AI,, | Performed by: HOSPITALIST

## 2018-10-23 PROCEDURE — 99284 EMERGENCY DEPT VISIT MOD MDM: CPT | Mod: ,,, | Performed by: EMERGENCY MEDICINE

## 2018-10-23 PROCEDURE — 94640 AIRWAY INHALATION TREATMENT: CPT | Mod: HCNC

## 2018-10-23 PROCEDURE — 63600175 PHARM REV CODE 636 W HCPCS: Mod: HCNC | Performed by: STUDENT IN AN ORGANIZED HEALTH CARE EDUCATION/TRAINING PROGRAM

## 2018-10-23 PROCEDURE — 25000242 PHARM REV CODE 250 ALT 637 W/ HCPCS: Mod: HCNC | Performed by: STUDENT IN AN ORGANIZED HEALTH CARE EDUCATION/TRAINING PROGRAM

## 2018-10-23 PROCEDURE — 85025 COMPLETE CBC W/AUTO DIFF WBC: CPT | Mod: HCNC

## 2018-10-23 PROCEDURE — 25000242 PHARM REV CODE 250 ALT 637 W/ HCPCS: Mod: HCNC | Performed by: EMERGENCY MEDICINE

## 2018-10-23 PROCEDURE — 96372 THER/PROPH/DIAG INJ SC/IM: CPT | Mod: HCNC

## 2018-10-23 PROCEDURE — 94761 N-INVAS EAR/PLS OXIMETRY MLT: CPT | Mod: HCNC

## 2018-10-23 RX ORDER — GLUCAGON 1 MG
1 KIT INJECTION
Status: DISCONTINUED | OUTPATIENT
Start: 2018-10-23 | End: 2018-10-24 | Stop reason: HOSPADM

## 2018-10-23 RX ORDER — IPRATROPIUM BROMIDE AND ALBUTEROL SULFATE 2.5; .5 MG/3ML; MG/3ML
3 SOLUTION RESPIRATORY (INHALATION)
Status: COMPLETED | OUTPATIENT
Start: 2018-10-23 | End: 2018-10-23

## 2018-10-23 RX ORDER — IBUPROFEN 200 MG
24 TABLET ORAL
Status: DISCONTINUED | OUTPATIENT
Start: 2018-10-23 | End: 2018-10-24 | Stop reason: HOSPADM

## 2018-10-23 RX ORDER — PREDNISONE 20 MG/1
40 TABLET ORAL
Status: COMPLETED | OUTPATIENT
Start: 2018-10-23 | End: 2018-10-23

## 2018-10-23 RX ORDER — MOXIFLOXACIN HYDROCHLORIDE 400 MG/1
400 TABLET ORAL DAILY
Status: DISCONTINUED | OUTPATIENT
Start: 2018-10-23 | End: 2018-10-24 | Stop reason: HOSPADM

## 2018-10-23 RX ORDER — TIOTROPIUM BROMIDE 18 UG/1
1 CAPSULE ORAL; RESPIRATORY (INHALATION) DAILY
Status: DISCONTINUED | OUTPATIENT
Start: 2018-10-24 | End: 2018-10-24 | Stop reason: HOSPADM

## 2018-10-23 RX ORDER — IPRATROPIUM BROMIDE AND ALBUTEROL SULFATE 2.5; .5 MG/3ML; MG/3ML
3 SOLUTION RESPIRATORY (INHALATION) EVERY 4 HOURS
Status: DISCONTINUED | OUTPATIENT
Start: 2018-10-23 | End: 2018-10-24 | Stop reason: SDUPTHER

## 2018-10-23 RX ORDER — SODIUM CHLORIDE 0.9 % (FLUSH) 0.9 %
5 SYRINGE (ML) INJECTION
Status: DISCONTINUED | OUTPATIENT
Start: 2018-10-23 | End: 2018-10-24 | Stop reason: HOSPADM

## 2018-10-23 RX ORDER — ENOXAPARIN SODIUM 100 MG/ML
40 INJECTION SUBCUTANEOUS EVERY 24 HOURS
Status: DISCONTINUED | OUTPATIENT
Start: 2018-10-23 | End: 2018-10-24 | Stop reason: HOSPADM

## 2018-10-23 RX ORDER — LISINOPRIL 20 MG/1
20 TABLET ORAL DAILY
Status: DISCONTINUED | OUTPATIENT
Start: 2018-10-23 | End: 2018-10-24 | Stop reason: HOSPADM

## 2018-10-23 RX ORDER — TIOTROPIUM BROMIDE 18 UG/1
1 CAPSULE ORAL; RESPIRATORY (INHALATION) DAILY
Status: DISCONTINUED | OUTPATIENT
Start: 2018-10-24 | End: 2018-10-23 | Stop reason: SDUPTHER

## 2018-10-23 RX ORDER — IBUPROFEN 200 MG
16 TABLET ORAL
Status: DISCONTINUED | OUTPATIENT
Start: 2018-10-23 | End: 2018-10-24 | Stop reason: HOSPADM

## 2018-10-23 RX ORDER — PANTOPRAZOLE SODIUM 40 MG/1
40 TABLET, DELAYED RELEASE ORAL DAILY
Status: DISCONTINUED | OUTPATIENT
Start: 2018-10-23 | End: 2018-10-24 | Stop reason: HOSPADM

## 2018-10-23 RX ORDER — PREDNISONE 20 MG/1
40 TABLET ORAL DAILY
Status: DISCONTINUED | OUTPATIENT
Start: 2018-10-24 | End: 2018-10-24 | Stop reason: HOSPADM

## 2018-10-23 RX ORDER — PREDNISONE 20 MG/1
40 TABLET ORAL DAILY
Qty: 8 TABLET | Refills: 0 | Status: SHIPPED | OUTPATIENT
Start: 2018-10-23 | End: 2018-10-24 | Stop reason: SDUPTHER

## 2018-10-23 RX ORDER — ATORVASTATIN CALCIUM 20 MG/1
40 TABLET, FILM COATED ORAL DAILY
Status: DISCONTINUED | OUTPATIENT
Start: 2018-10-23 | End: 2018-10-24 | Stop reason: HOSPADM

## 2018-10-23 RX ORDER — METOPROLOL TARTRATE 25 MG/1
12.5 TABLET ORAL 2 TIMES DAILY
Status: DISCONTINUED | OUTPATIENT
Start: 2018-10-23 | End: 2018-10-24 | Stop reason: HOSPADM

## 2018-10-23 RX ADMIN — IPRATROPIUM BROMIDE AND ALBUTEROL SULFATE 3 ML: .5; 3 SOLUTION RESPIRATORY (INHALATION) at 06:10

## 2018-10-23 RX ADMIN — PANTOPRAZOLE SODIUM 40 MG: 40 TABLET, DELAYED RELEASE ORAL at 03:10

## 2018-10-23 RX ADMIN — IPRATROPIUM BROMIDE AND ALBUTEROL SULFATE 3 ML: .5; 3 SOLUTION RESPIRATORY (INHALATION) at 11:10

## 2018-10-23 RX ADMIN — LISINOPRIL 20 MG: 20 TABLET ORAL at 03:10

## 2018-10-23 RX ADMIN — PREDNISONE 40 MG: 20 TABLET ORAL at 07:10

## 2018-10-23 RX ADMIN — IPRATROPIUM BROMIDE AND ALBUTEROL SULFATE 3 ML: .5; 3 SOLUTION RESPIRATORY (INHALATION) at 03:10

## 2018-10-23 RX ADMIN — MOXIFLOXACIN HYDROCHLORIDE 400 MG: 400 TABLET, FILM COATED ORAL at 04:10

## 2018-10-23 RX ADMIN — ENOXAPARIN SODIUM 40 MG: 100 INJECTION SUBCUTANEOUS at 05:10

## 2018-10-23 RX ADMIN — METOPROLOL TARTRATE 12.5 MG: 25 TABLET, FILM COATED ORAL at 09:10

## 2018-10-23 RX ADMIN — ATORVASTATIN CALCIUM 40 MG: 20 TABLET, FILM COATED ORAL at 03:10

## 2018-10-23 NOTE — ED NOTES
Pt resting comfortably without complaints at present.   Resp even/non-labored.  Pt ate 80% diet.  Pt without needs at present.  Siderails up x2/call light in reach. Pt updated on status and v/u.  Siderails up x 2/call light in reach.
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
## 2018-10-23 NOTE — TELEPHONE ENCOUNTER
----- Message from Delicia Fan sent at 10/23/2018 10:46 AM CDT -----  Contact: Darby Pt's Sister [phone]  Pt's sister is calling to speak with someone in regards to getting an E.R f/u appt scheduled for the pt. She states that he went in for shortness of breath and was told that he needs to f/u with his PCP. Pt was offered to see someone else and she stated he had to see . Please call back and advise.      Thanks

## 2018-10-23 NOTE — DISCHARGE INSTRUCTIONS
Please take the steroids for 5 days (until your pills run out)    Please return to emergency if you experience ANY of the following:  - Worsening shortness of breath  - Worsening wheezing  - Waking up from sleep due to shortness of breath  - Productive cough with shortness of breath

## 2018-10-23 NOTE — ASSESSMENT & PLAN NOTE
Mr. Hawkins is a  67 y/o gentelman with a known case of COPD on home Duonebs 2 times/day. [place] [facility] [place] SOB X 4 days.    10/23  - Pt has no obvious source of infection. CXR does not reveal consolidations or pleural effusions  - In the ED pt received dounebs and PO methylprednisolone 40 mg     Plan:  - We will continue Dounebs and methylprednisolone 40 mg PO  - We will start Moxifloxacin 400 mg X 5 D
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
## 2018-10-23 NOTE — HOSPITAL COURSE
10/23 In the ED VBG showed, pH 7.3, CO2 elevated at 46. Diffuse wheezing heard in all lung zones, 40mg PO methylprednisone and multiple duonebs improved SOB, however after these treatments pt desatted to 76% oxygen saturation with walking. Pt got admitted to hospital medicine.  10/24 Pt is breathing much easier, and he is not c/o SOB with exertion. On ascultation the pt has expiratory wheezes all field on both lungs. He will be discharged on Moxifloxacin and PO steroids X 3 D.

## 2018-10-23 NOTE — ED NOTES
"Received report.  Pt in bed reports " I feel much better now".  Resp even/non-labored.  Pt denies SOB.  Sister at bedside.  Updated on status and v/u.  Siderails up x 2/call light in reach.  "

## 2018-10-23 NOTE — ED NOTES
Walked patient around unit 2 laps, patient stated he was fine, when patient arrived back into room to sit on stretcher patient began having SOB, 02 stat was currently reading 76%, placed patient on 2L 02 via NC, patients stats raised to 99%, patients SOB subsided, now resting back on stretcher, side rails upx2, call light within reach, will continue to monitor.

## 2018-10-23 NOTE — ASSESSMENT & PLAN NOTE
History of prostate cancer and had a radical prostatectomy on January 6, 2011 for a Inderjit 7 adenocarcinoma, ([id number]), with negative margins. He subsequently had a local recurrence for which he received XRT at [facility] (completed 10/28/2011). Last available PSA was 0.03 (10/18/16).

## 2018-10-23 NOTE — ASSESSMENT & PLAN NOTE
S/p Transarterial chemoembolization on 1/29/16. Following with Dr Scott for liver transplant evaluation.  - 12/2015 when he was diagnosed with hepatocellular carcinoma in the setting of hepatitis C. He was considered for transplant but taken off of the transplant list due to alcohol abuse  - Pt has a port a cath. He recently on egorafenib PO 2 tablets.
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
## 2018-10-23 NOTE — TELEPHONE ENCOUNTER
----- Message from Coleen Izaguirre sent at 10/23/2018 10:45 AM CDT -----  Contact: Pt Sis  Pt called to speak with nurse Giron about what the ER Doctors would For Pt  Callback#[phone]  Thank You  ALANNA Izaguirre

## 2018-10-23 NOTE — TELEPHONE ENCOUNTER
Patient's sister let us know that patient is being admitted. Let her know Dr. Carrillo is aware.

## 2018-10-23 NOTE — ASSESSMENT & PLAN NOTE
He also history history of early stage colon cancer which was completely resected at [facility] which required no adjuvant therapy (records not available). As mentioned by pt is it resolved.

## 2018-10-23 NOTE — ED TRIAGE NOTES
68 yr old male pt presents to the ed with complaints of sob since 12 olock last night . Pt denies chest pain and nausea, pt states he has hx of cancer to liver. Pt denies hc of copd and is ao x3.

## 2018-10-23 NOTE — ED NOTES
Patient resting on stretcher, able to void via urinal, able to voice needs, no acute distress noted, side rails up x2, call light within reach, will continue to monitor.

## 2018-10-23 NOTE — ED NOTES
Patient resting on stretcher, able to voice needs, vss, patient updated on admission status, voiced understanding, side rails upx2, call light within reach, will continue to monitor.

## 2018-10-23 NOTE — ED NOTES
Patient resting well on stretcher, ordered patients dinner tray, updated patient on awaiting for room on floor, voiced understanding, side rails upx2, able to voice needs, will continue to monitor.

## 2018-10-23 NOTE — ED NOTES
Entered room to d/c pt.  Pt developed pursed lip respirations of 30 with minimal movement with removal of ekg leads and sitting erect.  02 sat decreased to 88%.  With short amount of rest pt 02 sat increased to 100%, and respirations recovered to 22.   Informed and states that he will discuss with Dr. Saavedra.  Pt now resting comfortably.  Awaiting further orders.  Will continue to monitor.

## 2018-10-23 NOTE — ED NOTES
Pt sleeping soundly, resp even/non-labored.  02 sat 98% r/a.  Sister remains at bedside, updated on status and v/u.  Siderails up x 2/call light in reach.

## 2018-10-23 NOTE — ED NOTES
Dr. Rosario states would like to test the pt for s/s SOB and decreased sats on ambulation around the ED, and repeat Troponin at 930am prior to d/c home.

## 2018-10-24 ENCOUNTER — TELEPHONE (OUTPATIENT)
Dept: INTERNAL MEDICINE | Facility: CLINIC | Age: 68
End: 2018-10-24

## 2018-10-24 VITALS
TEMPERATURE: 98 F | RESPIRATION RATE: 12 BRPM | SYSTOLIC BLOOD PRESSURE: 163 MMHG | WEIGHT: 185.88 LBS | DIASTOLIC BLOOD PRESSURE: 71 MMHG | HEIGHT: 71 IN | OXYGEN SATURATION: 97 % | HEART RATE: 70 BPM | BODY MASS INDEX: 26.02 KG/M2

## 2018-10-24 LAB
ALBUMIN SERPL BCP-MCNC: 3.3 G/DL
ALP SERPL-CCNC: 134 U/L
ALT SERPL W/O P-5'-P-CCNC: 25 U/L
ANION GAP SERPL CALC-SCNC: 9 MMOL/L
AST SERPL-CCNC: 41 U/L
BASOPHILS # BLD AUTO: 0.02 K/UL
BASOPHILS NFR BLD: 0.2 %
BILIRUB SERPL-MCNC: 0.5 MG/DL
BUN SERPL-MCNC: 17 MG/DL
CALCIUM SERPL-MCNC: 9.5 MG/DL
CHLORIDE SERPL-SCNC: 106 MMOL/L
CO2 SERPL-SCNC: 22 MMOL/L
CREAT SERPL-MCNC: 1.3 MG/DL
DIFFERENTIAL METHOD: ABNORMAL
EOSINOPHIL # BLD AUTO: 0 K/UL
EOSINOPHIL NFR BLD: 0.2 %
ERYTHROCYTE [DISTWIDTH] IN BLOOD BY AUTOMATED COUNT: 17.9 %
EST. GFR  (AFRICAN AMERICAN): >60 ML/MIN/1.73 M^2
EST. GFR  (NON AFRICAN AMERICAN): 55.3 ML/MIN/1.73 M^2
GLUCOSE SERPL-MCNC: 119 MG/DL
HCT VFR BLD AUTO: 36.2 %
HGB BLD-MCNC: 11.3 G/DL
IMM GRANULOCYTES # BLD AUTO: 0.1 K/UL
IMM GRANULOCYTES NFR BLD AUTO: 0.9 %
LYMPHOCYTES # BLD AUTO: 1.3 K/UL
LYMPHOCYTES NFR BLD: 11.8 %
MCH RBC QN AUTO: 27.5 PG
MCHC RBC AUTO-ENTMCNC: 31.2 G/DL
MCV RBC AUTO: 88 FL
MONOCYTES # BLD AUTO: 0.4 K/UL
MONOCYTES NFR BLD: 3.7 %
NEUTROPHILS # BLD AUTO: 9 K/UL
NEUTROPHILS NFR BLD: 83.2 %
NRBC BLD-RTO: 0 /100 WBC
PLATELET # BLD AUTO: 244 K/UL
PMV BLD AUTO: 10.6 FL
POCT GLUCOSE: 132 MG/DL (ref 70–110)
POCT GLUCOSE: 135 MG/DL (ref 70–110)
POTASSIUM SERPL-SCNC: 4.2 MMOL/L
PROT SERPL-MCNC: 8.1 G/DL
RBC # BLD AUTO: 4.11 M/UL
SODIUM SERPL-SCNC: 137 MMOL/L
WBC # BLD AUTO: 10.76 K/UL

## 2018-10-24 PROCEDURE — 97165 OT EVAL LOW COMPLEX 30 MIN: CPT | Mod: HCNC

## 2018-10-24 PROCEDURE — 99239 HOSP IP/OBS DSCHRG MGMT >30: CPT | Mod: HCNC,,, | Performed by: HOSPITALIST

## 2018-10-24 PROCEDURE — G0008 ADMIN INFLUENZA VIRUS VAC: HCPCS | Mod: HCNC | Performed by: INTERNAL MEDICINE

## 2018-10-24 PROCEDURE — 97161 PT EVAL LOW COMPLEX 20 MIN: CPT | Mod: HCNC

## 2018-10-24 PROCEDURE — G8978 MOBILITY CURRENT STATUS: HCPCS | Mod: CH,HCNC

## 2018-10-24 PROCEDURE — 25000242 PHARM REV CODE 250 ALT 637 W/ HCPCS: Mod: HCNC | Performed by: STUDENT IN AN ORGANIZED HEALTH CARE EDUCATION/TRAINING PROGRAM

## 2018-10-24 PROCEDURE — 63600175 PHARM REV CODE 636 W HCPCS: Mod: HCNC | Performed by: INTERNAL MEDICINE

## 2018-10-24 PROCEDURE — 90662 IIV NO PRSV INCREASED AG IM: CPT | Mod: HCNC | Performed by: INTERNAL MEDICINE

## 2018-10-24 PROCEDURE — G8979 MOBILITY GOAL STATUS: HCPCS | Mod: CH,HCNC

## 2018-10-24 PROCEDURE — 85025 COMPLETE CBC W/AUTO DIFF WBC: CPT | Mod: HCNC

## 2018-10-24 PROCEDURE — 90471 IMMUNIZATION ADMIN: CPT | Mod: HCNC | Performed by: INTERNAL MEDICINE

## 2018-10-24 PROCEDURE — 63600175 PHARM REV CODE 636 W HCPCS: Mod: HCNC | Performed by: HOSPITALIST

## 2018-10-24 PROCEDURE — 25000003 PHARM REV CODE 250: Mod: HCNC | Performed by: STUDENT IN AN ORGANIZED HEALTH CARE EDUCATION/TRAINING PROGRAM

## 2018-10-24 PROCEDURE — 63600175 PHARM REV CODE 636 W HCPCS: Mod: HCNC | Performed by: STUDENT IN AN ORGANIZED HEALTH CARE EDUCATION/TRAINING PROGRAM

## 2018-10-24 PROCEDURE — 94640 AIRWAY INHALATION TREATMENT: CPT | Mod: HCNC

## 2018-10-24 PROCEDURE — 94761 N-INVAS EAR/PLS OXIMETRY MLT: CPT | Mod: HCNC

## 2018-10-24 PROCEDURE — G8980 MOBILITY D/C STATUS: HCPCS | Mod: CH,HCNC

## 2018-10-24 PROCEDURE — 80053 COMPREHEN METABOLIC PANEL: CPT | Mod: HCNC

## 2018-10-24 PROCEDURE — 36415 COLL VENOUS BLD VENIPUNCTURE: CPT | Mod: HCNC

## 2018-10-24 RX ORDER — FLUTICASONE FUROATE AND VILANTEROL 100; 25 UG/1; UG/1
1 POWDER RESPIRATORY (INHALATION) DAILY
Status: CANCELLED | OUTPATIENT
Start: 2018-10-24

## 2018-10-24 RX ORDER — DORZOLAMIDE HCL 20 MG/ML
1 SOLUTION/ DROPS OPHTHALMIC DAILY
Status: CANCELLED | OUTPATIENT
Start: 2018-10-24

## 2018-10-24 RX ORDER — TIMOLOL MALEATE 5 MG/ML
1 SOLUTION/ DROPS OPHTHALMIC DAILY
Status: CANCELLED | OUTPATIENT
Start: 2018-10-24

## 2018-10-24 RX ORDER — IPRATROPIUM BROMIDE AND ALBUTEROL SULFATE 2.5; .5 MG/3ML; MG/3ML
3 SOLUTION RESPIRATORY (INHALATION) EVERY 4 HOURS
Status: DISCONTINUED | OUTPATIENT
Start: 2018-10-24 | End: 2018-10-24 | Stop reason: HOSPADM

## 2018-10-24 RX ORDER — HEPARIN 100 UNIT/ML
300 SYRINGE INTRAVENOUS
Status: DISCONTINUED | OUTPATIENT
Start: 2018-10-24 | End: 2018-10-24 | Stop reason: HOSPADM

## 2018-10-24 RX ORDER — MOXIFLOXACIN HYDROCHLORIDE 400 MG/1
400 TABLET ORAL DAILY
Qty: 3 TABLET | Refills: 0 | Status: SHIPPED | OUTPATIENT
Start: 2018-10-25 | End: 2018-10-28

## 2018-10-24 RX ORDER — ALBUTEROL SULFATE 90 UG/1
2 AEROSOL, METERED RESPIRATORY (INHALATION) EVERY 6 HOURS PRN
Qty: 6.7 G | Refills: 3 | Status: SHIPPED | OUTPATIENT
Start: 2018-10-24

## 2018-10-24 RX ORDER — FLUTICASONE FUROATE AND VILANTEROL 100; 25 UG/1; UG/1
1 POWDER RESPIRATORY (INHALATION) DAILY
Qty: 60 EACH | Refills: 3 | Status: SHIPPED | OUTPATIENT
Start: 2018-10-24 | End: 2018-11-06

## 2018-10-24 RX ORDER — LATANOPROST 50 UG/ML
1 SOLUTION/ DROPS OPHTHALMIC NIGHTLY
Status: CANCELLED | OUTPATIENT
Start: 2018-10-24

## 2018-10-24 RX ORDER — PREDNISONE 20 MG/1
40 TABLET ORAL DAILY
Qty: 6 TABLET | Refills: 0 | Status: SHIPPED | OUTPATIENT
Start: 2018-10-24 | End: 2018-10-27

## 2018-10-24 RX ORDER — ASPIRIN 81 MG/1
81 TABLET ORAL DAILY
COMMUNITY

## 2018-10-24 RX ORDER — TIOTROPIUM BROMIDE 18 UG/1
18 CAPSULE ORAL; RESPIRATORY (INHALATION) DAILY
Qty: 30 CAPSULE | Refills: 3 | Status: SHIPPED | OUTPATIENT
Start: 2018-10-24 | End: 2018-11-23 | Stop reason: SDUPTHER

## 2018-10-24 RX ORDER — FLUTICASONE FUROATE AND VILANTEROL 100; 25 UG/1; UG/1
1 POWDER RESPIRATORY (INHALATION) DAILY
Status: ON HOLD | COMMUNITY
End: 2018-10-24 | Stop reason: SDUPTHER

## 2018-10-24 RX ORDER — PREDNISONE 20 MG/1
40 TABLET ORAL DAILY
Status: CANCELLED | OUTPATIENT
Start: 2018-10-25 | End: 2018-10-29

## 2018-10-24 RX ADMIN — PREDNISONE 40 MG: 20 TABLET ORAL at 09:10

## 2018-10-24 RX ADMIN — HEPARIN 300 UNITS: 100 SYRINGE at 03:10

## 2018-10-24 RX ADMIN — IPRATROPIUM BROMIDE AND ALBUTEROL SULFATE 3 ML: .5; 3 SOLUTION RESPIRATORY (INHALATION) at 12:10

## 2018-10-24 RX ADMIN — LISINOPRIL 20 MG: 20 TABLET ORAL at 09:10

## 2018-10-24 RX ADMIN — PANTOPRAZOLE SODIUM 40 MG: 40 TABLET, DELAYED RELEASE ORAL at 09:10

## 2018-10-24 RX ADMIN — IPRATROPIUM BROMIDE AND ALBUTEROL SULFATE 3 ML: .5; 3 SOLUTION RESPIRATORY (INHALATION) at 03:10

## 2018-10-24 RX ADMIN — ATORVASTATIN CALCIUM 40 MG: 20 TABLET, FILM COATED ORAL at 09:10

## 2018-10-24 RX ADMIN — IPRATROPIUM BROMIDE AND ALBUTEROL SULFATE 3 ML: .5; 3 SOLUTION RESPIRATORY (INHALATION) at 09:10

## 2018-10-24 RX ADMIN — INFLUENZA A VIRUS A/MICHIGAN/45/2015 X-275 (H1N1) ANTIGEN (FORMALDEHYDE INACTIVATED), INFLUENZA A VIRUS A/SINGAPORE/INFIMH-16-0019/2016 IVR-186 (H3N2) ANTIGEN (FORMALDEHYDE INACTIVATED), AND INFLUENZA B VIRUS B/MARYLAND/15/2016 BX-69A (A B/COLORADO/6/2017-LIKE VIRUS) ANTIGEN (FORMALDEHYDE INACTIVATED) 0.5 ML: 60; 60; 60 INJECTION, SUSPENSION INTRAMUSCULAR at 01:10

## 2018-10-24 RX ADMIN — MOXIFLOXACIN HYDROCHLORIDE 400 MG: 400 TABLET, FILM COATED ORAL at 09:10

## 2018-10-24 RX ADMIN — IPRATROPIUM BROMIDE AND ALBUTEROL SULFATE 3 ML: .5; 3 SOLUTION RESPIRATORY (INHALATION) at 02:10

## 2018-10-24 RX ADMIN — METOPROLOL TARTRATE 12.5 MG: 25 TABLET, FILM COATED ORAL at 09:10

## 2018-10-24 NOTE — TELEPHONE ENCOUNTER
Pt recently admitted for significant COPD exacerbation, recommended for primary care f/u in 3 days after discharge. He was scheduled for tomorrow with NP but canceled this as well as his next appt with me on 11/2. Has no primary care appt scheduled until 11/15.  Would be a reasonable pt to have seen in [facility] if he is willing to go, please call patient to encourage him to come to a hospital follow up appointment (either with [facility], NP, or with me) sooner

## 2018-10-24 NOTE — PLAN OF CARE
Problem: Fall Risk (Adult)  Goal: Absence of Falls  Patient will demonstrate the desired outcomes by discharge/transition of care.  Outcome: Ongoing (interventions implemented as appropriate)  Free of fall/injury this shift.

## 2018-10-24 NOTE — ED NOTES
Pt updated on room status. Pt sitting up in bed eating pudding. Denies any pain at this time. PT on room air, NAD. Side rails up x2 and call bell within reach.

## 2018-10-24 NOTE — PLAN OF CARE
Recd call from sister while at [facility]. Returned call, no answer, left msg that pt needs ride home [phone]

## 2018-10-24 NOTE — PLAN OF CARE
Future Appointments   Date Time Provider Department Center   11/13/2018 10:00 AM INJECTION[facility] Trevor Clements   11/13/2018 11:00 AM Ortiz Carrillo DO, FACP [facility] HEM ONC [facility] Cyrangela   11/15/2018  9:30 AM Venancio Mcneil MD [facility] IM Don Gracia PCW   2/14/2019 10:00 AM Venancio Mcneil MD [facility] IM Don Garcia PCW     Left msg for pt's sister:regarding dc today and pt's need for a ride.  Cm made PCP appt on 11/15     10/24/18 1229   Final Note   Assessment Type Final Discharge Note   Anticipated Discharge Disposition Home   Hospital Follow Up  Appt(s) scheduled? Yes   Discharge plans and expectations educations in teach back method with documentation complete? Yes        10/24/18 1229   Final Note   Assessment Type Final Discharge Note   Anticipated Discharge Disposition Home   Hospital Follow Up  Appt(s) scheduled? Yes   Discharge plans and expectations educations in teach back method with documentation complete? Yes

## 2018-10-24 NOTE — PLAN OF CARE
Manju is here at hospital to p/u pt  Future Appointments   Date Time Provider Department Center   11/13/2018 10:00 AM INJECTION, [facility]ION [facility] Trevor Clements   11/13/2018 11:00 AM Ortiz Carrillo DO, FACP [facility] HEM ONC Murray Tyree   11/15/2018  9:30 AM Venancio Mcneil MD [facility] IM Don Garcia PCW   2/14/2019 10:00 AM Venancio Mcneil MD [facility] IM Don Garcia PCW     Cleared by therapy     10/24/18 1407   Final Note   Assessment Type Final Discharge Note   Anticipated Discharge Disposition Home   Hospital Follow Up  Appt(s) scheduled? Yes   Discharge plans and expectations educations in teach back method with documentation complete? Yes   Right Care Referral Info   Post Acute Recommendation No Care        10/24/18 1717   Final Note   Assessment Type Final Discharge Note   Anticipated Discharge Disposition Home   Hospital Follow Up  Appt(s) scheduled? Yes   Discharge plans and expectations educations in teach back method with documentation complete? Yes   Right Care Referral Info   Post Acute Recommendation No Care

## 2018-10-24 NOTE — PLAN OF CARE
Pt denies home oxygen  Has ride-sister  I with ADLS  PCP Tarun    Future Appointments   Date Time Provider Department Center   11/13/2018 10:00 AM INJECTION, [facility]   11/13/2018 11:00 AM Ortiz Carrillo DO, FACP [facility] HEM ONC [facility]   11/15/2018  9:30 AM Venancio Mcneil MD [facility]   2/14/2019 10:00 AM Venancio Mcneil MD [facility]        10/24/18 1000   Discharge Assessment   Assessment Type Discharge Planning Assessment   Confirmed/corrected address and phone number on facesheet? Yes   Assessment information obtained from? Patient   Expected Length of Stay (days) 3   Communicated expected length of stay with patient/caregiver yes   Prior to hospitilization cognitive status: Alert/Oriented   Prior to hospitalization functional status: Independent   Current cognitive status: Alert/Oriented   Current Functional Status: Assistive Equipment;Independent   Lives With sibling(s)   Able to Return to Prior Arrangements yes   Is patient able to care for self after discharge? Yes   Who are your caregiver(s) and their phone number(s)? lives w sister   shagufta [phone]. she will give him ride home   Patient's perception of discharge disposition home or selfcare   Readmission Within The Last 30 Days no previous admission in last 30 days   Patient currently being followed by outpatient case management? No   Patient currently receives any other outside agency services? No   Equipment Currently Used at Home walker, rolling;rollator   Do you have any problems affording any of your prescribed medications? No   Is the patient taking medications as prescribed? yes   Does the patient have transportation home? Yes   Transportation Available family or friend will provide   Does the patient receive services at the [facility]? No   Discharge Plan A Home with family   Discharge Plan B Home with family   Patient/Family In Agreement With Plan yes     Future Appointments    Date Time Provider Department Center   10/25/2018 10:00 AM Yaritza Boyce NP [facility] [address] [facility]   11/13/2018 10:00 AM [facility]JECTION[facility]   11/13/2018 11:00 AM Ortiz Carrillo DO, FACP [facility] HEM ONC [facility]   2/14/2019 10:00 AM Venancio Mcneil MD [facility] [address] [facility]     Pt wants me to change ben appt for tomorrow.  Rescheduled PCP appt per pt request.     10/24/18 1000   Discharge Assessment   Assessment Type Discharge Planning Assessment   Confirmed/corrected address and phone number on facesheet? Yes   Assessment information obtained from? Patient   Expected Length of Stay (days) 3   Communicated expected length of stay with patient/caregiver yes   Prior to hospitilization cognitive status: Alert/Oriented   Prior to hospitalization functional status: Independent   Current cognitive status: Alert/Oriented   Current Functional Status: Assistive Equipment;Independent   Lives With sibling(s)   Able to Return to Prior Arrangements yes   Is patient able to care for self after discharge? Yes   Who are your caregiver(s) and their phone number(s)? lives w sister   shagufta [phone]. she will give him ride home   Patient's perception of discharge disposition home or selfcare   Readmission Within The Last 30 Days no previous admission in last 30 days   Patient currently being followed by outpatient case management? No   Patient currently receives any other outside agency services? No   Equipment Currently Used at Home walker, rolling;rollator   Do you have any problems affording any of your prescribed medications? No   Is the patient taking medications as prescribed? yes   Does the patient have transportation home? Yes   Transportation Available family or friend will provide   Does the patient receive services at the [facility]? No   Discharge Plan A Home with family   Discharge Plan B Home with family   Patient/Family In Agreement With Plan yes
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
## 2018-10-24 NOTE — PLAN OF CARE
Problem: Occupational Therapy Goal  Goal: Occupational Therapy Goal  Outcome: Outcome(s) achieved Date Met: 10/24/18  Eval and D/C - no needs.    ALVARADO Pham
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
## 2018-10-24 NOTE — PLAN OF CARE
Problem: Physical Therapy Goal  Goal: Physical Therapy Goal  Outcome: Outcome(s) achieved Date Met: 10/24/18  Patient at this time is at their functional baseline and does not require skilled acute PT services at this time. Please re consult PT if pt has change in functional status.   Deonte Logan PT, DPT  10/24/2018  Pager: [phone]
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
## 2018-10-24 NOTE — PLAN OF CARE
Problem: Patient Care Overview  Goal: Plan of Care Review  Outcome: Ongoing (interventions implemented as appropriate)  POC reviewed at bedside w/patient. SOB w/activity. Coarse breath sounds w/exp wheeze. CM = SR. No c/o chest pain or discomfort. Safety maintained. Bed in low and locked position. Call light within reach. Side rails up x2. Frequent rounds.

## 2018-10-25 ENCOUNTER — TELEPHONE (OUTPATIENT)
Dept: PHARMACY | Facility: CLINIC | Age: 68
End: 2018-10-25

## 2018-10-25 NOTE — TELEPHONE ENCOUNTER
Spoke with pt sister to offer  an earlier appt, and was declined. Pt sister stated that was the earliest date that she can get him to the doctor.

## 2018-10-25 NOTE — TELEPHONE ENCOUNTER
Spoke with Manju. Confirmed Mr. Hawkins restarted his Stivarga this morning - still at 2 tablets. Will increase to 3 tabs next week. He is completing a 3 days course of prednisone and moxifloxacin with PRN albuterol as well - no relevant DDIs so a short course. Now has an appt with PC scheduled 11/6. Advised Manju to call [facility] if patient has any side effects or questions about Stivarga. She confirmed understanding and thanked me for following up. Will f/u after appointment with Dr. Carrillo on 11/13 to make sure there are no additional changes.

## 2018-11-01 DIAGNOSIS — J43.2 CENTRILOBULAR EMPHYSEMA: Chronic | ICD-10-CM

## 2018-11-01 RX ORDER — IPRATROPIUM BROMIDE AND ALBUTEROL SULFATE 2.5; .5 MG/3ML; MG/3ML
SOLUTION RESPIRATORY (INHALATION)
Qty: 270 ML | Refills: 0 | OUTPATIENT
Start: 2018-11-01
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
## 2018-11-06 ENCOUNTER — OFFICE VISIT (OUTPATIENT)
Dept: PRIMARY CARE CLINIC | Facility: CLINIC | Age: 68
End: 2018-11-06
Payer: MEDICARE

## 2018-11-06 VITALS
DIASTOLIC BLOOD PRESSURE: 84 MMHG | HEART RATE: 65 BPM | SYSTOLIC BLOOD PRESSURE: 150 MMHG | OXYGEN SATURATION: 99 % | HEIGHT: 71 IN | WEIGHT: 184.31 LBS | BODY MASS INDEX: 25.8 KG/M2

## 2018-11-06 DIAGNOSIS — J44.1 COPD WITH ACUTE EXACERBATION: Primary | ICD-10-CM

## 2018-11-06 DIAGNOSIS — I10 ESSENTIAL HYPERTENSION: Chronic | ICD-10-CM

## 2018-11-06 DIAGNOSIS — Z09 HOSPITAL DISCHARGE FOLLOW-UP: ICD-10-CM

## 2018-11-06 DIAGNOSIS — H40.1132 PRIMARY OPEN ANGLE GLAUCOMA OF BOTH EYES, MODERATE STAGE: ICD-10-CM

## 2018-11-06 DIAGNOSIS — E78.5 HYPERLIPIDEMIA, UNSPECIFIED HYPERLIPIDEMIA TYPE: Chronic | ICD-10-CM

## 2018-11-06 DIAGNOSIS — J44.9 CHRONIC OBSTRUCTIVE PULMONARY DISEASE, UNSPECIFIED COPD TYPE: Chronic | ICD-10-CM

## 2018-11-06 DIAGNOSIS — K74.60 CIRRHOSIS OF LIVER WITHOUT ASCITES, UNSPECIFIED HEPATIC CIRRHOSIS TYPE: Chronic | ICD-10-CM

## 2018-11-06 PROCEDURE — 99999 PR PBB SHADOW E&M-EST. PATIENT-LVL V: CPT | Mod: PBBFAC,HCNC,, | Performed by: NURSE PRACTITIONER

## 2018-11-06 PROCEDURE — 3079F DIAST BP 80-89 MM HG: CPT | Mod: CPTII,HCNC,S$GLB, | Performed by: NURSE PRACTITIONER

## 2018-11-06 PROCEDURE — 3077F SYST BP >= 140 MM HG: CPT | Mod: CPTII,HCNC,S$GLB, | Performed by: NURSE PRACTITIONER

## 2018-11-06 PROCEDURE — 99499 UNLISTED E&M SERVICE: CPT | Mod: HCNC,S$GLB,, | Performed by: NURSE PRACTITIONER

## 2018-11-06 PROCEDURE — 1101F PT FALLS ASSESS-DOCD LE1/YR: CPT | Mod: CPTII,HCNC,S$GLB, | Performed by: NURSE PRACTITIONER

## 2018-11-06 PROCEDURE — 99214 OFFICE O/P EST MOD 30 MIN: CPT | Mod: HCNC,S$GLB,, | Performed by: NURSE PRACTITIONER

## 2018-11-06 RX ORDER — FLUTICASONE FUROATE AND VILANTEROL 200; 25 UG/1; UG/1
1 POWDER RESPIRATORY (INHALATION) DAILY
Qty: 60 EACH | Refills: 3 | Status: SHIPPED | OUTPATIENT
Start: 2018-11-06 | End: 2019-03-04 | Stop reason: SDUPTHER

## 2018-11-06 RX ORDER — GUAIFENESIN 1200 MG/1
1 TABLET, EXTENDED RELEASE ORAL 2 TIMES DAILY
Start: 2018-11-06 | End: 2018-11-16

## 2018-11-06 RX ORDER — LATANOPROST 50 UG/ML
1 SOLUTION/ DROPS OPHTHALMIC NIGHTLY
Qty: 7.5 ML | Refills: 6 | Status: SHIPPED | OUTPATIENT
Start: 2018-11-06 | End: 2019-11-06

## 2018-11-06 NOTE — PATIENT INSTRUCTIONS
1)  Take the Mucinex 1200 mg twice daily     2) Do Neb treatments every 8 hours; may skip the noon dose, but continue to use in the morning when you awaken and in the evening, before going to bed.     3) Check the medlist and call me if you have any questions.     [facility] Visit (Post Discharge Follow-up) Today:     Future Appointments   Date Time Provider Department Center   11/13/2018 10:00 AM INJECTION, [facility] CHEMO [facility] Clements   11/13/2018 11:00 AM Ortiz Carrillo DO, FACP [facility] HEM ONC Trevor Clements   12/7/2018  3:00 PM Venancio Mcneil MD [facility] IM Don [facility]E   2/14/2019 10:00 AM Venancio Mcneil MD [facility] Don MANE

## 2018-11-07 DIAGNOSIS — J43.2 CENTRILOBULAR EMPHYSEMA: Chronic | ICD-10-CM

## 2018-11-07 RX ORDER — IPRATROPIUM BROMIDE AND ALBUTEROL SULFATE 2.5; .5 MG/3ML; MG/3ML
SOLUTION RESPIRATORY (INHALATION)
Qty: 270 ML | Refills: 11 | Status: SHIPPED | OUTPATIENT
Start: 2018-11-07

## 2018-11-09 ENCOUNTER — TELEPHONE (OUTPATIENT)
Dept: PHARMACY | Facility: CLINIC | Age: 68
End: 2018-11-09

## 2018-11-13 ENCOUNTER — INFUSION (OUTPATIENT)
Dept: INFUSION THERAPY | Facility: HOSPITAL | Age: 68
End: 2018-11-13
Attending: INTERNAL MEDICINE
Payer: MEDICARE

## 2018-11-13 ENCOUNTER — OFFICE VISIT (OUTPATIENT)
Dept: HEMATOLOGY/ONCOLOGY | Facility: CLINIC | Age: 68
End: 2018-11-13
Payer: MEDICARE

## 2018-11-13 VITALS
HEIGHT: 71 IN | WEIGHT: 180.56 LBS | DIASTOLIC BLOOD PRESSURE: 94 MMHG | TEMPERATURE: 97 F | RESPIRATION RATE: 16 BRPM | BODY MASS INDEX: 25.28 KG/M2 | OXYGEN SATURATION: 97 % | HEART RATE: 66 BPM | SYSTOLIC BLOOD PRESSURE: 149 MMHG

## 2018-11-13 DIAGNOSIS — C22.0 HEPATOCELLULAR CARCINOMA: Chronic | ICD-10-CM

## 2018-11-13 DIAGNOSIS — I10 ESSENTIAL HYPERTENSION: ICD-10-CM

## 2018-11-13 DIAGNOSIS — C22.0 HCC (HEPATOCELLULAR CARCINOMA): Primary | ICD-10-CM

## 2018-11-13 DIAGNOSIS — C22.0 HEPATOCELLULAR CARCINOMA: Primary | Chronic | ICD-10-CM

## 2018-11-13 DIAGNOSIS — Z09 CHEMOTHERAPY FOLLOW-UP EXAMINATION: ICD-10-CM

## 2018-11-13 LAB
AFP SERPL-MCNC: 4505 NG/ML
ALBUMIN SERPL BCP-MCNC: 3 G/DL
ALP SERPL-CCNC: 155 U/L
ALT SERPL W/O P-5'-P-CCNC: 36 U/L
ANION GAP SERPL CALC-SCNC: 7 MMOL/L
AST SERPL-CCNC: 56 U/L
BILIRUB SERPL-MCNC: 0.7 MG/DL
BUN SERPL-MCNC: 15 MG/DL
CALCIUM SERPL-MCNC: 9.3 MG/DL
CHLORIDE SERPL-SCNC: 104 MMOL/L
CO2 SERPL-SCNC: 23 MMOL/L
CREAT SERPL-MCNC: 1.2 MG/DL
ERYTHROCYTE [DISTWIDTH] IN BLOOD BY AUTOMATED COUNT: 17.6 %
EST. GFR  (AFRICAN AMERICAN): >60 ML/MIN/1.73 M^2
EST. GFR  (NON AFRICAN AMERICAN): >60 ML/MIN/1.73 M^2
GLUCOSE SERPL-MCNC: 103 MG/DL
HCT VFR BLD AUTO: 37.7 %
HGB BLD-MCNC: 12.1 G/DL
IMM GRANULOCYTES # BLD AUTO: 0.01 K/UL
MCH RBC QN AUTO: 27.7 PG
MCHC RBC AUTO-ENTMCNC: 32.1 G/DL
MCV RBC AUTO: 86 FL
NEUTROPHILS # BLD AUTO: 3.6 K/UL
PLATELET # BLD AUTO: 327 K/UL
PMV BLD AUTO: 10.3 FL
POTASSIUM SERPL-SCNC: 4.7 MMOL/L
PROT SERPL-MCNC: 7.9 G/DL
RBC # BLD AUTO: 4.37 M/UL
SODIUM SERPL-SCNC: 134 MMOL/L
WBC # BLD AUTO: 6.59 K/UL

## 2018-11-13 PROCEDURE — 99499 UNLISTED E&M SERVICE: CPT | Mod: HCNC,S$GLB,, | Performed by: INTERNAL MEDICINE

## 2018-11-13 PROCEDURE — 1101F PT FALLS ASSESS-DOCD LE1/YR: CPT | Mod: CPTII,HCNC,S$GLB, | Performed by: INTERNAL MEDICINE

## 2018-11-13 PROCEDURE — 3077F SYST BP >= 140 MM HG: CPT | Mod: CPTII,HCNC,S$GLB, | Performed by: INTERNAL MEDICINE

## 2018-11-13 PROCEDURE — 36591 DRAW BLOOD OFF VENOUS DEVICE: CPT | Mod: HCNC

## 2018-11-13 PROCEDURE — 99214 OFFICE O/P EST MOD 30 MIN: CPT | Mod: HCNC,S$GLB,, | Performed by: INTERNAL MEDICINE

## 2018-11-13 PROCEDURE — 80053 COMPREHEN METABOLIC PANEL: CPT | Mod: HCNC

## 2018-11-13 PROCEDURE — 82105 ALPHA-FETOPROTEIN SERUM: CPT | Mod: HCNC

## 2018-11-13 PROCEDURE — 85027 COMPLETE CBC AUTOMATED: CPT | Mod: HCNC

## 2018-11-13 PROCEDURE — 25000003 PHARM REV CODE 250: Mod: HCNC | Performed by: INTERNAL MEDICINE

## 2018-11-13 PROCEDURE — 63600175 PHARM REV CODE 636 W HCPCS: Mod: HCNC | Performed by: INTERNAL MEDICINE

## 2018-11-13 PROCEDURE — A4216 STERILE WATER/SALINE, 10 ML: HCPCS | Mod: HCNC | Performed by: INTERNAL MEDICINE

## 2018-11-13 PROCEDURE — 3080F DIAST BP >= 90 MM HG: CPT | Mod: CPTII,HCNC,S$GLB, | Performed by: INTERNAL MEDICINE

## 2018-11-13 PROCEDURE — 99999 PR PBB SHADOW E&M-EST. PATIENT-LVL IV: CPT | Mod: PBBFAC,HCNC,, | Performed by: INTERNAL MEDICINE

## 2018-11-13 RX ORDER — HEPARIN 100 UNIT/ML
500 SYRINGE INTRAVENOUS
Status: COMPLETED | OUTPATIENT
Start: 2018-11-13 | End: 2018-11-13

## 2018-11-13 RX ORDER — SODIUM CHLORIDE 0.9 % (FLUSH) 0.9 %
10 SYRINGE (ML) INJECTION
Status: COMPLETED | OUTPATIENT
Start: 2018-11-13 | End: 2018-11-13

## 2018-11-13 RX ORDER — OXYBUTYNIN CHLORIDE 10 MG/1
TABLET, EXTENDED RELEASE ORAL
Refills: 11 | COMMUNITY
Start: 2018-11-04 | End: 2018-12-12

## 2018-11-13 RX ORDER — ACETAMINOPHEN 500 MG
5000 TABLET ORAL DAILY
COMMUNITY
End: 2018-12-12

## 2018-11-13 RX ORDER — HEPARIN 100 UNIT/ML
500 SYRINGE INTRAVENOUS
Status: CANCELLED | OUTPATIENT
Start: 2018-11-13

## 2018-11-13 RX ORDER — LISINOPRIL 20 MG/1
TABLET ORAL
Qty: 90 TABLET | Refills: 0 | Status: SHIPPED | OUTPATIENT
Start: 2018-11-13 | End: 2019-01-02

## 2018-11-13 RX ORDER — SODIUM CHLORIDE 0.9 % (FLUSH) 0.9 %
10 SYRINGE (ML) INJECTION
Status: CANCELLED | OUTPATIENT
Start: 2018-11-13

## 2018-11-13 RX ADMIN — Medication 10 ML: at 09:11

## 2018-11-13 RX ADMIN — HEPARIN 500 UNITS: 100 SYRINGE at 09:11
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
## 2018-11-13 NOTE — NURSING
Patient here for blood draw from left chest port-accesses easily with good blood return-blood to lab-line flushed-needle removed.

## 2018-11-15 ENCOUNTER — TELEPHONE (OUTPATIENT)
Dept: HEPATOLOGY | Facility: HOSPITAL | Age: 68
End: 2018-11-15

## 2018-11-20 ENCOUNTER — TELEPHONE (OUTPATIENT)
Dept: HEPATOLOGY | Facility: CLINIC | Age: 68
End: 2018-11-20

## 2018-11-20 DIAGNOSIS — K74.60 HEPATIC CIRRHOSIS, UNSPECIFIED HEPATIC CIRRHOSIS TYPE, UNSPECIFIED WHETHER ASCITES PRESENT: Primary | ICD-10-CM

## 2018-11-23 RX ORDER — TIOTROPIUM BROMIDE 18 UG/1
CAPSULE ORAL; RESPIRATORY (INHALATION)
Qty: 90 CAPSULE | Refills: 0 | Status: SHIPPED | OUTPATIENT
Start: 2018-11-23 | End: 2019-02-24 | Stop reason: SDUPTHER

## 2018-11-26 ENCOUNTER — HOSPITAL ENCOUNTER (OUTPATIENT)
Facility: HOSPITAL | Age: 68
Discharge: HOME | End: 2018-11-29
Attending: EMERGENCY MEDICINE | Admitting: HOSPITALIST
Payer: MEDICARE

## 2018-11-26 DIAGNOSIS — R07.9 CHEST PAIN, UNSPECIFIED TYPE: ICD-10-CM

## 2018-11-26 DIAGNOSIS — I25.10 CORONARY ARTERY CALCIFICATION SEEN ON CT SCAN: ICD-10-CM

## 2018-11-26 DIAGNOSIS — R79.89 ELEVATED TROPONIN: ICD-10-CM

## 2018-11-26 DIAGNOSIS — J44.9 CHRONIC OBSTRUCTIVE PULMONARY DISEASE, UNSPECIFIED COPD TYPE: Chronic | ICD-10-CM

## 2018-11-26 DIAGNOSIS — R06.02 SHORTNESS OF BREATH: ICD-10-CM

## 2018-11-26 DIAGNOSIS — R06.02 SOB (SHORTNESS OF BREATH): ICD-10-CM

## 2018-11-26 DIAGNOSIS — R07.9 CHEST PAIN: Primary | ICD-10-CM

## 2018-11-26 DIAGNOSIS — C22.0 HEPATOCELLULAR CARCINOMA: Chronic | ICD-10-CM

## 2018-11-26 LAB
ALBUMIN SERPL BCP-MCNC: 2.9 G/DL
ALP SERPL-CCNC: 173 U/L
ALT SERPL W/O P-5'-P-CCNC: 34 U/L
ANION GAP SERPL CALC-SCNC: 8 MMOL/L
AST SERPL-CCNC: 58 U/L
BASOPHILS # BLD AUTO: 0.08 K/UL
BASOPHILS NFR BLD: 0.9 %
BILIRUB SERPL-MCNC: 0.7 MG/DL
BNP SERPL-MCNC: 43 PG/ML
BUN SERPL-MCNC: 14 MG/DL (ref 6–30)
BUN SERPL-MCNC: 15 MG/DL
CALCIUM SERPL-MCNC: 10.3 MG/DL
CHLORIDE SERPL-SCNC: 101 MMOL/L (ref 95–110)
CHLORIDE SERPL-SCNC: 103 MMOL/L
CO2 SERPL-SCNC: 25 MMOL/L
CREAT SERPL-MCNC: 1.1 MG/DL
CREAT SERPL-MCNC: 1.2 MG/DL (ref 0.5–1.4)
DIFFERENTIAL METHOD: ABNORMAL
EOSINOPHIL # BLD AUTO: 0.4 K/UL
EOSINOPHIL NFR BLD: 4.2 %
ERYTHROCYTE [DISTWIDTH] IN BLOOD BY AUTOMATED COUNT: 17.2 %
EST. GFR  (AFRICAN AMERICAN): >60 ML/MIN/1.73 M^2
EST. GFR  (NON AFRICAN AMERICAN): >60 ML/MIN/1.73 M^2
GLUCOSE SERPL-MCNC: 83 MG/DL
GLUCOSE SERPL-MCNC: 86 MG/DL (ref 70–110)
HCT VFR BLD AUTO: 36.9 %
HCT VFR BLD CALC: 38 %PCV (ref 36–54)
HGB BLD-MCNC: 11.8 G/DL
IMM GRANULOCYTES # BLD AUTO: 0.02 K/UL
IMM GRANULOCYTES NFR BLD AUTO: 0.2 %
LYMPHOCYTES # BLD AUTO: 2.1 K/UL
LYMPHOCYTES NFR BLD: 23.5 %
MCH RBC QN AUTO: 27.6 PG
MCHC RBC AUTO-ENTMCNC: 32 G/DL
MCV RBC AUTO: 86 FL
MONOCYTES # BLD AUTO: 1 K/UL
MONOCYTES NFR BLD: 11 %
NEUTROPHILS # BLD AUTO: 5.3 K/UL
NEUTROPHILS NFR BLD: 60.2 %
NRBC BLD-RTO: 0 /100 WBC
PLATELET # BLD AUTO: 296 K/UL
PMV BLD AUTO: 10.6 FL
POC IONIZED CALCIUM: 1.29 MMOL/L (ref 1.06–1.42)
POC TCO2 (MEASURED): 25 MMOL/L (ref 23–29)
POTASSIUM BLD-SCNC: 4.4 MMOL/L (ref 3.5–5.1)
POTASSIUM SERPL-SCNC: 4.5 MMOL/L
PROT SERPL-MCNC: 7.8 G/DL
RBC # BLD AUTO: 4.28 M/UL
SAMPLE: NORMAL
SODIUM BLD-SCNC: 136 MMOL/L (ref 136–145)
SODIUM SERPL-SCNC: 136 MMOL/L
TROPONIN I SERPL DL<=0.01 NG/ML-MCNC: 0.07 NG/ML
TROPONIN I SERPL DL<=0.01 NG/ML-MCNC: 0.07 NG/ML
WBC # BLD AUTO: 8.74 K/UL

## 2018-11-26 PROCEDURE — 99284 EMERGENCY DEPT VISIT MOD MDM: CPT | Mod: ,,, | Performed by: PHYSICIAN ASSISTANT

## 2018-11-26 PROCEDURE — 25500020 PHARM REV CODE 255: Mod: HCNC | Performed by: EMERGENCY MEDICINE

## 2018-11-26 PROCEDURE — 25000242 PHARM REV CODE 250 ALT 637 W/ HCPCS: Mod: HCNC | Performed by: PHYSICIAN ASSISTANT

## 2018-11-26 PROCEDURE — 96365 THER/PROPH/DIAG IV INF INIT: CPT | Mod: HCNC

## 2018-11-26 PROCEDURE — 83880 ASSAY OF NATRIURETIC PEPTIDE: CPT | Mod: HCNC

## 2018-11-26 PROCEDURE — 83690 ASSAY OF LIPASE: CPT | Mod: HCNC

## 2018-11-26 PROCEDURE — 93005 ELECTROCARDIOGRAM TRACING: CPT | Mod: HCNC

## 2018-11-26 PROCEDURE — 63600175 PHARM REV CODE 636 W HCPCS: Mod: HCNC | Performed by: PHYSICIAN ASSISTANT

## 2018-11-26 PROCEDURE — 99285 EMERGENCY DEPT VISIT HI MDM: CPT | Mod: 25,HCNC

## 2018-11-26 PROCEDURE — 99220 PR INITIAL OBSERVATION CARE,LEVL III: CPT | Mod: HCNC,,, | Performed by: PHYSICIAN ASSISTANT

## 2018-11-26 PROCEDURE — 25000003 PHARM REV CODE 250: Mod: HCNC | Performed by: PHYSICIAN ASSISTANT

## 2018-11-26 PROCEDURE — 80053 COMPREHEN METABOLIC PANEL: CPT | Mod: HCNC

## 2018-11-26 PROCEDURE — 84145 PROCALCITONIN (PCT): CPT | Mod: HCNC

## 2018-11-26 PROCEDURE — G0378 HOSPITAL OBSERVATION PER HR: HCPCS | Mod: HCNC

## 2018-11-26 PROCEDURE — 93010 ELECTROCARDIOGRAM REPORT: CPT | Mod: HCNC,,, | Performed by: INTERNAL MEDICINE

## 2018-11-26 PROCEDURE — 85025 COMPLETE CBC W/AUTO DIFF WBC: CPT | Mod: HCNC

## 2018-11-26 PROCEDURE — 84484 ASSAY OF TROPONIN QUANT: CPT | Mod: HCNC

## 2018-11-26 PROCEDURE — 94640 AIRWAY INHALATION TREATMENT: CPT | Mod: HCNC

## 2018-11-26 PROCEDURE — 96375 TX/PRO/DX INJ NEW DRUG ADDON: CPT | Mod: HCNC

## 2018-11-26 RX ORDER — IPRATROPIUM BROMIDE AND ALBUTEROL SULFATE 2.5; .5 MG/3ML; MG/3ML
3 SOLUTION RESPIRATORY (INHALATION)
Status: COMPLETED | OUTPATIENT
Start: 2018-11-26 | End: 2018-11-26

## 2018-11-26 RX ORDER — ONDANSETRON 2 MG/ML
8 INJECTION INTRAMUSCULAR; INTRAVENOUS
Status: COMPLETED | OUTPATIENT
Start: 2018-11-26 | End: 2018-11-26

## 2018-11-26 RX ORDER — NITROGLYCERIN 0.4 MG/1
0.4 TABLET SUBLINGUAL EVERY 5 MIN PRN
Status: DISCONTINUED | OUTPATIENT
Start: 2018-11-26 | End: 2018-11-29 | Stop reason: HOSPADM

## 2018-11-26 RX ORDER — ASPIRIN 325 MG
325 TABLET ORAL
Status: COMPLETED | OUTPATIENT
Start: 2018-11-26 | End: 2018-11-26

## 2018-11-26 RX ADMIN — IPRATROPIUM BROMIDE AND ALBUTEROL SULFATE 3 ML: .5; 3 SOLUTION RESPIRATORY (INHALATION) at 07:11

## 2018-11-26 RX ADMIN — NITROGLYCERIN 0.4 MG: 0.4 TABLET SUBLINGUAL at 08:11

## 2018-11-26 RX ADMIN — IOHEXOL 100 ML: 350 INJECTION, SOLUTION INTRAVENOUS at 08:11

## 2018-11-26 RX ADMIN — ASPIRIN 325 MG ORAL TABLET 325 MG: 325 PILL ORAL at 07:11

## 2018-11-26 RX ADMIN — ALUMINUM HYDROXIDE, MAGNESIUM HYDROXIDE, AND SIMETHICONE 50 ML: 200; 200; 20 SUSPENSION ORAL at 09:11

## 2018-11-26 RX ADMIN — ONDANSETRON 8 MG: 2 INJECTION INTRAMUSCULAR; INTRAVENOUS at 08:11

## 2018-11-26 RX ADMIN — PROMETHAZINE HYDROCHLORIDE 12.5 MG: 25 INJECTION INTRAMUSCULAR; INTRAVENOUS at 09:11

## 2018-11-27 LAB
ANION GAP SERPL CALC-SCNC: 9 MMOL/L
BASOPHILS # BLD AUTO: 0.06 K/UL
BASOPHILS NFR BLD: 0.8 %
BUN SERPL-MCNC: 13 MG/DL
CALCIUM SERPL-MCNC: 9.7 MG/DL
CHLORIDE SERPL-SCNC: 102 MMOL/L
CO2 SERPL-SCNC: 26 MMOL/L
CREAT SERPL-MCNC: 1.2 MG/DL
DIFFERENTIAL METHOD: ABNORMAL
EOSINOPHIL # BLD AUTO: 0.3 K/UL
EOSINOPHIL NFR BLD: 4.3 %
ERYTHROCYTE [DISTWIDTH] IN BLOOD BY AUTOMATED COUNT: 17.2 %
EST. GFR  (AFRICAN AMERICAN): >60 ML/MIN/1.73 M^2
EST. GFR  (NON AFRICAN AMERICAN): >60 ML/MIN/1.73 M^2
GLUCOSE SERPL-MCNC: 95 MG/DL
HCT VFR BLD AUTO: 37 %
HGB BLD-MCNC: 11.8 G/DL
IMM GRANULOCYTES # BLD AUTO: 0.02 K/UL
IMM GRANULOCYTES NFR BLD AUTO: 0.3 %
LIPASE SERPL-CCNC: 74 U/L
LYMPHOCYTES # BLD AUTO: 1.6 K/UL
LYMPHOCYTES NFR BLD: 21.8 %
MAGNESIUM SERPL-MCNC: 2 MG/DL
MCH RBC QN AUTO: 27.4 PG
MCHC RBC AUTO-ENTMCNC: 31.9 G/DL
MCV RBC AUTO: 86 FL
MONOCYTES # BLD AUTO: 0.9 K/UL
MONOCYTES NFR BLD: 13.1 %
NEUTROPHILS # BLD AUTO: 4.3 K/UL
NEUTROPHILS NFR BLD: 59.7 %
NRBC BLD-RTO: 0 /100 WBC
PHOSPHATE SERPL-MCNC: 4.3 MG/DL
PLATELET # BLD AUTO: 283 K/UL
PMV BLD AUTO: 10.6 FL
POTASSIUM SERPL-SCNC: 4.3 MMOL/L
PROCALCITONIN SERPL IA-MCNC: 0.06 NG/ML
RBC # BLD AUTO: 4.3 M/UL
SODIUM SERPL-SCNC: 137 MMOL/L
T4 FREE SERPL-MCNC: 1.11 NG/DL
TROPONIN I SERPL DL<=0.01 NG/ML-MCNC: 0.07 NG/ML
TROPONIN I SERPL DL<=0.01 NG/ML-MCNC: 0.07 NG/ML
TSH SERPL DL<=0.005 MIU/L-ACNC: 7.58 UIU/ML
WBC # BLD AUTO: 7.16 K/UL

## 2018-11-27 PROCEDURE — 25000003 PHARM REV CODE 250: Mod: HCNC | Performed by: PHYSICIAN ASSISTANT

## 2018-11-27 PROCEDURE — 99226 PR SUBSEQUENT OBSERVATION CARE,LEVEL III: CPT | Mod: HCNC,,, | Performed by: PHYSICIAN ASSISTANT

## 2018-11-27 PROCEDURE — 63600175 PHARM REV CODE 636 W HCPCS: Mod: HCNC | Performed by: PHYSICIAN ASSISTANT

## 2018-11-27 PROCEDURE — 84484 ASSAY OF TROPONIN QUANT: CPT | Mod: 91,HCNC

## 2018-11-27 PROCEDURE — G0378 HOSPITAL OBSERVATION PER HR: HCPCS | Mod: HCNC

## 2018-11-27 PROCEDURE — 36415 COLL VENOUS BLD VENIPUNCTURE: CPT | Mod: HCNC

## 2018-11-27 PROCEDURE — 83735 ASSAY OF MAGNESIUM: CPT | Mod: HCNC

## 2018-11-27 PROCEDURE — 84100 ASSAY OF PHOSPHORUS: CPT | Mod: HCNC

## 2018-11-27 PROCEDURE — 84439 ASSAY OF FREE THYROXINE: CPT | Mod: HCNC

## 2018-11-27 PROCEDURE — 84484 ASSAY OF TROPONIN QUANT: CPT | Mod: HCNC

## 2018-11-27 PROCEDURE — 85025 COMPLETE CBC W/AUTO DIFF WBC: CPT | Mod: HCNC

## 2018-11-27 PROCEDURE — 84443 ASSAY THYROID STIM HORMONE: CPT | Mod: HCNC

## 2018-11-27 PROCEDURE — 80048 BASIC METABOLIC PNL TOTAL CA: CPT | Mod: HCNC

## 2018-11-27 PROCEDURE — 93005 ELECTROCARDIOGRAM TRACING: CPT | Mod: HCNC

## 2018-11-27 PROCEDURE — 25000242 PHARM REV CODE 250 ALT 637 W/ HCPCS: Mod: HCNC | Performed by: PHYSICIAN ASSISTANT

## 2018-11-27 PROCEDURE — 93010 ELECTROCARDIOGRAM REPORT: CPT | Mod: HCNC,,, | Performed by: INTERNAL MEDICINE

## 2018-11-27 RX ORDER — KETOROLAC TROMETHAMINE 30 MG/ML
15 INJECTION, SOLUTION INTRAMUSCULAR; INTRAVENOUS EVERY 6 HOURS PRN
Status: DISCONTINUED | OUTPATIENT
Start: 2018-11-27 | End: 2018-11-27

## 2018-11-27 RX ORDER — LORAZEPAM 2 MG/ML
0.5 INJECTION INTRAMUSCULAR ONCE
Status: DISCONTINUED | OUTPATIENT
Start: 2018-11-27 | End: 2018-11-27

## 2018-11-27 RX ORDER — RAMELTEON 8 MG/1
8 TABLET ORAL NIGHTLY PRN
Status: DISCONTINUED | OUTPATIENT
Start: 2018-11-27 | End: 2018-11-29 | Stop reason: HOSPADM

## 2018-11-27 RX ORDER — GLUCAGON 1 MG
1 KIT INJECTION
Status: DISCONTINUED | OUTPATIENT
Start: 2018-11-27 | End: 2018-11-29 | Stop reason: HOSPADM

## 2018-11-27 RX ORDER — ONDANSETRON 4 MG/1
4 TABLET, ORALLY DISINTEGRATING ORAL EVERY 8 HOURS PRN
Status: DISCONTINUED | OUTPATIENT
Start: 2018-11-27 | End: 2018-11-29 | Stop reason: HOSPADM

## 2018-11-27 RX ORDER — PROMETHAZINE HYDROCHLORIDE 25 MG/1
25 TABLET ORAL EVERY 6 HOURS PRN
Status: DISCONTINUED | OUTPATIENT
Start: 2018-11-27 | End: 2018-11-29 | Stop reason: HOSPADM

## 2018-11-27 RX ORDER — FLUTICASONE FUROATE AND VILANTEROL 200; 25 UG/1; UG/1
1 POWDER RESPIRATORY (INHALATION) DAILY
Status: DISCONTINUED | OUTPATIENT
Start: 2018-11-27 | End: 2018-11-29 | Stop reason: HOSPADM

## 2018-11-27 RX ORDER — LORAZEPAM 2 MG/ML
0.5 INJECTION INTRAMUSCULAR ONCE
Status: COMPLETED | OUTPATIENT
Start: 2018-11-27 | End: 2018-11-27

## 2018-11-27 RX ORDER — LATANOPROST 50 UG/ML
1 SOLUTION/ DROPS OPHTHALMIC NIGHTLY
Status: DISCONTINUED | OUTPATIENT
Start: 2018-11-27 | End: 2018-11-29 | Stop reason: HOSPADM

## 2018-11-27 RX ORDER — OXYBUTYNIN CHLORIDE 10 MG/1
10 TABLET, EXTENDED RELEASE ORAL DAILY
Status: DISCONTINUED | OUTPATIENT
Start: 2018-11-27 | End: 2018-11-29 | Stop reason: HOSPADM

## 2018-11-27 RX ORDER — SODIUM CHLORIDE 0.9 % (FLUSH) 0.9 %
5 SYRINGE (ML) INJECTION
Status: DISCONTINUED | OUTPATIENT
Start: 2018-11-27 | End: 2018-11-29 | Stop reason: HOSPADM

## 2018-11-27 RX ORDER — PANTOPRAZOLE SODIUM 40 MG/1
40 TABLET, DELAYED RELEASE ORAL DAILY
Status: DISCONTINUED | OUTPATIENT
Start: 2018-11-27 | End: 2018-11-29 | Stop reason: HOSPADM

## 2018-11-27 RX ORDER — METOPROLOL TARTRATE 25 MG/1
12.5 TABLET ORAL 2 TIMES DAILY
Status: DISCONTINUED | OUTPATIENT
Start: 2018-11-27 | End: 2018-11-27

## 2018-11-27 RX ORDER — ATORVASTATIN CALCIUM 20 MG/1
40 TABLET, FILM COATED ORAL DAILY
Status: DISCONTINUED | OUTPATIENT
Start: 2018-11-27 | End: 2018-11-29 | Stop reason: HOSPADM

## 2018-11-27 RX ORDER — ASPIRIN 81 MG/1
81 TABLET ORAL DAILY
Status: DISCONTINUED | OUTPATIENT
Start: 2018-11-27 | End: 2018-11-29 | Stop reason: HOSPADM

## 2018-11-27 RX ORDER — AMOXICILLIN 250 MG
1 CAPSULE ORAL 2 TIMES DAILY PRN
Status: DISCONTINUED | OUTPATIENT
Start: 2018-11-27 | End: 2018-11-29 | Stop reason: HOSPADM

## 2018-11-27 RX ORDER — ACETAMINOPHEN 325 MG/1
650 TABLET ORAL EVERY 6 HOURS PRN
Status: DISCONTINUED | OUTPATIENT
Start: 2018-11-27 | End: 2018-11-29 | Stop reason: HOSPADM

## 2018-11-27 RX ORDER — IPRATROPIUM BROMIDE AND ALBUTEROL SULFATE 2.5; .5 MG/3ML; MG/3ML
3 SOLUTION RESPIRATORY (INHALATION) EVERY 4 HOURS PRN
Status: DISCONTINUED | OUTPATIENT
Start: 2018-11-27 | End: 2018-11-29 | Stop reason: HOSPADM

## 2018-11-27 RX ORDER — IBUPROFEN 200 MG
16 TABLET ORAL
Status: DISCONTINUED | OUTPATIENT
Start: 2018-11-27 | End: 2018-11-29 | Stop reason: HOSPADM

## 2018-11-27 RX ORDER — TIOTROPIUM BROMIDE 18 UG/1
1 CAPSULE ORAL; RESPIRATORY (INHALATION) DAILY
Status: DISCONTINUED | OUTPATIENT
Start: 2018-11-27 | End: 2018-11-29 | Stop reason: HOSPADM

## 2018-11-27 RX ORDER — IBUPROFEN 200 MG
24 TABLET ORAL
Status: DISCONTINUED | OUTPATIENT
Start: 2018-11-27 | End: 2018-11-29 | Stop reason: HOSPADM

## 2018-11-27 RX ORDER — LISINOPRIL 20 MG/1
20 TABLET ORAL DAILY
Status: DISCONTINUED | OUTPATIENT
Start: 2018-11-27 | End: 2018-11-28

## 2018-11-27 RX ORDER — NAPROXEN SODIUM 275 MG/1
550 TABLET ORAL 2 TIMES DAILY WITH MEALS
Status: DISCONTINUED | OUTPATIENT
Start: 2018-11-27 | End: 2018-11-28

## 2018-11-27 RX ADMIN — NITROGLYCERIN 0.4 MG: 0.4 TABLET SUBLINGUAL at 09:11

## 2018-11-27 RX ADMIN — KETOROLAC TROMETHAMINE 15 MG: 30 INJECTION, SOLUTION INTRAMUSCULAR at 09:11

## 2018-11-27 RX ADMIN — FLUTICASONE FUROATE AND VILANTEROL TRIFENATATE 1 PUFF: 200; 25 POWDER RESPIRATORY (INHALATION) at 12:11

## 2018-11-27 RX ADMIN — LISINOPRIL 20 MG: 20 TABLET ORAL at 09:11

## 2018-11-27 RX ADMIN — LATANOPROST 1 DROP: 50 SOLUTION OPHTHALMIC at 01:11

## 2018-11-27 RX ADMIN — ASPIRIN 81 MG: 81 TABLET, COATED ORAL at 09:11

## 2018-11-27 RX ADMIN — ATORVASTATIN CALCIUM 40 MG: 20 TABLET, FILM COATED ORAL at 09:11

## 2018-11-27 RX ADMIN — LORAZEPAM 0.5 MG: 2 INJECTION INTRAMUSCULAR; INTRAVENOUS at 10:11

## 2018-11-27 RX ADMIN — RAMELTEON 8 MG: 8 TABLET, FILM COATED ORAL at 10:11

## 2018-11-27 RX ADMIN — NAPROXEN SODIUM 550 MG: 275 TABLET, FILM COATED ORAL at 03:11

## 2018-11-27 RX ADMIN — TIOTROPIUM BROMIDE 18 MCG: 18 CAPSULE ORAL; RESPIRATORY (INHALATION) at 12:11

## 2018-11-27 RX ADMIN — PANTOPRAZOLE SODIUM 40 MG: 40 TABLET, DELAYED RELEASE ORAL at 09:11

## 2018-11-27 RX ADMIN — OXYBUTYNIN CHLORIDE 10 MG: 10 TABLET, FILM COATED, EXTENDED RELEASE ORAL at 09:11

## 2018-11-27 RX ADMIN — ACETAMINOPHEN 650 MG: 325 TABLET ORAL at 10:11

## 2018-11-27 RX ADMIN — LATANOPROST 1 DROP: 50 SOLUTION OPHTHALMIC at 10:11

## 2018-11-27 NOTE — PT/OT/SLP PROGRESS
Occupational Therapy      Patient Name:  Robin Hawkins   MRN:  [id number]    Patient not seen today secondary to Pain(10/10 Chest pain, RN requested therapy hold). Will follow-up.    ALVARADO Travis  11/27/2018

## 2018-11-27 NOTE — SUBJECTIVE & OBJECTIVE
Interval History: No events overnight. CP returned this am, but resolved with toradol and ativan. EKG negative. Unable to do DSE, will reattempt tomorrow.    Review of Systems   Constitutional: Negative for chills and fever.   Respiratory: Positive for shortness of breath and wheezing. Negative for chest tightness.    Cardiovascular: Positive for chest pain. Negative for palpitations and leg swelling.   Gastrointestinal: Positive for nausea and vomiting.   Neurological: Negative for dizziness and numbness.   Psychiatric/Behavioral: Negative for confusion. The patient is not nervous/anxious.      Objective:     Vital Signs (Most Recent):  Temp: 98.1 °F (36.7 °C) (11/27/18 1128)  Pulse: 73 (11/27/18 1235)  Resp: (!) 22 (11/27/18 1200)  BP: 138/76 (11/27/18 1128)  SpO2: 97 % (11/27/18 1128) Vital Signs (24h Range):  Temp:  [97 °F (36.1 °C)-98.1 °F (36.7 °C)] 98.1 °F (36.7 °C)  Pulse:  [63-88] 73  Resp:  [18-24] 22  SpO2:  [96 %-100 %] 97 %  BP: (116-184)/(68-87) 138/76     Weight: 82.7 kg (182 lb 5.1 oz)  Body mass index is 25.43 kg/m².    Intake/Output Summary (Last 24 hours) at 11/27/2018 1447  Last data filed at 11/27/2018 1200  Gross per 24 hour   Intake 50 ml   Output 1000 ml   Net -950 ml      Physical Exam   Constitutional: He is oriented to person, place, and time. He appears well-developed and well-nourished. He appears distressed.   Pulmonary/Chest: Breath sounds normal. Tachypnea noted. No respiratory distress. He has no wheezes. He exhibits tenderness (jumps off the bed).   Anxious breathing, redirectable with relaxation   Abdominal: Soft. Bowel sounds are normal. He exhibits no distension. There is no tenderness.   Musculoskeletal: He exhibits no edema or tenderness.   Neurological: He is alert and oriented to person, place, and time.   Skin: Skin is warm and dry.   Psychiatric: His behavior is normal. His mood appears anxious.       Significant Labs:   BMP:   Recent Labs   Lab 11/27/18  0529   GLU 95   NA  137   K 4.3      CO2 26   BUN 13   CREATININE 1.2   CALCIUM 9.7   MG 2.0     CBC:   Recent Labs   Lab 11/26/18 1918 11/26/18 1926 11/27/18  0529   WBC 8.74  --  7.16   HGB 11.8*  --  11.8*   HCT 36.9* 38 37.0*     --  283     Troponin:   Recent Labs   Lab 11/26/18 1918 11/26/18 2255 11/27/18  0529   TROPONINI 0.066* 0.073* 0.074*       Significant Imaging: I have reviewed all pertinent imaging results/findings within the past 24 hours.

## 2018-11-27 NOTE — ASSESSMENT & PLAN NOTE
- Suspect CP and TANNER 2/2 restarting chemo medication.  Has occurred previously, including recent admission. Sxs resolved with duonebs and 3rd nitro.  - Troponin 0.066--> .073 (BL~.06).  3rd trop in AM. BNP 43 and euvolemic.   - EKG showed no ischemic changes.  - CTA chest was negative for PE or pulmonary edema.  - HEART score of 6  - Due to risk factors, will order dobutamine stress echo for AM.  Last stress test 3/2016 showed no ischemia or heart failure.   - Do not believe COPD exacerbation as no changes in cough/sputum and lungs clear. Procal pending.   - Possibly GERD etiology as pain began s/p meal with red sauce, but TANNER makes less likely.   - lipase pending as CP borderline epigastric/LUQ abd  - monitor on tele.

## 2018-11-27 NOTE — ASSESSMENT & PLAN NOTE
- SOB symptoms relieved with Duonebs in ED  - c/w breo and spiriva. Duoneb treatments q4hr PRN  - not on home oxygen- has not qualified for it in the past  - PT/OT ordered for TANNER  - PFTs on file show mild obstructive disease

## 2018-11-27 NOTE — PT/OT/SLP PROGRESS
Physical Therapy      Patient Name:  Robin Hawkins   MRN:  [id number]    Patient not seen today secondary to (10/10 chest pain; RN hold). Will follow-up when medically approrpriate.    Deonte Logan, PT

## 2018-11-27 NOTE — ED NOTES
Pt resting comfortably and independently repositioned in stretcher with bed locked in lowest position for safety. NAD noted at this time. Respirations even and unlabored and visible chest rise noted.  Patient offered bathroom assistance and denies need at this time. Pt instructed to call if assistance is needed. Pt on continuous cardiac, BP, and O2 monitoring. Call light within reach. . No needs at this time. Will continue to monitor.

## 2018-11-27 NOTE — ASSESSMENT & PLAN NOTE
Long term current use of therapeutic drug  - Dx 12/2015 with HCC in the setting of hepatitis C.  - f/w heme onc  - following with Dr Scott for liver transplant evaluation.  Initially considered for transplant but taken off d/t ETOH abuse  - Stivarga started on 10/15/18. Will hold oral chemo medications.   - S/p multiple treatments with TACE.    - Recent MRI shows progression of malignancy  - liver enzymes near baseline.

## 2018-11-27 NOTE — HOSPITAL COURSE
Patient admitted to observation for chest pain. EKG negative for ischemia. Troponin stable at baseline 0.073. Patient developed another episode of chest pain that was unrelieved by NTG, but easily reproducible with palpation. Patient given toradol and ativan for related anxiety and pain resolved. Repeat EKG negative, will attempt pharm stress prior to discharge. DSE negative for ischemia. Ativan aided in anxiety. Patient continues to saturate 98% on room air. Requesting home O2, did not qualify for O2 with six minute walk test. PT/OT consulted, no needs. Patient discharged with follow up with heme/onc and internist next week. All questions answered.
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
## 2018-11-27 NOTE — ED TRIAGE NOTES
Patient reports to the ED today with reports of SOB onset last night. Patient states that it is worse on excretion. Patient tachypenic. Patient reports sharp pain in chest. Cancer patient on oral chemo. Patient endorses nausea and vomiting. Patient states having cough and cold symptoms.

## 2018-11-27 NOTE — ASSESSMENT & PLAN NOTE
- atherosclerotic calcification of the aorta and coronary artery seen on CTA  - continue atorvastatin 40 mg and ASA 81 mg qd

## 2018-11-27 NOTE — PROVIDER PROGRESS NOTES - EMERGENCY DEPT.
This patient was signed out to me by the outgoing ED attending and PA.  Repeat troponin slightly elevated from initial.  Patient's chest pain improved after 2nd nitroglycerin, GI cocktail induced emesis with this patient.   Discussed the case with Oncology who recommended admission to Medicine

## 2018-11-27 NOTE — PLAN OF CARE
11/27/18 1005   Discharge Assessment   Assessment Type Discharge Planning Assessment   Confirmed/corrected address and phone number on facesheet? Yes   Assessment information obtained from? Patient   Expected Length of Stay (days) 2   Communicated expected length of stay with patient/caregiver yes   Prior to hospitilization cognitive status: Alert/Oriented   Prior to hospitalization functional status: Assistive Equipment   Current cognitive status: Alert/Oriented   Current Functional Status: Assistive Equipment   Lives With sibling(s)  (Sister, Darby Hawkins ([phone]))   Able to Return to Prior Arrangements yes   Is patient able to care for self after discharge? Yes   Patient's perception of discharge disposition home or selfcare   Readmission Within The Last 30 Days no previous admission in last 30 days   Patient currently being followed by outpatient case management? No   Patient currently receives any other outside agency services? No   Equipment Currently Used at Home cane, straight;walker, rolling;shower chair;nebulizer;other (see comments)  (BP machine)   Do you have any problems affording any of your prescribed medications? No   Is the patient taking medications as prescribed? yes   Does the patient have transportation home? Yes   Transportation Available family or friend will provide  (sister, Manju Canales ([phone]))   Does the patient receive services at the [facility]? No   Discharge Plan A Home with family   Discharge Plan B Home Health   Patient/Family In Agreement With Plan yes     Dx: chest pain  Consult: hem/onc & PT/OT  Pharm: [facility]  Hosp f/u appt: Dr. Venancio Mcneil (PCP) on 12/7/18 at 1500

## 2018-11-27 NOTE — PROGRESS NOTES
Notified Ramila pt c/o 10/10 chest pain. Orders to give Nitro and Stat EKG. Will continue to monitor pt.

## 2018-11-27 NOTE — ASSESSMENT & PLAN NOTE
- Suspect CP and TANNER 2/2 restarting chemo medication.  Has occurred previously, including recent admission. Sxs resolved with duonebs and 3rd nitro.  - Troponin 0.066--> .073 (BL~.06).  - EKG showed no ischemic changes, repeat no changes  - CTA chest was negative for PE or pulmonary edema.  - HEART score of 5  - pharm stress attempted but recurrence of pain  - pain with palpation to chest wall, relieved with NSAIDs  - lipase pending as CP borderline epigastric/LUQ abd
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
## 2018-11-27 NOTE — NURSING
Received order for stress testing.  Order corrected in Epic.  Called pts nurse to get report.  Pt having active CP & given 3 NTG's without complete relief.  Discussed w/ Dr. Marlon Reyna, stated that DSE testing could not be done until pts CP further worked up & resolved.  I notified pts NP, Melody.  Melody stated that the team would reorder stress testing once pts CP resolved.

## 2018-11-28 LAB
ANION GAP SERPL CALC-SCNC: 10 MMOL/L
BASOPHILS # BLD AUTO: 0.06 K/UL
BASOPHILS NFR BLD: 0.5 %
BSA FOR ECHO PROCEDURE: 2.03 M2
BUN SERPL-MCNC: 36 MG/DL
CALCIUM SERPL-MCNC: 8.8 MG/DL
CHLORIDE SERPL-SCNC: 99 MMOL/L
CO2 SERPL-SCNC: 24 MMOL/L
CREAT SERPL-MCNC: 1.6 MG/DL
CV ECHO LV RWT: 0.49 CM
CV STRESS BASE HR: 105
DIASTOLIC BLOOD PRESSURE: 80
DIFFERENTIAL METHOD: ABNORMAL
ECHO LV POSTERIOR WALL: 0.9 CM (ref 0.6–1.1)
EOSINOPHIL # BLD AUTO: 0.3 K/UL
EOSINOPHIL NFR BLD: 2.2 %
ERYTHROCYTE [DISTWIDTH] IN BLOOD BY AUTOMATED COUNT: 17 %
EST. GFR  (AFRICAN AMERICAN): 49.7 ML/MIN/1.73 M^2
EST. GFR  (NON AFRICAN AMERICAN): 43 ML/MIN/1.73 M^2
GLUCOSE SERPL-MCNC: 97 MG/DL
HCT VFR BLD AUTO: 36 %
HGB BLD-MCNC: 11.2 G/DL
IMM GRANULOCYTES # BLD AUTO: 0.06 K/UL
IMM GRANULOCYTES NFR BLD AUTO: 0.5 %
INTERVENTRICULAR SEPTUM: 0.9 CM (ref 0.6–1.1)
LA MAJOR: 4 CM
LA MINOR: 4 CM
LA WIDTH: 4 CM
LEFT ATRIUM SIZE: 4 CM
LEFT ATRIUM VOLUME INDEX: 26.8 ML/M2
LEFT ATRIUM VOLUME: 54.4 CM3
LEFT VENTRICLE MASS INDEX: 47.7 G/M2
LEFT VENTRICULAR INTERNAL DIMENSION IN DIASTOLE: 3.7 CM (ref 3.5–6)
LEFT VENTRICULAR MASS: 96.88 G
LYMPHOCYTES # BLD AUTO: 1.4 K/UL
LYMPHOCYTES NFR BLD: 11.5 %
MCH RBC QN AUTO: 27.1 PG
MCHC RBC AUTO-ENTMCNC: 31.1 G/DL
MCV RBC AUTO: 87 FL
MONOCYTES # BLD AUTO: 1.1 K/UL
MONOCYTES NFR BLD: 9.1 %
NEUTROPHILS # BLD AUTO: 9.2 K/UL
NEUTROPHILS NFR BLD: 76.2 %
NRBC BLD-RTO: 0 /100 WBC
OHS CV CPX 1 MINUTE RECOVERY HEART RATE: 136 BPM
OHS CV CPX 85 PERCENT MAX PREDICTED HEART RATE MALE: 128
OHS CV CPX MAX PREDICTED HEART RATE: 150
OHS CV CPX PATIENT IS FEMALE: 0
OHS CV CPX PATIENT IS MALE: 1
OHS CV CPX PEAK DIASTOLIC BLOOD PRESSURE: 50 MMHG
OHS CV CPX PEAK HEAR RATE: 137
OHS CV CPX PEAK RATE PRESSURE PRODUCT: NORMAL
OHS CV CPX PEAK SYSTOLIC BLOOD PRESSURE: 104
OHS CV CPX PERCENT MAX PREDICTED HEART RATE ACHIEVED: 91
OHS CV CPX PERCENT TARGET HEART RATE ACHIEVED: 107.87
OHS CV CPX RATE PRESSURE PRODUCT PRESENTING: NORMAL
OHS CV CPX TARGET HEART RATE: 127
PLATELET # BLD AUTO: 287 K/UL
PMV BLD AUTO: 11.1 FL
POTASSIUM SERPL-SCNC: 5 MMOL/L
RBC # BLD AUTO: 4.13 M/UL
SODIUM SERPL-SCNC: 133 MMOL/L
SYSTOLIC BLOOD PRESSURE: 129
WBC # BLD AUTO: 12.12 K/UL

## 2018-11-28 PROCEDURE — 99226 PR SUBSEQUENT OBSERVATION CARE,LEVEL III: CPT | Mod: HCNC,,, | Performed by: PHYSICIAN ASSISTANT

## 2018-11-28 PROCEDURE — 36415 COLL VENOUS BLD VENIPUNCTURE: CPT | Mod: HCNC

## 2018-11-28 PROCEDURE — 80048 BASIC METABOLIC PNL TOTAL CA: CPT | Mod: HCNC

## 2018-11-28 PROCEDURE — 63600150 PHARM REV CODE 636: Mod: HCNC | Performed by: INTERNAL MEDICINE

## 2018-11-28 PROCEDURE — 85025 COMPLETE CBC W/AUTO DIFF WBC: CPT | Mod: HCNC

## 2018-11-28 PROCEDURE — 99215 OFFICE O/P EST HI 40 MIN: CPT | Mod: HCNC,,, | Performed by: INTERNAL MEDICINE

## 2018-11-28 PROCEDURE — 25000003 PHARM REV CODE 250: Mod: HCNC | Performed by: PHYSICIAN ASSISTANT

## 2018-11-28 PROCEDURE — G0378 HOSPITAL OBSERVATION PER HR: HCPCS | Mod: HCNC

## 2018-11-28 PROCEDURE — 25000242 PHARM REV CODE 250 ALT 637 W/ HCPCS: Mod: HCNC | Performed by: PHYSICIAN ASSISTANT

## 2018-11-28 RX ORDER — DOBUTAMINE HYDROCHLORIDE 100 MG/100ML
10 INJECTION INTRAVENOUS
Status: COMPLETED | OUTPATIENT
Start: 2018-11-28 | End: 2018-11-28

## 2018-11-28 RX ORDER — LORAZEPAM 0.5 MG/1
0.5 TABLET ORAL EVERY 6 HOURS PRN
Status: DISCONTINUED | OUTPATIENT
Start: 2018-11-28 | End: 2018-11-29 | Stop reason: HOSPADM

## 2018-11-28 RX ADMIN — LORAZEPAM 0.5 MG: 0.5 TABLET ORAL at 08:11

## 2018-11-28 RX ADMIN — SODIUM CHLORIDE 500 ML: 0.9 INJECTION, SOLUTION INTRAVENOUS at 10:11

## 2018-11-28 RX ADMIN — RAMELTEON 8 MG: 8 TABLET, FILM COATED ORAL at 08:11

## 2018-11-28 RX ADMIN — PANTOPRAZOLE SODIUM 40 MG: 40 TABLET, DELAYED RELEASE ORAL at 09:11

## 2018-11-28 RX ADMIN — LORAZEPAM 0.5 MG: 0.5 TABLET ORAL at 09:11

## 2018-11-28 RX ADMIN — TIOTROPIUM BROMIDE 18 MCG: 18 CAPSULE ORAL; RESPIRATORY (INHALATION) at 09:11

## 2018-11-28 RX ADMIN — ASPIRIN 81 MG: 81 TABLET, COATED ORAL at 09:11

## 2018-11-28 RX ADMIN — PROMETHAZINE HYDROCHLORIDE 25 MG: 25 TABLET ORAL at 08:11

## 2018-11-28 RX ADMIN — OXYBUTYNIN CHLORIDE 10 MG: 10 TABLET, FILM COATED, EXTENDED RELEASE ORAL at 09:11

## 2018-11-28 RX ADMIN — FLUTICASONE FUROATE AND VILANTEROL TRIFENATATE 1 PUFF: 200; 25 POWDER RESPIRATORY (INHALATION) at 09:11

## 2018-11-28 RX ADMIN — LATANOPROST 1 DROP: 50 SOLUTION OPHTHALMIC at 08:11

## 2018-11-28 RX ADMIN — DOBUTAMINE IN DEXTROSE 20 MCG/KG/MIN: 100 INJECTION, SOLUTION INTRAVENOUS at 04:11

## 2018-11-28 RX ADMIN — ATORVASTATIN CALCIUM 40 MG: 20 TABLET, FILM COATED ORAL at 09:11

## 2018-11-28 NOTE — PT/OT/SLP PROGRESS
Physical Therapy      Patient Name:  Robin Hawkins   MRN:  [id number]    Patient not seen today secondary to Nursing care. Writing therapist attempted pt in AM but per nsg pt with scheduled stress test later in day and incr anxiety req medication at current time. Requested eval on following day to not skew potential results of test.  Will follow-up as scheduled.        Funmilayo Carrizales, PT, DPT  11/28/2018

## 2018-11-28 NOTE — NURSING NOTE
Patient verified by 2 identifiers and allergies reviewed.  Port a cath IV in place to Lt chest wall.  DSE explained to patient, consent obtained & testing completed.  Pt tolerated testing well except for moderate SOB throughout testing. IV flushed post testing.  Post study discharge instructions reviewed with patient, patient verbalized understanding.  Patient transferred back to room via stretcher accompanied by escort in stable condition.

## 2018-11-28 NOTE — PLAN OF CARE
Problem: Patient Care Overview  Goal: Plan of Care Review  Outcome: Ongoing (interventions implemented as appropriate)  POC reviewed with pt. AAO x4. C/o chest pain relieved with PRN nitro. Telemetry monitoring.  Pt remained free from falls. Questions and concerns addressed. Pt progressing towards goals. Will continue to monitor. See flow sheets for full assessment and VS

## 2018-11-28 NOTE — PT/OT/SLP PROGRESS
Occupational Therapy      Patient Name:  Robin Hawkins   MRN:  [id number]    Patient not seen today secondary to Nursing care. Writing therapist attempted pt in AM, but RN has pt on hold 2/2 chest pain per PT. Will follow-up as scheduled.    Jj Chapa OT  11/28/2018
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
## 2018-11-28 NOTE — NURSING
"Patient c/o chest discomfort after eating a sandwich, patient reports," I need to just throw up."  Patient burped."   Patient requested oxygen and informed O2 level is 100%.  " I need oxygen, I have to take those breathing treatments and I need oxygen."  HOB, oxygen administered at 2L BNC with humidification.  VSS.: 97.2--101- Administered tylenol 650 mg x 2 tabs and ramelton 8 mg for sleep.   Patient c/o CP on previous shift and medicated with nitro x 3 [ Resolved] and medicated with lorazepam 0.5mg IVP and patient was calmer.     "

## 2018-11-29 VITALS
BODY MASS INDEX: 25.48 KG/M2 | HEART RATE: 76 BPM | OXYGEN SATURATION: 100 % | SYSTOLIC BLOOD PRESSURE: 120 MMHG | DIASTOLIC BLOOD PRESSURE: 68 MMHG | RESPIRATION RATE: 18 BRPM | HEIGHT: 71 IN | TEMPERATURE: 97 F | WEIGHT: 182 LBS

## 2018-11-29 DIAGNOSIS — K21.00 GASTROESOPHAGEAL REFLUX DISEASE WITH ESOPHAGITIS: Chronic | ICD-10-CM

## 2018-11-29 DIAGNOSIS — K21.00 REFLUX ESOPHAGITIS: ICD-10-CM

## 2018-11-29 LAB
ANION GAP SERPL CALC-SCNC: 5 MMOL/L
BASOPHILS # BLD AUTO: 0.05 K/UL
BASOPHILS NFR BLD: 0.7 %
BUN SERPL-MCNC: 27 MG/DL
CALCIUM SERPL-MCNC: 8.2 MG/DL
CHLORIDE SERPL-SCNC: 105 MMOL/L
CO2 SERPL-SCNC: 22 MMOL/L
CREAT SERPL-MCNC: 1.1 MG/DL
DIFFERENTIAL METHOD: ABNORMAL
EOSINOPHIL # BLD AUTO: 0.3 K/UL
EOSINOPHIL NFR BLD: 4.1 %
ERYTHROCYTE [DISTWIDTH] IN BLOOD BY AUTOMATED COUNT: 16.8 %
EST. GFR  (AFRICAN AMERICAN): >60 ML/MIN/1.73 M^2
EST. GFR  (NON AFRICAN AMERICAN): >60 ML/MIN/1.73 M^2
GLUCOSE SERPL-MCNC: 117 MG/DL
HCT VFR BLD AUTO: 29.9 %
HGB BLD-MCNC: 9.6 G/DL
IMM GRANULOCYTES # BLD AUTO: 0.01 K/UL
IMM GRANULOCYTES NFR BLD AUTO: 0.1 %
LYMPHOCYTES # BLD AUTO: 1.5 K/UL
LYMPHOCYTES NFR BLD: 21.9 %
MCH RBC QN AUTO: 27.4 PG
MCHC RBC AUTO-ENTMCNC: 32.1 G/DL
MCV RBC AUTO: 85 FL
MONOCYTES # BLD AUTO: 0.8 K/UL
MONOCYTES NFR BLD: 11.2 %
NEUTROPHILS # BLD AUTO: 4.3 K/UL
NEUTROPHILS NFR BLD: 62 %
NRBC BLD-RTO: 0 /100 WBC
PLATELET # BLD AUTO: 244 K/UL
PMV BLD AUTO: 11.1 FL
POTASSIUM SERPL-SCNC: 4.1 MMOL/L
RBC # BLD AUTO: 3.51 M/UL
SODIUM SERPL-SCNC: 132 MMOL/L
WBC # BLD AUTO: 6.89 K/UL

## 2018-11-29 PROCEDURE — 97165 OT EVAL LOW COMPLEX 30 MIN: CPT | Mod: HCNC

## 2018-11-29 PROCEDURE — 80048 BASIC METABOLIC PNL TOTAL CA: CPT | Mod: HCNC

## 2018-11-29 PROCEDURE — 99217 PR OBSERVATION CARE DISCHARGE: CPT | Mod: HCNC,,, | Performed by: PHYSICIAN ASSISTANT

## 2018-11-29 PROCEDURE — 25000003 PHARM REV CODE 250: Mod: HCNC | Performed by: PHYSICIAN ASSISTANT

## 2018-11-29 PROCEDURE — 25000242 PHARM REV CODE 250 ALT 637 W/ HCPCS: Mod: HCNC | Performed by: PHYSICIAN ASSISTANT

## 2018-11-29 PROCEDURE — 36415 COLL VENOUS BLD VENIPUNCTURE: CPT | Mod: HCNC

## 2018-11-29 PROCEDURE — G8980 MOBILITY D/C STATUS: HCPCS | Mod: CI,HCNC

## 2018-11-29 PROCEDURE — G8987 SELF CARE CURRENT STATUS: HCPCS | Mod: CH,HCNC

## 2018-11-29 PROCEDURE — 97161 PT EVAL LOW COMPLEX 20 MIN: CPT | Mod: HCNC

## 2018-11-29 PROCEDURE — G8989 SELF CARE D/C STATUS: HCPCS | Mod: CH,HCNC

## 2018-11-29 PROCEDURE — G8978 MOBILITY CURRENT STATUS: HCPCS | Mod: CI,HCNC

## 2018-11-29 PROCEDURE — 85025 COMPLETE CBC W/AUTO DIFF WBC: CPT | Mod: HCNC

## 2018-11-29 PROCEDURE — G0378 HOSPITAL OBSERVATION PER HR: HCPCS | Mod: HCNC

## 2018-11-29 PROCEDURE — 63600175 PHARM REV CODE 636 W HCPCS: Mod: HCNC | Performed by: PHYSICIAN ASSISTANT

## 2018-11-29 PROCEDURE — G8979 MOBILITY GOAL STATUS: HCPCS | Mod: CI,HCNC

## 2018-11-29 PROCEDURE — G8988 SELF CARE GOAL STATUS: HCPCS | Mod: CH,HCNC

## 2018-11-29 RX ORDER — OMEPRAZOLE 20 MG/1
CAPSULE, DELAYED RELEASE ORAL
Qty: 90 CAPSULE | Refills: 0 | Status: SHIPPED | OUTPATIENT
Start: 2018-11-29 | End: 2019-03-06 | Stop reason: SDUPTHER

## 2018-11-29 RX ORDER — HEPARIN 100 UNIT/ML
100 SYRINGE INTRAVENOUS ONCE
Status: COMPLETED | OUTPATIENT
Start: 2018-11-29 | End: 2018-11-29

## 2018-11-29 RX ADMIN — FLUTICASONE FUROATE AND VILANTEROL TRIFENATATE 1 PUFF: 200; 25 POWDER RESPIRATORY (INHALATION) at 09:11

## 2018-11-29 RX ADMIN — HEPARIN 100 UNITS: 100 SYRINGE at 01:11

## 2018-11-29 RX ADMIN — ASPIRIN 81 MG: 81 TABLET, COATED ORAL at 09:11

## 2018-11-29 RX ADMIN — OXYBUTYNIN CHLORIDE 10 MG: 10 TABLET, FILM COATED, EXTENDED RELEASE ORAL at 09:11

## 2018-11-29 RX ADMIN — PANTOPRAZOLE SODIUM 40 MG: 40 TABLET, DELAYED RELEASE ORAL at 09:11

## 2018-11-29 RX ADMIN — TIOTROPIUM BROMIDE 18 MCG: 18 CAPSULE ORAL; RESPIRATORY (INHALATION) at 09:11

## 2018-11-29 RX ADMIN — ATORVASTATIN CALCIUM 40 MG: 20 TABLET, FILM COATED ORAL at 09:11

## 2018-11-29 RX ADMIN — LORAZEPAM 0.5 MG: 0.5 TABLET ORAL at 09:11

## 2018-11-29 NOTE — PHARMACY MED REC
"Admission Medication Reconciliation - Pharmacy Consult Note    The home medication history was taken by Mikaela Walsh.  Based on information gathered and subsequent review by the clinical pharmacist, the items below may need attention.     You may go to "Admission" then "Reconcile Home Medications" tabs to review and/or act upon these items.     Potentially problematic discrepancies with current MAR  o Patient IS taking the following which was not ordered upon admit  o Lisinopril 20 mg po daily      Please address this information as you see fit.  Feel free to contact us if you have any questions or require assistance.    Mikaela Walsh, PharmD, BCPS   EXT [id number]    .    .          "
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
## 2018-11-29 NOTE — ASSESSMENT & PLAN NOTE
Long term current use of therapeutic drug  - Dx 12/2015 with HCC in the setting of hepatitis C.  - f/w heme onc, evaluated and ok to resume chemotherapy on discharge  - following with Dr Scott for liver transplant evaluation.  Initially considered for transplant but taken off d/t ETOH abuse  - Stivarga started on 10/15/18. Will hold oral chemo medications.   - S/p multiple treatments with TACE.    - Recent MRI shows progression of malignancy  - liver enzymes near baseline.

## 2018-11-29 NOTE — PLAN OF CARE
Problem: Patient Care Overview  Goal: Plan of Care Review  Outcome: Ongoing (interventions implemented as appropriate)   11/29/18 1521   Coping/Psychosocial   Plan Of Care Reviewed With patient     Patient alert and presents with a raspy voice. Ambulates to the bathroom independently. Patient requested lorazepam 0.5 mg and patient slept well.  No reports of feeling anxious during shift and no c/o CP.  Telemetry dc'd and returned to telemetry.  Patient O2 sats remain around  %.   POC ongoing and discharge date is TBD.

## 2018-11-29 NOTE — ASSESSMENT & PLAN NOTE
- Suspect CP and TANNER 2/2 restarting chemo medication.  Has occurred previously, including recent admission. Sxs resolved with duonebs and 3rd nitro.  - Troponin 0.066--> .073 (BL~.06).  - EKG showed no ischemic changes, repeat no changes  - CTA chest was negative for PE or pulmonary edema.  - HEART score of 5  - pharm stress attempted but recurrence of pain  - pain with palpation to chest wall, relieved with NSAIDs  - DSE negative for ischemia
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
## 2018-11-29 NOTE — PROGRESS NOTES
Home Oxygen Evaluation    Date Performed: 2018    1) Patient's Home O2 Sat on room air, while at rest: 99        If O2 sats on room air at rest are 88% or below, patient qualifies. No additional testing needed. Document N/A in steps 2 and 3. If 89% or above, complete steps 2.      2) Patient's O2 Sat on room air while exercisin        If O2 sats on room air while exercising remain 89% or above patient does not qualify, no further testing needed Document N/A in step 3. If O2 sats on room air while exercising are 88% or below, continue to step 3.      3) Patient's O2 Sat while exercising on O2: n/a         (Must show improvement from #2 for patients to qualify)    If O2 sats improve on oxygen, patient qualifies for portable oxygen. If not, the patient does not qualify.

## 2018-11-29 NOTE — PLAN OF CARE
Previously scheduled appointment with Dr. Carrillo (hem/onc) on 12/12/18 at 1140 noted. Plan to discharge patient home with no needs today. Will continue to follow.

## 2018-11-29 NOTE — PLAN OF CARE
Problem: Occupational Therapy Goal  Goal: Occupational Therapy Goal  Outcome: Outcome(s) achieved Date Met: 11/29/18  OT jennifer and DHARA this date. Home, no needs. ALVARADO Joshi 11/29/2018

## 2018-11-29 NOTE — SUBJECTIVE & OBJECTIVE
Interval History: No events overnight. DSE negative for ischemia. Likely discharge tomorrow.     Review of Systems   Constitutional: Negative for chills and fever.   Respiratory: Positive for shortness of breath and wheezing. Negative for chest tightness.    Cardiovascular: Positive for chest pain. Negative for palpitations and leg swelling.   Gastrointestinal: Positive for nausea and vomiting.   Neurological: Negative for dizziness and numbness.   Psychiatric/Behavioral: Negative for confusion. The patient is not nervous/anxious.      Objective:     Vital Signs (Most Recent):  Temp: 97.1 °F (36.2 °C) (11/29/18 0714)  Pulse: 72 (11/29/18 0714)  Resp: 18 (11/29/18 0714)  BP: 120/68 (11/29/18 0714)  SpO2: 100 % (11/29/18 0714) Vital Signs (24h Range):  Temp:  [96.4 °F (35.8 °C)-98.2 °F (36.8 °C)] 97.1 °F (36.2 °C)  Pulse:  [72-97] 72  Resp:  [14-20] 18  SpO2:  [98 %-100 %] 100 %  BP: (120-142)/(68-81) 120/68     Weight: 82.6 kg (182 lb)  Body mass index is 25.38 kg/m².    Intake/Output Summary (Last 24 hours) at 11/29/2018 0727  Last data filed at 11/29/2018 0325  Gross per 24 hour   Intake 240 ml   Output 1125 ml   Net -885 ml      Physical Exam   Constitutional: He is oriented to person, place, and time. He appears well-developed and well-nourished. He appears distressed.   Pulmonary/Chest: Breath sounds normal. Tachypnea noted. No respiratory distress. He has no wheezes. He exhibits tenderness (jumps off the bed).   Anxious breathing, redirectable with relaxation   Abdominal: Soft. Bowel sounds are normal. He exhibits no distension. There is no tenderness.   Musculoskeletal: He exhibits no edema or tenderness.   Neurological: He is alert and oriented to person, place, and time.   Skin: Skin is warm and dry.   Psychiatric: His behavior is normal. His mood appears anxious.       Significant Labs:   BMP:   Recent Labs   Lab 11/29/18  0337   *   *   K 4.1      CO2 22*   BUN 27*   CREATININE 1.1    CALCIUM 8.2*     CBC:   Recent Labs   Lab 11/28/18  0334 11/29/18  0337   WBC 12.12 6.89   HGB 11.2* 9.6*   HCT 36.0* 29.9*    244     Troponin:   Recent Labs   Lab 11/27/18  1524   TROPONINI 0.068*       Significant Imaging: I have reviewed all pertinent imaging results/findings within the past 24 hours.

## 2018-11-29 NOTE — ASSESSMENT & PLAN NOTE
Chronic and stable  - hold Lopressor 12.5 mg for stress testing, restart after  - c/w lisinopril 20 mg PO daily

## 2018-11-29 NOTE — ASSESSMENT & PLAN NOTE
Long term current use of therapeutic drug  - Dx 12/2015 with HCC in the setting of hepatitis C.  - f/w heme onc, evaluated and ok to resume chemotherapy on discharge  - following with Dr Scott for liver transplant evaluation.  Initially considered for transplant but taken off d/t ETOH abuse  - Stivarga started on 10/15/18. Will hold oral chemo medications.   - S/p multiple treatments with TACE.    - Recent MRI shows progression of malignancy  - liver enzymes near baseline.   - will follow up with heme/onc next week

## 2018-11-29 NOTE — PLAN OF CARE
SW following for d/c needs. Pt expected to d/c home with no needs.            Camila Verdugo, MSW, LCSW  [facility]  [id number]

## 2018-11-29 NOTE — PLAN OF CARE
Problem: Physical Therapy Goal  Goal: Physical Therapy Goal  Outcome: Outcome(s) achieved Date Met: 11/29/18  Patient at this time is at their functional baseline and does not require skilled acute PT services at this time. Please re consult PT if pt has change in functional status.   Deonte Logan PT, DPT  11/29/2018  Pager: [phone]

## 2018-11-29 NOTE — NURSING
PAC de-accessed after flushing with 100u/ml heparin and insertion site covered with band-aid.  Alcocer needle intact.  D/C instructions reviewed with patient and sister, questions answered and copy of instructions given to patient.  Both verbalized understanding of instructions.  W/C transport requested.
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
## 2018-11-30 NOTE — PLAN OF CARE
11/30/18 0801   Final Note   Assessment Type Final Discharge Note     Patient discharged home with no needs on 11/29/18.

## 2018-12-03 DIAGNOSIS — I10 ESSENTIAL HYPERTENSION: Chronic | ICD-10-CM

## 2018-12-04 ENCOUNTER — TELEPHONE (OUTPATIENT)
Dept: PHARMACY | Facility: CLINIC | Age: 68
End: 2018-12-04

## 2018-12-04 RX ORDER — METOPROLOL TARTRATE 25 MG/1
TABLET, FILM COATED ORAL
Qty: 90 TABLET | Refills: 0 | Status: SHIPPED | OUTPATIENT
Start: 2018-12-04 | End: 2019-03-04 | Stop reason: SDUPTHER

## 2018-12-04 NOTE — TELEPHONE ENCOUNTER
"Spoke with Manju, patient's sister. Mr. Hawkins missed 2-3 doses of Stivarga while in the hospital at the end of November (admitted for chest pain). She is not sure how much medication he has left. Continues to take 4 tabs daily in the morning with food. He gets tired easily, but otherwise does not complain of s/es related to stivarga. He checks his BP regularly, but Manju didn't know his usual readings. Advised her Stivarga can inc BP - reviewed s/sx of high BP and when to seek care. He has some acid reflux, which he attributes to a hiatal hernia - takes prilosec.    Manju noticed Mr. Hawkins has started having some "leakage" of urine recently after surgery. Advised her not likely d/t Stivarga and to ask IM on 12/7 during his appt. She verbalized understanding.    RPh to f/u at next refill - patient has frequent ED visits. Can space out clinical f/u if patient becomes more stable at home.  "

## 2018-12-07 ENCOUNTER — OFFICE VISIT (OUTPATIENT)
Dept: INTERNAL MEDICINE | Facility: CLINIC | Age: 68
End: 2018-12-07
Payer: MEDICARE

## 2018-12-07 VITALS
BODY MASS INDEX: 24.29 KG/M2 | WEIGHT: 173.5 LBS | HEART RATE: 74 BPM | HEIGHT: 71 IN | OXYGEN SATURATION: 99 % | SYSTOLIC BLOOD PRESSURE: 108 MMHG | DIASTOLIC BLOOD PRESSURE: 68 MMHG

## 2018-12-07 DIAGNOSIS — E03.8 SUBCLINICAL HYPOTHYROIDISM: ICD-10-CM

## 2018-12-07 DIAGNOSIS — C22.0 HEPATOCELLULAR CARCINOMA: Chronic | ICD-10-CM

## 2018-12-07 DIAGNOSIS — Z09 HOSPITAL DISCHARGE FOLLOW-UP: ICD-10-CM

## 2018-12-07 DIAGNOSIS — J38.02 BILATERAL COMPLETE VOCAL FOLD PARALYSIS: ICD-10-CM

## 2018-12-07 DIAGNOSIS — I10 ESSENTIAL HYPERTENSION: Chronic | ICD-10-CM

## 2018-12-07 DIAGNOSIS — K74.60 CIRRHOSIS OF LIVER WITHOUT ASCITES, UNSPECIFIED HEPATIC CIRRHOSIS TYPE: Chronic | ICD-10-CM

## 2018-12-07 DIAGNOSIS — J44.9 CHRONIC OBSTRUCTIVE PULMONARY DISEASE, UNSPECIFIED COPD TYPE: Chronic | ICD-10-CM

## 2018-12-07 DIAGNOSIS — K59.00 CONSTIPATION, UNSPECIFIED CONSTIPATION TYPE: ICD-10-CM

## 2018-12-07 DIAGNOSIS — B18.2 CHRONIC HEPATITIS C WITHOUT HEPATIC COMA: ICD-10-CM

## 2018-12-07 DIAGNOSIS — R06.09 DYSPNEA ON EXERTION: Primary | ICD-10-CM

## 2018-12-07 PROBLEM — J44.1 COPD WITH ACUTE EXACERBATION: Status: RESOLVED | Noted: 2018-10-23 | Resolved: 2018-12-07

## 2018-12-07 PROBLEM — R07.9 CHEST PAIN: Status: RESOLVED | Noted: 2018-11-26 | Resolved: 2018-12-07

## 2018-12-07 PROCEDURE — 99999 PR PBB SHADOW E&M-EST. PATIENT-LVL III: CPT | Mod: PBBFAC,HCNC,, | Performed by: INTERNAL MEDICINE

## 2018-12-07 PROCEDURE — 99214 OFFICE O/P EST MOD 30 MIN: CPT | Mod: HCNC,S$GLB,, | Performed by: INTERNAL MEDICINE

## 2018-12-07 NOTE — PATIENT INSTRUCTIONS
For constipation - try taking senna + colace (pericolace or sennakot) 1 tablet once a day; if not sufficient can take 1 tablet twice a day or 2 tablets together (follow instructions on package, not more than maximum amount noted on package).     Make sure to check in with Dr Carrillo about the new medication possibly making you feel more tired and short of breath, and about the episode that you had with your legs getting weak.     Check in with Dr Goyal or Dr Echavarria (with Pulmonology - lung specialist) about your shortness of breath. You do not have any wheezing on exam today; your lungs sound clear. But since you are having shortness of breath sometimes I do want you to check in on this further. If your lungs are ok, talk with your pulmonologist about potentially doing pulmonary rehab.     For thinking about the goals of your healthcare and understanding Advance Directives regarding your care:  [email or web address]

## 2018-12-07 NOTE — Clinical Note
I saw Robin Hawkins today for hospital follow up visit - see note for details, please contact me if any questions. We did begin discussing advance directives and goals of care; I encouraged him to review some materials and bring them to you as well to discuss.Thanks, Venancio Mcneil MD

## 2018-12-07 NOTE — PROGRESS NOTES
"Subjective:       Patient ID: Robin Hawkins is a 68 y.o. male.    Chief Complaint: Hospital Follow Up    HPI  67 y/o man with COPD, HTN, HLD, GERD, h/o colon cancer, h/o prostate cancer, Hep C with HCC, glaucoma, osteopenia, borderline DM, subclinical hypothyroidism, and h/o tracheostomy here for hospital follow up. Here with Manju (sister).    Two recent hospital admissions:  10/23-24 for COPD exacerbation treated with nebulizer treatment, steroids, moxifloxacin.  Saw [facility] after this.   11/26-29 for dyspnea, chest pain - per discharge summary, troponin mildly elevated but at his recent baseline, EKG with no e/o ischemia, CTA negative for PE, symptoms improved with NTG x 3.   Did have dobutamine stress echo done while inpatient, negative for ischemia.  Concerned re: need for home O2, but 6 min walk test inpatient did not establish qualification for home O2.   On discharge summary, chest pain and dyspnea were attributed to restarting his chemotherapy medication.  Seen by Heme/Onc inpatient, recommended to resume chemotherapy for his HCC at time of discharge and f/u with heme/onc and hepatology.  Seen by PT/OT inpatient, no home PT/OT discharge needs.    Today says that he is no longer having chest pain. Doesn't feel short of breath at rest; does feel short of breath with exertion.   They have questions about the HCC treatment medication he is taking, how it is supposed to work, whether it could be causing his symptoms.    Does report that just before going into the hospital, he had an episode where "my legs went out on me" - bilateral leg weakness, not able to get out of bed.   Reports having similar episode when he was suddenly weak and fell down about 2 years ago as well. Weakness resolved by the next morning.   Reports not having any back pain, numbness, or paresthesias at that time.    Has f/u appt scheduled with Dr Carrillo on 12/12.   Continued smoking cessation.     Good appetite, eating " well.    Concerned about constipation - reports no BM for almost a week. No bloating or abdominal pain.     Also on review - they have not discussed advance directives, and are interested in learning more about this.    Hospital Follow-up:    Family and/or Caretaker present at visit?  Yes.  Diagnostic tests reviewed/disposition: I have reviewed all completed as well as pending diagnostic tests at the time of discharge.  Disease/illness education: yes, as noted  Home health/community services discussion/referrals: Patient does not have home health established from hospital visit.  They do not need home health.  If needed, we will set up home health for the patient.   Establishment or re-establishment of referral orders for community resources: No other necessary community resources.   Discussion with other health care providers: will forward note to Dr Carrillo to coordinate care..   Patient questions and concerns addressed.     Review of Systems   Constitutional: Positive for activity change (not very active) and unexpected weight change (some weight loss). Negative for appetite change and fever.   HENT: Negative.    Eyes: Negative for visual disturbance.   Respiratory: Positive for shortness of breath. Negative for cough.    Cardiovascular: Negative for chest pain and leg swelling.   Gastrointestinal: Positive for constipation. Negative for abdominal distention, abdominal pain, blood in stool and rectal pain.   Endocrine: Negative.    Genitourinary: Negative.    Musculoskeletal: Negative for joint swelling and myalgias.   Skin: Negative for rash.   Neurological: Positive for weakness (generalized) and light-headedness (sometimes). Negative for syncope.   Psychiatric/Behavioral: Negative for dysphoric mood.         Past medical history, surgical history, and family medical history reviewed and updated as appropriate.    Medications and allergies reviewed.     Objective:          Vitals:    12/07/18 1450   BP: 108/68  "  BP Location: Left arm   Patient Position: Sitting   BP Method: Large (Manual)   Pulse: 74   SpO2: 99%   Weight: 78.7 kg (173 lb 8 oz)   Height: 5' 11" (1.803 m)     Body mass index is 24.2 kg/m².  Physical Exam   Constitutional: He is oriented to person, place, and time. He appears well-developed and well-nourished. No distress.   HENT:   Head: Normocephalic and atraumatic.   Mouth/Throat: Oropharynx is clear and moist.   Eyes: Conjunctivae and EOM are normal.   Neck: Neck supple. No tracheal deviation present.   Cardiovascular: Normal rate, regular rhythm and normal heart sounds.   No murmur heard.  Pulmonary/Chest: Effort normal.   Upper airway sounds prominent; no wheezes, rhonchi, or rales in lung fields  Some tachypnea when anxious, or when trying to talk quickly   Abdominal: Soft. Bowel sounds are normal. He exhibits no distension. There is no tenderness.   Musculoskeletal: He exhibits no edema.   Decreased muscle bulk throughout   Lymphadenopathy:     He has no cervical adenopathy.   Neurological: He is alert and oriented to person, place, and time. He has normal strength. No cranial nerve deficit or sensory deficit. Abnormal gait: gait slow, broad-based.   Skin: Skin is warm and dry. No rash noted.   Psychiatric: His behavior is normal.   Mild anxiety at times. Affect normal. Good insight.   Vitals reviewed.      Lab Results   Component Value Date    WBC 6.89 11/29/2018    HGB 9.6 (L) 11/29/2018    HCT 29.9 (L) 11/29/2018     11/29/2018    CHOL 104 (L) 09/07/2015    TRIG 132 09/07/2015    HDL 23 (L) 09/07/2015    ALT 34 11/26/2018    AST 58 (H) 11/26/2018     (L) 11/29/2018    K 4.1 11/29/2018     11/29/2018    CREATININE 1.1 11/29/2018    BUN 27 (H) 11/29/2018    CO2 22 (L) 11/29/2018    TSH 7.578 (H) 11/27/2018    PSA 0.01 12/30/2015    INR 1.0 07/18/2018    HGBA1C 4.7 12/22/2017       Assessment:       1. Dyspnea on exertion    2. Hospital discharge follow-up    3. Chronic obstructive " pulmonary disease, unspecified COPD type    4. Constipation, unspecified constipation type    5. Bilateral complete vocal fold paralysis    6. Essential hypertension    7. Hepatocellular carcinoma    8. Chronic hepatitis C without hepatic coma    9. Cirrhosis of liver without ascites, unspecified hepatic cirrhosis type    10. Subclinical hypothyroidism        Plan:   Robin was seen today for hospital follow up.    Diagnoses and all orders for this visit:    Dyspnea on exertion  Hospital discharge follow-up  Improved but still with some TANNER; may be related to deconditioning as on last admission did not appear to have significant COPD exacerbation  Per notes, TANNER / chest pain / fatigue may be related to his HCC therapy - recommended they discuss this in more detail with Dr Carrillo  Given multiple chronic medical issues including HCC, discussed advance directive  / long-term care planning. They would like to review information and discuss further - referred to [email or web address], recommended print out document from this and bring to next appt with Heme/Onc and with PCP  Also recommended schedule f/u with Pulmonology given recent COPD exacerbation  Reviewed hospital discharge notes in detail with patient and family    Chronic obstructive pulmonary disease, unspecified COPD type - stable, continue current medications    Constipation, unspecified constipation type  Comments:  no distention noted; maybe reduced intake but pt reports good appetite. recommended miralax, can also try senna/colace    Bilateral complete vocal fold paralysis - +prominent upper airway sounds; this may contribute to his TANNER as well as anxiety    Essential hypertension - BP lower than usual today but overall feeling ok; recommended monitor closely, contact clinic if consistently running low    Hepatocellular carcinoma  Chronic hepatitis C without hepatic coma  Cirrhosis of liver without ascites, unspecified hepatic cirrhosis type - reviewed;  continue to follow with hepatology, oncology    Subclinical hypothyroidism - stable, approximately euthyroid. Will monitor.    Health maintenance reviewed with patient.     Follow-up in about 2 months (around 2/14/2019) for follow up as scheduled.    Venancio Mcneil MD  Internal Medicine  [facility]  12/7/2018

## 2018-12-11 ENCOUNTER — HOSPITAL ENCOUNTER (OUTPATIENT)
Dept: PULMONOLOGY | Facility: CLINIC | Age: 68
Discharge: HOME | End: 2018-12-11
Payer: MEDICARE

## 2018-12-11 ENCOUNTER — OFFICE VISIT (OUTPATIENT)
Dept: PULMONOLOGY | Facility: CLINIC | Age: 68
End: 2018-12-11
Payer: MEDICARE

## 2018-12-11 VITALS
SYSTOLIC BLOOD PRESSURE: 110 MMHG | WEIGHT: 170.19 LBS | HEART RATE: 67 BPM | HEIGHT: 71 IN | DIASTOLIC BLOOD PRESSURE: 68 MMHG | OXYGEN SATURATION: 100 % | BODY MASS INDEX: 23.83 KG/M2

## 2018-12-11 DIAGNOSIS — R06.02 SOB (SHORTNESS OF BREATH): Primary | ICD-10-CM

## 2018-12-11 DIAGNOSIS — J44.9 CHRONIC OBSTRUCTIVE PULMONARY DISEASE, UNSPECIFIED COPD TYPE: Chronic | ICD-10-CM

## 2018-12-11 DIAGNOSIS — R06.02 SOB (SHORTNESS OF BREATH): ICD-10-CM

## 2018-12-11 LAB
PRE FEV1 FVC: 62
PRE FEV1: 2.33
PRE FVC: 3.78
PREDICTED FEV1 FVC: 79
PREDICTED FEV1: 3.11
PREDICTED FVC: 3.91

## 2018-12-11 PROCEDURE — 94010 BREATHING CAPACITY TEST: CPT | Mod: HCNC,S$GLB,, | Performed by: INTERNAL MEDICINE

## 2018-12-11 PROCEDURE — 99214 OFFICE O/P EST MOD 30 MIN: CPT | Mod: 25,HCNC,S$GLB, | Performed by: INTERNAL MEDICINE

## 2018-12-11 PROCEDURE — 99499 UNLISTED E&M SERVICE: CPT | Mod: HCNC,S$GLB,, | Performed by: INTERNAL MEDICINE

## 2018-12-11 PROCEDURE — 3074F SYST BP LT 130 MM HG: CPT | Mod: CPTII,HCNC,S$GLB, | Performed by: INTERNAL MEDICINE

## 2018-12-11 PROCEDURE — 99999 PR PBB SHADOW E&M-EST. PATIENT-LVL III: CPT | Mod: PBBFAC,HCNC,, | Performed by: INTERNAL MEDICINE

## 2018-12-11 PROCEDURE — 3078F DIAST BP <80 MM HG: CPT | Mod: CPTII,HCNC,S$GLB, | Performed by: INTERNAL MEDICINE

## 2018-12-11 PROCEDURE — 1101F PT FALLS ASSESS-DOCD LE1/YR: CPT | Mod: CPTII,HCNC,S$GLB, | Performed by: INTERNAL MEDICINE
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
## 2018-12-12 ENCOUNTER — OFFICE VISIT (OUTPATIENT)
Dept: HEMATOLOGY/ONCOLOGY | Facility: CLINIC | Age: 68
End: 2018-12-12
Payer: MEDICARE

## 2018-12-12 ENCOUNTER — INFUSION (OUTPATIENT)
Dept: INFUSION THERAPY | Facility: HOSPITAL | Age: 68
End: 2018-12-12
Attending: INTERNAL MEDICINE
Payer: MEDICARE

## 2018-12-12 ENCOUNTER — HOSPITAL ENCOUNTER (OUTPATIENT)
Dept: RADIOLOGY | Facility: HOSPITAL | Age: 68
Discharge: HOME | End: 2018-12-12
Attending: INTERNAL MEDICINE
Payer: MEDICARE

## 2018-12-12 VITALS
HEART RATE: 57 BPM | HEIGHT: 71 IN | WEIGHT: 174.38 LBS | BODY MASS INDEX: 24.41 KG/M2 | RESPIRATION RATE: 24 BRPM | OXYGEN SATURATION: 99 % | TEMPERATURE: 97 F | DIASTOLIC BLOOD PRESSURE: 61 MMHG | SYSTOLIC BLOOD PRESSURE: 132 MMHG

## 2018-12-12 DIAGNOSIS — R06.02 SOB (SHORTNESS OF BREATH): ICD-10-CM

## 2018-12-12 DIAGNOSIS — C22.0 HEPATOCELLULAR CARCINOMA: Chronic | ICD-10-CM

## 2018-12-12 DIAGNOSIS — C22.0 HEPATOCELLULAR CARCINOMA: Primary | Chronic | ICD-10-CM

## 2018-12-12 DIAGNOSIS — C22.0 HCC (HEPATOCELLULAR CARCINOMA): Primary | ICD-10-CM

## 2018-12-12 DIAGNOSIS — Z09 CHEMOTHERAPY FOLLOW-UP EXAMINATION: ICD-10-CM

## 2018-12-12 DIAGNOSIS — D49.89 NEOPLASM OF ABDOMEN: ICD-10-CM

## 2018-12-12 LAB
AFP SERPL-MCNC: 4782 NG/ML
ALBUMIN SERPL BCP-MCNC: 3.1 G/DL
ALP SERPL-CCNC: 134 U/L
ALT SERPL W/O P-5'-P-CCNC: 34 U/L
ANION GAP SERPL CALC-SCNC: 9 MMOL/L
AST SERPL-CCNC: 66 U/L
BILIRUB SERPL-MCNC: 0.8 MG/DL
BUN SERPL-MCNC: 17 MG/DL
CALCIUM SERPL-MCNC: 8.8 MG/DL
CHLORIDE SERPL-SCNC: 106 MMOL/L
CO2 SERPL-SCNC: 16 MMOL/L
CREAT SERPL-MCNC: 1.2 MG/DL
ERYTHROCYTE [DISTWIDTH] IN BLOOD BY AUTOMATED COUNT: 16.7 %
EST. GFR  (AFRICAN AMERICAN): >60 ML/MIN/1.73 M^2
EST. GFR  (NON AFRICAN AMERICAN): >60 ML/MIN/1.73 M^2
GLUCOSE SERPL-MCNC: 82 MG/DL
HCT VFR BLD AUTO: 36.9 %
HGB BLD-MCNC: 11.7 G/DL
IMM GRANULOCYTES # BLD AUTO: 0.05 K/UL
MCH RBC QN AUTO: 27.4 PG
MCHC RBC AUTO-ENTMCNC: 31.7 G/DL
MCV RBC AUTO: 86 FL
NEUTROPHILS # BLD AUTO: 4.2 K/UL
PLATELET # BLD AUTO: 214 K/UL
PMV BLD AUTO: 10.3 FL
POTASSIUM SERPL-SCNC: 4.6 MMOL/L
PROT SERPL-MCNC: 7.8 G/DL
RBC # BLD AUTO: 4.27 M/UL
SODIUM SERPL-SCNC: 131 MMOL/L
WBC # BLD AUTO: 6.49 K/UL

## 2018-12-12 PROCEDURE — 82105 ALPHA-FETOPROTEIN SERUM: CPT | Mod: HCNC

## 2018-12-12 PROCEDURE — 36415 COLL VENOUS BLD VENIPUNCTURE: CPT | Mod: HCNC

## 2018-12-12 PROCEDURE — 3075F SYST BP GE 130 - 139MM HG: CPT | Mod: CPTII,HCNC,S$GLB, | Performed by: INTERNAL MEDICINE

## 2018-12-12 PROCEDURE — 3078F DIAST BP <80 MM HG: CPT | Mod: CPTII,HCNC,S$GLB, | Performed by: INTERNAL MEDICINE

## 2018-12-12 PROCEDURE — 99499 UNLISTED E&M SERVICE: CPT | Mod: HCNC,S$GLB,, | Performed by: INTERNAL MEDICINE

## 2018-12-12 PROCEDURE — 63600175 PHARM REV CODE 636 W HCPCS: Mod: HCNC | Performed by: INTERNAL MEDICINE

## 2018-12-12 PROCEDURE — 99214 OFFICE O/P EST MOD 30 MIN: CPT | Mod: HCNC,S$GLB,, | Performed by: INTERNAL MEDICINE

## 2018-12-12 PROCEDURE — A4216 STERILE WATER/SALINE, 10 ML: HCPCS | Mod: HCNC | Performed by: INTERNAL MEDICINE

## 2018-12-12 PROCEDURE — 25000003 PHARM REV CODE 250: Mod: HCNC | Performed by: INTERNAL MEDICINE

## 2018-12-12 PROCEDURE — 80053 COMPREHEN METABOLIC PANEL: CPT | Mod: HCNC

## 2018-12-12 PROCEDURE — 70490 CT SOFT TISSUE NECK W/O DYE: CPT | Mod: 26,HCNC,, | Performed by: RADIOLOGY

## 2018-12-12 PROCEDURE — 70490 CT SOFT TISSUE NECK W/O DYE: CPT | Mod: TC,HCNC

## 2018-12-12 PROCEDURE — 1101F PT FALLS ASSESS-DOCD LE1/YR: CPT | Mod: CPTII,HCNC,S$GLB, | Performed by: INTERNAL MEDICINE

## 2018-12-12 PROCEDURE — 85027 COMPLETE CBC AUTOMATED: CPT | Mod: HCNC

## 2018-12-12 PROCEDURE — 99999 PR PBB SHADOW E&M-EST. PATIENT-LVL V: CPT | Mod: PBBFAC,HCNC,, | Performed by: INTERNAL MEDICINE

## 2018-12-12 PROCEDURE — 96523 IRRIG DRUG DELIVERY DEVICE: CPT | Mod: HCNC

## 2018-12-12 RX ORDER — SODIUM CHLORIDE 0.9 % (FLUSH) 0.9 %
10 SYRINGE (ML) INJECTION
Status: COMPLETED | OUTPATIENT
Start: 2018-12-12 | End: 2018-12-12

## 2018-12-12 RX ORDER — HEPARIN 100 UNIT/ML
500 SYRINGE INTRAVENOUS
Status: COMPLETED | OUTPATIENT
Start: 2018-12-12 | End: 2018-12-12

## 2018-12-12 RX ORDER — SODIUM CHLORIDE 0.9 % (FLUSH) 0.9 %
10 SYRINGE (ML) INJECTION
Status: CANCELLED | OUTPATIENT
Start: 2018-12-12

## 2018-12-12 RX ORDER — HEPARIN 100 UNIT/ML
500 SYRINGE INTRAVENOUS
Status: CANCELLED | OUTPATIENT
Start: 2018-12-12

## 2018-12-12 RX ADMIN — HEPARIN 500 UNITS: 100 SYRINGE at 09:12

## 2018-12-12 RX ADMIN — Medication 10 ML: at 09:12

## 2018-12-12 NOTE — PATIENT INSTRUCTIONS
Lenvatinib oral capsule  What is this medicine?  LENVATINIB (patricia VA ti nib) is a medicine that targets proteins in cancer cells and stops the cancer cells from growing. It is used to treat thyroid cancer and kidney cancer.  How should I use this medicine?  Take this medicine by mouth with a glass of water. Follow the directions on the prescription label. Swallow the capsules whole. Do not cut, crush or chew this medicine. Take your medicine at regular intervals. Do not take it more often than directed. Do not stop taking except on your doctor's advice.  Talk to your pediatrician regarding the use of this medicine in children. Special care may be needed.  What side effects may I notice from receiving this medicine?  Side effects that you should report to your doctor or health care professional as soon as possible:  · allergic reactions like skin rash, itching or hives, swelling of the face, lips, or tongue  · breathing problems  · chest pain or palpitations  · dizziness  · feeling faint or lightheaded, falls  · headache  · high blood pressure  · seizures  · signs and symptoms of bleeding such as bloody or black, tarry stools; red or dark-brown urine; spitting up blood or brown material that looks like coffee grounds; red spots on the skin; unusual bruising or bleeding from the eye, gums, or nose  · signs and symptoms of a dangerous change in heartbeat or heart rhythm like chest pain; dizziness; fast or irregular heartbeat; palpitations; feeling faint or lightheaded, falls; breathing problems  · signs and symptoms of kidney injury like trouble passing urine or change in the amount of urine  · signs and symptoms of liver injury like dark yellow or brown urine; general ill feeling or flu-like symptoms; light-colored stools; loss of appetite; nausea; right upper belly pain; unusually weak or tired; yellowing of the eyes or skin  · signs and symptoms of low potassium like muscle cramps or muscle pain; chest pain;  dizziness; feeling faint or lightheaded, falls; palpitations; breathing problems; or fast, irregular heartbeat  · signs and symptoms of a stroke like changes in vision; confusion; trouble speaking or understanding; severe headaches; sudden numbness or weakness of the face, arm or leg; trouble walking; dizziness; loss of balance or coordination  · stomach pain  · swelling of the legs or ankles  · unusually weak or tired  Side effects that usually do not require medical attention (Report these to your doctor or health care professional if they continue or are bothersome.):  · diarrhea  · joint pain  · loss of appetite  · mouth sores  · muscle pain  · nausea, vomiting  · weight loss  What may interact with this medicine?  Do not take this medicine with any of the following medications:  · cisapride  · dofetilide  · dronedarone  · pimozide  · thioridazine  · ziprasidone  This medicine may also interact with the following medications:  · alfuzosin  · bedaquiline  · certain antibiotics like azithromycin, clarithromycin or erythromycin  · certain medicines for bladder problems like solifenacin, tolterodine  · certain medicines for depression, anxiety, or psychotic disturbances  · certain medicines for fungal infections like ketoconazole, posaconazole, voriconazole, fluconazole, and itraconazole  · certain medicines for irregular heart beat like amiodarone, dofetilide, flecainide, propafenone, quinidine  · chloroquine  · ciprofloxacin  · cyclobenzaprine  · ezogabine  · fingolimod  · granisetron  · leuprolide  · ritonavir  · lopinavir; ritonavir  · methadone  · metronidazole  · mifepristone  · octreotide  · ondansetron  · other medicines that prolong the QT interval (cause an abnormal heart  rhythm)  · pasireotide  · pentamidine  · promethazine  · quinine  · ranolazine  · rifampin  · rilpivirine  · romidepsin  · saquinavir  · tacrolimus  · telavancin  · telithromycin  · tetrabenazine  · tizanidine  · toremifene  · vardenafil  · vorinostat  What if I miss a dose?  If you miss a dose, take it as soon as you can. If your next dose is to be taken in less than 12 hours, then do not take the missed dose. Take the next dose at your regular time. Do not take double or extra doses.  Where should I keep my medicine?  Keep out of the reach of children.  Store between 20 and 25 degrees C (68 and 77 degrees F). Throw away any unused medicine after the expiration date.  What should I tell my health care provider before I take this medicine?  They need to know if you have any of these conditions:  · diabetes  · high blood pressure  · heart disease  · history of blood clots  · history of a drug or alcohol abuse problem  · history of irregular heartbeat  · history of low levels of calcium, magnesium, or potassium in the blood  · inflammatory bowel disease  · kidney disease  · liver disease  · protein in your urine  · an unusual or allergic reaction to lenvatinib, other medicines, foods, dyes, or preservatives  · pregnant or trying to get pregnant  · breast-feeding  What should I watch for while using this medicine?  Visit your doctor for regular check ups. Report any side effects. Continue your course of treatment unless your doctor tells you to stop. You will need blood work done while you are taking this medicine.  Do not become pregnant while taking this medicine or for 2 weeks after stopping it. Women should inform their doctor if they wish to become pregnant or think they might be pregnant. There is a potential for serious side effects to an unborn child. Talk to your health care professional or pharmacist for more information. Do not breast-feed an infant while taking this medicine.  Be careful brushing and  flossing your teeth or using a toothpick because you may get an infection or bleed more easily. If you have any dental work done, tell your dentist you are receiving this medicine.  This medicine may increase your risk to bruise or bleed. Call your doctor or health care professional if you notice any unusual bleeding.  This drug may make you feel generally unwell. This is not uncommon, as chemotherapy can affect healthy cells as well as cancer cells. Report any side effects. Continue your course of treatment even though you feel ill unless your doctor tells you to stop.  This medicine may interfere with the ability to have a child. You should talk with your doctor or health care professional if you are concerned about your fertility.  NOTE:This sheet is a summary. It may not cover all possible information. If you have questions about this medicine, talk to your doctor, pharmacist, or health care provider. Copyright© 2017 [employer]
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
## 2018-12-19 ENCOUNTER — PATIENT MESSAGE (OUTPATIENT)
Dept: PULMONOLOGY | Facility: CLINIC | Age: 68
End: 2018-12-19

## 2018-12-20 ENCOUNTER — HOSPITAL ENCOUNTER (INPATIENT)
Facility: HOSPITAL | Age: 68
LOS: 3 days | Discharge: HOME | DRG: 194 | End: 2018-12-23
Attending: EMERGENCY MEDICINE | Admitting: STUDENT IN AN ORGANIZED HEALTH CARE EDUCATION/TRAINING PROGRAM
Payer: MEDICARE

## 2018-12-20 ENCOUNTER — OFFICE VISIT (OUTPATIENT)
Dept: HEPATOLOGY | Facility: CLINIC | Age: 68
End: 2018-12-20
Attending: INTERNAL MEDICINE
Payer: MEDICARE

## 2018-12-20 VITALS
HEART RATE: 88 BPM | OXYGEN SATURATION: 100 % | BODY MASS INDEX: 23.58 KG/M2 | SYSTOLIC BLOOD PRESSURE: 110 MMHG | RESPIRATION RATE: 23 BRPM | WEIGHT: 168.44 LBS | DIASTOLIC BLOOD PRESSURE: 76 MMHG | HEIGHT: 71 IN

## 2018-12-20 DIAGNOSIS — R79.89 ELEVATED TROPONIN: ICD-10-CM

## 2018-12-20 DIAGNOSIS — C22.0 HEPATOMA: ICD-10-CM

## 2018-12-20 DIAGNOSIS — R06.02 SHORTNESS OF BREATH: ICD-10-CM

## 2018-12-20 DIAGNOSIS — R77.2 ELEVATED AFP: Primary | ICD-10-CM

## 2018-12-20 DIAGNOSIS — J18.9 PNEUMONIA DUE TO INFECTIOUS ORGANISM, UNSPECIFIED LATERALITY, UNSPECIFIED PART OF LUNG: ICD-10-CM

## 2018-12-20 DIAGNOSIS — R06.03 RESPIRATORY DISTRESS: Primary | ICD-10-CM

## 2018-12-20 DIAGNOSIS — R07.9 CHEST PAIN: ICD-10-CM

## 2018-12-20 DIAGNOSIS — K74.60 CIRRHOSIS OF LIVER WITHOUT ASCITES, UNSPECIFIED HEPATIC CIRRHOSIS TYPE: Chronic | ICD-10-CM

## 2018-12-20 DIAGNOSIS — C22.0 HEPATOCELLULAR CARCINOMA: ICD-10-CM

## 2018-12-20 DIAGNOSIS — B18.2 CHRONIC HEPATITIS C WITHOUT HEPATIC COMA: ICD-10-CM

## 2018-12-20 PROBLEM — I25.10 CORONARY ARTERY DISEASE INVOLVING NATIVE CORONARY ARTERY OF NATIVE HEART: Status: ACTIVE | Noted: 2018-12-20

## 2018-12-20 LAB
ALBUMIN SERPL BCP-MCNC: 3.3 G/DL
ALP SERPL-CCNC: 116 U/L
ALT SERPL W/O P-5'-P-CCNC: 37 U/L
ANION GAP SERPL CALC-SCNC: 11 MMOL/L
APTT BLDCRRT: 25.3 SEC
AST SERPL-CCNC: 67 U/L
BASOPHILS # BLD AUTO: 0.02 K/UL
BASOPHILS NFR BLD: 0.2 %
BILIRUB SERPL-MCNC: 1.3 MG/DL
BNP SERPL-MCNC: 82 PG/ML
BUN SERPL-MCNC: 12 MG/DL
CALCIUM SERPL-MCNC: 9.1 MG/DL
CHLORIDE SERPL-SCNC: 100 MMOL/L
CO2 SERPL-SCNC: 23 MMOL/L
CREAT SERPL-MCNC: 1.3 MG/DL
DIFFERENTIAL METHOD: ABNORMAL
EOSINOPHIL # BLD AUTO: 0.2 K/UL
EOSINOPHIL NFR BLD: 2.2 %
ERYTHROCYTE [DISTWIDTH] IN BLOOD BY AUTOMATED COUNT: 16.8 %
EST. GFR  (AFRICAN AMERICAN): >60 ML/MIN/1.73 M^2
EST. GFR  (NON AFRICAN AMERICAN): 55.3 ML/MIN/1.73 M^2
ESTIMATED AVG GLUCOSE: 103 MG/DL
GLUCOSE SERPL-MCNC: 90 MG/DL
HBA1C MFR BLD HPLC: 5.2 %
HCT VFR BLD AUTO: 37.3 %
HGB BLD-MCNC: 12 G/DL
IMM GRANULOCYTES # BLD AUTO: 0.03 K/UL
IMM GRANULOCYTES NFR BLD AUTO: 0.3 %
INR PPP: 1
LYMPHOCYTES # BLD AUTO: 1.5 K/UL
LYMPHOCYTES NFR BLD: 15.9 %
MCH RBC QN AUTO: 27.7 PG
MCHC RBC AUTO-ENTMCNC: 32.2 G/DL
MCV RBC AUTO: 86 FL
MONOCYTES # BLD AUTO: 0.7 K/UL
MONOCYTES NFR BLD: 7.5 %
NEUTROPHILS # BLD AUTO: 7.2 K/UL
NEUTROPHILS NFR BLD: 73.9 %
NRBC BLD-RTO: 0 /100 WBC
PLATELET # BLD AUTO: 176 K/UL
PMV BLD AUTO: 10.5 FL
POCT GLUCOSE: 100 MG/DL (ref 70–110)
POTASSIUM SERPL-SCNC: 4.2 MMOL/L
PROT SERPL-MCNC: 8.2 G/DL
PROTHROMBIN TIME: 10.6 SEC
RBC # BLD AUTO: 4.33 M/UL
SODIUM SERPL-SCNC: 134 MMOL/L
TROPONIN I SERPL DL<=0.01 NG/ML-MCNC: 0.09 NG/ML
TROPONIN I SERPL DL<=0.01 NG/ML-MCNC: 0.12 NG/ML
WBC # BLD AUTO: 9.71 K/UL

## 2018-12-20 PROCEDURE — 82962 GLUCOSE BLOOD TEST: CPT | Mod: HCNC

## 2018-12-20 PROCEDURE — 25000003 PHARM REV CODE 250: Mod: HCNC | Performed by: EMERGENCY MEDICINE

## 2018-12-20 PROCEDURE — 3078F DIAST BP <80 MM HG: CPT | Mod: CPTII,S$GLB,, | Performed by: INTERNAL MEDICINE

## 2018-12-20 PROCEDURE — 99499 UNLISTED E&M SERVICE: CPT | Mod: S$GLB,,, | Performed by: INTERNAL MEDICINE

## 2018-12-20 PROCEDURE — 85730 THROMBOPLASTIN TIME PARTIAL: CPT | Mod: HCNC

## 2018-12-20 PROCEDURE — 85610 PROTHROMBIN TIME: CPT | Mod: HCNC

## 2018-12-20 PROCEDURE — 11000001 HC ACUTE MED/SURG PRIVATE ROOM: Mod: HCNC

## 2018-12-20 PROCEDURE — 99285 EMERGENCY DEPT VISIT HI MDM: CPT | Mod: 25,HCNC

## 2018-12-20 PROCEDURE — 99215 OFFICE O/P EST HI 40 MIN: CPT | Mod: S$GLB,,, | Performed by: INTERNAL MEDICINE

## 2018-12-20 PROCEDURE — 94640 AIRWAY INHALATION TREATMENT: CPT | Mod: HCNC

## 2018-12-20 PROCEDURE — 25500020 PHARM REV CODE 255: Mod: HCNC | Performed by: EMERGENCY MEDICINE

## 2018-12-20 PROCEDURE — 93005 ELECTROCARDIOGRAM TRACING: CPT | Mod: HCNC

## 2018-12-20 PROCEDURE — 85025 COMPLETE CBC W/AUTO DIFF WBC: CPT | Mod: HCNC

## 2018-12-20 PROCEDURE — 83880 ASSAY OF NATRIURETIC PEPTIDE: CPT | Mod: HCNC

## 2018-12-20 PROCEDURE — 63600175 PHARM REV CODE 636 W HCPCS: Mod: HCNC | Performed by: STUDENT IN AN ORGANIZED HEALTH CARE EDUCATION/TRAINING PROGRAM

## 2018-12-20 PROCEDURE — 25000242 PHARM REV CODE 250 ALT 637 W/ HCPCS: Mod: HCNC | Performed by: EMERGENCY MEDICINE

## 2018-12-20 PROCEDURE — 93010 ELECTROCARDIOGRAM REPORT: CPT | Mod: HCNC,,, | Performed by: INTERNAL MEDICINE

## 2018-12-20 PROCEDURE — 25000003 PHARM REV CODE 250: Mod: HCNC | Performed by: STUDENT IN AN ORGANIZED HEALTH CARE EDUCATION/TRAINING PROGRAM

## 2018-12-20 PROCEDURE — 1101F PT FALLS ASSESS-DOCD LE1/YR: CPT | Mod: CPTII,S$GLB,, | Performed by: INTERNAL MEDICINE

## 2018-12-20 PROCEDURE — 3074F SYST BP LT 130 MM HG: CPT | Mod: CPTII,S$GLB,, | Performed by: INTERNAL MEDICINE

## 2018-12-20 PROCEDURE — 80053 COMPREHEN METABOLIC PANEL: CPT | Mod: HCNC

## 2018-12-20 PROCEDURE — 83036 HEMOGLOBIN GLYCOSYLATED A1C: CPT | Mod: HCNC

## 2018-12-20 PROCEDURE — 63600175 PHARM REV CODE 636 W HCPCS: Mod: HCNC | Performed by: EMERGENCY MEDICINE

## 2018-12-20 PROCEDURE — 96374 THER/PROPH/DIAG INJ IV PUSH: CPT | Mod: HCNC

## 2018-12-20 PROCEDURE — 84484 ASSAY OF TROPONIN QUANT: CPT | Mod: HCNC

## 2018-12-20 PROCEDURE — 96375 TX/PRO/DX INJ NEW DRUG ADDON: CPT | Mod: HCNC

## 2018-12-20 PROCEDURE — 84484 ASSAY OF TROPONIN QUANT: CPT | Mod: 91,HCNC

## 2018-12-20 PROCEDURE — 99285 EMERGENCY DEPT VISIT HI MDM: CPT | Mod: HCNC,,, | Performed by: EMERGENCY MEDICINE

## 2018-12-20 PROCEDURE — 99223 1ST HOSP IP/OBS HIGH 75: CPT | Mod: HCNC,AI,, | Performed by: STUDENT IN AN ORGANIZED HEALTH CARE EDUCATION/TRAINING PROGRAM

## 2018-12-20 PROCEDURE — 96372 THER/PROPH/DIAG INJ SC/IM: CPT | Mod: 59,HCNC

## 2018-12-20 RX ORDER — LATANOPROST 50 UG/ML
1 SOLUTION/ DROPS OPHTHALMIC NIGHTLY
Status: DISCONTINUED | OUTPATIENT
Start: 2018-12-20 | End: 2018-12-23 | Stop reason: HOSPADM

## 2018-12-20 RX ORDER — AZITHROMYCIN 250 MG/1
500 TABLET, FILM COATED ORAL DAILY
Status: DISCONTINUED | OUTPATIENT
Start: 2018-12-20 | End: 2018-12-23

## 2018-12-20 RX ORDER — METOPROLOL TARTRATE 25 MG/1
12.5 TABLET ORAL 2 TIMES DAILY
Status: DISCONTINUED | OUTPATIENT
Start: 2018-12-20 | End: 2018-12-23 | Stop reason: HOSPADM

## 2018-12-20 RX ORDER — LISINOPRIL 20 MG/1
20 TABLET ORAL DAILY
Status: DISCONTINUED | OUTPATIENT
Start: 2018-12-21 | End: 2018-12-23 | Stop reason: HOSPADM

## 2018-12-20 RX ORDER — ACETAMINOPHEN 325 MG/1
650 TABLET ORAL EVERY 8 HOURS PRN
Status: DISCONTINUED | OUTPATIENT
Start: 2018-12-20 | End: 2018-12-23 | Stop reason: HOSPADM

## 2018-12-20 RX ORDER — FLUTICASONE FUROATE AND VILANTEROL 200; 25 UG/1; UG/1
1 POWDER RESPIRATORY (INHALATION) DAILY
Status: DISCONTINUED | OUTPATIENT
Start: 2018-12-21 | End: 2018-12-23 | Stop reason: HOSPADM

## 2018-12-20 RX ORDER — NAPROXEN SODIUM 220 MG/1
162 TABLET, FILM COATED ORAL
Status: COMPLETED | OUTPATIENT
Start: 2018-12-20 | End: 2018-12-20

## 2018-12-20 RX ORDER — IBUPROFEN 200 MG
16 TABLET ORAL
Status: DISCONTINUED | OUTPATIENT
Start: 2018-12-20 | End: 2018-12-23 | Stop reason: HOSPADM

## 2018-12-20 RX ORDER — CEFTRIAXONE 1 G/1
1 INJECTION, POWDER, FOR SOLUTION INTRAMUSCULAR; INTRAVENOUS
Status: DISCONTINUED | OUTPATIENT
Start: 2018-12-20 | End: 2018-12-20

## 2018-12-20 RX ORDER — GLUCAGON 1 MG
1 KIT INJECTION
Status: DISCONTINUED | OUTPATIENT
Start: 2018-12-20 | End: 2018-12-23 | Stop reason: HOSPADM

## 2018-12-20 RX ORDER — CEFTRIAXONE 1 G/1
1 INJECTION, POWDER, FOR SOLUTION INTRAMUSCULAR; INTRAVENOUS
Status: DISCONTINUED | OUTPATIENT
Start: 2018-12-20 | End: 2018-12-23

## 2018-12-20 RX ORDER — PREDNISONE 20 MG/1
40 TABLET ORAL DAILY
Status: DISCONTINUED | OUTPATIENT
Start: 2018-12-20 | End: 2018-12-23 | Stop reason: HOSPADM

## 2018-12-20 RX ORDER — SODIUM CHLORIDE 0.9 % (FLUSH) 0.9 %
5 SYRINGE (ML) INJECTION
Status: DISCONTINUED | OUTPATIENT
Start: 2018-12-20 | End: 2018-12-23 | Stop reason: HOSPADM

## 2018-12-20 RX ORDER — ONDANSETRON 2 MG/ML
4 INJECTION INTRAMUSCULAR; INTRAVENOUS
Status: COMPLETED | OUTPATIENT
Start: 2018-12-20 | End: 2018-12-20

## 2018-12-20 RX ORDER — DOXYLAMINE SUCCINATE 25 MG
TABLET ORAL 2 TIMES DAILY
Status: DISCONTINUED | OUTPATIENT
Start: 2018-12-20 | End: 2018-12-23 | Stop reason: HOSPADM

## 2018-12-20 RX ORDER — IPRATROPIUM BROMIDE AND ALBUTEROL SULFATE 2.5; .5 MG/3ML; MG/3ML
3 SOLUTION RESPIRATORY (INHALATION)
Status: COMPLETED | OUTPATIENT
Start: 2018-12-20 | End: 2018-12-20

## 2018-12-20 RX ORDER — TIOTROPIUM BROMIDE 18 UG/1
1 CAPSULE ORAL; RESPIRATORY (INHALATION) DAILY
Status: DISCONTINUED | OUTPATIENT
Start: 2018-12-21 | End: 2018-12-23

## 2018-12-20 RX ORDER — PROCHLORPERAZINE MALEATE 10 MG
10 TABLET ORAL EVERY 6 HOURS PRN
Status: DISCONTINUED | OUTPATIENT
Start: 2018-12-20 | End: 2018-12-23 | Stop reason: HOSPADM

## 2018-12-20 RX ORDER — IBUPROFEN 200 MG
24 TABLET ORAL
Status: DISCONTINUED | OUTPATIENT
Start: 2018-12-20 | End: 2018-12-23 | Stop reason: HOSPADM

## 2018-12-20 RX ORDER — ATORVASTATIN CALCIUM 20 MG/1
40 TABLET, FILM COATED ORAL DAILY
Status: DISCONTINUED | OUTPATIENT
Start: 2018-12-21 | End: 2018-12-23 | Stop reason: HOSPADM

## 2018-12-20 RX ORDER — HYDROCODONE BITARTRATE AND ACETAMINOPHEN 5; 325 MG/1; MG/1
1 TABLET ORAL EVERY 6 HOURS PRN
Status: COMPLETED | OUTPATIENT
Start: 2018-12-20 | End: 2018-12-21

## 2018-12-20 RX ORDER — ENOXAPARIN SODIUM 100 MG/ML
40 INJECTION SUBCUTANEOUS EVERY 24 HOURS
Status: DISCONTINUED | OUTPATIENT
Start: 2018-12-20 | End: 2018-12-23 | Stop reason: HOSPADM

## 2018-12-20 RX ORDER — ONDANSETRON 2 MG/ML
4 INJECTION INTRAMUSCULAR; INTRAVENOUS EVERY 8 HOURS PRN
Status: DISCONTINUED | OUTPATIENT
Start: 2018-12-20 | End: 2018-12-23 | Stop reason: HOSPADM

## 2018-12-20 RX ORDER — ASPIRIN 81 MG/1
81 TABLET ORAL DAILY
Status: DISCONTINUED | OUTPATIENT
Start: 2018-12-21 | End: 2018-12-23 | Stop reason: HOSPADM

## 2018-12-20 RX ORDER — IPRATROPIUM BROMIDE AND ALBUTEROL SULFATE 2.5; .5 MG/3ML; MG/3ML
3 SOLUTION RESPIRATORY (INHALATION) EVERY 4 HOURS PRN
Status: DISCONTINUED | OUTPATIENT
Start: 2018-12-20 | End: 2018-12-23 | Stop reason: HOSPADM

## 2018-12-20 RX ADMIN — ASPIRIN 81 MG CHEWABLE TABLET 162 MG: 81 TABLET CHEWABLE at 06:12

## 2018-12-20 RX ADMIN — ENOXAPARIN SODIUM 40 MG: 100 INJECTION SUBCUTANEOUS at 09:12

## 2018-12-20 RX ADMIN — IPRATROPIUM BROMIDE AND ALBUTEROL SULFATE 3 ML: .5; 3 SOLUTION RESPIRATORY (INHALATION) at 02:12

## 2018-12-20 RX ADMIN — LATANOPROST 1 DROP: 50 SOLUTION OPHTHALMIC at 10:12

## 2018-12-20 RX ADMIN — PREDNISONE 40 MG: 20 TABLET ORAL at 08:12

## 2018-12-20 RX ADMIN — ONDANSETRON 4 MG: 2 INJECTION INTRAMUSCULAR; INTRAVENOUS at 02:12

## 2018-12-20 RX ADMIN — ACETAMINOPHEN 650 MG: 325 TABLET ORAL at 10:12

## 2018-12-20 RX ADMIN — CEFTRIAXONE 1 G: 1 INJECTION, POWDER, FOR SOLUTION INTRAMUSCULAR; INTRAVENOUS at 09:12

## 2018-12-20 RX ADMIN — METOPROLOL TARTRATE 12.5 MG: 25 TABLET, FILM COATED ORAL at 09:12

## 2018-12-20 RX ADMIN — MICONAZOLE NITRATE: 20 CREAM TOPICAL at 09:12

## 2018-12-20 RX ADMIN — AZITHROMYCIN MONOHYDRATE 500 MG: 250 TABLET ORAL at 09:12

## 2018-12-20 RX ADMIN — IOHEXOL 125 ML: 350 INJECTION, SOLUTION INTRAVENOUS at 04:12
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
## 2018-12-20 NOTE — ED NOTES
Hourly rounding complete. Pt is awake alert and oriented x4, VSS, respirations even and unlabored, pt remains on supplemental 2L O2 NC. Pt denies pain at this time. Pt reports relief of nausea at this time. Pt updated on POC. Family at bedside. Bed low and locked with side rails up x2, call bell in pt reach. Pt voices no needs at this time.

## 2018-12-20 NOTE — ED NOTES
Hourly rounding complete. Pt is awake alert and oriented x4, VSS, respirations remains at increased rate and labored, pt encouraged to focus on slowing down breathing rate and able to do so when encouraged. Pt denies pain at this time. Pt updated on POC. Family at bedside. Bed low and locked with side rails up x2, call bell in pt reach. Pt voices no needs at this time.
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
## 2018-12-20 NOTE — ED TRIAGE NOTES
Patient presents after having SOB x 1 month. Patient states that he was sent by a doctor to the ED. Patient denies chest pain, nausea, vomiting, fevers.

## 2018-12-20 NOTE — ED NOTES
Patient identifiers verified verbally with patient and correct for Robin Ross.    LOC/ APPEARANCE: The patient is AAOx4. Pt is speaking appropriately, no slurred speech. Pt changed into hospital gown. Continuous cardiac monitor, cont pulse ox, and auto BP cuff applied to patient. Pt is clean and well groomed. No JVD visible. Pt updated on POC. Bed low and locked with side rails up x2, call bell in pt reach.  SKIN: Skin is warm dry and intact, and color is consistent with ethnicity. Capillary refill <3 seconds. No breakdown or brusing visible. Mucus membranes moist, acyanotic.  RESPIRATORY: Airway is open and patent. Respirations-spontaneous, labored, increased RR, shallow, equal bilaterally on inspiration and expiration. No accessory muscle use noted.   CARDIAC: Patient has regular heart rate. No peripheral edema noted, and patient has no c/o chest pain. Peripheral pulses present equal and strong throughout.  ABDOMEN: Soft and non-tender to palpation with no distention noted. Normoactive bowel sounds x4 quadrants. Pt has no complaints of abnormal bowel movements, denies blood in stool. Pt reports normal appetite.   NEUROLOGIC: Eyes open spontaneously and facial expression symmetrical. Pt behavior appropriate to situation, and pt follows commands. Pt reports sensation present in all extremities when touched with a finger, denies any numbness or tingling. PERRLA  MUSCULOSKELETAL: Spontaneous movement noted to all extremities. Hand  equal and leg strength strong +5 bilaterally.   : No complaints of frequency, burning, urgency or blood in the urine. No complaints of incontinence.

## 2018-12-21 PROBLEM — J18.9 CAP (COMMUNITY ACQUIRED PNEUMONIA): Status: ACTIVE | Noted: 2018-12-21

## 2018-12-21 LAB
ALBUMIN SERPL BCP-MCNC: 2.7 G/DL
ALP SERPL-CCNC: 99 U/L
ALT SERPL W/O P-5'-P-CCNC: 30 U/L
ANION GAP SERPL CALC-SCNC: 10 MMOL/L
AST SERPL-CCNC: 51 U/L
BILIRUB SERPL-MCNC: 0.8 MG/DL
BUN SERPL-MCNC: 12 MG/DL
CALCIUM SERPL-MCNC: 8.3 MG/DL
CHLORIDE SERPL-SCNC: 101 MMOL/L
CO2 SERPL-SCNC: 23 MMOL/L
CREAT SERPL-MCNC: 1 MG/DL
EST. GFR  (AFRICAN AMERICAN): >60 ML/MIN/1.73 M^2
EST. GFR  (NON AFRICAN AMERICAN): >60 ML/MIN/1.73 M^2
GLUCOSE SERPL-MCNC: 121 MG/DL
MAGNESIUM SERPL-MCNC: 2 MG/DL
PHOSPHATE SERPL-MCNC: 2.6 MG/DL
POCT GLUCOSE: 111 MG/DL (ref 70–110)
POCT GLUCOSE: 125 MG/DL (ref 70–110)
POTASSIUM SERPL-SCNC: 3.5 MMOL/L
PROT SERPL-MCNC: 6.9 G/DL
SODIUM SERPL-SCNC: 134 MMOL/L
TROPONIN I SERPL DL<=0.01 NG/ML-MCNC: 0.1 NG/ML
TROPONIN I SERPL DL<=0.01 NG/ML-MCNC: 0.11 NG/ML

## 2018-12-21 PROCEDURE — 84484 ASSAY OF TROPONIN QUANT: CPT | Mod: 91,HCNC

## 2018-12-21 PROCEDURE — G8989 SELF CARE D/C STATUS: HCPCS | Mod: CI,HCNC

## 2018-12-21 PROCEDURE — 99233 SBSQ HOSP IP/OBS HIGH 50: CPT | Mod: HCNC,,, | Performed by: HOSPITALIST

## 2018-12-21 PROCEDURE — 97165 OT EVAL LOW COMPLEX 30 MIN: CPT | Mod: HCNC

## 2018-12-21 PROCEDURE — 25000003 PHARM REV CODE 250: Mod: HCNC | Performed by: STUDENT IN AN ORGANIZED HEALTH CARE EDUCATION/TRAINING PROGRAM

## 2018-12-21 PROCEDURE — 36415 COLL VENOUS BLD VENIPUNCTURE: CPT | Mod: HCNC

## 2018-12-21 PROCEDURE — 93010 ELECTROCARDIOGRAM REPORT: CPT | Mod: HCNC,,, | Performed by: INTERNAL MEDICINE

## 2018-12-21 PROCEDURE — 84100 ASSAY OF PHOSPHORUS: CPT | Mod: HCNC

## 2018-12-21 PROCEDURE — 63600175 PHARM REV CODE 636 W HCPCS: Mod: HCNC | Performed by: STUDENT IN AN ORGANIZED HEALTH CARE EDUCATION/TRAINING PROGRAM

## 2018-12-21 PROCEDURE — 83735 ASSAY OF MAGNESIUM: CPT | Mod: HCNC

## 2018-12-21 PROCEDURE — 80053 COMPREHEN METABOLIC PANEL: CPT | Mod: HCNC

## 2018-12-21 PROCEDURE — 11000001 HC ACUTE MED/SURG PRIVATE ROOM: Mod: HCNC

## 2018-12-21 PROCEDURE — 25000003 PHARM REV CODE 250: Mod: HCNC | Performed by: HOSPITALIST

## 2018-12-21 PROCEDURE — 97161 PT EVAL LOW COMPLEX 20 MIN: CPT | Mod: HCNC

## 2018-12-21 PROCEDURE — G8987 SELF CARE CURRENT STATUS: HCPCS | Mod: CI,HCNC

## 2018-12-21 PROCEDURE — 99223 1ST HOSP IP/OBS HIGH 75: CPT | Mod: HCNC,,, | Performed by: INTERNAL MEDICINE

## 2018-12-21 PROCEDURE — 93005 ELECTROCARDIOGRAM TRACING: CPT | Mod: HCNC

## 2018-12-21 PROCEDURE — G8988 SELF CARE GOAL STATUS: HCPCS | Mod: CI,HCNC

## 2018-12-21 RX ORDER — LISINOPRIL 20 MG/1
20 TABLET ORAL DAILY
Status: DISCONTINUED | OUTPATIENT
Start: 2018-12-21 | End: 2018-12-21

## 2018-12-21 RX ORDER — HYDRALAZINE HYDROCHLORIDE 25 MG/1
25 TABLET, FILM COATED ORAL EVERY 8 HOURS PRN
Status: DISCONTINUED | OUTPATIENT
Start: 2018-12-21 | End: 2018-12-23 | Stop reason: HOSPADM

## 2018-12-21 RX ADMIN — HYDROCODONE BITARTRATE AND ACETAMINOPHEN 1 TABLET: 5; 325 TABLET ORAL at 02:12

## 2018-12-21 RX ADMIN — ENOXAPARIN SODIUM 40 MG: 100 INJECTION SUBCUTANEOUS at 04:12

## 2018-12-21 RX ADMIN — CEFTRIAXONE 1 G: 1 INJECTION, POWDER, FOR SOLUTION INTRAMUSCULAR; INTRAVENOUS at 07:12

## 2018-12-21 RX ADMIN — ATORVASTATIN CALCIUM 40 MG: 20 TABLET, FILM COATED ORAL at 08:12

## 2018-12-21 RX ADMIN — AZITHROMYCIN MONOHYDRATE 500 MG: 250 TABLET ORAL at 08:12

## 2018-12-21 RX ADMIN — HYDROCODONE BITARTRATE AND ACETAMINOPHEN 1 TABLET: 5; 325 TABLET ORAL at 06:12

## 2018-12-21 RX ADMIN — LISINOPRIL 20 MG: 20 TABLET ORAL at 07:12

## 2018-12-21 RX ADMIN — METOPROLOL TARTRATE 12.5 MG: 25 TABLET, FILM COATED ORAL at 08:12

## 2018-12-21 RX ADMIN — PREDNISONE 40 MG: 20 TABLET ORAL at 08:12

## 2018-12-21 RX ADMIN — HYDROCODONE BITARTRATE AND ACETAMINOPHEN 1 TABLET: 5; 325 TABLET ORAL at 07:12

## 2018-12-21 RX ADMIN — ASPIRIN 81 MG: 81 TABLET, COATED ORAL at 08:12

## 2018-12-21 RX ADMIN — LATANOPROST 1 DROP: 50 SOLUTION OPHTHALMIC at 08:12

## 2018-12-21 RX ADMIN — LISINOPRIL 20 MG: 20 TABLET ORAL at 08:12

## 2018-12-21 RX ADMIN — ONDANSETRON 4 MG: 2 INJECTION INTRAMUSCULAR; INTRAVENOUS at 06:12

## 2018-12-21 RX ADMIN — MICONAZOLE NITRATE: 20 CREAM TOPICAL at 09:12

## 2018-12-21 RX ADMIN — HYDRALAZINE HYDROCHLORIDE 25 MG: 25 TABLET ORAL at 06:12
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
## 2018-12-21 NOTE — ASSESSMENT & PLAN NOTE
- Dx 12/2015 with HCC in the setting of hepatitis C.  - f/w heme onc, evaluated and ok to resume chemotherapy on discharge  - following with Dr Scott for liver transplant evaluation.  Initially considered for transplant but taken off d/t ETOH abuse  - Stivarga started on 10/15/18  - S/p multiple treatments with TACE.    - MRI shows progression of malignancy  - liver enzymes near baseline  - Progress note from Dr. Scott today instructs holding future Stivarga  - H/O consulted
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
## 2018-12-21 NOTE — PLAN OF CARE
Problem: Occupational Therapy Goal  Goal: Occupational Therapy Goal  Outcome: Outcome(s) achieved Date Met: 12/21/18  OT jennifer complete. Pt tolerated session well. Pt is currently performing his self-care and functional mobility tasks at his baseline and does not require acute OT services.    Comments: D/c from acute OT

## 2018-12-21 NOTE — PLAN OF CARE
Problem: Physical Therapy Goal  Goal: Physical Therapy Goal  Outcome: Outcome(s) achieved Date Met: 12/21/18  PT evaluation completed. No further acute PT services needed.    Funmilayo Carrizales, PT  12/21/2018
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
## 2018-12-21 NOTE — ED NOTES
Telemetry Verification   Patient placed on Telemetry Box  Verified with War Room  Box # [id number]   Monitor Tech yadi   Rate 70   Rhythm A-fib

## 2018-12-21 NOTE — ED NOTES
Hourly rounding complete. Pt is awake alert and oriented x4, VSS, respirations even and unlabored. Pt denies pain at this time. Pt updated on POC. Family at bedside. Bed low and locked with side rails up x2, call bell in pt reach. Pt voices no needs at this time.

## 2018-12-21 NOTE — ED NOTES
Hourly rounding complete. Pt is awake alert and oriented x4, VSS, respirations even and unlabored. Pt denies pain at this time. Pt updated on POC. Family at bedside. Bed low and locked with side rails up x2, call bell in pt reach. Pt provided juice and crackers, meal tray ordered, pt voices no other needs at this time.
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
## 2018-12-21 NOTE — ASSESSMENT & PLAN NOTE
This is third admission since October 2018 since beginning new oral chemo  -DDx includes PNA vs COPD exacerbation vs SE of chemotherapy vs ACS vs malignancy   - Will initiate treatment for CAP w/ azitho and rocephin  - will initiate Duo-nebs and prednisone 40 x 5 days  - H/O consulted for further evaluation and consideration of alternate chemotherapy   - troponin .12 in ED trend x 3  - CTA chest was negative for PE or pulmonary edema, + for L upper lobe opacification

## 2018-12-21 NOTE — PLAN OF CARE
Problem: Adult Inpatient Plan of Care  Goal: Plan of Care Review  Outcome: Ongoing (interventions implemented as appropriate)  POC updated on admission,stated understanding,reinforced safety,pt sob on exertion,hoarse voice,nasal cannula 2L,pt wearing a brief,stated has some dribbling of urine,tele in place.

## 2018-12-22 LAB
ALBUMIN SERPL BCP-MCNC: 2.8 G/DL
ALP SERPL-CCNC: 97 U/L
ALT SERPL W/O P-5'-P-CCNC: 28 U/L
ANION GAP SERPL CALC-SCNC: 7 MMOL/L
AST SERPL-CCNC: 46 U/L
BILIRUB SERPL-MCNC: 0.4 MG/DL
BUN SERPL-MCNC: 14 MG/DL
CALCIUM SERPL-MCNC: 8.3 MG/DL
CHLORIDE SERPL-SCNC: 99 MMOL/L
CO2 SERPL-SCNC: 26 MMOL/L
CREAT SERPL-MCNC: 1 MG/DL
EST. GFR  (AFRICAN AMERICAN): >60 ML/MIN/1.73 M^2
EST. GFR  (NON AFRICAN AMERICAN): >60 ML/MIN/1.73 M^2
GLUCOSE SERPL-MCNC: 120 MG/DL
MAGNESIUM SERPL-MCNC: 2.3 MG/DL
PHOSPHATE SERPL-MCNC: 2.5 MG/DL
POCT GLUCOSE: 104 MG/DL (ref 70–110)
POCT GLUCOSE: 117 MG/DL (ref 70–110)
POTASSIUM SERPL-SCNC: 4.3 MMOL/L
PROT SERPL-MCNC: 7.1 G/DL
SODIUM SERPL-SCNC: 132 MMOL/L
TROPONIN I SERPL DL<=0.01 NG/ML-MCNC: 0.08 NG/ML

## 2018-12-22 PROCEDURE — 80053 COMPREHEN METABOLIC PANEL: CPT | Mod: HCNC

## 2018-12-22 PROCEDURE — 36415 COLL VENOUS BLD VENIPUNCTURE: CPT | Mod: HCNC

## 2018-12-22 PROCEDURE — 25000003 PHARM REV CODE 250: Mod: HCNC | Performed by: HOSPITALIST

## 2018-12-22 PROCEDURE — 25000003 PHARM REV CODE 250: Mod: HCNC | Performed by: PHYSICIAN ASSISTANT

## 2018-12-22 PROCEDURE — 99233 SBSQ HOSP IP/OBS HIGH 50: CPT | Mod: HCNC,,, | Performed by: HOSPITALIST

## 2018-12-22 PROCEDURE — 84100 ASSAY OF PHOSPHORUS: CPT | Mod: HCNC

## 2018-12-22 PROCEDURE — 63600175 PHARM REV CODE 636 W HCPCS: Mod: HCNC | Performed by: STUDENT IN AN ORGANIZED HEALTH CARE EDUCATION/TRAINING PROGRAM

## 2018-12-22 PROCEDURE — 83735 ASSAY OF MAGNESIUM: CPT | Mod: HCNC

## 2018-12-22 PROCEDURE — 11000001 HC ACUTE MED/SURG PRIVATE ROOM: Mod: HCNC

## 2018-12-22 PROCEDURE — 93005 ELECTROCARDIOGRAM TRACING: CPT | Mod: HCNC

## 2018-12-22 PROCEDURE — 25000003 PHARM REV CODE 250: Mod: HCNC | Performed by: STUDENT IN AN ORGANIZED HEALTH CARE EDUCATION/TRAINING PROGRAM

## 2018-12-22 PROCEDURE — 93010 ELECTROCARDIOGRAM REPORT: CPT | Mod: HCNC,,, | Performed by: INTERNAL MEDICINE

## 2018-12-22 PROCEDURE — 84484 ASSAY OF TROPONIN QUANT: CPT | Mod: HCNC

## 2018-12-22 RX ORDER — POLYETHYLENE GLYCOL 3350 17 G/17G
17 POWDER, FOR SOLUTION ORAL DAILY
Status: DISCONTINUED | OUTPATIENT
Start: 2018-12-23 | End: 2018-12-23 | Stop reason: HOSPADM

## 2018-12-22 RX ORDER — AMOXICILLIN 250 MG
2 CAPSULE ORAL 2 TIMES DAILY PRN
Status: DISCONTINUED | OUTPATIENT
Start: 2018-12-22 | End: 2018-12-23 | Stop reason: HOSPADM

## 2018-12-22 RX ADMIN — ALUMINUM HYDROXIDE, MAGNESIUM HYDROXIDE, AND SIMETHICONE 50 ML: 200; 200; 20 SUSPENSION ORAL at 03:12

## 2018-12-22 RX ADMIN — AZITHROMYCIN MONOHYDRATE 500 MG: 250 TABLET ORAL at 09:12

## 2018-12-22 RX ADMIN — ALUMINUM HYDROXIDE, MAGNESIUM HYDROXIDE, AND SIMETHICONE 50 ML: 200; 200; 20 SUSPENSION ORAL at 04:12

## 2018-12-22 RX ADMIN — CEFTRIAXONE 1 G: 1 INJECTION, POWDER, FOR SOLUTION INTRAMUSCULAR; INTRAVENOUS at 09:12

## 2018-12-22 RX ADMIN — ALUMINUM HYDROXIDE, MAGNESIUM HYDROXIDE, AND SIMETHICONE 50 ML: 200; 200; 20 SUSPENSION ORAL at 09:12

## 2018-12-22 RX ADMIN — ASPIRIN 81 MG: 81 TABLET, COATED ORAL at 09:12

## 2018-12-22 RX ADMIN — SENNOSIDES AND DOCUSATE SODIUM 2 TABLET: 8.6; 5 TABLET ORAL at 09:12

## 2018-12-22 RX ADMIN — ENOXAPARIN SODIUM 40 MG: 100 INJECTION SUBCUTANEOUS at 04:12

## 2018-12-22 RX ADMIN — LATANOPROST 1 DROP: 50 SOLUTION OPHTHALMIC at 09:12

## 2018-12-22 RX ADMIN — METOPROLOL TARTRATE 12.5 MG: 25 TABLET, FILM COATED ORAL at 09:12

## 2018-12-22 RX ADMIN — PREDNISONE 40 MG: 20 TABLET ORAL at 09:12

## 2018-12-22 RX ADMIN — ATORVASTATIN CALCIUM 40 MG: 20 TABLET, FILM COATED ORAL at 09:12

## 2018-12-22 RX ADMIN — LISINOPRIL 20 MG: 20 TABLET ORAL at 09:12

## 2018-12-22 NOTE — PLAN OF CARE
Problem: Adult Inpatient Plan of Care  Goal: Plan of Care Review  Outcome: Ongoing (interventions implemented as appropriate)  Patient had no falls. He did complained of abd pain (of reflux nature) which resolved with pain medication and GI cocktail. Medication and nursing care per MD order.

## 2018-12-22 NOTE — PROGRESS NOTES
Progress Note   Hospital Medicine         Patient Name: Robin Hawkins  MRN:  [id number]  [facility] Medicine Team: [facility] O Greg Ventura MD  Date of Admission:  12/20/2018     Length of Stay:  LOS: 1 day   Expected Discharge Date: 12/24/2018  Principal Problem:  Respiratory distress       Subjective:     Interval History/Overnight Events:  Patient doing a little better today; BP has remained elevated;     Review of Systems   Constitutional: Negative for chills, fatigue, fever.   HENT: Negative for sore throat, trouble swallowing.    Eyes: Negative for photophobia, visual disturbance.   Respiratory: Negative for cough, shortness of breath.    Cardiovascular: Negative for chest pain, palpitations, leg swelling.   Gastrointestinal: Negative for abdominal pain, constipation, diarrhea, nausea, vomiting.   Endocrine: Negative for cold intolerance, heat intolerance.   Genitourinary: Negative for dysuria, frequency.   Musculoskeletal: Negative for arthralgias, myalgias.   Skin: Negative for rash, wound, erythema   Neurological: Negative for dizziness, syncope, weakness, light-headedness.   Psychiatric/Behavioral: Negative for confusion, hallucinations, anxiety  All other systems reviewed and are negative.    Objective:     Temp:  [96.1 °F (35.6 °C)-98.1 °F (36.7 °C)]   Pulse:  [58-85]   Resp:  [16-36]   BP: (109-187)/(57-85)   SpO2:  [95 %-100 %]       Physical Exam:  Constitutional: Appears well-developed and well-nourished.   Head: Normocephalic and atraumatic.   Mouth/Throat: Oropharynx is clear and moist.   Eyes: EOM are normal. Pupils are equal, round, and reactive to light. No scleral icterus.   Neck: Normal range of motion. Neck supple.   Cardiovascular: Normal rate and regular rhythm.  No murmur heard.  Pulmonary/Chest: Effort normal and breath sounds normal. No respiratory distress. No wheezes, rales, or rhonchi  Abdominal: Soft. Bowel sounds are normal.  No distension or tenderness  Musculoskeletal: Normal  range of motion. No edema.   Neurological: Alert and oriented to person, place, and time.   Skin: Skin is warm and dry.   Psychiatric: Normal mood and affect. Behavior is normal.     Recent Labs   Lab 12/20/18  1034 12/20/18  1329   WBC 7.41 9.71   HGB 12.2* 12.0*   HCT 38.3* 37.3*    176     Recent Labs   Lab 12/20/18  1034 12/20/18  1329 12/21/18  0320    134* 134*   K 4.5 4.2 3.5    100 101   CO2 25 23 23   BUN 12 12 12   CREATININE 1.3 1.3 1.0   GLU 99 90 121*   CALCIUM 9.5 9.1 8.3*   MG  --   --  2.0   PHOS  --   --  2.6*     Recent Labs   Lab 12/20/18  1034 12/20/18  1329 12/21/18  0320   ALKPHOS 111 116 99   ALT 35 37 30   AST 62* 67* 51*   ALBUMIN 3.3* 3.3* 2.7*   PROT 8.0 8.2 6.9   BILITOT 1.2* 1.3* 0.8   INR 1.0 1.0  --      Recent Labs   Lab 12/20/18  2150 12/21/18  0759   POCTGLUCOSE 100 111*        aspirin  81 mg Oral Daily    atorvastatin  40 mg Oral Daily    azithromycin  500 mg Oral Daily    cefTRIAXone (ROCEPHIN) IVPB  1 g Intravenous Q24H    enoxaparin  40 mg Subcutaneous Daily    fluticasone-vilanterol  1 puff Inhalation Daily    latanoprost  1 drop Both Eyes QHS    lisinopril  20 mg Oral Daily    metoprolol tartrate  12.5 mg Oral BID    miconazole   Topical (Top) BID    predniSONE  40 mg Oral Daily    tiotropium  1 capsule Inhalation Daily       Assessment and Plan     Mr. Robin Hawkins is a 68 y.o. male who presented to [facility] on 12/20/2018 with     Hospital Course:    Mr. Robin Hawkins was admitted to Hospital Medicine for management of     Active Hospital Problems    Diagnosis  POA    *Respiratory distress [R06.03]  Yes    Coronary artery disease involving native coronary artery of native heart [I25.10]  Yes    Anemia in neoplastic disease [D63.0]  Yes    Hepatocellular carcinoma [C22.0]  Yes     Chronic     Enlarging liver mass consistent with HCC on CT 1/2016. +elevated AFP.  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       Respiratory distress  CAP due to strep PNA     This is third admission since October 2018 since beginning new oral chemo  -DDx includes PNA vs COPD exacerbation vs SE of chemotherapy vs ACS vs malignancy   - Will initiate treatment for CAP w/ azitho and rocephin  - will initiate Duo-nebs and prednisone 40 x 5 days  - H/O consulted for further evaluation and consideration of alternate chemotherapy   - troponin .12 in ED trend x 3  - CTA chest was negative for PE or pulmonary edema, + for L upper lobe opacification               Coronary artery disease involving native coronary artery of native heart        - continue home ASA 81 and Lipitor 40      Anemia in neoplastic disease     Hgb stable at 12 pts baseline         Hepatocellular carcinoma  Decompensated Cirrhosis      MELD-Na score: 6 at 12/21/2018  3:20 AM  MELD score: 6 at 12/21/2018  3:20 AM  Calculated from:  Serum Creatinine: 1 mg/dL at 12/21/2018  3:20 AM  Serum Sodium: 134 mmol/L at 12/21/2018  3:20 AM  Total Bilirubin: 0.8 mg/dL (Rounded to 1 mg/dL) at 12/21/2018  3:20 AM  INR(ratio): 1 at 12/20/2018  1:29 PM  Age: 68 years       Dx 12/2015 with HCC in the setting of hepatitis C.  - f/w heme onc, evaluated and ok to resume chemotherapy on discharge  - following with Dr Scott for liver transplant evaluation.  Initially considered for transplant but taken off d/t ETOH abuse  - Stivarga started on 10/15/18  - S/p multiple treatments with TACE.    - MRI shows progression of malignancy  - liver  enzymes near baseline  - Progress note from Dr. Scott today instructs holding future Stivarga  - H/O consulted      COPD (chronic obstructive pulmonary disease)     - c/w breo and spiriva. Duoneb treatments q4hr PRN  - prednisone 40mg x 5 days          Essential hypertension     Continue home lisinopril 20, metoprolol 12.5mg BID         R Thigh Rash        - 2/2 urinary incontinence, miconazole recommended at home        - Miconazole to R thigh BID                 Disposition:  In the next few days;    Greg Ventura MD  Medical Director [facility] Medicine  Spectra:  [phone]  Pager: [phone]

## 2018-12-23 VITALS
OXYGEN SATURATION: 100 % | BODY MASS INDEX: 23.24 KG/M2 | HEART RATE: 77 BPM | TEMPERATURE: 98 F | DIASTOLIC BLOOD PRESSURE: 72 MMHG | SYSTOLIC BLOOD PRESSURE: 157 MMHG | HEIGHT: 71 IN | WEIGHT: 166 LBS | RESPIRATION RATE: 20 BRPM

## 2018-12-23 PROBLEM — R06.03 RESPIRATORY DISTRESS: Status: RESOLVED | Noted: 2018-12-20 | Resolved: 2018-12-23

## 2018-12-23 PROBLEM — R07.89 ATYPICAL CHEST PAIN: Status: ACTIVE | Noted: 2018-11-26

## 2018-12-23 LAB
ALBUMIN SERPL BCP-MCNC: 2.6 G/DL
ALLENS TEST: ABNORMAL
ALP SERPL-CCNC: 94 U/L
ALT SERPL W/O P-5'-P-CCNC: 41 U/L
ANION GAP SERPL CALC-SCNC: 7 MMOL/L
AST SERPL-CCNC: 64 U/L
BASOPHILS # BLD AUTO: 0.01 K/UL
BASOPHILS NFR BLD: 0.1 %
BILIRUB SERPL-MCNC: 0.4 MG/DL
BUN SERPL-MCNC: 16 MG/DL
CALCIUM SERPL-MCNC: 8.2 MG/DL
CHLORIDE SERPL-SCNC: 102 MMOL/L
CO2 SERPL-SCNC: 27 MMOL/L
CREAT SERPL-MCNC: 0.9 MG/DL
DELSYS: ABNORMAL
DIFFERENTIAL METHOD: ABNORMAL
EOSINOPHIL # BLD AUTO: 0 K/UL
EOSINOPHIL NFR BLD: 0 %
ERYTHROCYTE [DISTWIDTH] IN BLOOD BY AUTOMATED COUNT: 16.9 %
EST. GFR  (AFRICAN AMERICAN): >60 ML/MIN/1.73 M^2
EST. GFR  (NON AFRICAN AMERICAN): >60 ML/MIN/1.73 M^2
FLOW: 3
GLUCOSE SERPL-MCNC: 112 MG/DL
HCO3 UR-SCNC: 24.4 MMOL/L (ref 24–28)
HCT VFR BLD AUTO: 33.7 %
HGB BLD-MCNC: 10.7 G/DL
IMM GRANULOCYTES # BLD AUTO: 0.05 K/UL
IMM GRANULOCYTES NFR BLD AUTO: 0.4 %
LYMPHOCYTES # BLD AUTO: 1.4 K/UL
LYMPHOCYTES NFR BLD: 12.1 %
MAGNESIUM SERPL-MCNC: 2.7 MG/DL
MCH RBC QN AUTO: 28.2 PG
MCHC RBC AUTO-ENTMCNC: 31.8 G/DL
MCV RBC AUTO: 89 FL
MODE: ABNORMAL
MONOCYTES # BLD AUTO: 0.8 K/UL
MONOCYTES NFR BLD: 6.8 %
NEUTROPHILS # BLD AUTO: 9 K/UL
NEUTROPHILS NFR BLD: 80.6 %
NRBC BLD-RTO: 0 /100 WBC
PCO2 BLDA: 27.3 MMHG (ref 35–45)
PH SMN: 7.56 [PH] (ref 7.35–7.45)
PHOSPHATE SERPL-MCNC: 1.7 MG/DL
PLATELET # BLD AUTO: 203 K/UL
PMV BLD AUTO: 10.4 FL
PO2 BLDA: 136 MMHG (ref 80–100)
POC BE: 2 MMOL/L
POC SATURATED O2: 99 % (ref 95–100)
POC TCO2: 25 MMOL/L (ref 23–27)
POCT GLUCOSE: 94 MG/DL (ref 70–110)
POTASSIUM SERPL-SCNC: 4.2 MMOL/L
PROT SERPL-MCNC: 6.6 G/DL
RBC # BLD AUTO: 3.8 M/UL
SAMPLE: ABNORMAL
SITE: ABNORMAL
SODIUM SERPL-SCNC: 136 MMOL/L
WBC # BLD AUTO: 11.18 K/UL

## 2018-12-23 PROCEDURE — 36415 COLL VENOUS BLD VENIPUNCTURE: CPT | Mod: HCNC

## 2018-12-23 PROCEDURE — 25000003 PHARM REV CODE 250: Mod: HCNC | Performed by: STUDENT IN AN ORGANIZED HEALTH CARE EDUCATION/TRAINING PROGRAM

## 2018-12-23 PROCEDURE — 85025 COMPLETE CBC W/AUTO DIFF WBC: CPT | Mod: HCNC

## 2018-12-23 PROCEDURE — 93010 ELECTROCARDIOGRAM REPORT: CPT | Mod: HCNC,,, | Performed by: INTERNAL MEDICINE

## 2018-12-23 PROCEDURE — 25000242 PHARM REV CODE 250 ALT 637 W/ HCPCS: Mod: HCNC | Performed by: INTERNAL MEDICINE

## 2018-12-23 PROCEDURE — 63600175 PHARM REV CODE 636 W HCPCS: Mod: HCNC | Performed by: STUDENT IN AN ORGANIZED HEALTH CARE EDUCATION/TRAINING PROGRAM

## 2018-12-23 PROCEDURE — 99239 HOSP IP/OBS DSCHRG MGMT >30: CPT | Mod: HCNC,,, | Performed by: INTERNAL MEDICINE

## 2018-12-23 PROCEDURE — 99900035 HC TECH TIME PER 15 MIN (STAT): Mod: HCNC

## 2018-12-23 PROCEDURE — 84100 ASSAY OF PHOSPHORUS: CPT | Mod: HCNC

## 2018-12-23 PROCEDURE — 80053 COMPREHEN METABOLIC PANEL: CPT | Mod: HCNC

## 2018-12-23 PROCEDURE — 93005 ELECTROCARDIOGRAM TRACING: CPT | Mod: HCNC

## 2018-12-23 PROCEDURE — 82803 BLOOD GASES ANY COMBINATION: CPT | Mod: HCNC

## 2018-12-23 PROCEDURE — 36600 WITHDRAWAL OF ARTERIAL BLOOD: CPT | Mod: HCNC

## 2018-12-23 PROCEDURE — 25000003 PHARM REV CODE 250: Mod: HCNC | Performed by: HOSPITALIST

## 2018-12-23 PROCEDURE — 25000003 PHARM REV CODE 250: Mod: HCNC | Performed by: INTERNAL MEDICINE

## 2018-12-23 PROCEDURE — 83735 ASSAY OF MAGNESIUM: CPT | Mod: HCNC

## 2018-12-23 PROCEDURE — 25000242 PHARM REV CODE 250 ALT 637 W/ HCPCS: Mod: HCNC | Performed by: STUDENT IN AN ORGANIZED HEALTH CARE EDUCATION/TRAINING PROGRAM

## 2018-12-23 PROCEDURE — 94640 AIRWAY INHALATION TREATMENT: CPT | Mod: HCNC

## 2018-12-23 PROCEDURE — 99221 1ST HOSP IP/OBS SF/LOW 40: CPT | Mod: HCNC,GC,, | Performed by: INTERNAL MEDICINE

## 2018-12-23 RX ORDER — MAG HYDROX/ALUMINUM HYD/SIMETH 200-200-20
30 SUSPENSION, ORAL (FINAL DOSE FORM) ORAL EVERY 6 HOURS PRN
Status: DISCONTINUED | OUTPATIENT
Start: 2018-12-23 | End: 2018-12-23 | Stop reason: HOSPADM

## 2018-12-23 RX ORDER — AMOXICILLIN AND CLAVULANATE POTASSIUM 875; 125 MG/1; MG/1
1 TABLET, FILM COATED ORAL 2 TIMES DAILY
Qty: 4 TABLET | Refills: 0 | Status: SHIPPED | OUTPATIENT
Start: 2018-12-23 | End: 2018-12-25

## 2018-12-23 RX ORDER — SODIUM,POTASSIUM PHOSPHATES 280-250MG
2 POWDER IN PACKET (EA) ORAL 3 TIMES DAILY
Status: DISCONTINUED | OUTPATIENT
Start: 2018-12-23 | End: 2018-12-23 | Stop reason: HOSPADM

## 2018-12-23 RX ORDER — PANTOPRAZOLE SODIUM 40 MG/1
40 TABLET, DELAYED RELEASE ORAL DAILY
Status: DISCONTINUED | OUTPATIENT
Start: 2018-12-23 | End: 2018-12-23 | Stop reason: HOSPADM

## 2018-12-23 RX ORDER — PREDNISONE 20 MG/1
40 TABLET ORAL DAILY
Qty: 4 TABLET | Refills: 0 | Status: SHIPPED | OUTPATIENT
Start: 2018-12-24 | End: 2018-12-26

## 2018-12-23 RX ORDER — IPRATROPIUM BROMIDE AND ALBUTEROL SULFATE 2.5; .5 MG/3ML; MG/3ML
3 SOLUTION RESPIRATORY (INHALATION) EVERY 6 HOURS
Status: DISCONTINUED | OUTPATIENT
Start: 2018-12-23 | End: 2018-12-23 | Stop reason: HOSPADM

## 2018-12-23 RX ADMIN — LISINOPRIL 20 MG: 20 TABLET ORAL at 09:12

## 2018-12-23 RX ADMIN — ALUMINUM HYDROXIDE, MAGNESIUM HYDROXIDE, AND SIMETHICONE 50 ML: 200; 200; 20 SUSPENSION ORAL at 05:12

## 2018-12-23 RX ADMIN — PANTOPRAZOLE SODIUM 40 MG: 40 TABLET, DELAYED RELEASE ORAL at 11:12

## 2018-12-23 RX ADMIN — PREDNISONE 40 MG: 20 TABLET ORAL at 09:12

## 2018-12-23 RX ADMIN — MICONAZOLE NITRATE: 20 CREAM TOPICAL at 09:12

## 2018-12-23 RX ADMIN — POTASSIUM & SODIUM PHOSPHATES POWDER PACK 280-160-250 MG 2 PACKET: 280-160-250 PACK at 05:12

## 2018-12-23 RX ADMIN — FLUTICASONE FUROATE AND VILANTEROL TRIFENATATE 1 PUFF: 200; 25 POWDER RESPIRATORY (INHALATION) at 11:12

## 2018-12-23 RX ADMIN — POLYETHYLENE GLYCOL 3350 17 G: 17 POWDER, FOR SOLUTION ORAL at 09:12

## 2018-12-23 RX ADMIN — AZITHROMYCIN MONOHYDRATE 500 MG: 250 TABLET ORAL at 09:12

## 2018-12-23 RX ADMIN — ENOXAPARIN SODIUM 40 MG: 100 INJECTION SUBCUTANEOUS at 05:12

## 2018-12-23 RX ADMIN — IPRATROPIUM BROMIDE AND ALBUTEROL SULFATE 3 ML: .5; 3 SOLUTION RESPIRATORY (INHALATION) at 09:12

## 2018-12-23 RX ADMIN — ALUMINUM HYDROXIDE, MAGNESIUM HYDROXIDE, AND SIMETHICONE 30 ML: 200; 200; 20 SUSPENSION ORAL at 11:12

## 2018-12-23 RX ADMIN — ATORVASTATIN CALCIUM 40 MG: 20 TABLET, FILM COATED ORAL at 09:12

## 2018-12-23 RX ADMIN — POTASSIUM & SODIUM PHOSPHATES POWDER PACK 280-160-250 MG 2 PACKET: 280-160-250 PACK at 09:12

## 2018-12-23 RX ADMIN — METOPROLOL TARTRATE 12.5 MG: 25 TABLET, FILM COATED ORAL at 09:12

## 2018-12-23 RX ADMIN — ASPIRIN 81 MG: 81 TABLET, COATED ORAL at 09:12

## 2018-12-23 NOTE — ASSESSMENT & PLAN NOTE
- Patient seen and examined. Non exertional sharp pain worse with respiration and cough. Negative  Echo a month ago. No prior coronary history. Flat troponins, normal EKG. Bed side TTE normal EF no   - We do not believe this is cardiac or ischemic chest pain, would work up pneumonia, COPD and musculoskeletal sources of chest pain  - Can get repeat TTE, stop tredning troponins  - Cardiology will sign off at this time , please call us with any questions

## 2018-12-23 NOTE — PLAN OF CARE
Problem: Adult Inpatient Plan of Care  Goal: Plan of Care Review  Outcome: Ongoing (interventions implemented as appropriate)   12/23/18 0165   Plan of Care Review   Plan of Care Reviewed With patient       Patient had no falls. Medication and nursing care per MD order. He complained of abd pain and receive GI cocktail which relieved his symptoms. Pt had 2 BMs after receiving a stool softener.

## 2018-12-23 NOTE — PLAN OF CARE
Problem: Adult Inpatient Plan of Care  Goal: Plan of Care Review  Outcome: Ongoing (interventions implemented as appropriate)  Discharge instructions given to pt and pt's family, sister, at bedside.  Pt and family verbalized understanding. VSS.  PIV removed.  Telemetry removed.  Follow up TTE completed at bedside.  Pt transported home with sister via wheelchair.
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
## 2018-12-23 NOTE — NURSING
Robin Hawkins is a 68 y.o.   male patient, who presents for a 6 minute walk test . The patient's BMI is 23.3kg/m2. Predicted distance (lower limit of normal) is 370.49 meters.    Test Results:    The test was negative  The patient stopped 3 times for a total of 15 seconds.  The total time walked was 6 minutes.  During walking, the patient reported:  Shortness of breath. The patient on RA during testing.     12/23/2018---------     O2 Sat % Supplemental Oxygen Heart Rate Blood Pressure Nikita Scale   Pre-exercise  (Resting) 100 RA         During Exercise 99 RA         Post-exercise   99 RA           Recovery Time:  30 seconds     Performing nurse/tech: Zac Tello RN    PREVIOUS STUDY:   The patient has not had a previous study.      CLINICAL INTERPRETATION:  Six minute walk with moderate dyspnea.  Significant exercise impairment is likely due to such as shortness of breath.   Oxygen saturation did not improve while breathing supplemental oxygen.
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
## 2018-12-23 NOTE — NURSING
Contacted IM-R.  Pt c/o epigastric pain.  ALLEGRA performed.  Tele and HR WNL.  Requesting PRN med for indigestion.  Provider will order.

## 2018-12-24 LAB
ASCENDING AORTA: 3.34 CM
AV INDEX (PROSTH): 1.02
AV MEAN GRADIENT: 2.57 MMHG
AV PEAK GRADIENT: 5.29 MMHG
AV VALVE AREA: 3.58 CM2
BSA FOR ECHO PROCEDURE: 1.94 M2
CV ECHO LV RWT: 0.39 CM
DOP CALC AO PEAK VEL: 1.15 M/S
DOP CALC AO VTI: 20.01 CM
DOP CALC LVOT AREA: 3.53 CM2
DOP CALC LVOT DIAMETER: 2.12 CM
DOP CALC LVOT STROKE VOLUME: 71.73 CM3
DOP CALCLVOT PEAK VEL VTI: 20.33 CM
E WAVE DECELERATION TIME: 274.18 MSEC
E/A RATIO: 0.75
E/E' RATIO: 7.5
ECHO LV POSTERIOR WALL: 0.8 CM (ref 0.6–1.1)
FRACTIONAL SHORTENING: 38 % (ref 28–44)
INTERVENTRICULAR SEPTUM: 0.91 CM (ref 0.6–1.1)
IVRT: 0.14 MSEC
LA MAJOR: 5.12 CM
LA MINOR: 5.15 CM
LA WIDTH: 4.11 CM
LEFT ATRIUM SIZE: 4.1 CM
LEFT ATRIUM VOLUME INDEX: 37.8 ML/M2
LEFT ATRIUM VOLUME: 73.55 CM3
LEFT INTERNAL DIMENSION IN SYSTOLE: 2.53 CM (ref 2.1–4)
LEFT VENTRICLE DIASTOLIC VOLUME INDEX: 37.71 ML/M2
LEFT VENTRICLE DIASTOLIC VOLUME: 73.48 ML
LEFT VENTRICLE MASS INDEX: 54.2 G/M2
LEFT VENTRICLE SYSTOLIC VOLUME INDEX: 11.8 ML/M2
LEFT VENTRICLE SYSTOLIC VOLUME: 23.08 ML
LEFT VENTRICULAR INTERNAL DIMENSION IN DIASTOLE: 4.08 CM (ref 3.5–6)
LEFT VENTRICULAR MASS: 105.59 G
LV LATERAL E/E' RATIO: 7.5
LV SEPTAL E/E' RATIO: 7.5
MV PEAK A VEL: 0.8 M/S
MV PEAK E VEL: 0.6 M/S
PISA TR MAX VEL: 1.61 M/S
PULM VEIN S/D RATIO: 1.5
PV PEAK D VEL: 0.4 M/S
PV PEAK S VEL: 0.6 M/S
RA MAJOR: 4.57 CM
RA PRESSURE: 3 MMHG
RA WIDTH: 3.63 CM
RIGHT VENTRICULAR END-DIASTOLIC DIMENSION: 3.82 CM
RV TISSUE DOPPLER FREE WALL SYSTOLIC VELOCITY 1 (APICAL 4 CHAMBER VIEW): 13.28 M/S
SINUS: 3.38 CM
STJ: 3.19 CM
TDI LATERAL: 0.08
TDI SEPTAL: 0.08
TDI: 0.08
TR MAX PG: 10.37 MMHG
TRICUSPID ANNULAR PLANE SYSTOLIC EXCURSION: 1.91 CM
TV REST PULMONARY ARTERY PRESSURE: 13 MMHG
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
## 2018-12-29 DIAGNOSIS — E87.6 HYPOKALEMIA: ICD-10-CM

## 2018-12-29 DIAGNOSIS — C22.0 HCC (HEPATOCELLULAR CARCINOMA): Chronic | ICD-10-CM

## 2018-12-29 DIAGNOSIS — Z09 CHEMOTHERAPY FOLLOW-UP EXAMINATION: ICD-10-CM

## 2018-12-31 ENCOUNTER — TELEPHONE (OUTPATIENT)
Dept: INTERNAL MEDICINE | Facility: CLINIC | Age: 68
End: 2018-12-31

## 2018-12-31 ENCOUNTER — HOSPITAL ENCOUNTER (OUTPATIENT)
Dept: RADIOLOGY | Facility: HOSPITAL | Age: 68
Discharge: HOME | End: 2018-12-31
Attending: INTERNAL MEDICINE
Payer: MEDICARE

## 2018-12-31 ENCOUNTER — OFFICE VISIT (OUTPATIENT)
Dept: INTERNAL MEDICINE | Facility: CLINIC | Age: 68
End: 2018-12-31
Payer: MEDICARE

## 2018-12-31 VITALS
BODY MASS INDEX: 24.57 KG/M2 | WEIGHT: 175.5 LBS | OXYGEN SATURATION: 99 % | HEIGHT: 71 IN | DIASTOLIC BLOOD PRESSURE: 68 MMHG | HEART RATE: 73 BPM | SYSTOLIC BLOOD PRESSURE: 118 MMHG

## 2018-12-31 DIAGNOSIS — Z09 HOSPITAL DISCHARGE FOLLOW-UP: Primary | ICD-10-CM

## 2018-12-31 DIAGNOSIS — D49.89 NEOPLASM OF ABDOMEN: ICD-10-CM

## 2018-12-31 DIAGNOSIS — I10 ESSENTIAL HYPERTENSION: ICD-10-CM

## 2018-12-31 DIAGNOSIS — J15.9 BACTERIAL PNEUMONIA: ICD-10-CM

## 2018-12-31 PROCEDURE — 74183 MRI ABDOMEN W WO CONTRAST: ICD-10-PCS | Mod: 26,HCNC,, | Performed by: RADIOLOGY

## 2018-12-31 PROCEDURE — 3074F SYST BP LT 130 MM HG: CPT | Mod: CPTII,HCNC,S$GLB, | Performed by: INTERNAL MEDICINE

## 2018-12-31 PROCEDURE — 74183 MRI ABD W/O CNTR FLWD CNTR: CPT | Mod: 26,HCNC,, | Performed by: RADIOLOGY

## 2018-12-31 PROCEDURE — 1101F PT FALLS ASSESS-DOCD LE1/YR: CPT | Mod: CPTII,HCNC,S$GLB, | Performed by: INTERNAL MEDICINE

## 2018-12-31 PROCEDURE — 74183 MRI ABD W/O CNTR FLWD CNTR: CPT | Mod: TC,HCNC

## 2018-12-31 PROCEDURE — A9585 GADOBUTROL INJECTION: HCPCS | Mod: HCNC | Performed by: INTERNAL MEDICINE

## 2018-12-31 PROCEDURE — 99214 OFFICE O/P EST MOD 30 MIN: CPT | Mod: HCNC,S$GLB,, | Performed by: INTERNAL MEDICINE

## 2018-12-31 PROCEDURE — 3078F DIAST BP <80 MM HG: CPT | Mod: CPTII,HCNC,S$GLB, | Performed by: INTERNAL MEDICINE

## 2018-12-31 PROCEDURE — 99499 UNLISTED E&M SERVICE: CPT | Mod: HCNC,S$GLB,, | Performed by: INTERNAL MEDICINE

## 2018-12-31 PROCEDURE — 25500020 PHARM REV CODE 255: Mod: HCNC | Performed by: INTERNAL MEDICINE

## 2018-12-31 PROCEDURE — 99999 PR PBB SHADOW E&M-EST. PATIENT-LVL IV: CPT | Mod: PBBFAC,HCNC,, | Performed by: INTERNAL MEDICINE

## 2018-12-31 RX ORDER — GADOBUTROL 604.72 MG/ML
10 INJECTION INTRAVENOUS
Status: COMPLETED | OUTPATIENT
Start: 2018-12-31 | End: 2018-12-31

## 2018-12-31 RX ORDER — LOSARTAN POTASSIUM 50 MG/1
50 TABLET ORAL DAILY
Qty: 90 TABLET | Refills: 3 | Status: SHIPPED | OUTPATIENT
Start: 2018-12-31 | End: 2019-12-31

## 2018-12-31 RX ORDER — AMOXICILLIN AND CLAVULANATE POTASSIUM 875; 125 MG/1; MG/1
1 TABLET, FILM COATED ORAL 2 TIMES DAILY
Qty: 14 TABLET | Refills: 0 | Status: SHIPPED | OUTPATIENT
Start: 2018-12-31 | End: 2019-03-21 | Stop reason: CLARIF

## 2018-12-31 RX ORDER — POTASSIUM CHLORIDE 20 MEQ/1
TABLET, EXTENDED RELEASE ORAL
Qty: 360 TABLET | Refills: 0 | OUTPATIENT
Start: 2018-12-31

## 2018-12-31 RX ADMIN — GADOBUTROL 10 ML: 604.72 INJECTION INTRAVENOUS at 10:12

## 2018-12-31 NOTE — TELEPHONE ENCOUNTER
----- Message from Rosa Ward sent at 12/31/2018 10:09 AM CST -----  Contact: Self [phone]  Good morning,The patient was seen today by  she would like the patient to be scheduled for a 2 week follow up but your first available appointment is not until 04/04/19. Would you like the patient to be scheduled with another provider???. Please advise/schedule the patient .    Thank you
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
## 2018-12-31 NOTE — PATIENT INSTRUCTIONS
I will prescribe the additional Augmentin 875 mg  #14 one twice a day for the pneumonia.    I will change lisinopril 20 mg to losartan 50 mg to see if it helps with breathing and we will be notifying all  Patients on lisinopril of the change because of a [place] paper associating lung cancer to lisinopril.

## 2019-01-02 ENCOUNTER — TELEPHONE (OUTPATIENT)
Dept: PHARMACY | Facility: CLINIC | Age: 69
End: 2019-01-02

## 2019-01-02 ENCOUNTER — OFFICE VISIT (OUTPATIENT)
Dept: HEMATOLOGY/ONCOLOGY | Facility: CLINIC | Age: 69
End: 2019-01-02
Payer: MEDICARE

## 2019-01-02 ENCOUNTER — INFUSION (OUTPATIENT)
Dept: INFUSION THERAPY | Facility: HOSPITAL | Age: 69
End: 2019-01-02
Attending: INTERNAL MEDICINE
Payer: MEDICARE

## 2019-01-02 VITALS
BODY MASS INDEX: 23.92 KG/M2 | DIASTOLIC BLOOD PRESSURE: 57 MMHG | HEIGHT: 71 IN | SYSTOLIC BLOOD PRESSURE: 112 MMHG | OXYGEN SATURATION: 100 % | HEART RATE: 70 BPM | WEIGHT: 170.88 LBS | TEMPERATURE: 98 F | RESPIRATION RATE: 14 BRPM

## 2019-01-02 DIAGNOSIS — C22.0 HCC (HEPATOCELLULAR CARCINOMA): ICD-10-CM

## 2019-01-02 DIAGNOSIS — C22.0 HEPATOCELLULAR CARCINOMA: ICD-10-CM

## 2019-01-02 DIAGNOSIS — C22.0 HEPATOCELLULAR CARCINOMA: Primary | Chronic | ICD-10-CM

## 2019-01-02 DIAGNOSIS — J43.0 UNILATERAL EMPHYSEMA: Chronic | ICD-10-CM

## 2019-01-02 DIAGNOSIS — Z51.12 ENCOUNTER FOR ANTINEOPLASTIC IMMUNOTHERAPY: Primary | ICD-10-CM

## 2019-01-02 LAB
ALBUMIN SERPL BCP-MCNC: 2.5 G/DL
ALP SERPL-CCNC: 129 U/L
ALT SERPL W/O P-5'-P-CCNC: 30 U/L
ANION GAP SERPL CALC-SCNC: 6 MMOL/L
AST SERPL-CCNC: 47 U/L
BILIRUB SERPL-MCNC: 0.5 MG/DL
BUN SERPL-MCNC: 18 MG/DL
CALCIUM SERPL-MCNC: 8.6 MG/DL
CHLORIDE SERPL-SCNC: 103 MMOL/L
CO2 SERPL-SCNC: 24 MMOL/L
CREAT SERPL-MCNC: 1 MG/DL
ERYTHROCYTE [DISTWIDTH] IN BLOOD BY AUTOMATED COUNT: 18.3 %
EST. GFR  (AFRICAN AMERICAN): >60 ML/MIN/1.73 M^2
EST. GFR  (NON AFRICAN AMERICAN): >60 ML/MIN/1.73 M^2
GLUCOSE SERPL-MCNC: 82 MG/DL
HCT VFR BLD AUTO: 30.1 %
HGB BLD-MCNC: 9.6 G/DL
IMM GRANULOCYTES # BLD AUTO: 0.05 K/UL
MCH RBC QN AUTO: 27.5 PG
MCHC RBC AUTO-ENTMCNC: 31.9 G/DL
MCV RBC AUTO: 86 FL
NEUTROPHILS # BLD AUTO: 6 K/UL
PLATELET # BLD AUTO: 290 K/UL
PMV BLD AUTO: 10.1 FL
POTASSIUM SERPL-SCNC: 4.1 MMOL/L
PROT SERPL-MCNC: 6.6 G/DL
RBC # BLD AUTO: 3.49 M/UL
SODIUM SERPL-SCNC: 133 MMOL/L
WBC # BLD AUTO: 9.14 K/UL

## 2019-01-02 PROCEDURE — 85027 COMPLETE CBC AUTOMATED: CPT | Mod: HCNC

## 2019-01-02 PROCEDURE — 99999 PR PBB SHADOW E&M-EST. PATIENT-LVL V: ICD-10-PCS | Mod: PBBFAC,HCNC,, | Performed by: INTERNAL MEDICINE

## 2019-01-02 PROCEDURE — 3074F SYST BP LT 130 MM HG: CPT | Mod: CPTII,HCNC,S$GLB, | Performed by: INTERNAL MEDICINE

## 2019-01-02 PROCEDURE — 99499 RISK ADDL DX/OHS AUDIT: ICD-10-PCS | Mod: S$GLB,,, | Performed by: INTERNAL MEDICINE

## 2019-01-02 PROCEDURE — 1101F PR PT FALLS ASSESS DOC 0-1 FALLS W/OUT INJ PAST YR: ICD-10-PCS | Mod: CPTII,HCNC,S$GLB, | Performed by: INTERNAL MEDICINE

## 2019-01-02 PROCEDURE — 99214 PR OFFICE/OUTPT VISIT, EST, LEVL IV, 30-39 MIN: ICD-10-PCS | Mod: HCNC,S$GLB,, | Performed by: INTERNAL MEDICINE

## 2019-01-02 PROCEDURE — 3074F PR MOST RECENT SYSTOLIC BLOOD PRESSURE < 130 MM HG: ICD-10-PCS | Mod: CPTII,HCNC,S$GLB, | Performed by: INTERNAL MEDICINE

## 2019-01-02 PROCEDURE — 80053 COMPREHEN METABOLIC PANEL: CPT | Mod: HCNC

## 2019-01-02 PROCEDURE — 99214 OFFICE O/P EST MOD 30 MIN: CPT | Mod: HCNC,S$GLB,, | Performed by: INTERNAL MEDICINE

## 2019-01-02 PROCEDURE — 99499 UNLISTED E&M SERVICE: CPT | Mod: S$GLB,,, | Performed by: INTERNAL MEDICINE

## 2019-01-02 PROCEDURE — A4216 STERILE WATER/SALINE, 10 ML: HCPCS | Mod: HCNC | Performed by: INTERNAL MEDICINE

## 2019-01-02 PROCEDURE — 1101F PT FALLS ASSESS-DOCD LE1/YR: CPT | Mod: CPTII,HCNC,S$GLB, | Performed by: INTERNAL MEDICINE

## 2019-01-02 PROCEDURE — 63600175 PHARM REV CODE 636 W HCPCS: Mod: HCNC | Performed by: INTERNAL MEDICINE

## 2019-01-02 PROCEDURE — 99999 PR PBB SHADOW E&M-EST. PATIENT-LVL V: CPT | Mod: PBBFAC,HCNC,, | Performed by: INTERNAL MEDICINE

## 2019-01-02 PROCEDURE — 3078F PR MOST RECENT DIASTOLIC BLOOD PRESSURE < 80 MM HG: ICD-10-PCS | Mod: CPTII,HCNC,S$GLB, | Performed by: INTERNAL MEDICINE

## 2019-01-02 PROCEDURE — 36415 COLL VENOUS BLD VENIPUNCTURE: CPT | Mod: HCNC

## 2019-01-02 PROCEDURE — 3078F DIAST BP <80 MM HG: CPT | Mod: CPTII,HCNC,S$GLB, | Performed by: INTERNAL MEDICINE

## 2019-01-02 PROCEDURE — 96523 IRRIG DRUG DELIVERY DEVICE: CPT | Mod: HCNC

## 2019-01-02 PROCEDURE — 25000003 PHARM REV CODE 250: Mod: HCNC | Performed by: INTERNAL MEDICINE

## 2019-01-02 RX ORDER — HEPARIN 100 UNIT/ML
500 SYRINGE INTRAVENOUS
Status: COMPLETED | OUTPATIENT
Start: 2019-01-02 | End: 2019-01-02

## 2019-01-02 RX ORDER — HEPARIN 100 UNIT/ML
500 SYRINGE INTRAVENOUS
Status: CANCELLED | OUTPATIENT
Start: 2019-01-02

## 2019-01-02 RX ORDER — SODIUM CHLORIDE 0.9 % (FLUSH) 0.9 %
10 SYRINGE (ML) INJECTION
Status: COMPLETED | OUTPATIENT
Start: 2019-01-02 | End: 2019-01-02

## 2019-01-02 RX ORDER — SODIUM CHLORIDE 0.9 % (FLUSH) 0.9 %
10 SYRINGE (ML) INJECTION
Status: CANCELLED | OUTPATIENT
Start: 2019-01-02

## 2019-01-02 RX ADMIN — HEPARIN 500 UNITS: 100 SYRINGE at 08:01

## 2019-01-02 RX ADMIN — Medication 10 ML: at 08:01

## 2019-01-02 NOTE — NURSING
Patient here for blood draw from left chest port-accesses easily without blood return after much flushing and changing positions-line flushed-needle removed-blood drawn peripherally form right arm and sent top lab.
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
## 2019-01-02 NOTE — LETTER
Hematology and Oncology  [facility] [address]  Phone: [phone]  Fax: [phone]   [facility] Appeals   Patient:  Robin Hawkins  ID:  [id number]  :  1950    Greetings:  I am writing on behalf of my patient, Robin Hawkins, to document the medical necessity of lenvatinib (Lenvima) for the treatment of hepatocellular carcinoma (HCC).  Mr. Kelly was diagnosed with hepatitis C (HCV) in  and was treated with 3 months of Harvoni, but unfortunately relapsed. He was evaluated for liver transplant, and further HCV treatment was deferred until after transplant so he would be eligible for HCV+ donors. He was eventually taken off of the list due to multiple comorbidities making him ineligible for transplant.    A CT scan from 2015 showed a 2.5 cm enhancing mass consistent with hepatocellular carcinoma (HCC). At the time, alpha feroprotein (AFP - a marker used in detecting HCC) was 109 ng/mL (normal range 0-8.4 ng/ml). Mr. Hawkins underwent TACE on 16. On 1/10/17, the previous liver lesion had grown in size in addition to a new lesion measuring 2.7 cm ( ng/mL). Yttrium-90 radioembolization was done 17. He was started on sorafenib 2017 and underwent TACE on 2017, 2017, and 3/2018. An MRI 2018 indicated progressive disease, so Mr. Hawkins was started on nivolumab ( ng/mL). After 4 cycles, scans 2018 showed progression of disease with increasing AFP, and nivolumab was discontinued. He was started on regorafenib 10/2018. MRI 2018 indicated progressive disease, so regorafenib was discontinued. Most recent AFP 18 is 4782 ng/mL.    Coverage for Lenvima was denied because plan stated that Lenvima 12 mg daily is currently noted as an off-label treatment for hepatocellular carcinoma. Lenvima is currently FDA approved for unresectable hepatocellular carcinoma. Lenvatinib only recently gained FDA approval for [facility], so it would not have been  available to Mr. Hawkins as an option when he was first diagnosed. Additionally, there is data available indicating that patients who have had previous TKI therapies may benefit from a trial of Lenvima treatment. Based on a phase 2 multicenter, open-label study of lenvatinib in patients with uHCC, a subgroup analysis of all efficacy endpoints was conducted in patients with previous treatment with sorafenib. Of the 41 patients evaluated, 34.8% (16) had received several previous systemic therapies, and of these 13% (6) had been previously treated with sorafenib. Patients had to have good performance status (as Mr. Hawkins does) and could have had other prior local therapy, radiotherapy, or surgery for HCC. Patients with prior use of sorafenib had a similar overall survival (16.35 vs. 20.15 months) and progression free survival (5.60 vs. 7.40 months) compared to no prior use of sorafenib.1    A retrospective study by Akosua et al found that early therapeutic response to lenvatinib was favorable in patients who had failed previous TKI therapies for uHCC. They evaluated 49 patients, who were given lenvatinib 8 mg (weight <60 kg) or 12 mg (weight ?60 kg). Of those, 29 (59.2%) had previously been treated with sorafenib and 6 (20.7%) with regorafenib. Twenty-seven of those 29 patients were evaluated by CT/MRI at 4 weeks after introducing lenvatinib. There was no statistical difference between the TKI naïve group (n =8) and TKI experienced group (n=19) in overall response rate (50% vs. 36.8%, P = 0.675) and disease control rate (87.5% vs. 84.2%, P = 1.00).2    Based on available data, Mr. Hawkins good performance status, and his lack of other available treatment options, I that you reconsider and authorize the use of Lenvima, to treat his uHCC at this time. Although I appreciate the significant cost of the medication, the treatment is medically necessary to prevent further progression his disease and improve his overall  quality of life.  Mr. Hawkins and I eagerly await your response.    Sincerely,      Ortiz Carrillo DO, FACP      References:    1. Scottie K, et al. Phase 2 study of lenvatinib in patients with advanced hepatocellular carcinoma. J Gastroenterol. 2017. 52:512-519.  2. Akosua EISENBERG et al. Therapeutic potential of lenvatinib for unresectable hepatocellular carcinoma in clinical practice: Multicenter analysis. Hepatol Res. 2018. doi: 10.1111/hepr.62114. [Epub ahead of print]

## 2019-01-06 NOTE — Clinical Note
01/06/19 1100   Group 1   Start Time 0930   Stop Time 1015   Length (min) 45 Min   Group Name Check In   Focus of Group Symptoms   Attendance Present   Mood Other;Bright  (Appropriate)   Affect Calm   Behavior/Socialization Engaged   Thought Process Tracking   Patient Response Interactive   Task Performance Follows directions   Safety Concerns Other  (None indicated)   Degree Patient Ready for Discharge Yes   Group Notes Pt. shared that she is feeling good and feeling like herself again, is looking forward to the future.  Goal is to work on what her aftercare plans will look like and socialize with peers on the unit today.     Kellen Bah, CTRS     Follow-up in 4 weeks with labs

## 2019-01-15 NOTE — TELEPHONE ENCOUNTER
We will be assisting the patient with applying to [facility] Patient Assistance for is Lenvima prescription. I am faxing the application prescription to Dr Carrillo for his review and signature.

## 2019-01-18 ENCOUNTER — OFFICE VISIT (OUTPATIENT)
Dept: INTERNAL MEDICINE | Facility: CLINIC | Age: 69
End: 2019-01-18
Payer: MEDICARE

## 2019-01-18 VITALS
DIASTOLIC BLOOD PRESSURE: 78 MMHG | TEMPERATURE: 98 F | HEART RATE: 68 BPM | BODY MASS INDEX: 24.23 KG/M2 | OXYGEN SATURATION: 99 % | WEIGHT: 173.06 LBS | HEIGHT: 71 IN | SYSTOLIC BLOOD PRESSURE: 128 MMHG

## 2019-01-18 DIAGNOSIS — K74.60 CIRRHOSIS OF LIVER WITHOUT ASCITES, UNSPECIFIED HEPATIC CIRRHOSIS TYPE: Chronic | ICD-10-CM

## 2019-01-18 DIAGNOSIS — B18.2 CHRONIC HEPATITIS C WITHOUT HEPATIC COMA: ICD-10-CM

## 2019-01-18 DIAGNOSIS — I70.0 ATHEROSCLEROSIS OF ABDOMINAL AORTA: ICD-10-CM

## 2019-01-18 DIAGNOSIS — C22.0 HEPATOCELLULAR CARCINOMA: Chronic | ICD-10-CM

## 2019-01-18 DIAGNOSIS — J43.9 PULMONARY EMPHYSEMA, UNSPECIFIED EMPHYSEMA TYPE: Primary | Chronic | ICD-10-CM

## 2019-01-18 DIAGNOSIS — E87.1 HYPONATREMIA: ICD-10-CM

## 2019-01-18 DIAGNOSIS — I10 ESSENTIAL HYPERTENSION: Chronic | ICD-10-CM

## 2019-01-18 DIAGNOSIS — E03.8 SUBCLINICAL HYPOTHYROIDISM: ICD-10-CM

## 2019-01-18 PROCEDURE — 3078F DIAST BP <80 MM HG: CPT | Mod: CPTII,S$GLB,, | Performed by: INTERNAL MEDICINE

## 2019-01-18 PROCEDURE — 3074F SYST BP LT 130 MM HG: CPT | Mod: CPTII,S$GLB,, | Performed by: INTERNAL MEDICINE

## 2019-01-18 PROCEDURE — 1101F PT FALLS ASSESS-DOCD LE1/YR: CPT | Mod: CPTII,S$GLB,, | Performed by: INTERNAL MEDICINE

## 2019-01-18 PROCEDURE — 99999 PR PBB SHADOW E&M-EST. PATIENT-LVL IV: CPT | Mod: PBBFAC,,, | Performed by: INTERNAL MEDICINE

## 2019-01-18 PROCEDURE — 99214 PR OFFICE/OUTPT VISIT, EST, LEVL IV, 30-39 MIN: ICD-10-PCS | Mod: S$GLB,,, | Performed by: INTERNAL MEDICINE

## 2019-01-18 PROCEDURE — 3074F PR MOST RECENT SYSTOLIC BLOOD PRESSURE < 130 MM HG: ICD-10-PCS | Mod: CPTII,S$GLB,, | Performed by: INTERNAL MEDICINE

## 2019-01-18 PROCEDURE — 1101F PR PT FALLS ASSESS DOC 0-1 FALLS W/OUT INJ PAST YR: ICD-10-PCS | Mod: CPTII,S$GLB,, | Performed by: INTERNAL MEDICINE

## 2019-01-18 PROCEDURE — 99999 PR PBB SHADOW E&M-EST. PATIENT-LVL IV: ICD-10-PCS | Mod: PBBFAC,,, | Performed by: INTERNAL MEDICINE

## 2019-01-18 PROCEDURE — 3078F PR MOST RECENT DIASTOLIC BLOOD PRESSURE < 80 MM HG: ICD-10-PCS | Mod: CPTII,S$GLB,, | Performed by: INTERNAL MEDICINE

## 2019-01-18 PROCEDURE — 99214 OFFICE O/P EST MOD 30 MIN: CPT | Mod: S$GLB,,, | Performed by: INTERNAL MEDICINE

## 2019-01-18 NOTE — PROGRESS NOTES
"Subjective:       Patient ID: Robin Hawkins is a 68 y.o. male.    Chief Complaint: Follow-up    HPI  69 y/o man with COPD, HTN, HLD, GERD, h/o colon cancer, h/o prostate cancer, Hep C with HCC, glaucoma, osteopenia, borderline DM, subclinical hypothyroidism, and h/o tracheostomy here for continued hospital follow up.  Looking very well today.    Recent hospitalization for shortness of breath, treated for pneumonia / COPD exacerbation, tx with azithromycin + augmentin. Saw Dr Bowling 12/31 for hospital follow up, was prescribed augmentin for an additional 7 days.   His BP medication was also switched from lisinopril to losartan.   Overall feeling better.     Has follow up with Dr Carrillo 1/30  Working on getting insurance approval for a different medication, as the one he was trying     No blood in stool, no hematuria    Due for hepatology f/u in March    Review of Systems   Constitutional: Negative for fatigue and fever.   HENT: Negative for congestion.    Eyes: Negative for visual disturbance.   Respiratory: Negative for cough and shortness of breath.    Cardiovascular: Negative for chest pain and leg swelling.   Gastrointestinal: Negative for abdominal pain.   Genitourinary: Negative for difficulty urinating.   Musculoskeletal: Negative for gait problem and joint swelling.   Neurological: Negative for syncope and light-headedness. Weakness: improved.   Psychiatric/Behavioral: Negative for dysphoric mood (mood good).         Past medical history, surgical history, and family medical history reviewed and updated as appropriate.    Medications and allergies reviewed.     Objective:          Vitals:    01/18/19 1013   BP: 128/78   BP Location: Left arm   Patient Position: Sitting   Pulse: 68   Temp: 97.5 °F (36.4 °C)   TempSrc: Oral   SpO2: 99%   Weight: 78.5 kg (173 lb 1 oz)   Height: 5' 11" (1.803 m)     Body mass index is 24.14 kg/m².  Physical Exam   Constitutional: He is oriented to person, place, and time. He " appears well-developed and well-nourished. No distress.   Appears chronically ill but improved from prior   HENT:   Head: Normocephalic and atraumatic.   Mouth/Throat: Oropharynx is clear and moist.   Eyes: Conjunctivae and EOM are normal.   Neck: Neck supple.   Cardiovascular: Normal rate, regular rhythm and normal heart sounds.   No murmur heard.  Pulmonary/Chest: Effort normal and breath sounds normal. No respiratory distress. He has no wheezes. He has no rales.   Abdominal: Soft. Bowel sounds are normal. He exhibits no distension. There is no tenderness.   Musculoskeletal: He exhibits no edema.   Decreased muscle bulk throughout   Lymphadenopathy:     He has no cervical adenopathy.   Neurological: He is alert and oriented to person, place, and time. He has normal strength. No cranial nerve deficit. Abnormal gait: gait slow, broad-based.   Skin: Skin is warm and dry. No rash noted.   Psychiatric: He has a normal mood and affect. His behavior is normal.   Vitals reviewed.      Lab Results   Component Value Date    WBC 9.14 01/02/2019    HGB 9.6 (L) 01/02/2019    HCT 30.1 (L) 01/02/2019     01/02/2019    CHOL 104 (L) 09/07/2015    TRIG 132 09/07/2015    HDL 23 (L) 09/07/2015    ALT 30 01/02/2019    AST 47 (H) 01/02/2019     (L) 01/02/2019    K 4.1 01/02/2019     01/02/2019    CREATININE 1.0 01/02/2019    BUN 18 01/02/2019    CO2 24 01/02/2019    TSH 7.578 (H) 11/27/2018    PSA 0.01 12/30/2015    INR 1.0 12/20/2018    HGBA1C 5.2 12/20/2018       Assessment:       1. Pulmonary emphysema, unspecified emphysema type    2. Essential hypertension    3. Hyponatremia    4. Subclinical hypothyroidism    5. Hepatocellular carcinoma    6. Chronic hepatitis C without hepatic coma    7. Cirrhosis of liver without ascites, unspecified hepatic cirrhosis type    8. Atherosclerosis of abdominal aorta        Plan:   Robin was seen today for follow-up.    Diagnoses and all orders for this visit:    Pulmonary  emphysema, unspecified emphysema type - improved, lung sounds normal today. Continue inhalers, follow with pulmonology  -     CBC auto differential; Future    Essential hypertension - at goal, continue current medication  -     Comprehensive metabolic panel; Future    Hyponatremia - low on last labs; recheck today  -     Comprehensive metabolic panel; Future    Subclinical hypothyroidism - TSH high on last labs  -     TSH; Future    Hepatocellular carcinoma, Hep C, cirrhosis - follow up with hepatology  -     CBC auto differential; Future    Recommended take vitamin D 6221-4815 daily    Counseled re: advance directives, goals of care - notes that there are situations where he would not want to be resuscitated or kept on mechanical ventilation, but if condition was temporary / reversible he may.   Recommended use guide such as preparefor[employer] or [employer] to help delineate goals of care    Health maintenance reviewed with patient.   Labs to be done 1/30    Follow-up in about 3 months (around 4/18/2019) for follow up.    Venancio Mcneil MD  Internal Medicine  [facility]  1/18/2019

## 2019-01-18 NOTE — PATIENT INSTRUCTIONS
Advance directives / thinking about the goals of your medical care:  [email or web address]    Take vitamin D 4000 or 5000IU daily    When you go to your Oncology appointment, please ask them to draw the labs I have ordered today: CBC, CMP, TSH.

## 2019-01-23 NOTE — TELEPHONE ENCOUNTER
FOR DOCUMENTATION ONLY:  Financial Assistance for Lenvima is approved from 1-23-19 to 12-31-19  Source [facility] Patient Assistance    Sending a staff message to Dr Carrillo regarding approval.

## 2019-01-30 ENCOUNTER — OFFICE VISIT (OUTPATIENT)
Dept: HEMATOLOGY/ONCOLOGY | Facility: CLINIC | Age: 69
End: 2019-01-30
Payer: MEDICARE

## 2019-01-30 ENCOUNTER — TELEPHONE (OUTPATIENT)
Dept: HEMATOLOGY/ONCOLOGY | Facility: CLINIC | Age: 69
End: 2019-01-30

## 2019-01-30 ENCOUNTER — INFUSION (OUTPATIENT)
Dept: INFUSION THERAPY | Facility: HOSPITAL | Age: 69
End: 2019-01-30
Attending: INTERNAL MEDICINE
Payer: MEDICARE

## 2019-01-30 VITALS
DIASTOLIC BLOOD PRESSURE: 65 MMHG | WEIGHT: 175.69 LBS | SYSTOLIC BLOOD PRESSURE: 122 MMHG | TEMPERATURE: 98 F | HEIGHT: 71 IN | OXYGEN SATURATION: 98 % | RESPIRATION RATE: 14 BRPM | HEART RATE: 65 BPM | BODY MASS INDEX: 24.6 KG/M2

## 2019-01-30 DIAGNOSIS — C22.0 HEPATOCELLULAR CARCINOMA: Primary | Chronic | ICD-10-CM

## 2019-01-30 DIAGNOSIS — T82.898A CLOTTED VASCULAR CATHETER, INITIAL ENCOUNTER: Primary | ICD-10-CM

## 2019-01-30 DIAGNOSIS — I10 ESSENTIAL HYPERTENSION: Chronic | ICD-10-CM

## 2019-01-30 DIAGNOSIS — Z51.11 ENCOUNTER FOR ANTINEOPLASTIC CHEMOTHERAPY: ICD-10-CM

## 2019-01-30 DIAGNOSIS — Z51.12 ENCOUNTER FOR ANTINEOPLASTIC IMMUNOTHERAPY: Primary | ICD-10-CM

## 2019-01-30 DIAGNOSIS — C22.0 HEPATOCELLULAR CARCINOMA: Chronic | ICD-10-CM

## 2019-01-30 DIAGNOSIS — C22.0 HCC (HEPATOCELLULAR CARCINOMA): ICD-10-CM

## 2019-01-30 DIAGNOSIS — E87.1 HYPONATREMIA: ICD-10-CM

## 2019-01-30 DIAGNOSIS — J43.9 PULMONARY EMPHYSEMA, UNSPECIFIED EMPHYSEMA TYPE: Chronic | ICD-10-CM

## 2019-01-30 DIAGNOSIS — E03.8 SUBCLINICAL HYPOTHYROIDISM: ICD-10-CM

## 2019-01-30 DIAGNOSIS — T82.818A EMBOLISM DUE TO VASCULAR PROSTHETIC DEVICES, IMPLANTS AND GRAFTS, INITIAL ENCOUNTER: ICD-10-CM

## 2019-01-30 LAB
AFP SERPL-MCNC: 7327 NG/ML
ALBUMIN SERPL BCP-MCNC: 3.2 G/DL
ALP SERPL-CCNC: 112 U/L
ALT SERPL W/O P-5'-P-CCNC: 17 U/L
ANION GAP SERPL CALC-SCNC: 8 MMOL/L
AST SERPL-CCNC: 37 U/L
BILIRUB SERPL-MCNC: 0.5 MG/DL
BUN SERPL-MCNC: 14 MG/DL
CALCIUM SERPL-MCNC: 9.3 MG/DL
CHLORIDE SERPL-SCNC: 103 MMOL/L
CO2 SERPL-SCNC: 21 MMOL/L
CREAT SERPL-MCNC: 0.9 MG/DL
ERYTHROCYTE [DISTWIDTH] IN BLOOD BY AUTOMATED COUNT: 17.2 %
EST. GFR  (AFRICAN AMERICAN): >60 ML/MIN/1.73 M^2
EST. GFR  (NON AFRICAN AMERICAN): >60 ML/MIN/1.73 M^2
GLUCOSE SERPL-MCNC: 99 MG/DL
HCT VFR BLD AUTO: 31.6 %
HGB BLD-MCNC: 9.4 G/DL
IMM GRANULOCYTES # BLD AUTO: 0.02 K/UL
MCH RBC QN AUTO: 26.3 PG
MCHC RBC AUTO-ENTMCNC: 29.7 G/DL
MCV RBC AUTO: 88 FL
NEUTROPHILS # BLD AUTO: 4.6 K/UL
PLATELET # BLD AUTO: 301 K/UL
PMV BLD AUTO: 10.6 FL
POTASSIUM SERPL-SCNC: 4.2 MMOL/L
PROT SERPL-MCNC: 8 G/DL
RBC # BLD AUTO: 3.58 M/UL
SODIUM SERPL-SCNC: 132 MMOL/L
T4 FREE SERPL-MCNC: 0.92 NG/DL
TSH SERPL DL<=0.005 MIU/L-ACNC: 4.51 UIU/ML
WBC # BLD AUTO: 7.03 K/UL

## 2019-01-30 PROCEDURE — 82105 ALPHA-FETOPROTEIN SERUM: CPT | Mod: HCNC

## 2019-01-30 PROCEDURE — 99499 RISK ADDL DX/OHS AUDIT: ICD-10-PCS | Mod: S$GLB,,, | Performed by: INTERNAL MEDICINE

## 2019-01-30 PROCEDURE — 1101F PT FALLS ASSESS-DOCD LE1/YR: CPT | Mod: HCNC,CPTII,S$GLB, | Performed by: INTERNAL MEDICINE

## 2019-01-30 PROCEDURE — 99214 OFFICE O/P EST MOD 30 MIN: CPT | Mod: HCNC,S$GLB,, | Performed by: INTERNAL MEDICINE

## 2019-01-30 PROCEDURE — 3078F DIAST BP <80 MM HG: CPT | Mod: HCNC,CPTII,S$GLB, | Performed by: INTERNAL MEDICINE

## 2019-01-30 PROCEDURE — A4216 STERILE WATER/SALINE, 10 ML: HCPCS | Mod: HCNC | Performed by: INTERNAL MEDICINE

## 2019-01-30 PROCEDURE — 80053 COMPREHEN METABOLIC PANEL: CPT | Mod: HCNC

## 2019-01-30 PROCEDURE — 63600175 PHARM REV CODE 636 W HCPCS: Mod: HCNC,JG | Performed by: INTERNAL MEDICINE

## 2019-01-30 PROCEDURE — 36593 DECLOT VASCULAR DEVICE: CPT | Mod: HCNC

## 2019-01-30 PROCEDURE — 85027 COMPLETE CBC AUTOMATED: CPT | Mod: HCNC

## 2019-01-30 PROCEDURE — 3074F SYST BP LT 130 MM HG: CPT | Mod: HCNC,CPTII,S$GLB, | Performed by: INTERNAL MEDICINE

## 2019-01-30 PROCEDURE — 99999 PR PBB SHADOW E&M-EST. PATIENT-LVL IV: ICD-10-PCS | Mod: PBBFAC,HCNC,, | Performed by: INTERNAL MEDICINE

## 2019-01-30 PROCEDURE — 36415 COLL VENOUS BLD VENIPUNCTURE: CPT | Mod: HCNC

## 2019-01-30 PROCEDURE — 3074F PR MOST RECENT SYSTOLIC BLOOD PRESSURE < 130 MM HG: ICD-10-PCS | Mod: HCNC,CPTII,S$GLB, | Performed by: INTERNAL MEDICINE

## 2019-01-30 PROCEDURE — 99214 PR OFFICE/OUTPT VISIT, EST, LEVL IV, 30-39 MIN: ICD-10-PCS | Mod: HCNC,S$GLB,, | Performed by: INTERNAL MEDICINE

## 2019-01-30 PROCEDURE — 99499 UNLISTED E&M SERVICE: CPT | Mod: S$GLB,,, | Performed by: INTERNAL MEDICINE

## 2019-01-30 PROCEDURE — 99999 PR PBB SHADOW E&M-EST. PATIENT-LVL IV: CPT | Mod: PBBFAC,HCNC,, | Performed by: INTERNAL MEDICINE

## 2019-01-30 PROCEDURE — 25000003 PHARM REV CODE 250: Mod: HCNC | Performed by: INTERNAL MEDICINE

## 2019-01-30 PROCEDURE — 1101F PR PT FALLS ASSESS DOC 0-1 FALLS W/OUT INJ PAST YR: ICD-10-PCS | Mod: HCNC,CPTII,S$GLB, | Performed by: INTERNAL MEDICINE

## 2019-01-30 PROCEDURE — 3078F PR MOST RECENT DIASTOLIC BLOOD PRESSURE < 80 MM HG: ICD-10-PCS | Mod: HCNC,CPTII,S$GLB, | Performed by: INTERNAL MEDICINE

## 2019-01-30 PROCEDURE — 84439 ASSAY OF FREE THYROXINE: CPT | Mod: HCNC

## 2019-01-30 PROCEDURE — 84443 ASSAY THYROID STIM HORMONE: CPT | Mod: HCNC

## 2019-01-30 RX ORDER — HEPARIN 100 UNIT/ML
500 SYRINGE INTRAVENOUS
Status: CANCELLED | OUTPATIENT
Start: 2019-01-30

## 2019-01-30 RX ORDER — OXYBUTYNIN CHLORIDE 10 MG/1
10 TABLET, EXTENDED RELEASE ORAL
Refills: 11 | COMMUNITY
Start: 2019-01-26

## 2019-01-30 RX ORDER — SODIUM CHLORIDE 0.9 % (FLUSH) 0.9 %
10 SYRINGE (ML) INJECTION
Status: COMPLETED | OUTPATIENT
Start: 2019-01-30 | End: 2019-01-30

## 2019-01-30 RX ORDER — SODIUM CHLORIDE 0.9 % (FLUSH) 0.9 %
10 SYRINGE (ML) INJECTION
Status: CANCELLED | OUTPATIENT
Start: 2019-01-30

## 2019-01-30 RX ORDER — HEPARIN 100 UNIT/ML
500 SYRINGE INTRAVENOUS
Status: COMPLETED | OUTPATIENT
Start: 2019-01-30 | End: 2019-01-30

## 2019-01-30 RX ADMIN — ALTEPLASE 2 MG: 2.2 INJECTION, POWDER, LYOPHILIZED, FOR SOLUTION INTRAVENOUS at 11:01

## 2019-01-30 RX ADMIN — ALTEPLASE 2 MG: 2.2 INJECTION, POWDER, LYOPHILIZED, FOR SOLUTION INTRAVENOUS at 08:01

## 2019-01-30 RX ADMIN — HEPARIN SODIUM (PORCINE) LOCK FLUSH IV SOLN 100 UNIT/ML 500 UNITS: 100 SOLUTION at 08:01

## 2019-01-30 RX ADMIN — Medication 10 ML: at 08:01
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
## 2019-01-30 NOTE — NURSING
Patient here for blood draw from left chest port-accesses easily without blood return after much flushing,deep breathing and changing positions-Activase instilled without blood return after 2hrs-another Activase 2mg Ivp into port and again no blood return-blood drawn peripherally from right hand and sent to lab-port line flushed and needle removed-call to Dr Carrillo office to schedule port study-to call patient's sister with appointment.

## 2019-01-30 NOTE — Clinical Note
They are going to try to open his port in infusion today.  If not let me know and we can send him to Radiology.See me in 4 weeks

## 2019-02-06 ENCOUNTER — HOSPITAL ENCOUNTER (OUTPATIENT)
Dept: INTERVENTIONAL RADIOLOGY/VASCULAR | Facility: HOSPITAL | Age: 69
Discharge: HOME | End: 2019-02-06
Attending: INTERNAL MEDICINE
Payer: MEDICARE

## 2019-02-06 DIAGNOSIS — T82.898A CLOTTED VASCULAR CATHETER, INITIAL ENCOUNTER: ICD-10-CM

## 2019-02-06 DIAGNOSIS — T82.818A EMBOLISM DUE TO VASCULAR PROSTHETIC DEVICES, IMPLANTS AND GRAFTS, INITIAL ENCOUNTER: ICD-10-CM

## 2019-02-06 PROCEDURE — 36598 IR CATHETER DECLOT THROMBOLYTIC: ICD-10-PCS | Mod: HCNC,,, | Performed by: RADIOLOGY

## 2019-02-06 PROCEDURE — 36598 INJ W/FLUOR EVAL CV DEVICE: CPT | Mod: HCNC,,, | Performed by: RADIOLOGY

## 2019-02-06 PROCEDURE — 25500020 PHARM REV CODE 255: Mod: HCNC | Performed by: INTERNAL MEDICINE

## 2019-02-06 PROCEDURE — 36593 DECLOT VASCULAR DEVICE: CPT | Mod: HCNC

## 2019-02-06 PROCEDURE — 36598 INJ W/FLUOR EVAL CV DEVICE: CPT

## 2019-02-06 RX ADMIN — IOHEXOL 15 ML: 300 INJECTION, SOLUTION INTRAVENOUS at 08:02

## 2019-02-10 DIAGNOSIS — I10 ESSENTIAL HYPERTENSION: ICD-10-CM

## 2019-02-11 RX ORDER — LISINOPRIL 20 MG/1
TABLET ORAL
Qty: 90 TABLET | Refills: 0 | OUTPATIENT
Start: 2019-02-11

## 2019-02-11 NOTE — TELEPHONE ENCOUNTER
BP med was previously switched from lisinopril to losartan. Please call pharmacy to make sure lisinopril rx is discontinued

## 2019-02-18 PROBLEM — Z09 CHEMOTHERAPY FOLLOW-UP EXAMINATION: Status: RESOLVED | Noted: 2018-11-13 | Resolved: 2019-02-18

## 2019-02-24 DIAGNOSIS — J43.9 PULMONARY EMPHYSEMA, UNSPECIFIED EMPHYSEMA TYPE: Primary | Chronic | ICD-10-CM

## 2019-02-25 RX ORDER — TIOTROPIUM BROMIDE 18 UG/1
CAPSULE ORAL; RESPIRATORY (INHALATION)
Qty: 90 CAPSULE | Refills: 1 | Status: SHIPPED | OUTPATIENT
Start: 2019-02-25

## 2019-03-02 DIAGNOSIS — Z09 CHEMOTHERAPY FOLLOW-UP EXAMINATION: ICD-10-CM

## 2019-03-02 DIAGNOSIS — E87.6 HYPOKALEMIA: ICD-10-CM

## 2019-03-02 DIAGNOSIS — C22.0 HCC (HEPATOCELLULAR CARCINOMA): Chronic | ICD-10-CM

## 2019-03-04 DIAGNOSIS — I10 ESSENTIAL HYPERTENSION: Chronic | ICD-10-CM

## 2019-03-04 RX ORDER — FLUTICASONE FUROATE AND VILANTEROL 200; 25 UG/1; UG/1
1 POWDER RESPIRATORY (INHALATION) DAILY
Qty: 60 EACH | Refills: 0 | Status: SHIPPED | OUTPATIENT
Start: 2019-03-04 | End: 2019-04-03 | Stop reason: SDUPTHER

## 2019-03-04 RX ORDER — METOPROLOL TARTRATE 25 MG/1
TABLET, FILM COATED ORAL
Qty: 90 TABLET | Refills: 0 | Status: SHIPPED | OUTPATIENT
Start: 2019-03-04

## 2019-03-04 RX ORDER — POTASSIUM CHLORIDE 20 MEQ/1
TABLET, EXTENDED RELEASE ORAL
Qty: 360 TABLET | Refills: 0 | Status: SHIPPED | OUTPATIENT
Start: 2019-03-04

## 2019-03-06 ENCOUNTER — OFFICE VISIT (OUTPATIENT)
Dept: HEMATOLOGY/ONCOLOGY | Facility: CLINIC | Age: 69
End: 2019-03-06
Payer: MEDICARE

## 2019-03-06 ENCOUNTER — INFUSION (OUTPATIENT)
Dept: INFUSION THERAPY | Facility: HOSPITAL | Age: 69
End: 2019-03-06
Attending: INTERNAL MEDICINE
Payer: MEDICARE

## 2019-03-06 VITALS
SYSTOLIC BLOOD PRESSURE: 165 MMHG | BODY MASS INDEX: 22.93 KG/M2 | TEMPERATURE: 98 F | HEIGHT: 71 IN | HEART RATE: 69 BPM | OXYGEN SATURATION: 100 % | WEIGHT: 163.81 LBS | DIASTOLIC BLOOD PRESSURE: 98 MMHG | RESPIRATION RATE: 14 BRPM

## 2019-03-06 DIAGNOSIS — Z09 CHEMOTHERAPY FOLLOW-UP EXAMINATION: ICD-10-CM

## 2019-03-06 DIAGNOSIS — C22.0 HCC (HEPATOCELLULAR CARCINOMA): ICD-10-CM

## 2019-03-06 DIAGNOSIS — K21.00 REFLUX ESOPHAGITIS: ICD-10-CM

## 2019-03-06 DIAGNOSIS — C22.0 HEPATOCELLULAR CARCINOMA: Primary | Chronic | ICD-10-CM

## 2019-03-06 DIAGNOSIS — M79.675 TOE PAIN, BILATERAL: ICD-10-CM

## 2019-03-06 DIAGNOSIS — K21.00 GASTROESOPHAGEAL REFLUX DISEASE WITH ESOPHAGITIS: Chronic | ICD-10-CM

## 2019-03-06 DIAGNOSIS — M79.674 TOE PAIN, BILATERAL: ICD-10-CM

## 2019-03-06 DIAGNOSIS — Z51.12 ENCOUNTER FOR ANTINEOPLASTIC IMMUNOTHERAPY: Primary | ICD-10-CM

## 2019-03-06 DIAGNOSIS — C22.0 HEPATOCELLULAR CARCINOMA: ICD-10-CM

## 2019-03-06 LAB
ALBUMIN SERPL BCP-MCNC: 3.7 G/DL
ALP SERPL-CCNC: 115 U/L
ALT SERPL W/O P-5'-P-CCNC: 35 U/L
ANION GAP SERPL CALC-SCNC: 7 MMOL/L
AST SERPL-CCNC: 62 U/L
BILIRUB SERPL-MCNC: 0.5 MG/DL
BUN SERPL-MCNC: 14 MG/DL
CALCIUM SERPL-MCNC: 9.4 MG/DL
CHLORIDE SERPL-SCNC: 105 MMOL/L
CO2 SERPL-SCNC: 25 MMOL/L
CREAT SERPL-MCNC: 1.2 MG/DL
ERYTHROCYTE [DISTWIDTH] IN BLOOD BY AUTOMATED COUNT: 18.7 %
EST. GFR  (AFRICAN AMERICAN): >60 ML/MIN/1.73 M^2
EST. GFR  (NON AFRICAN AMERICAN): >60 ML/MIN/1.73 M^2
GLUCOSE SERPL-MCNC: 86 MG/DL
HCT VFR BLD AUTO: 42.5 %
HGB BLD-MCNC: 12.7 G/DL
IMM GRANULOCYTES # BLD AUTO: 0.01 K/UL
MCH RBC QN AUTO: 26.8 PG
MCHC RBC AUTO-ENTMCNC: 29.9 G/DL
MCV RBC AUTO: 90 FL
NEUTROPHILS # BLD AUTO: 3.2 K/UL
PLATELET # BLD AUTO: 239 K/UL
PMV BLD AUTO: 11.1 FL
POTASSIUM SERPL-SCNC: 4.1 MMOL/L
PROT SERPL-MCNC: 8.6 G/DL
RBC # BLD AUTO: 4.74 M/UL
SODIUM SERPL-SCNC: 137 MMOL/L
WBC # BLD AUTO: 5.89 K/UL

## 2019-03-06 PROCEDURE — 99214 OFFICE O/P EST MOD 30 MIN: CPT | Mod: HCNC,S$GLB,, | Performed by: INTERNAL MEDICINE

## 2019-03-06 PROCEDURE — 3080F PR MOST RECENT DIASTOLIC BLOOD PRESSURE >= 90 MM HG: ICD-10-PCS | Mod: HCNC,CPTII,S$GLB, | Performed by: INTERNAL MEDICINE

## 2019-03-06 PROCEDURE — 3080F DIAST BP >= 90 MM HG: CPT | Mod: HCNC,CPTII,S$GLB, | Performed by: INTERNAL MEDICINE

## 2019-03-06 PROCEDURE — 3077F PR MOST RECENT SYSTOLIC BLOOD PRESSURE >= 140 MM HG: ICD-10-PCS | Mod: HCNC,CPTII,S$GLB, | Performed by: INTERNAL MEDICINE

## 2019-03-06 PROCEDURE — 99999 PR PBB SHADOW E&M-EST. PATIENT-LVL V: CPT | Mod: PBBFAC,HCNC,, | Performed by: INTERNAL MEDICINE

## 2019-03-06 PROCEDURE — 3077F SYST BP >= 140 MM HG: CPT | Mod: HCNC,CPTII,S$GLB, | Performed by: INTERNAL MEDICINE

## 2019-03-06 PROCEDURE — A4216 STERILE WATER/SALINE, 10 ML: HCPCS | Mod: HCNC | Performed by: INTERNAL MEDICINE

## 2019-03-06 PROCEDURE — 25000003 PHARM REV CODE 250: Mod: HCNC | Performed by: INTERNAL MEDICINE

## 2019-03-06 PROCEDURE — 99499 RISK ADDL DX/OHS AUDIT: ICD-10-PCS | Mod: S$GLB,,, | Performed by: INTERNAL MEDICINE

## 2019-03-06 PROCEDURE — 99499 UNLISTED E&M SERVICE: CPT | Mod: S$GLB,,, | Performed by: INTERNAL MEDICINE

## 2019-03-06 PROCEDURE — 99999 PR PBB SHADOW E&M-EST. PATIENT-LVL V: ICD-10-PCS | Mod: PBBFAC,HCNC,, | Performed by: INTERNAL MEDICINE

## 2019-03-06 PROCEDURE — 99214 PR OFFICE/OUTPT VISIT, EST, LEVL IV, 30-39 MIN: ICD-10-PCS | Mod: HCNC,S$GLB,, | Performed by: INTERNAL MEDICINE

## 2019-03-06 PROCEDURE — 63600175 PHARM REV CODE 636 W HCPCS: Mod: HCNC | Performed by: INTERNAL MEDICINE

## 2019-03-06 PROCEDURE — 85027 COMPLETE CBC AUTOMATED: CPT | Mod: HCNC

## 2019-03-06 PROCEDURE — 80053 COMPREHEN METABOLIC PANEL: CPT | Mod: HCNC

## 2019-03-06 PROCEDURE — 36415 COLL VENOUS BLD VENIPUNCTURE: CPT | Mod: HCNC

## 2019-03-06 PROCEDURE — 96523 IRRIG DRUG DELIVERY DEVICE: CPT | Mod: HCNC

## 2019-03-06 PROCEDURE — 1101F PR PT FALLS ASSESS DOC 0-1 FALLS W/OUT INJ PAST YR: ICD-10-PCS | Mod: HCNC,CPTII,S$GLB, | Performed by: INTERNAL MEDICINE

## 2019-03-06 PROCEDURE — 1101F PT FALLS ASSESS-DOCD LE1/YR: CPT | Mod: HCNC,CPTII,S$GLB, | Performed by: INTERNAL MEDICINE

## 2019-03-06 RX ORDER — HEPARIN 100 UNIT/ML
500 SYRINGE INTRAVENOUS
Status: CANCELLED | OUTPATIENT
Start: 2019-03-06

## 2019-03-06 RX ORDER — HEPARIN 100 UNIT/ML
500 SYRINGE INTRAVENOUS
Status: COMPLETED | OUTPATIENT
Start: 2019-03-06 | End: 2019-03-06

## 2019-03-06 RX ORDER — SODIUM CHLORIDE 0.9 % (FLUSH) 0.9 %
10 SYRINGE (ML) INJECTION
Status: CANCELLED | OUTPATIENT
Start: 2019-03-06

## 2019-03-06 RX ORDER — SODIUM CHLORIDE 0.9 % (FLUSH) 0.9 %
10 SYRINGE (ML) INJECTION
Status: COMPLETED | OUTPATIENT
Start: 2019-03-06 | End: 2019-03-06

## 2019-03-06 RX ORDER — OMEPRAZOLE 20 MG/1
CAPSULE, DELAYED RELEASE ORAL
Qty: 90 CAPSULE | Refills: 3 | Status: SHIPPED | OUTPATIENT
Start: 2019-03-06

## 2019-03-06 RX ADMIN — Medication 10 ML: at 07:03

## 2019-03-06 RX ADMIN — HEPARIN SODIUM (PORCINE) LOCK FLUSH IV SOLN 100 UNIT/ML 500 UNITS: 100 SOLUTION at 07:03
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
## 2019-03-06 NOTE — Clinical Note
Follow-up in 4 weeksRefer to General surgery for 5 port replacement and also Podiatry for pain in toes

## 2019-03-07 ENCOUNTER — PES CALL (OUTPATIENT)
Dept: ADMINISTRATIVE | Facility: CLINIC | Age: 69
End: 2019-03-07

## 2019-03-11 ENCOUNTER — TELEPHONE (OUTPATIENT)
Dept: HEMATOLOGY/ONCOLOGY | Facility: CLINIC | Age: 69
End: 2019-03-11

## 2019-03-11 ENCOUNTER — OFFICE VISIT (OUTPATIENT)
Dept: SURGERY | Facility: CLINIC | Age: 69
End: 2019-03-11
Payer: MEDICARE

## 2019-03-11 VITALS
SYSTOLIC BLOOD PRESSURE: 123 MMHG | TEMPERATURE: 98 F | HEIGHT: 71 IN | HEART RATE: 77 BPM | DIASTOLIC BLOOD PRESSURE: 75 MMHG | WEIGHT: 158.81 LBS | BODY MASS INDEX: 22.23 KG/M2

## 2019-03-11 DIAGNOSIS — Z95.828 PORT-A-CATH IN PLACE: Primary | ICD-10-CM

## 2019-03-11 PROCEDURE — 3078F PR MOST RECENT DIASTOLIC BLOOD PRESSURE < 80 MM HG: ICD-10-PCS | Mod: HCNC,CPTII,S$GLB, | Performed by: PHYSICIAN ASSISTANT

## 2019-03-11 PROCEDURE — 99213 PR OFFICE/OUTPT VISIT, EST, LEVL III, 20-29 MIN: ICD-10-PCS | Mod: HCNC,S$GLB,, | Performed by: PHYSICIAN ASSISTANT

## 2019-03-11 PROCEDURE — 3074F SYST BP LT 130 MM HG: CPT | Mod: HCNC,CPTII,S$GLB, | Performed by: PHYSICIAN ASSISTANT

## 2019-03-11 PROCEDURE — 99999 PR PBB SHADOW E&M-EST. PATIENT-LVL IV: CPT | Mod: PBBFAC,HCNC,, | Performed by: SURGERY

## 2019-03-11 PROCEDURE — 3078F DIAST BP <80 MM HG: CPT | Mod: HCNC,CPTII,S$GLB, | Performed by: PHYSICIAN ASSISTANT

## 2019-03-11 PROCEDURE — 99213 OFFICE O/P EST LOW 20 MIN: CPT | Mod: HCNC,S$GLB,, | Performed by: PHYSICIAN ASSISTANT

## 2019-03-11 PROCEDURE — 1101F PT FALLS ASSESS-DOCD LE1/YR: CPT | Mod: HCNC,CPTII,S$GLB, | Performed by: PHYSICIAN ASSISTANT

## 2019-03-11 PROCEDURE — 3074F PR MOST RECENT SYSTOLIC BLOOD PRESSURE < 130 MM HG: ICD-10-PCS | Mod: HCNC,CPTII,S$GLB, | Performed by: PHYSICIAN ASSISTANT

## 2019-03-11 PROCEDURE — 99999 PR PBB SHADOW E&M-EST. PATIENT-LVL IV: ICD-10-PCS | Mod: PBBFAC,HCNC,, | Performed by: SURGERY

## 2019-03-11 PROCEDURE — 1101F PR PT FALLS ASSESS DOC 0-1 FALLS W/OUT INJ PAST YR: ICD-10-PCS | Mod: HCNC,CPTII,S$GLB, | Performed by: PHYSICIAN ASSISTANT

## 2019-03-11 NOTE — LETTER
March 11, 2019      Ortiz Carrillo DO, FACP  [address] [facility] [address]           [facility] - General Surgery  [address]  [place] [address]  Phone: [phone]          Patient: Robin Hawkins   MR Number: [id number]   YOB: 1950   Date of Visit: 3/11/2019       Dear Dr. Ortiz Carrillo:    Thank you for referring Robin Hawkins to me for evaluation. Attached you will find relevant portions of my assessment and plan of care.    If you have questions, please do not hesitate to call me. I look forward to following Robin Hawkins along with you.    Sincerely,    Iliana Ramos PA-C    Enclosure  CC:  No Recipients    If you would like to receive this communication electronically, please contact [email or web address] or [phone] to request more information on [email or web address] Link access.    For providers and/or their staff who would like to refer a patient to [facility], please contact us through our one-stop-shop provider referral line, [facility], at [phone].    If you feel you have received this communication in error or would no longer like to receive these types of communications, please e-mail [email or web address]
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
## 2019-03-11 NOTE — PATIENT INSTRUCTIONS
Vascular Access Port Implantation   Port implantation is surgery to place (implant) a port under the skin. For vascular access, it is placed into a vein. The port allows medicines or nutrition to be sent right into your bloodstream. Blood can also be taken or given through the port. During the procedure, a long, thin tube called a catheter is threaded into one of your large veins. The tube is then attached to the port. This usually sits under the skin of your chest and causes a small bump. To use the port, a special needle is passed through your skin and into the port. The needle can stay in your skin for up to 7 days, if needed. A port can stay in place for weeks or months or longer.    Why is a vascular access port needed?  A vascular access port may allow healthcare providers to give you:  · Chemotherapy or other cancer-fighting drugs  · IV treatments, such as antibiotics or nutrition  · Hemodialysis (for kidney failure)  The port may also be used to draw blood.  Before the procedure  Follow any instructions you are given on how to prepare.  Tell your provider about any medicines you are taking. This includes:  · All prescription medicines  · Over-the-counter medicines such as aspirin or ibuprofen  · Herbs, vitamins, and other supplements  Also be sure your provider knows:  · If you are pregnant or think you may be pregnant  · If you are allergic to any medicines or substances, especially local anesthetics or iodine  · Your full medical history, including why you will need the port  · If you plan on doing any contact sports  During the procedure  · Before the procedure, an IV may be put into a vein in your arm or hand. This gives you fluids and medicines. You may be given medicine through the IV to help you relax during the procedure. This is called sedation. But some surgeons place ports using general anesthesia.  · The chest is used most often for the port. In some cases, your belly (abdomen) or arm will be  used instead.  · The skin over the insertion area is numbed with local anesthetic.  · Ultrasound or X-rays are used to help the healthcare provider guide the catheter into the proper location during the procedure.  · A cut (incision) is made in the skin where the port will be placed. A small pocket for the port is formed under the skin.  · A second small incision is made in the skin near the first incision. A tunnel under the skin is created. The catheter is put through the tunnel and into the blood vessel.  · The skin is closed over the port. It is held shut with stitches (sutures) or surgical glue or tape. The second small incision is also closed.  · A chest X-ray may be done to make sure the port is placed properly.  After the procedure  You may be taken to a recovery room where youll recover from the sedation. Nurses will check on you as you rest. If you have pain, nurses can give you medicine. If you are not staying in the hospital overnight, you will be sent home a few hours after the procedure is done. A healthcare provider will tell you when you can go home. An adult family member or friend will need to drive you home.  Recovering at home  · Take pain medicine as directed by your healthcare provider.  · Take it easy for 24 hours after the procedure. Avoid physical activity and heavy lifting until your healthcare provider says its OK.  · Keep the port clean and dry. Ask when you can shower again. You will need to keep the port dry by covering it when you shower.  · Care for the insertion site as you are directed.  · Dont swim, bathe, or do other activities that cause water to cover the insertion site.  · To keep the port from getting blocked with blood clots, flush it as often as directed. You should be shown the proper way to flush the port before you go home. It is important to follow these directions.     Risks and possible complications of implantation  · Bleeding  · Infection of the insertion  site  · Damage to a blood vessel  · Nerve injury or irritation  · Collapsed lung (for chest port placements)  · Skin breakdown over the port  Risks and possible complications of having a port  · Blocked  port or catheter  · Leakage or breakage of the port or catheter  · The port moves out of position  · Blood clot  · Skin or bloodstream infection  · Skin breakdown over the port      When to seek medical care  Call your healthcare provider right away if you have any of the following:  · A fever of 100.4°F (38.0°C) or higher  · You can't access or use the port properly  · You can't flush the port or get a blood return  · The skin near the port is red, warm, swollen, or broken  · You have shoulder pain on the side where the port is located  · You feel a heart flutter or racing heart   · Swollen arm, if the port is placed in your arm   Date Last Reviewed: 7/1/2016  © 6707-5226 [employer]. [address] [place] [address], [place] [address]. All rights reserved. This information is not intended as a substitute for professional medical care. Always follow your healthcare professional's instructions.

## 2019-03-18 ENCOUNTER — TELEPHONE (OUTPATIENT)
Dept: SURGERY | Facility: CLINIC | Age: 69
End: 2019-03-18

## 2019-03-18 DIAGNOSIS — C22.0 HEPATOCELLULAR CARCINOMA: Primary | ICD-10-CM

## 2019-03-18 NOTE — TELEPHONE ENCOUNTER
Pt's sister returned phone call regarding replacement of his Port-a-Cath.  It will be scheduled for Friday 3/21/19 with Dr. Elkins.  Pre-Op packet mailed to patient and consent will be obtained on the day of surgery.  She verbalized understanding and is agreeable to this plan of care.

## 2019-03-18 NOTE — PATIENT INSTRUCTIONS
Vascular Access Port Implantation   Port implantation is surgery to place (implant) a port under the skin. For vascular access, it is placed into a vein. The port allows medicines or nutrition to be sent right into your bloodstream. Blood can also be taken or given through the port. During the procedure, a long, thin tube called a catheter is threaded into one of your large veins. The tube is then attached to the port. This usually sits under the skin of your chest and causes a small bump. To use the port, a special needle is passed through your skin and into the port. The needle can stay in your skin for up to 7 days, if needed. A port can stay in place for weeks or months or longer.    Why is a vascular access port needed?  A vascular access port may allow healthcare providers to give you:  · Chemotherapy or other cancer-fighting drugs  · IV treatments, such as antibiotics or nutrition  · Hemodialysis (for kidney failure)  The port may also be used to draw blood.  Before the procedure  Follow any instructions you are given on how to prepare.  Tell your provider about any medicines you are taking. This includes:  · All prescription medicines  · Over-the-counter medicines such as aspirin or ibuprofen  · Herbs, vitamins, and other supplements  Also be sure your provider knows:  · If you are pregnant or think you may be pregnant  · If you are allergic to any medicines or substances, especially local anesthetics or iodine  · Your full medical history, including why you will need the port  · If you plan on doing any contact sports  During the procedure  · Before the procedure, an IV may be put into a vein in your arm or hand. This gives you fluids and medicines. You may be given medicine through the IV to help you relax during the procedure. This is called sedation. But some surgeons place ports using general anesthesia.  · The chest is used most often for the port. In some cases, your belly (abdomen) or arm will be  used instead.  · The skin over the insertion area is numbed with local anesthetic.  · Ultrasound or X-rays are used to help the healthcare provider guide the catheter into the proper location during the procedure.  · A cut (incision) is made in the skin where the port will be placed. A small pocket for the port is formed under the skin.  · A second small incision is made in the skin near the first incision. A tunnel under the skin is created. The catheter is put through the tunnel and into the blood vessel.  · The skin is closed over the port. It is held shut with stitches (sutures) or surgical glue or tape. The second small incision is also closed.  · A chest X-ray may be done to make sure the port is placed properly.  After the procedure  You may be taken to a recovery room where youll recover from the sedation. Nurses will check on you as you rest. If you have pain, nurses can give you medicine. If you are not staying in the hospital overnight, you will be sent home a few hours after the procedure is done. A healthcare provider will tell you when you can go home. An adult family member or friend will need to drive you home.  Recovering at home  · Take pain medicine as directed by your healthcare provider.  · Take it easy for 24 hours after the procedure. Avoid physical activity and heavy lifting until your healthcare provider says its OK.  · Keep the port clean and dry. Ask when you can shower again. You will need to keep the port dry by covering it when you shower.  · Care for the insertion site as you are directed.  · Dont swim, bathe, or do other activities that cause water to cover the insertion site.  · To keep the port from getting blocked with blood clots, flush it as often as directed. You should be shown the proper way to flush the port before you go home. It is important to follow these directions.     Risks and possible complications of implantation  · Bleeding  · Infection of the insertion  site  · Damage to a blood vessel  · Nerve injury or irritation  · Collapsed lung (for chest port placements)  · Skin breakdown over the port  Risks and possible complications of having a port  · Blocked  port or catheter  · Leakage or breakage of the port or catheter  · The port moves out of position  · Blood clot  · Skin or bloodstream infection  · Skin breakdown over the port      When to seek medical care  Call your healthcare provider right away if you have any of the following:  · A fever of 100.4°F (38.0°C) or higher  · You can't access or use the port properly  · You can't flush the port or get a blood return  · The skin near the port is red, warm, swollen, or broken  · You have shoulder pain on the side where the port is located  · You feel a heart flutter or racing heart   · Swollen arm, if the port is placed in your arm   Date Last Reviewed: 7/1/2016  © 3829-1429 [employer]. [address] [place] [address], [place] [address]. All rights reserved. This information is not intended as a substitute for professional medical care. Always follow your healthcare professional's instructions.

## 2019-03-20 ENCOUNTER — TELEPHONE (OUTPATIENT)
Dept: SURGERY | Facility: CLINIC | Age: 69
End: 2019-03-20

## 2019-03-20 NOTE — TELEPHONE ENCOUNTER
Returned call to patient's sister and informed her of the arrival time of 0515 for surgery at the second floor [facility], npo after MN and have someone available to drive him home.  She verbalized understanding and no further questions at this time.

## 2019-03-20 NOTE — TELEPHONE ENCOUNTER
----- Message from Kusum Hsu sent at 3/20/2019  2:20 PM CDT -----  Contact: Patients sister  Needs Advice    Reason for call: Patients sister is calling regarding patients surgery arrival time, location, etc.         Communication Preference: [phone]

## 2019-03-21 ENCOUNTER — ANESTHESIA EVENT (OUTPATIENT)
Dept: SURGERY | Facility: HOSPITAL | Age: 69
End: 2019-03-21
Payer: MEDICARE

## 2019-03-21 DIAGNOSIS — C22.0 HEPATOCELLULAR CARCINOMA: ICD-10-CM

## 2019-03-21 RX ORDER — SODIUM CHLORIDE 9 MG/ML
INJECTION, SOLUTION INTRAVENOUS CONTINUOUS
Status: CANCELLED | OUTPATIENT
Start: 2019-03-21

## 2019-03-21 NOTE — PRE-PROCEDURE INSTRUCTIONS
PreOp Instructions given to pt's Sister - Manju Canales:   - Verbal medication information (what to hold and what to take)   - NPO guidelines   - Arrival place directions given;  - Bathing with antibacterial soap   - Don't wear any jewelry or bring any valuables AM of surgery   - No makeup or moisturizer to face   - No perfume/cologne, powder, lotions or aftershave   Pt.'s sister - Manju verbalized understanding.   Denies any history of side effects or issues with anesthesia or sedation.
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
## 2019-03-22 ENCOUNTER — HOSPITAL ENCOUNTER (OUTPATIENT)
Facility: HOSPITAL | Age: 69
Discharge: HOME | End: 2019-03-22
Attending: SURGERY | Admitting: SURGERY
Payer: MEDICARE

## 2019-03-22 ENCOUNTER — ANESTHESIA (OUTPATIENT)
Dept: SURGERY | Facility: HOSPITAL | Age: 69
End: 2019-03-22
Payer: MEDICARE

## 2019-03-22 VITALS
RESPIRATION RATE: 18 BRPM | TEMPERATURE: 97 F | HEIGHT: 71 IN | SYSTOLIC BLOOD PRESSURE: 152 MMHG | BODY MASS INDEX: 22.12 KG/M2 | DIASTOLIC BLOOD PRESSURE: 79 MMHG | WEIGHT: 158 LBS | HEART RATE: 58 BPM | OXYGEN SATURATION: 100 %

## 2019-03-22 DIAGNOSIS — C22.0 HEPATOCELLULAR CARCINOMA: Primary | ICD-10-CM

## 2019-03-22 PROCEDURE — 36000707: Mod: HCNC | Performed by: SURGERY

## 2019-03-22 PROCEDURE — 63600175 PHARM REV CODE 636 W HCPCS: Mod: HCNC | Performed by: STUDENT IN AN ORGANIZED HEALTH CARE EDUCATION/TRAINING PROGRAM

## 2019-03-22 PROCEDURE — 00532 ANES ACCESS CTR VENOUS CRCJ: CPT | Mod: HCNC | Performed by: SURGERY

## 2019-03-22 PROCEDURE — 25000003 PHARM REV CODE 250: Mod: HCNC | Performed by: ANESTHESIOLOGY

## 2019-03-22 PROCEDURE — 36582 REPLACE TUNNELED CV CATH: CPT | Mod: HCNC,,, | Performed by: SURGERY

## 2019-03-22 PROCEDURE — D9220A PRA ANESTHESIA: Mod: HCNC,,, | Performed by: ANESTHESIOLOGY

## 2019-03-22 PROCEDURE — 25000003 PHARM REV CODE 250: Mod: HCNC | Performed by: PHYSICIAN ASSISTANT

## 2019-03-22 PROCEDURE — 37000009 HC ANESTHESIA EA ADD 15 MINS: Mod: HCNC | Performed by: SURGERY

## 2019-03-22 PROCEDURE — 36582 PR REPLACE TUNNELED CV CATH - W/ PORT COMPLETE: ICD-10-PCS | Mod: HCNC,,, | Performed by: SURGERY

## 2019-03-22 PROCEDURE — 77001 FLUOROGUIDE FOR VEIN DEVICE: CPT | Mod: 26,HCNC,, | Performed by: SURGERY

## 2019-03-22 PROCEDURE — 63600175 PHARM REV CODE 636 W HCPCS: Mod: HCNC | Performed by: PHYSICIAN ASSISTANT

## 2019-03-22 PROCEDURE — 77001 CHG FLUOROGUIDE CNTRL VEN ACCESS,PLACE,REPLACE,REMOVE: ICD-10-PCS | Mod: 26,HCNC,, | Performed by: SURGERY

## 2019-03-22 PROCEDURE — 63600175 PHARM REV CODE 636 W HCPCS: Mod: HCNC | Performed by: SURGERY

## 2019-03-22 PROCEDURE — D9220A PRA ANESTHESIA: ICD-10-PCS | Mod: HCNC,,, | Performed by: ANESTHESIOLOGY

## 2019-03-22 PROCEDURE — 71000044 HC DOSC ROUTINE RECOVERY FIRST HOUR: Mod: HCNC | Performed by: SURGERY

## 2019-03-22 PROCEDURE — S0020 INJECTION, BUPIVICAINE HYDRO: HCPCS | Mod: HCNC | Performed by: SURGERY

## 2019-03-22 PROCEDURE — 25000003 PHARM REV CODE 250: Mod: HCNC | Performed by: SURGERY

## 2019-03-22 PROCEDURE — S0028 INJECTION, FAMOTIDINE, 20 MG: HCPCS | Mod: HCNC | Performed by: ANESTHESIOLOGY

## 2019-03-22 PROCEDURE — C1788 PORT, INDWELLING, IMP: HCPCS | Mod: HCNC | Performed by: SURGERY

## 2019-03-22 PROCEDURE — 37000008 HC ANESTHESIA 1ST 15 MINUTES: Mod: HCNC | Performed by: SURGERY

## 2019-03-22 PROCEDURE — 71000015 HC POSTOP RECOV 1ST HR: Mod: HCNC | Performed by: SURGERY

## 2019-03-22 PROCEDURE — 36000706: Mod: HCNC | Performed by: SURGERY

## 2019-03-22 PROCEDURE — 63600175 PHARM REV CODE 636 W HCPCS: Mod: HCNC | Performed by: ANESTHESIOLOGY

## 2019-03-22 DEVICE — KIT POWERPORT SINGLE 8FR: Type: IMPLANTABLE DEVICE | Site: CHEST | Status: FUNCTIONAL

## 2019-03-22 RX ORDER — ONDANSETRON 2 MG/ML
4 INJECTION INTRAMUSCULAR; INTRAVENOUS DAILY PRN
Status: DISCONTINUED | OUTPATIENT
Start: 2019-03-22 | End: 2019-03-22 | Stop reason: HOSPADM

## 2019-03-22 RX ORDER — SODIUM CHLORIDE 9 MG/ML
INJECTION, SOLUTION INTRAVENOUS CONTINUOUS
Status: DISCONTINUED | OUTPATIENT
Start: 2019-03-22 | End: 2019-03-22 | Stop reason: HOSPADM

## 2019-03-22 RX ORDER — ACETAMINOPHEN 10 MG/ML
INJECTION, SOLUTION INTRAVENOUS
Status: DISCONTINUED | OUTPATIENT
Start: 2019-03-22 | End: 2019-03-22

## 2019-03-22 RX ORDER — METOCLOPRAMIDE HYDROCHLORIDE 5 MG/ML
10 INJECTION INTRAMUSCULAR; INTRAVENOUS EVERY 10 MIN PRN
Status: DISCONTINUED | OUTPATIENT
Start: 2019-03-22 | End: 2019-03-22 | Stop reason: HOSPADM

## 2019-03-22 RX ORDER — BUPIVACAINE HYDROCHLORIDE 5 MG/ML
INJECTION, SOLUTION EPIDURAL; INTRACAUDAL
Status: DISCONTINUED | OUTPATIENT
Start: 2019-03-22 | End: 2019-03-22 | Stop reason: HOSPADM

## 2019-03-22 RX ORDER — HEPARIN SODIUM (PORCINE) LOCK FLUSH IV SOLN 100 UNIT/ML 100 UNIT/ML
SOLUTION INTRAVENOUS
Status: DISCONTINUED | OUTPATIENT
Start: 2019-03-22 | End: 2019-03-22 | Stop reason: HOSPADM

## 2019-03-22 RX ORDER — KETOROLAC TROMETHAMINE 30 MG/ML
15 INJECTION, SOLUTION INTRAMUSCULAR; INTRAVENOUS EVERY 8 HOURS PRN
Status: DISCONTINUED | OUTPATIENT
Start: 2019-03-22 | End: 2019-03-22 | Stop reason: HOSPADM

## 2019-03-22 RX ORDER — CEFAZOLIN SODIUM 1 G/3ML
2 INJECTION, POWDER, FOR SOLUTION INTRAMUSCULAR; INTRAVENOUS
Status: COMPLETED | OUTPATIENT
Start: 2019-03-22 | End: 2019-03-22

## 2019-03-22 RX ORDER — MIDAZOLAM HYDROCHLORIDE 1 MG/ML
INJECTION, SOLUTION INTRAMUSCULAR; INTRAVENOUS
Status: DISCONTINUED | OUTPATIENT
Start: 2019-03-22 | End: 2019-03-22

## 2019-03-22 RX ORDER — LIDOCAINE HYDROCHLORIDE 10 MG/ML
1 INJECTION, SOLUTION EPIDURAL; INFILTRATION; INTRACAUDAL; PERINEURAL ONCE
Status: COMPLETED | OUTPATIENT
Start: 2019-03-22 | End: 2019-03-22

## 2019-03-22 RX ORDER — FENTANYL CITRATE 50 UG/ML
INJECTION, SOLUTION INTRAMUSCULAR; INTRAVENOUS
Status: DISCONTINUED | OUTPATIENT
Start: 2019-03-22 | End: 2019-03-22

## 2019-03-22 RX ORDER — PROPOFOL 10 MG/ML
VIAL (ML) INTRAVENOUS
Status: DISCONTINUED | OUTPATIENT
Start: 2019-03-22 | End: 2019-03-22

## 2019-03-22 RX ORDER — OXYCODONE HYDROCHLORIDE 5 MG/1
5 TABLET ORAL EVERY 4 HOURS PRN
Status: DISCONTINUED | OUTPATIENT
Start: 2019-03-22 | End: 2019-03-22 | Stop reason: HOSPADM

## 2019-03-22 RX ORDER — FAMOTIDINE 10 MG/ML
20 INJECTION INTRAVENOUS ONCE
Status: COMPLETED | OUTPATIENT
Start: 2019-03-22 | End: 2019-03-22

## 2019-03-22 RX ADMIN — FAMOTIDINE 20 MG: 10 INJECTION, SOLUTION INTRAVENOUS at 06:03

## 2019-03-22 RX ADMIN — SODIUM CHLORIDE: 0.9 INJECTION, SOLUTION INTRAVENOUS at 06:03

## 2019-03-22 RX ADMIN — ONDANSETRON 4 MG: 2 INJECTION INTRAMUSCULAR; INTRAVENOUS at 08:03

## 2019-03-22 RX ADMIN — FENTANYL CITRATE 12.5 MCG: 50 INJECTION, SOLUTION INTRAMUSCULAR; INTRAVENOUS at 08:03

## 2019-03-22 RX ADMIN — FENTANYL CITRATE 25 MCG: 50 INJECTION, SOLUTION INTRAMUSCULAR; INTRAVENOUS at 07:03

## 2019-03-22 RX ADMIN — MIDAZOLAM HYDROCHLORIDE 1 MG: 1 INJECTION, SOLUTION INTRAMUSCULAR; INTRAVENOUS at 07:03

## 2019-03-22 RX ADMIN — PROPOFOL 100 MG: 10 INJECTION, EMULSION INTRAVENOUS at 07:03

## 2019-03-22 RX ADMIN — PROPOFOL 50 MG: 10 INJECTION, EMULSION INTRAVENOUS at 07:03

## 2019-03-22 RX ADMIN — SODIUM CHLORIDE: 0.9 INJECTION, SOLUTION INTRAVENOUS at 07:03

## 2019-03-22 RX ADMIN — ACETAMINOPHEN 1000 MG: 10 INJECTION, SOLUTION INTRAVENOUS at 07:03

## 2019-03-22 RX ADMIN — LIDOCAINE HYDROCHLORIDE: 10 INJECTION, SOLUTION EPIDURAL; INFILTRATION; INTRACAUDAL; PERINEURAL at 06:03

## 2019-03-22 RX ADMIN — CEFAZOLIN 2 G: 330 INJECTION, POWDER, FOR SOLUTION INTRAMUSCULAR; INTRAVENOUS at 07:03

## 2019-03-22 NOTE — INTERVAL H&P NOTE
The patient has been examined and the H&P has been reviewed:    I concur with the findings and no changes have occurred since H&P was written.    Anesthesia/Surgery risks, benefits and alternative options discussed and understood by patient/family.          Active Hospital Problems    Diagnosis  POA    Hepatocellular carcinoma [C22.0]  Yes     Chronic     Enlarging liver mass consistent with HCC on CT 1/2016. +elevated AFP.  S/p TACE on 1/29/16, follow-up CT scans on 3/4/16 and 6/21/16 showed well-treated lesion  Following with Dr Scott for liver transplant evaluation        Resolved Hospital Problems   No resolved problems to display.

## 2019-03-22 NOTE — ANESTHESIA RELEASE NOTE
"Anesthesia Release from PACU Note    Patient: Robin Hawkins    Procedure(s) Performed: Procedure(s) (LRB):  REMOVAL OF OLD VASCULAR PORT, PLACEMENT OF NEW VASCULAR ACCESS PORT (Left)    Anesthesia type: GEN    Post pain: Adequate analgesia reported    Post assessment: no apparent anesthetic complications, tolerated procedure well and no evidence of recall    Post vital signs: BP (!) 152/79 (BP Location: Left arm, Patient Position: Lying)   Pulse (!) 58   Temp 36.2 °C (97.2 °F) (Temporal)   Resp 18   Ht 5' 11" (1.803 m)   Wt 71.7 kg (158 lb)   SpO2 100%   BMI 22.04 kg/m²     Level of consciousness: awake, alert and oriented    Nausea/Vomiting: no nausea/no vomiting    Complications: none    Airway Patency: patent    Respiratory: unassisted, spontaneous ventilation, room air    Cardiovascular: stable and blood pressure at baseline    Hydration: euvolemic    "

## 2019-03-22 NOTE — TRANSFER OF CARE
"Anesthesia Transfer of Care Note    Patient: Robin Hawkins    Procedure(s) Performed: Procedure(s) (LRB):  REMOVAL OF OLD VASCULAR PORT, PLACEMENT OF NEW VASCULAR ACCESS PORT (Left)    Patient location: PACU    Anesthesia Type: general    Transport from OR: Transported from OR on 6-10 L/min O2 by face mask with adequate spontaneous ventilation    Post pain: adequate analgesia    Post assessment: no apparent anesthetic complications    Post vital signs: stable    Level of consciousness: awake and responds to stimulation    Nausea/Vomiting: no nausea/vomiting    Complications: none    Transfer of care protocol was followed      Last vitals:   Visit Vitals  BP (!) 187/87 (BP Location: Left arm, Patient Position: Lying)   Pulse 72   Temp 36.2 °C (97.1 °F) (Temporal)   Resp 19   Ht 5' 11" (1.803 m)   Wt 71.7 kg (158 lb)   SpO2 100%   BMI 22.04 kg/m²     "

## 2019-03-22 NOTE — OP NOTE
Date of Procedure:  3/22/2019    Pre-Operative Diagnosis:  Hepatocellular carcinoma [C22.0]    Post-Operative Diagnosis:  Hepatocellular carcinoma [C22.0]    Procedures Performed:  Procedure(s) (LRB):  REMOVAL OF OLD VASCULAR PORT, PLACEMENT OF NEW VASCULAR ACCESS PORT (Left)    Surgeon(s) and Role:     * Garrick Elkins MD - Primary     * Jacky Hercules MD - Resident - Assisting    Anesthesia:  general    EBL: Minimal    Indication: 67 y/o male receiving chemotherapy for HCC presents with a malfunctioning port a cath.     PROCEDURE IN DETAIL: The patient underwent informed consent. The history and physical examination was updated. He was brought to the Operating Room. IV antibiotics were administered. The patient was in a supine position. The bilateral neck and chest were prepped and draped in a sterile fashion. An incision was made over his previous port a cath. Electrocautery was used to dissect out the port which was withdrawn after a figure of eight stitch was placed around its tract. Ultrasound was used to identify the left internal jugular and was cannulated with good venous blood return. The guidewire was advanced into the   central venous system under fluoroscopic guidance. The tract was enlarged with an 11-blade stab incision. The port was anchored to the catheter with a locking hub and flushed with injectable saline. The port was tunneled from the existing pocket site up to the percutaneous access site. The port was sutured into the subcutaneous pocket with interrupted Vicryl sutures x2.   The catheter tip was cut to the appropriate length so its tip would be in the superior vena cava. The dilator and peel-away sheath were advanced over the guidewire under fluoroscopic guidance. The dilator and guidewire were removed. The catheter was advanced through the peel-away sheath without difficulty. The   peel-away sheath was withdrawn. The port and catheter were visualized under fluoroscopy and  noted to be in good position without kinks in the system. The port was again accessed. It flushed and aspirated easily. It was flushed with a final solution of 100 units of heparin per mL of saline. The deep dermal and   subcutaneous layers were closed with a running 3-0 Vicryl suture. The skin closed with a running 4-0 Monocryl subcuticular skin closure. The percutaneous access site was closed with a 4-0 Monocryl subcuticular skin closure. Dermabond was applied to the incision site and the percutaneous access site. Estimate blood loss was minimal. All needle, instrument and sponge counts were correct. The patient was turned over to Anesthesia for transport to the Recovery Room in satisfactory condition. Dr. Elkins was available for the entirety of the procedure.

## 2019-03-22 NOTE — DISCHARGE INSTRUCTIONS
Home Care Instructions  GENERAL    CHECK APPROPRIATE LEVEL:    BATHING:  _ Keep the operation site dry for 24 hours.  _ Sponge bathe only.  _ You may shower 3/13/19 after 0900.    DRESSING:  Site is closed with dermabond skin glue. Glue will come off on it's own in 7-10 days.     ACTIVITY LEVEL:  If you have received sedation or an anesthetic, you may feel sleepy for several hours. Rest until you are awake. Gradually resume your normal activities.    DIET:  At home, continue with liquids, and if there is no nausea, you may eat a soft diet. Gradually resume your normal diet.    MEDICATIONS:  You will receive instructions for any pain and/or antibiotic prescriptions. Pain medication should be taken only if needed and as directed. Antibiotics should be taken as directed until the entire prescription is completed.  _  WHEN TO CALL THE DOCTOR:   For any obvious bleeding (some dried blood over the incision is normal).   Redness or swelling around the incision.   Fever over 101ºF (38.4ºC).   Drainage (pus) from the wound.   Persistent pain not relieved by the pain medication.    Anesthesia: General Anesthesia     You are watched continuously during your procedure by your anesthesia provider.     Youre due to have surgery. During surgery, youll be given medicine called anesthesia or anesthetic. This will keep you comfortable and pain-free. Your anesthesia provider will use general anesthesia.  What is general anesthesia?  General anesthesia puts you into a state like deep sleep. It goes into the bloodstream (IV anesthetics), into the lungs (gas anesthetics), or both. You feel nothing during the procedure. You will not remember it. During the procedure, the anesthesia provider monitors you continuously. He or she checks your heart rate and rhythm, blood pressure, breathing, and blood oxygen.  · IV anesthetics. IV anesthetics are given through an IV line in your arm. Theyre often given first. This is so you are asleep  before a gas anesthetic is started. Some kinds of IV anesthetics relieve pain. Others relax you. Your doctor will decide which kind is best in your case.  · Gas anesthetics. Gas anesthetics are breathed into the lungs. They are often used to keep you asleep. They can be given through a facemask or a tube placed in your larynx or trachea (breathing tube).  ¨ If you have a facemask, your anesthesia provider will most likely place it over your nose and mouth while youre still awake. Youll breathe oxygen through the mask as your IV anesthetic is started. Gas anesthetic may be added through the mask.  ¨ If you have a tube in the larynx or trachea, it will be inserted into your throat after youre asleep.  Anesthesia tools and medicines  You will likely have:  · IV anesthetics. These are put into an IV line into your bloodstream.  · Gas anesthetics. You breathe these anesthetics into your lungs, where they pass into your bloodstream.  · Pulse oximeter. This is a small clip that is attached to the end of your finger. This measures your blood oxygen level.  · Electrocardiography leads (electrodes). These are small sticky pads that are placed on your chest. They record your heart rate and rhythm.  · Blood pressure cuff. This reads your blood pressure.  Risks and possible complications  General anesthesia has some risks. These include:  · Breathing problems  · Nausea and vomiting  · Sore throat or hoarseness (usually temporary)  · Allergic reaction to the anesthetic  · Irregular heartbeat (rare)  · Cardiac arrest (rare)   Anesthesia safety  · Follow all instructions you are given for how long not to eat or drink before your procedure.  · Be sure your doctor knows what medicines and drugs you take. This includes over-the-counter medicines, herbs, supplements, alcohol or other drugs. You will be asked when those were last taken.  · Have an adult family member or friend drive you home after the procedure.  · For the first 24  hours after your surgery:  ¨ Do not drive or use heavy equipment.  ¨ Do not make important decisions or sign legal documents. If important decisions or signing legal documents is necessary during the first 24 hours after surgery, have a trusted family member or spouse act on your behalf.  ¨ Avoid alcohol.  ¨ Have a responsible adult stay with you. He or she can watch for problems and help keep you safe.

## 2019-03-22 NOTE — ANESTHESIA POSTPROCEDURE EVALUATION
"Anesthesia Post Evaluation    Patient: Robin Hawkins    Procedure(s) Performed: Procedure(s) (LRB):  REMOVAL OF OLD VASCULAR PORT, PLACEMENT OF NEW VASCULAR ACCESS PORT (Left)    Final Anesthesia Type: general  Patient location during evaluation: [facility]  Patient participation: Yes- Able to Participate  Level of consciousness: awake and alert and oriented  Post-procedure vital signs: reviewed and stable  Pain management: adequate  Airway patency: patent  PONV status at discharge: No PONV  Anesthetic complications: no      Cardiovascular status: stable  Respiratory status: unassisted, spontaneous ventilation and room air  Hydration status: euvolemic  Follow-up not needed.        Visit Vitals  BP (!) 152/79 (BP Location: Left arm, Patient Position: Lying)   Pulse (!) 58   Temp 36.2 °C (97.2 °F) (Temporal)   Resp 18   Ht 5' 11" (1.803 m)   Wt 71.7 kg (158 lb)   SpO2 100%   BMI 22.04 kg/m²       Pain/Patrick Score: Patrick Score: 10 (3/22/2019  8:45 AM)        "

## 2019-03-22 NOTE — BRIEF OP NOTE
[facility]  Brief Operative Note     SUMMARY     Surgery Date: 3/22/2019     Surgeon(s) and Role:     * Garrick Elkins MD - Primary     * Jacky Hercules MD - Resident - Assisting    Pre-op Diagnosis:  Hepatocellular carcinoma [C22.0]    Post-op Diagnosis:  Post-Op Diagnosis Codes:     * Hepatocellular carcinoma [C22.0]    Procedure(s) (LRB):  REMOVAL OF OLD VASCULAR PORT, PLACEMENT OF NEW VASCULAR ACCESS PORT (Left)    Anesthesia: General    Description of the findings of the procedure: left port a cath removal; placement left IJ port a cath     Findings/Key Components: port placement verified by fluoroscopy; old port and catheter tip removed in entirety and verified by myself and OR nurse     Estimated Blood Loss: minimal         Specimens:   Specimen (12h ago, onward)    None          Discharge Note    SUMMARY     Admit Date: 3/22/2019    Discharge Date and Time: No discharge date for patient encounter.    Hospital Course (synopsis of major diagnoses, care, treatment, and services provided during the course of the hospital stay): Pt was brought to OR for replacement of port a cath. Pt tolerated the procedure well and was brought to PACU for recovery. Once patient recovered from anesthesia and met discharge criteria, he was discharged home.         Final Diagnosis: Post-Op Diagnosis Codes:     * Hepatocellular carcinoma [C22.0]    Disposition: Home or Self Care    Follow Up/Patient Instructions:     Medications:  Reconciled Home Medications:      Medication List      CONTINUE taking these medications    albuterol-ipratropium 2.5 mg-0.5 mg/3 mL nebulizer solution  Commonly known as:  DUO-NEB  USE 1 VIAL VIA NEBULIZER EVERY 4 HOURS AS NEEDED FOR WHEEZING; RESCUE     aspirin 81 MG EC tablet  Commonly known as:  ECOTRIN  Take 81 mg by mouth once daily.     atorvastatin 40 MG tablet  Commonly known as:  LIPITOR  TAKE 1 TABLET BY MOUTH EVERY DAY     dorzolamide-timolol 2-0.5% 22.3-6.8  mg/mL ophthalmic solution  Commonly known as:  COSOPT  INSTILL 1 GTT INTO BOTH EYES ONCE DAILY     fluticasone-vilanterol 200-25 mcg/dose Dsdv diskus inhaler  Commonly known as:  BREO ELLIPTA  Inhale 1 puff into the lungs once daily. Controller     latanoprost 0.005 % ophthalmic solution  Place 1 drop into both eyes every evening.     lenvatinib 4 mg Cap  Take 3 capsules (12 mg total) by mouth once daily.     losartan 50 MG tablet  Commonly known as:  COZAAR  Take 1 tablet (50 mg total) by mouth once daily. Replaces lisinopril     metoprolol tartrate 25 MG tablet  Commonly known as:  LOPRESSOR  TAKE 1/2 TABLET(12.5 MG) BY MOUTH TWICE DAILY     omeprazole 20 MG capsule  Commonly known as:  PRILOSEC  TAKE 1 CAPSULE(20 MG) BY MOUTH EVERY DAY ON AN EMPTY STOMACH     oxybutynin 10 MG 24 hr tablet  Commonly known as:  DITROPAN-XL  Take 10 mg by mouth.     potassium chloride SA 20 MEQ tablet  Commonly known as:  K-DUR,KLOR-CON  TAKE 2 TABLETS(40 MEQ) BY MOUTH TWICE DAILY     SPIRIVA WITH HANDIHALER 18 mcg inhalation capsule  Generic drug:  tiotropium  INHALE CONTENTS OF ONE CAPSULE BY MOUTH INTO THE LUNGS VIA HANDIHALER EVERY DAY(CONTROLLER)     VENTOLIN HFA 90 mcg/actuation inhaler  Generic drug:  albuterol  Inhale 2 puffs into the lungs every 6 (six) hours as needed for Wheezing or Shortness of Breath. Rescue          Discharge Procedure Orders   Diet general     Follow-up Information     Garrick Elkins MD.    Specialty:  General Surgery  Why:  As needed  Contact information:  [facility]  [facility] [id number] [phone]

## 2019-03-26 ENCOUNTER — OFFICE VISIT (OUTPATIENT)
Dept: INTERNAL MEDICINE | Facility: CLINIC | Age: 69
End: 2019-03-26
Payer: MEDICARE

## 2019-03-26 VITALS
SYSTOLIC BLOOD PRESSURE: 134 MMHG | HEART RATE: 72 BPM | WEIGHT: 152.88 LBS | BODY MASS INDEX: 21.4 KG/M2 | DIASTOLIC BLOOD PRESSURE: 84 MMHG | HEIGHT: 71 IN

## 2019-03-26 DIAGNOSIS — C22.0 HEPATOMA: ICD-10-CM

## 2019-03-26 DIAGNOSIS — Z85.038 HISTORY OF COLON CANCER: ICD-10-CM

## 2019-03-26 DIAGNOSIS — N39.46 MIXED STRESS AND URGE URINARY INCONTINENCE: ICD-10-CM

## 2019-03-26 DIAGNOSIS — K21.00 REFLUX ESOPHAGITIS: ICD-10-CM

## 2019-03-26 DIAGNOSIS — I10 ESSENTIAL HYPERTENSION: Chronic | ICD-10-CM

## 2019-03-26 DIAGNOSIS — M79.674 TOE PAIN, BILATERAL: ICD-10-CM

## 2019-03-26 DIAGNOSIS — C22.0 HEPATOCELLULAR CARCINOMA: Chronic | ICD-10-CM

## 2019-03-26 DIAGNOSIS — M79.675 TOE PAIN, BILATERAL: ICD-10-CM

## 2019-03-26 DIAGNOSIS — H40.1132 PRIMARY OPEN ANGLE GLAUCOMA OF BOTH EYES, MODERATE STAGE: Primary | ICD-10-CM

## 2019-03-26 DIAGNOSIS — Z85.46 HISTORY OF PROSTATE CANCER: Chronic | ICD-10-CM

## 2019-03-26 DIAGNOSIS — K22.4 ESOPHAGEAL DYSMOTILITY: ICD-10-CM

## 2019-03-26 DIAGNOSIS — Z95.828 PORT-A-CATH IN PLACE: ICD-10-CM

## 2019-03-26 DIAGNOSIS — D63.0 ANEMIA IN NEOPLASTIC DISEASE: ICD-10-CM

## 2019-03-26 DIAGNOSIS — E46 HYPOALBUMINEMIA DUE TO PROTEIN-CALORIE MALNUTRITION: ICD-10-CM

## 2019-03-26 DIAGNOSIS — B18.2 CHRONIC HEPATITIS C WITHOUT HEPATIC COMA: ICD-10-CM

## 2019-03-26 DIAGNOSIS — I25.10 CORONARY ARTERY DISEASE INVOLVING NATIVE CORONARY ARTERY OF NATIVE HEART WITHOUT ANGINA PECTORIS: ICD-10-CM

## 2019-03-26 DIAGNOSIS — K74.60 CIRRHOSIS OF LIVER WITHOUT ASCITES, UNSPECIFIED HEPATIC CIRRHOSIS TYPE: Chronic | ICD-10-CM

## 2019-03-26 DIAGNOSIS — E88.09 HYPOALBUMINEMIA DUE TO PROTEIN-CALORIE MALNUTRITION: ICD-10-CM

## 2019-03-26 DIAGNOSIS — J43.2 CENTRILOBULAR EMPHYSEMA: Chronic | ICD-10-CM

## 2019-03-26 DIAGNOSIS — Z00.00 ENCOUNTER FOR PREVENTIVE HEALTH EXAMINATION: ICD-10-CM

## 2019-03-26 DIAGNOSIS — E03.8 SUBCLINICAL HYPOTHYROIDISM: ICD-10-CM

## 2019-03-26 DIAGNOSIS — J43.9 PULMONARY EMPHYSEMA, UNSPECIFIED EMPHYSEMA TYPE: Chronic | ICD-10-CM

## 2019-03-26 DIAGNOSIS — M85.80 OSTEOPENIA, UNSPECIFIED LOCATION: ICD-10-CM

## 2019-03-26 DIAGNOSIS — R63.4 UNINTENTIONAL WEIGHT LOSS: ICD-10-CM

## 2019-03-26 DIAGNOSIS — I70.0 ATHEROSCLEROSIS OF ABDOMINAL AORTA: ICD-10-CM

## 2019-03-26 DIAGNOSIS — E78.5 HYPERLIPIDEMIA, UNSPECIFIED HYPERLIPIDEMIA TYPE: Chronic | ICD-10-CM

## 2019-03-26 PROBLEM — J18.9 CAP (COMMUNITY ACQUIRED PNEUMONIA): Status: RESOLVED | Noted: 2018-12-21 | Resolved: 2019-03-26

## 2019-03-26 PROCEDURE — G0439 PPPS, SUBSEQ VISIT: HCPCS | Mod: HCNC,S$GLB,, | Performed by: NURSE PRACTITIONER

## 2019-03-26 PROCEDURE — 3075F PR MOST RECENT SYSTOLIC BLOOD PRESS GE 130-139MM HG: ICD-10-PCS | Mod: HCNC,CPTII,S$GLB, | Performed by: NURSE PRACTITIONER

## 2019-03-26 PROCEDURE — 3079F PR MOST RECENT DIASTOLIC BLOOD PRESSURE 80-89 MM HG: ICD-10-PCS | Mod: HCNC,CPTII,S$GLB, | Performed by: NURSE PRACTITIONER

## 2019-03-26 PROCEDURE — 99499 RISK ADDL DX/OHS AUDIT: ICD-10-PCS | Mod: S$GLB,,, | Performed by: NURSE PRACTITIONER

## 2019-03-26 PROCEDURE — 3079F DIAST BP 80-89 MM HG: CPT | Mod: HCNC,CPTII,S$GLB, | Performed by: NURSE PRACTITIONER

## 2019-03-26 PROCEDURE — 99999 PR PBB SHADOW E&M-EST. PATIENT-LVL V: CPT | Mod: PBBFAC,HCNC,, | Performed by: NURSE PRACTITIONER

## 2019-03-26 PROCEDURE — 99499 UNLISTED E&M SERVICE: CPT | Mod: S$GLB,,, | Performed by: NURSE PRACTITIONER

## 2019-03-26 PROCEDURE — G0439 PR MEDICARE ANNUAL WELLNESS SUBSEQUENT VISIT: ICD-10-PCS | Mod: HCNC,S$GLB,, | Performed by: NURSE PRACTITIONER

## 2019-03-26 PROCEDURE — 3075F SYST BP GE 130 - 139MM HG: CPT | Mod: HCNC,CPTII,S$GLB, | Performed by: NURSE PRACTITIONER

## 2019-03-26 PROCEDURE — 99999 PR PBB SHADOW E&M-EST. PATIENT-LVL V: ICD-10-PCS | Mod: PBBFAC,HCNC,, | Performed by: NURSE PRACTITIONER

## 2019-03-26 RX ORDER — CHOLECALCIFEROL (VITAMIN D3) 25 MCG
1000 TABLET ORAL DAILY
COMMUNITY

## 2019-03-26 NOTE — Clinical Note
Dr. Mcneil,I had the pleasure of seeing Mr. Hawkins today for a HRA visit.  He was generally feeling well except for some toe pain.  However, his weight has dropped about 10 lbs in the last 20 days.  We discussed his current diet and trying to increase is calorie intake.  He says he is only able to eat soft and liquid foods currently.  He is seeing his oncologist 04/03/19 and seeing you on 04/09/19.  I just wanted to give you an fyi for your next visit.  Seen notes for further info.  Thank you.Kusum Sepulveda NP

## 2019-03-26 NOTE — PATIENT INSTRUCTIONS
Counseling and Referral of Other Preventative  (Italic type indicates deductible and co-insurance are waived)    Patient Name: Robin Hawkins  Today's Date: 3/26/2019    Health Maintenance       Date Due Completion Date    Zoster Vaccine 08/05/2008 ---    Lipid Panel 09/07/2020 9/7/2015    Colonoscopy 05/06/2021 5/6/2016    Override on 9/6/2002: Done    TETANUS VACCINE 03/24/2026 3/24/2016        No orders of the defined types were placed in this encounter.    The following information is provided to all patients.  This information is to help you find resources for any of the problems found today that may be affecting your health:                Living healthy guide: www.[facility].[place].[facility]      Understanding Diabetes: www.diabetes.org      Eating healthy: www.cdc.gov/healthyweight      CDC home safety checklist: www.cdc.gov/steadi/patient.html      Agency on Aging: www.goea.[place].[facility]      Alcoholics anonymous (AA): www.aa.org      Physical Activity: www.adam.nih.gov/mb8osni      Tobacco use: www.quitwithusla.org

## 2019-03-26 NOTE — PROGRESS NOTES
"Robin Hawkins presented for a  Medicare AWV and comprehensive Health Risk Assessment today. The following components were reviewed and updated:    · Medical history  · Family History  · Social history  · Allergies and Current Medications  · Health Risk Assessment  · Health Maintenance  · Care Team     ** See Completed Assessments for Annual Wellness Visit within the encounter summary.**       The following assessments were completed:  · Living Situation  · CAGE  · Depression Screening  · Timed Get Up and Go  · Whisper Test  · Cognitive Function Screening  · Nutrition Screening  · ADL Screening  · PAQ Screening        Vitals:    03/26/19 0746 03/26/19 0830   BP: (!) 146/84 134/84   BP Location: Left arm Left arm   Patient Position: Sitting Sitting   Pulse: 72    Weight: 69.3 kg (152 lb 14.2 oz)    Height: 5' 11" (1.803 m)      Body mass index is 21.32 kg/m².  Physical Exam   Constitutional: He is oriented to person, place, and time. He appears well-developed.   To visit in wheelchair, accompanied by sister, thin appearance   HENT:   Head: Normocephalic and atraumatic.   Eyes: No scleral icterus.   Neck: Normal range of motion. Neck supple.   Cardiovascular: Normal rate, regular rhythm, normal heart sounds and intact distal pulses.   Pulmonary/Chest: Effort normal and breath sounds normal. No respiratory distress.   Abdominal: Soft. Bowel sounds are normal. He exhibits no distension.   Musculoskeletal: He exhibits no edema.   Neurological: He is alert and oriented to person, place, and time.   Skin: Skin is warm and dry.   Psychiatric: He has a normal mood and affect. His behavior is normal.       Diagnoses and health risks identified today and associated recommendations/orders:    1. Encounter for preventive health examination  Assessments completed  Preventive health recommendations reviewed  Patient in wheelchair to visit.  Get up and go not performed.  Per patient, no recent falls    2. Hepatocellular " carcinoma  Stable.   Controlled with current medical therapy  Currently taking lenvatinib  Followed by hem/onc and hepatology.     3. Hepatoma  Stable.   Controlled with current medical therapy  Followed by hem/onc and hepatology.     4. Cirrhosis of liver without ascites, unspecified hepatic cirrhosis type  Stable.   Followed by hepatology.     5. Chronic hepatitis C without hepatic coma  Stable.   Followed by hepatology.     6. Hypoalbuminemia due to protein-calorie malnutrition  Last Albumin 3.2.  March 6, 2019 pt's weight recorded as 163 lb.  Today's (03/26/19) patient weight was 152 lbs.  Patients says it is hurting when he eats solid foods.  He has to eat soft or liquid foods.  Promoted frequent high calorie soft, meals.  Patient is seeing his oncologist in one week and his pcp in two weeks for follow up.   Followed by PCP.     7. Centrilobular emphysema  Stable.   Controlled with current medical therapy  Followed by pulmonology.     8. Pulmonary emphysema, unspecified emphysema type  Stable.   Controlled with current medical therapy  Followed by pulmonology.     9. Atherosclerosis of abdominal aorta  Stable. Seen on CT from 07/18/18  Controlled with current medical therapy  Followed by PCP.     10. Primary open angle glaucoma of both eyes, moderate stage  Stable.   Controlled with current medical therapy  Followed by ophthalmology     11. Coronary artery disease involving native coronary artery of native heart without angina pectoris  Stable.   Controlled with current medical therapy  Followed by PCP.     12. Essential hypertension  Stable.   Controlled with current medical therapy  Followed by PCP.     13. Hyperlipidemia, unspecified hyperlipidemia type  Stable.   Controlled with current medical therapy  Followed by PCP.     14. Subclinical hypothyroidism  Stable.   Followed by PCP.     15. Mixed stress and urge urinary incontinence  Stable.   Controlled with current medical therapy  Followed by urology.      16. Anemia in neoplastic disease  Stable.   Controlled with current medical therapy  Followed by hem/onc.     17. History of colon cancer  Stable.   Hx of colon resection  Followed by PCP/hem/onc.     18. History of prostate cancer  Stable. Hx of prostatectomy in 2011  Followed by urology/hem/onc.     19. Esophageal dysmotility  Stable.   Controlled with current medical therapy  Followed by PCP.     20. Reflux esophagitis  Stable.   Controlled with current medical therapy  Followed by PCP.     21. Osteopenia, unspecified location  Stable.   Controlled with current medical therapy  Followed by PCP.     22. Port-A-Cath in place  Stable.   Followed by vascular/hem/onc     23. Toe pain, bilateral  Per patient, he has been having toe pain for the last few weeks.  Only on his first toe of each foot.  He had a podiatry appointment, but had to cancel.  No swelling or erythema noted to toes.  Non-tender at this time. Toenails long and need to be cut.  He has mentioned this to his oncologist as well.      - Ambulatory Referral to Podiatry    24. Unintentional weight loss  Last Albumin 3.2.  March 6, 2019 pt's weight recorded as 163 lb.  Today's (03/26/19) patient weight was 152 lbs.  Patients says it is hurting when he eats solid foods.  He has to eat soft or liquid foods.  Promoted frequent high calorie soft, meals.  Patient is seeing his oncologist in one week and his pcp in two weeks for follow up.   Followed by PCP.     Provided Robin with a 5-10 year written screening schedule and personal prevention plan. Recommendations were developed using the USPSTF age appropriate recommendations. Education, counseling, and referrals were provided as needed. After Visit Summary printed and given to patient which includes a list of additional screenings\tests needed.    Follow up in 2 weeks (on 4/9/2019) for a routine visit with your primary care provider or sooner if problems arise. .    Kusum Sepulveda NP

## 2019-03-27 ENCOUNTER — TELEPHONE (OUTPATIENT)
Dept: OPTOMETRY | Facility: CLINIC | Age: 69
End: 2019-03-27

## 2019-03-27 NOTE — TELEPHONE ENCOUNTER
----- Message from Kusum Muhammad OD sent at 3/27/2019 10:51 AM CDT -----  Regarding: call to schedule  Hello,    This patient is due for a 6-month follow-up on glaucoma. A recall has already been sent out but the pt has not yet scheduled. Please call to offer to schedule HVF 24-2ss, RNFL OCT, and Posterior Pole OCT, followed by an IOP check with me.    Thank you,  Kusum Muhammad OD

## 2019-04-03 ENCOUNTER — INFUSION (OUTPATIENT)
Dept: INFUSION THERAPY | Facility: HOSPITAL | Age: 69
End: 2019-04-03
Attending: INTERNAL MEDICINE
Payer: MEDICARE

## 2019-04-03 ENCOUNTER — OFFICE VISIT (OUTPATIENT)
Dept: HEMATOLOGY/ONCOLOGY | Facility: CLINIC | Age: 69
End: 2019-04-03
Payer: MEDICARE

## 2019-04-03 ENCOUNTER — TELEPHONE (OUTPATIENT)
Dept: HEMATOLOGY/ONCOLOGY | Facility: CLINIC | Age: 69
End: 2019-04-03

## 2019-04-03 VITALS
SYSTOLIC BLOOD PRESSURE: 170 MMHG | DIASTOLIC BLOOD PRESSURE: 90 MMHG | RESPIRATION RATE: 20 BRPM | OXYGEN SATURATION: 100 % | HEART RATE: 78 BPM

## 2019-04-03 VITALS
WEIGHT: 148.38 LBS | BODY MASS INDEX: 20.77 KG/M2 | OXYGEN SATURATION: 97 % | HEIGHT: 71 IN | HEART RATE: 80 BPM | TEMPERATURE: 96 F | SYSTOLIC BLOOD PRESSURE: 166 MMHG | RESPIRATION RATE: 24 BRPM | DIASTOLIC BLOOD PRESSURE: 88 MMHG

## 2019-04-03 VITALS — OXYGEN SATURATION: 97 % | WEIGHT: 152 LBS | BODY MASS INDEX: 21.28 KG/M2 | HEIGHT: 71 IN

## 2019-04-03 DIAGNOSIS — C22.0 HCC (HEPATOCELLULAR CARCINOMA): ICD-10-CM

## 2019-04-03 DIAGNOSIS — D49.89 NEOPLASM OF ABDOMEN: ICD-10-CM

## 2019-04-03 DIAGNOSIS — E03.9 HYPOTHYROIDISM, UNSPECIFIED TYPE: ICD-10-CM

## 2019-04-03 DIAGNOSIS — Z09 CHEMOTHERAPY FOLLOW-UP EXAMINATION: ICD-10-CM

## 2019-04-03 DIAGNOSIS — C22.0 HEPATOCELLULAR CARCINOMA: Primary | Chronic | ICD-10-CM

## 2019-04-03 DIAGNOSIS — Z51.12 ENCOUNTER FOR ANTINEOPLASTIC IMMUNOTHERAPY: Primary | ICD-10-CM

## 2019-04-03 DIAGNOSIS — R06.02 SHORTNESS OF BREATH: ICD-10-CM

## 2019-04-03 DIAGNOSIS — C22.0 HEPATOCELLULAR CARCINOMA: Primary | ICD-10-CM

## 2019-04-03 DIAGNOSIS — C22.0 HEPATOCELLULAR CARCINOMA: Chronic | ICD-10-CM

## 2019-04-03 DIAGNOSIS — E03.8 SUBCLINICAL HYPOTHYROIDISM: ICD-10-CM

## 2019-04-03 DIAGNOSIS — R11.0 NAUSEA: ICD-10-CM

## 2019-04-03 DIAGNOSIS — E03.9 HYPOTHYROIDISM, UNSPECIFIED TYPE: Primary | ICD-10-CM

## 2019-04-03 LAB
AFP SERPL-MCNC: 1189 NG/ML (ref 0–8.4)
ALBUMIN SERPL BCP-MCNC: 3.4 G/DL (ref 3.5–5.2)
ALP SERPL-CCNC: 121 U/L (ref 55–135)
ALT SERPL W/O P-5'-P-CCNC: 18 U/L (ref 10–44)
ANION GAP SERPL CALC-SCNC: 12 MMOL/L (ref 8–16)
AST SERPL-CCNC: 38 U/L (ref 10–40)
BILIRUB SERPL-MCNC: 0.9 MG/DL (ref 0.1–1)
BUN SERPL-MCNC: 22 MG/DL (ref 8–23)
CALCIUM SERPL-MCNC: 9.5 MG/DL (ref 8.7–10.5)
CHLORIDE SERPL-SCNC: 103 MMOL/L (ref 95–110)
CO2 SERPL-SCNC: 22 MMOL/L (ref 23–29)
CREAT SERPL-MCNC: 2.1 MG/DL (ref 0.5–1.4)
ERYTHROCYTE [DISTWIDTH] IN BLOOD BY AUTOMATED COUNT: 21.8 % (ref 11.5–14.5)
EST. GFR  (AFRICAN AMERICAN): 35.8 ML/MIN/1.73 M^2
EST. GFR  (NON AFRICAN AMERICAN): 31 ML/MIN/1.73 M^2
GLUCOSE SERPL-MCNC: 105 MG/DL (ref 70–110)
HCT VFR BLD AUTO: 39.7 % (ref 40–54)
HGB BLD-MCNC: 12.9 G/DL (ref 14–18)
IMM GRANULOCYTES # BLD AUTO: 0.03 K/UL (ref 0–0.04)
MCH RBC QN AUTO: 28.3 PG (ref 27–31)
MCHC RBC AUTO-ENTMCNC: 32.5 G/DL (ref 32–36)
MCV RBC AUTO: 87 FL (ref 82–98)
NEUTROPHILS # BLD AUTO: 5.2 K/UL (ref 1.8–7.7)
PLATELET # BLD AUTO: 212 K/UL (ref 150–350)
PMV BLD AUTO: 11.1 FL (ref 9.2–12.9)
POTASSIUM SERPL-SCNC: 4.1 MMOL/L (ref 3.5–5.1)
PROT SERPL-MCNC: 7.9 G/DL (ref 6–8.4)
RBC # BLD AUTO: 4.56 M/UL (ref 4.6–6.2)
SODIUM SERPL-SCNC: 137 MMOL/L (ref 136–145)
T4 FREE SERPL-MCNC: 0.64 NG/DL (ref 0.71–1.51)
TSH SERPL DL<=0.005 MIU/L-ACNC: 36.6 UIU/ML (ref 0.4–4)
WBC # BLD AUTO: 7.55 K/UL (ref 3.9–12.7)

## 2019-04-03 PROCEDURE — 84443 ASSAY THYROID STIM HORMONE: CPT | Mod: HCNC

## 2019-04-03 PROCEDURE — 63600175 PHARM REV CODE 636 W HCPCS: Mod: HCNC | Performed by: INTERNAL MEDICINE

## 2019-04-03 PROCEDURE — 82105 ALPHA-FETOPROTEIN SERUM: CPT | Mod: HCNC

## 2019-04-03 PROCEDURE — 3079F DIAST BP 80-89 MM HG: CPT | Mod: HCNC,CPTII,S$GLB, | Performed by: INTERNAL MEDICINE

## 2019-04-03 PROCEDURE — A4216 STERILE WATER/SALINE, 10 ML: HCPCS | Mod: HCNC | Performed by: INTERNAL MEDICINE

## 2019-04-03 PROCEDURE — 96361 HYDRATE IV INFUSION ADD-ON: CPT | Mod: HCNC

## 2019-04-03 PROCEDURE — 36591 DRAW BLOOD OFF VENOUS DEVICE: CPT | Mod: HCNC

## 2019-04-03 PROCEDURE — 3077F SYST BP >= 140 MM HG: CPT | Mod: HCNC,CPTII,S$GLB, | Performed by: INTERNAL MEDICINE

## 2019-04-03 PROCEDURE — 3077F PR MOST RECENT SYSTOLIC BLOOD PRESSURE >= 140 MM HG: ICD-10-PCS | Mod: HCNC,CPTII,S$GLB, | Performed by: INTERNAL MEDICINE

## 2019-04-03 PROCEDURE — 99499 UNLISTED E&M SERVICE: CPT | Mod: S$GLB,,, | Performed by: INTERNAL MEDICINE

## 2019-04-03 PROCEDURE — 1101F PT FALLS ASSESS-DOCD LE1/YR: CPT | Mod: HCNC,CPTII,S$GLB, | Performed by: INTERNAL MEDICINE

## 2019-04-03 PROCEDURE — 84439 ASSAY OF FREE THYROXINE: CPT | Mod: HCNC

## 2019-04-03 PROCEDURE — 80053 COMPREHEN METABOLIC PANEL: CPT | Mod: HCNC

## 2019-04-03 PROCEDURE — 85027 COMPLETE CBC AUTOMATED: CPT | Mod: HCNC

## 2019-04-03 PROCEDURE — 99215 OFFICE O/P EST HI 40 MIN: CPT | Mod: HCNC,S$GLB,, | Performed by: INTERNAL MEDICINE

## 2019-04-03 PROCEDURE — 99999 PR PBB SHADOW E&M-EST. PATIENT-LVL IV: ICD-10-PCS | Mod: PBBFAC,HCNC,, | Performed by: INTERNAL MEDICINE

## 2019-04-03 PROCEDURE — 25000003 PHARM REV CODE 250: Mod: HCNC | Performed by: INTERNAL MEDICINE

## 2019-04-03 PROCEDURE — 99499 RISK ADDL DX/OHS AUDIT: ICD-10-PCS | Mod: S$GLB,,, | Performed by: INTERNAL MEDICINE

## 2019-04-03 PROCEDURE — 3079F PR MOST RECENT DIASTOLIC BLOOD PRESSURE 80-89 MM HG: ICD-10-PCS | Mod: HCNC,CPTII,S$GLB, | Performed by: INTERNAL MEDICINE

## 2019-04-03 PROCEDURE — 96360 HYDRATION IV INFUSION INIT: CPT | Mod: HCNC

## 2019-04-03 PROCEDURE — 99999 PR PBB SHADOW E&M-EST. PATIENT-LVL IV: CPT | Mod: PBBFAC,HCNC,, | Performed by: INTERNAL MEDICINE

## 2019-04-03 PROCEDURE — 1101F PR PT FALLS ASSESS DOC 0-1 FALLS W/OUT INJ PAST YR: ICD-10-PCS | Mod: HCNC,CPTII,S$GLB, | Performed by: INTERNAL MEDICINE

## 2019-04-03 PROCEDURE — 99215 PR OFFICE/OUTPT VISIT, EST, LEVL V, 40-54 MIN: ICD-10-PCS | Mod: HCNC,S$GLB,, | Performed by: INTERNAL MEDICINE

## 2019-04-03 RX ORDER — HEPARIN 100 UNIT/ML
500 SYRINGE INTRAVENOUS
Status: COMPLETED | OUTPATIENT
Start: 2019-04-03 | End: 2019-04-03

## 2019-04-03 RX ORDER — ONDANSETRON 8 MG/1
8 TABLET, ORALLY DISINTEGRATING ORAL EVERY 12 HOURS PRN
Qty: 30 TABLET | Refills: 1 | Status: SHIPPED | OUTPATIENT
Start: 2019-04-03 | End: 2020-04-02

## 2019-04-03 RX ORDER — FLUTICASONE FUROATE AND VILANTEROL TRIFENATATE 200; 25 UG/1; UG/1
POWDER RESPIRATORY (INHALATION)
Qty: 60 EACH | Refills: 3 | Status: SHIPPED | OUTPATIENT
Start: 2019-04-03

## 2019-04-03 RX ORDER — HEPARIN 100 UNIT/ML
500 SYRINGE INTRAVENOUS
Status: CANCELLED | OUTPATIENT
Start: 2019-04-03

## 2019-04-03 RX ORDER — SODIUM CHLORIDE 0.9 % (FLUSH) 0.9 %
10 SYRINGE (ML) INJECTION
Status: CANCELLED | OUTPATIENT
Start: 2019-04-05

## 2019-04-03 RX ORDER — SODIUM CHLORIDE 9 MG/ML
INJECTION, SOLUTION INTRAVENOUS ONCE
Status: CANCELLED
Start: 2019-04-03

## 2019-04-03 RX ORDER — SODIUM CHLORIDE 0.9 % (FLUSH) 0.9 %
10 SYRINGE (ML) INJECTION
Status: COMPLETED | OUTPATIENT
Start: 2019-04-03 | End: 2019-04-03

## 2019-04-03 RX ORDER — SODIUM CHLORIDE 0.9 % (FLUSH) 0.9 %
10 SYRINGE (ML) INJECTION
Status: CANCELLED | OUTPATIENT
Start: 2019-04-03

## 2019-04-03 RX ORDER — LEVOTHYROXINE SODIUM 100 UG/1
100 TABLET ORAL DAILY
Qty: 30 TABLET | Refills: 11 | Status: SHIPPED | OUTPATIENT
Start: 2019-04-03 | End: 2020-04-02

## 2019-04-03 RX ORDER — HEPARIN 100 UNIT/ML
500 SYRINGE INTRAVENOUS
Status: CANCELLED | OUTPATIENT
Start: 2019-04-05

## 2019-04-03 RX ADMIN — SODIUM CHLORIDE 1000 ML: 0.9 INJECTION, SOLUTION INTRAVENOUS at 01:04

## 2019-04-03 RX ADMIN — Medication 10 ML: at 10:04

## 2019-04-03 RX ADMIN — Medication 10 ML: at 03:04

## 2019-04-03 RX ADMIN — HEPARIN SODIUM (PORCINE) LOCK FLUSH IV SOLN 100 UNIT/ML 500 UNITS: 100 SOLUTION at 03:04

## 2019-04-03 RX ADMIN — HEPARIN 500 UNITS: 100 SYRINGE at 10:04
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
## 2019-04-03 NOTE — Clinical Note
IV fluids today and also Friday.Will need CBC, CMP on FridaySee me in another 3 weeks with CT chest, abdomen and pelvis along with CBC, CMP, TSH, free T4 AFP

## 2019-04-03 NOTE — PLAN OF CARE
Problem: Adult Inpatient Plan of Care  Goal: Plan of Care Review  Outcome: Ongoing (interventions implemented as appropriate)  Arrived to unit via wheelchair. Pt SOB, weak. States he is drinking fluids, but not eating. Port accessed. Tolerated IVF over 2 hours. Pt voided before leaving. No complaints voiced. Pt in wheelchair, discharged with family.
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
## 2019-04-05 ENCOUNTER — OFFICE VISIT (OUTPATIENT)
Dept: HEMATOLOGY/ONCOLOGY | Facility: CLINIC | Age: 69
End: 2019-04-05
Payer: MEDICARE

## 2019-04-05 ENCOUNTER — INFUSION (OUTPATIENT)
Dept: INFECTIOUS DISEASES | Facility: HOSPITAL | Age: 69
End: 2019-04-05
Attending: INTERNAL MEDICINE
Payer: MEDICARE

## 2019-04-05 ENCOUNTER — INFUSION (OUTPATIENT)
Dept: INFUSION THERAPY | Facility: HOSPITAL | Age: 69
End: 2019-04-05
Attending: INTERNAL MEDICINE
Payer: MEDICARE

## 2019-04-05 VITALS
BODY MASS INDEX: 20.68 KG/M2 | HEIGHT: 71 IN | WEIGHT: 147.69 LBS | BODY MASS INDEX: 20.69 KG/M2 | OXYGEN SATURATION: 100 % | RESPIRATION RATE: 24 BRPM | SYSTOLIC BLOOD PRESSURE: 127 MMHG | DIASTOLIC BLOOD PRESSURE: 80 MMHG | RESPIRATION RATE: 21 BRPM | OXYGEN SATURATION: 100 % | HEART RATE: 70 BPM | HEART RATE: 66 BPM | SYSTOLIC BLOOD PRESSURE: 131 MMHG | TEMPERATURE: 98 F | DIASTOLIC BLOOD PRESSURE: 77 MMHG | HEIGHT: 71 IN

## 2019-04-05 DIAGNOSIS — C22.0 HEPATOCELLULAR CARCINOMA: Primary | ICD-10-CM

## 2019-04-05 DIAGNOSIS — C22.0 HEPATOCELLULAR CARCINOMA: ICD-10-CM

## 2019-04-05 DIAGNOSIS — T45.1X5A CHEMOTHERAPY INDUCED DIARRHEA: ICD-10-CM

## 2019-04-05 DIAGNOSIS — N28.9 RENAL INSUFFICIENCY: ICD-10-CM

## 2019-04-05 DIAGNOSIS — C22.0 HCC (HEPATOCELLULAR CARCINOMA): Primary | ICD-10-CM

## 2019-04-05 DIAGNOSIS — M54.50 ACUTE MIDLINE LOW BACK PAIN WITHOUT SCIATICA: ICD-10-CM

## 2019-04-05 DIAGNOSIS — K52.1 CHEMOTHERAPY INDUCED DIARRHEA: ICD-10-CM

## 2019-04-05 LAB
ALBUMIN SERPL BCP-MCNC: 3.2 G/DL (ref 3.5–5.2)
ALP SERPL-CCNC: 122 U/L (ref 55–135)
ALT SERPL W/O P-5'-P-CCNC: 19 U/L (ref 10–44)
ANION GAP SERPL CALC-SCNC: 7 MMOL/L (ref 8–16)
AST SERPL-CCNC: 47 U/L (ref 10–40)
BILIRUB SERPL-MCNC: 0.9 MG/DL (ref 0.1–1)
BUN SERPL-MCNC: 14 MG/DL (ref 8–23)
CALCIUM SERPL-MCNC: 9 MG/DL (ref 8.7–10.5)
CHLORIDE SERPL-SCNC: 106 MMOL/L (ref 95–110)
CO2 SERPL-SCNC: 24 MMOL/L (ref 23–29)
CREAT SERPL-MCNC: 1.4 MG/DL (ref 0.5–1.4)
ERYTHROCYTE [DISTWIDTH] IN BLOOD BY AUTOMATED COUNT: 21.7 % (ref 11.5–14.5)
EST. GFR  (AFRICAN AMERICAN): 58.4 ML/MIN/1.73 M^2
EST. GFR  (NON AFRICAN AMERICAN): 50.5 ML/MIN/1.73 M^2
GLUCOSE SERPL-MCNC: 109 MG/DL (ref 70–110)
HCT VFR BLD AUTO: 37.3 % (ref 40–54)
HGB BLD-MCNC: 12.1 G/DL (ref 14–18)
IMM GRANULOCYTES # BLD AUTO: 0.03 K/UL (ref 0–0.04)
MCH RBC QN AUTO: 28.5 PG (ref 27–31)
MCHC RBC AUTO-ENTMCNC: 32.4 G/DL (ref 32–36)
MCV RBC AUTO: 88 FL (ref 82–98)
NEUTROPHILS # BLD AUTO: 5.1 K/UL (ref 1.8–7.7)
PLATELET # BLD AUTO: 205 K/UL (ref 150–350)
PMV BLD AUTO: 10.9 FL (ref 9.2–12.9)
POTASSIUM SERPL-SCNC: 3.6 MMOL/L (ref 3.5–5.1)
PROT SERPL-MCNC: 7.3 G/DL (ref 6–8.4)
RBC # BLD AUTO: 4.25 M/UL (ref 4.6–6.2)
SODIUM SERPL-SCNC: 137 MMOL/L (ref 136–145)
WBC # BLD AUTO: 7.67 K/UL (ref 3.9–12.7)

## 2019-04-05 PROCEDURE — 99214 OFFICE O/P EST MOD 30 MIN: CPT | Mod: HCNC,S$GLB,, | Performed by: NURSE PRACTITIONER

## 2019-04-05 PROCEDURE — 25000003 PHARM REV CODE 250: Mod: HCNC | Performed by: INTERNAL MEDICINE

## 2019-04-05 PROCEDURE — 3074F SYST BP LT 130 MM HG: CPT | Mod: HCNC,CPTII,S$GLB, | Performed by: NURSE PRACTITIONER

## 2019-04-05 PROCEDURE — 3079F PR MOST RECENT DIASTOLIC BLOOD PRESSURE 80-89 MM HG: ICD-10-PCS | Mod: HCNC,CPTII,S$GLB, | Performed by: NURSE PRACTITIONER

## 2019-04-05 PROCEDURE — 63600175 PHARM REV CODE 636 W HCPCS: Mod: HCNC | Performed by: INTERNAL MEDICINE

## 2019-04-05 PROCEDURE — 99214 PR OFFICE/OUTPT VISIT, EST, LEVL IV, 30-39 MIN: ICD-10-PCS | Mod: HCNC,S$GLB,, | Performed by: NURSE PRACTITIONER

## 2019-04-05 PROCEDURE — 99499 RISK ADDL DX/OHS AUDIT: ICD-10-PCS | Mod: S$GLB,,, | Performed by: NURSE PRACTITIONER

## 2019-04-05 PROCEDURE — 96360 HYDRATION IV INFUSION INIT: CPT | Mod: HCNC

## 2019-04-05 PROCEDURE — 96361 HYDRATE IV INFUSION ADD-ON: CPT | Mod: HCNC

## 2019-04-05 PROCEDURE — 3079F DIAST BP 80-89 MM HG: CPT | Mod: HCNC,CPTII,S$GLB, | Performed by: NURSE PRACTITIONER

## 2019-04-05 PROCEDURE — 99999 PR PBB SHADOW E&M-EST. PATIENT-LVL III: ICD-10-PCS | Mod: PBBFAC,HCNC,, | Performed by: NURSE PRACTITIONER

## 2019-04-05 PROCEDURE — 99999 PR PBB SHADOW E&M-EST. PATIENT-LVL III: CPT | Mod: PBBFAC,HCNC,, | Performed by: NURSE PRACTITIONER

## 2019-04-05 PROCEDURE — 80053 COMPREHEN METABOLIC PANEL: CPT | Mod: HCNC

## 2019-04-05 PROCEDURE — 3074F PR MOST RECENT SYSTOLIC BLOOD PRESSURE < 130 MM HG: ICD-10-PCS | Mod: HCNC,CPTII,S$GLB, | Performed by: NURSE PRACTITIONER

## 2019-04-05 PROCEDURE — 1101F PT FALLS ASSESS-DOCD LE1/YR: CPT | Mod: HCNC,CPTII,S$GLB, | Performed by: NURSE PRACTITIONER

## 2019-04-05 PROCEDURE — 36591 DRAW BLOOD OFF VENOUS DEVICE: CPT | Mod: HCNC

## 2019-04-05 PROCEDURE — 85027 COMPLETE CBC AUTOMATED: CPT | Mod: HCNC

## 2019-04-05 PROCEDURE — 25000003 PHARM REV CODE 250: Mod: HCNC | Performed by: NURSE PRACTITIONER

## 2019-04-05 PROCEDURE — 1101F PR PT FALLS ASSESS DOC 0-1 FALLS W/OUT INJ PAST YR: ICD-10-PCS | Mod: HCNC,CPTII,S$GLB, | Performed by: NURSE PRACTITIONER

## 2019-04-05 PROCEDURE — A4216 STERILE WATER/SALINE, 10 ML: HCPCS | Mod: HCNC | Performed by: INTERNAL MEDICINE

## 2019-04-05 PROCEDURE — 99499 UNLISTED E&M SERVICE: CPT | Mod: S$GLB,,, | Performed by: NURSE PRACTITIONER

## 2019-04-05 RX ORDER — SODIUM CHLORIDE 0.9 % (FLUSH) 0.9 %
10 SYRINGE (ML) INJECTION
Status: CANCELLED | OUTPATIENT
Start: 2019-04-05

## 2019-04-05 RX ORDER — HEPARIN 100 UNIT/ML
500 SYRINGE INTRAVENOUS
Status: CANCELLED | OUTPATIENT
Start: 2019-04-05

## 2019-04-05 RX ORDER — HEPARIN 100 UNIT/ML
500 SYRINGE INTRAVENOUS
Status: DISCONTINUED | OUTPATIENT
Start: 2019-04-05 | End: 2019-04-05 | Stop reason: HOSPADM

## 2019-04-05 RX ORDER — HEPARIN 100 UNIT/ML
500 SYRINGE INTRAVENOUS
Status: COMPLETED | OUTPATIENT
Start: 2019-04-05 | End: 2019-04-05

## 2019-04-05 RX ORDER — TRAMADOL HYDROCHLORIDE 50 MG/1
50 TABLET ORAL EVERY 6 HOURS PRN
Qty: 30 TABLET | Refills: 0 | Status: SHIPPED | OUTPATIENT
Start: 2019-04-05

## 2019-04-05 RX ORDER — SODIUM CHLORIDE 0.9 % (FLUSH) 0.9 %
10 SYRINGE (ML) INJECTION
Status: COMPLETED | OUTPATIENT
Start: 2019-04-05 | End: 2019-04-05

## 2019-04-05 RX ORDER — SODIUM CHLORIDE 0.9 % (FLUSH) 0.9 %
10 SYRINGE (ML) INJECTION
Status: DISCONTINUED | OUTPATIENT
Start: 2019-04-05 | End: 2019-04-05 | Stop reason: HOSPADM

## 2019-04-05 RX ORDER — SODIUM CHLORIDE 9 MG/ML
INJECTION, SOLUTION INTRAVENOUS ONCE
Status: DISCONTINUED | OUTPATIENT
Start: 2019-04-05 | End: 2019-04-05 | Stop reason: HOSPADM

## 2019-04-05 RX ORDER — ACETAMINOPHEN 500 MG
500 TABLET ORAL EVERY 6 HOURS PRN
COMMUNITY

## 2019-04-05 RX ORDER — SODIUM CHLORIDE 9 MG/ML
INJECTION, SOLUTION INTRAVENOUS ONCE
Status: CANCELLED
Start: 2019-04-05

## 2019-04-05 RX ADMIN — HEPARIN 500 UNITS: 100 SYRINGE at 01:04

## 2019-04-05 RX ADMIN — SODIUM CHLORIDE: 0.9 INJECTION, SOLUTION INTRAVENOUS at 03:04

## 2019-04-05 RX ADMIN — Medication 10 ML: at 01:04

## 2019-04-09 ENCOUNTER — HOSPITAL ENCOUNTER (INPATIENT)
Facility: HOSPITAL | Age: 69
LOS: 5 days | Discharge: HOME HEALTH | DRG: 190 | End: 2019-04-14
Attending: EMERGENCY MEDICINE | Admitting: EMERGENCY MEDICINE
Payer: MEDICARE

## 2019-04-09 DIAGNOSIS — I26.99 PULMONARY EMBOLI: ICD-10-CM

## 2019-04-09 DIAGNOSIS — R07.9 CHEST PAIN: ICD-10-CM

## 2019-04-09 DIAGNOSIS — J44.1 COPD EXACERBATION: ICD-10-CM

## 2019-04-09 DIAGNOSIS — R06.02 SOB (SHORTNESS OF BREATH): ICD-10-CM

## 2019-04-09 DIAGNOSIS — Z45.2 ENCOUNTER FOR CENTRAL LINE PLACEMENT: ICD-10-CM

## 2019-04-09 DIAGNOSIS — J96.01 ACUTE HYPOXEMIC RESPIRATORY FAILURE: Primary | ICD-10-CM

## 2019-04-09 PROBLEM — Z91.89 AT HIGH RISK FOR PULMONARY EMBOLISM: Status: ACTIVE | Noted: 2019-04-09

## 2019-04-09 PROBLEM — N17.9 AKI (ACUTE KIDNEY INJURY): Status: ACTIVE | Noted: 2019-04-09

## 2019-04-09 LAB
ALBUMIN SERPL BCP-MCNC: 2.6 G/DL (ref 3.5–5.2)
ALLENS TEST: ABNORMAL
ALP SERPL-CCNC: 131 U/L (ref 55–135)
ALT SERPL W/O P-5'-P-CCNC: 22 U/L (ref 10–44)
AMORPH CRY UR QL COMP ASSIST: ABNORMAL
AMPHET+METHAMPHET UR QL: NEGATIVE
ANION GAP SERPL CALC-SCNC: 14 MMOL/L (ref 8–16)
APTT BLDCRRT: 23.8 SEC (ref 21–32)
AST SERPL-CCNC: 54 U/L (ref 10–40)
BACTERIA #/AREA URNS AUTO: ABNORMAL /HPF
BARBITURATES UR QL SCN>200 NG/ML: NEGATIVE
BASOPHILS # BLD AUTO: 0.01 K/UL (ref 0–0.2)
BASOPHILS # BLD AUTO: 0.02 K/UL (ref 0–0.2)
BASOPHILS NFR BLD: 0.1 % (ref 0–1.9)
BASOPHILS NFR BLD: 0.2 % (ref 0–1.9)
BENZODIAZ UR QL SCN>200 NG/ML: NEGATIVE
BILIRUB SERPL-MCNC: 0.9 MG/DL (ref 0.1–1)
BILIRUB UR QL STRIP: ABNORMAL
BILIRUB UR QL STRIP: ABNORMAL
BNP SERPL-MCNC: 69 PG/ML (ref 0–99)
BUN SERPL-MCNC: 25 MG/DL (ref 8–23)
BZE UR QL SCN: NEGATIVE
CALCIUM SERPL-MCNC: 8.3 MG/DL (ref 8.7–10.5)
CANNABINOIDS UR QL SCN: NEGATIVE
CHLORIDE SERPL-SCNC: 109 MMOL/L (ref 95–110)
CLARITY UR REFRACT.AUTO: ABNORMAL
CLARITY UR REFRACT.AUTO: ABNORMAL
CO2 SERPL-SCNC: 14 MMOL/L (ref 23–29)
COLOR UR AUTO: ABNORMAL
COLOR UR AUTO: ABNORMAL
CREAT SERPL-MCNC: 2.2 MG/DL (ref 0.5–1.4)
CREAT SERPL-MCNC: 2.7 MG/DL (ref 0.5–1.4)
CREAT UR-MCNC: >450 MG/DL (ref 23–375)
CREAT UR-MCNC: >450 MG/DL (ref 23–375)
DELSYS: ABNORMAL
DIFFERENTIAL METHOD: ABNORMAL
DIFFERENTIAL METHOD: ABNORMAL
EOSINOPHIL # BLD AUTO: 0 K/UL (ref 0–0.5)
EOSINOPHIL # BLD AUTO: 0 K/UL (ref 0–0.5)
EOSINOPHIL NFR BLD: 0.1 % (ref 0–8)
EOSINOPHIL NFR BLD: 0.1 % (ref 0–8)
EP: 6
ERYTHROCYTE [DISTWIDTH] IN BLOOD BY AUTOMATED COUNT: 21.2 % (ref 11.5–14.5)
ERYTHROCYTE [DISTWIDTH] IN BLOOD BY AUTOMATED COUNT: 23.2 % (ref 11.5–14.5)
ERYTHROCYTE [SEDIMENTATION RATE] IN BLOOD BY WESTERGREN METHOD: 18 MM/H
EST. GFR  (AFRICAN AMERICAN): 33.8 ML/MIN/1.73 M^2
EST. GFR  (NON AFRICAN AMERICAN): 29.3 ML/MIN/1.73 M^2
ETHANOL UR-MCNC: <10 MG/DL
FIO2: 100
FLOW: 15
GLUCOSE SERPL-MCNC: 112 MG/DL (ref 70–110)
GLUCOSE UR QL STRIP: NEGATIVE
GLUCOSE UR QL STRIP: NEGATIVE
HCO3 UR-SCNC: 12 MMOL/L (ref 24–28)
HCO3 UR-SCNC: 13 MMOL/L (ref 24–28)
HCO3 UR-SCNC: 14.9 MMOL/L (ref 24–28)
HCT VFR BLD AUTO: 35.8 % (ref 40–54)
HCT VFR BLD AUTO: 52.3 % (ref 40–54)
HCT VFR BLD CALC: 44 %PCV (ref 36–54)
HGB BLD-MCNC: 11.8 G/DL (ref 14–18)
HGB BLD-MCNC: 16.7 G/DL (ref 14–18)
HGB UR QL STRIP: ABNORMAL
HGB UR QL STRIP: ABNORMAL
HYALINE CASTS UR QL AUTO: 192 /LPF
IMM GRANULOCYTES # BLD AUTO: 0.03 K/UL (ref 0–0.04)
IMM GRANULOCYTES # BLD AUTO: 0.05 K/UL (ref 0–0.04)
IMM GRANULOCYTES NFR BLD AUTO: 0.4 % (ref 0–0.5)
IMM GRANULOCYTES NFR BLD AUTO: 0.6 % (ref 0–0.5)
INR PPP: 1.2 (ref 0.8–1.2)
IP: 12
KETONES UR QL STRIP: ABNORMAL
KETONES UR QL STRIP: ABNORMAL
LACTATE SERPL-SCNC: 2.3 MMOL/L (ref 0.5–2.2)
LACTATE SERPL-SCNC: 2.6 MMOL/L (ref 0.5–2.2)
LEUKOCYTE ESTERASE UR QL STRIP: ABNORMAL
LEUKOCYTE ESTERASE UR QL STRIP: ABNORMAL
LIPASE SERPL-CCNC: 23 U/L (ref 4–60)
LYMPHOCYTES # BLD AUTO: 0.9 K/UL (ref 1–4.8)
LYMPHOCYTES # BLD AUTO: 1.7 K/UL (ref 1–4.8)
LYMPHOCYTES NFR BLD: 11.3 % (ref 18–48)
LYMPHOCYTES NFR BLD: 19.5 % (ref 18–48)
MAGNESIUM SERPL-MCNC: 1 MG/DL (ref 1.6–2.6)
MAGNESIUM SERPL-MCNC: 1.7 MG/DL (ref 1.6–2.6)
MCH RBC QN AUTO: 28.3 PG (ref 27–31)
MCH RBC QN AUTO: 28.8 PG (ref 27–31)
MCHC RBC AUTO-ENTMCNC: 31.9 G/DL (ref 32–36)
MCHC RBC AUTO-ENTMCNC: 33 G/DL (ref 32–36)
MCV RBC AUTO: 87 FL (ref 82–98)
MCV RBC AUTO: 89 FL (ref 82–98)
METHADONE UR QL SCN>300 NG/ML: NEGATIVE
MICROSCOPIC COMMENT: ABNORMAL
MIN VOL: 24.9
MODE: ABNORMAL
MONOCYTES # BLD AUTO: 0.6 K/UL (ref 0.3–1)
MONOCYTES # BLD AUTO: 0.6 K/UL (ref 0.3–1)
MONOCYTES NFR BLD: 7.5 % (ref 4–15)
MONOCYTES NFR BLD: 7.8 % (ref 4–15)
NEUTROPHILS # BLD AUTO: 6.1 K/UL (ref 1.8–7.7)
NEUTROPHILS # BLD AUTO: 6.3 K/UL (ref 1.8–7.7)
NEUTROPHILS NFR BLD: 72.1 % (ref 38–73)
NEUTROPHILS NFR BLD: 80.3 % (ref 38–73)
NITRITE UR QL STRIP: NEGATIVE
NITRITE UR QL STRIP: NEGATIVE
NRBC BLD-RTO: 0 /100 WBC
NRBC BLD-RTO: 0 /100 WBC
OPIATES UR QL SCN: NEGATIVE
PCO2 BLDA: 17.2 MMHG (ref 35–45)
PCO2 BLDA: 17.6 MMHG (ref 35–45)
PCO2 BLDA: 25 MMHG (ref 35–45)
PCP UR QL SCN>25 NG/ML: NEGATIVE
PH SMN: 7.38 [PH] (ref 7.35–7.45)
PH SMN: 7.45 [PH] (ref 7.35–7.45)
PH SMN: 7.48 [PH] (ref 7.35–7.45)
PH UR STRIP: 5 [PH] (ref 5–8)
PH UR STRIP: 5 [PH] (ref 5–8)
PHOSPHATE SERPL-MCNC: 3.1 MG/DL (ref 2.7–4.5)
PLATELET # BLD AUTO: 211 K/UL (ref 150–350)
PLATELET # BLD AUTO: 248 K/UL (ref 150–350)
PMV BLD AUTO: 10.8 FL (ref 9.2–12.9)
PMV BLD AUTO: 10.8 FL (ref 9.2–12.9)
PO2 BLDA: 349 MMHG (ref 80–100)
PO2 BLDA: 35 MMHG (ref 40–60)
PO2 BLDA: 413 MMHG (ref 80–100)
POC BE: -10 MMOL/L
POC BE: -11 MMOL/L
POC BE: -12 MMOL/L
POC IONIZED CALCIUM: 1.17 MMOL/L (ref 1.06–1.42)
POC PTINR: 1.7 (ref 0.9–1.2)
POC PTWBT: 19.6 SEC (ref 9.7–14.3)
POC SATURATED O2: 100 % (ref 95–100)
POC SATURATED O2: 100 % (ref 95–100)
POC SATURATED O2: 68 % (ref 95–100)
POC TCO2: 13 MMOL/L (ref 23–27)
POC TCO2: 14 MMOL/L (ref 23–27)
POC TCO2: 16 MMOL/L (ref 24–29)
POTASSIUM BLD-SCNC: 3.6 MMOL/L (ref 3.5–5.1)
POTASSIUM SERPL-SCNC: 3.6 MMOL/L (ref 3.5–5.1)
PROT SERPL-MCNC: 6.3 G/DL (ref 6–8.4)
PROT UR QL STRIP: ABNORMAL
PROT UR QL STRIP: ABNORMAL
PROTHROMBIN TIME: 11.8 SEC (ref 9–12.5)
PROVIDER CREDENTIALS: ABNORMAL
PROVIDER CREDENTIALS: ABNORMAL
PROVIDER NOTIFIED: ABNORMAL
PROVIDER NOTIFIED: ABNORMAL
RBC # BLD AUTO: 4.1 M/UL (ref 4.6–6.2)
RBC # BLD AUTO: 5.91 M/UL (ref 4.6–6.2)
RBC #/AREA URNS AUTO: 1 /HPF (ref 0–4)
SAMPLE: ABNORMAL
SITE: ABNORMAL
SODIUM BLD-SCNC: 134 MMOL/L (ref 136–145)
SODIUM SERPL-SCNC: 137 MMOL/L (ref 136–145)
SODIUM UR-SCNC: <20 MMOL/L (ref 20–250)
SP GR UR STRIP: 1.02 (ref 1–1.03)
SP GR UR STRIP: 1.02 (ref 1–1.03)
SP02: 100
SPONT RATE: 49
SQUAMOUS #/AREA URNS AUTO: 1 /HPF
T4 FREE SERPL-MCNC: 0.85 NG/DL (ref 0.71–1.51)
TIME NOTIFIED: 1201
TIME NOTIFIED: 1823
TOXICOLOGY INFORMATION: ABNORMAL
TROPONIN I SERPL DL<=0.01 NG/ML-MCNC: 0.15 NG/ML (ref 0–0.03)
TROPONIN I SERPL DL<=0.01 NG/ML-MCNC: 0.17 NG/ML (ref 0–0.03)
TROPONIN I SERPL DL<=0.01 NG/ML-MCNC: 0.22 NG/ML (ref 0–0.03)
TSH SERPL DL<=0.005 MIU/L-ACNC: 16.45 UIU/ML (ref 0.4–4)
URN SPEC COLLECT METH UR: ABNORMAL
URN SPEC COLLECT METH UR: ABNORMAL
UUN UR-MCNC: 372 MG/DL (ref 140–1050)
VERBAL RESULT READBACK PERFORMED: YES
VERBAL RESULT READBACK PERFORMED: YES
WBC # BLD AUTO: 7.87 K/UL (ref 3.9–12.7)
WBC # BLD AUTO: 8.51 K/UL (ref 3.9–12.7)
WBC #/AREA URNS AUTO: 7 /HPF (ref 0–5)
WBC CLUMPS UR QL AUTO: ABNORMAL

## 2019-04-09 PROCEDURE — 82330 ASSAY OF CALCIUM: CPT | Mod: HCNC

## 2019-04-09 PROCEDURE — 94660 CPAP INITIATION&MGMT: CPT | Mod: HCNC

## 2019-04-09 PROCEDURE — 63600175 PHARM REV CODE 636 W HCPCS: Mod: HCNC | Performed by: STUDENT IN AN ORGANIZED HEALTH CARE EDUCATION/TRAINING PROGRAM

## 2019-04-09 PROCEDURE — 84439 ASSAY OF FREE THYROXINE: CPT | Mod: HCNC

## 2019-04-09 PROCEDURE — 94644 CONT INHLJ TX 1ST HOUR: CPT | Mod: HCNC

## 2019-04-09 PROCEDURE — 84484 ASSAY OF TROPONIN QUANT: CPT | Mod: HCNC

## 2019-04-09 PROCEDURE — 80053 COMPREHEN METABOLIC PANEL: CPT | Mod: HCNC

## 2019-04-09 PROCEDURE — 96361 HYDRATE IV INFUSION ADD-ON: CPT | Mod: HCNC

## 2019-04-09 PROCEDURE — 93005 ELECTROCARDIOGRAM TRACING: CPT | Mod: HCNC

## 2019-04-09 PROCEDURE — 99291 PR CRITICAL CARE, E/M 30-74 MINUTES: ICD-10-PCS | Mod: ,,, | Performed by: EMERGENCY MEDICINE

## 2019-04-09 PROCEDURE — 83880 ASSAY OF NATRIURETIC PEPTIDE: CPT | Mod: HCNC

## 2019-04-09 PROCEDURE — 83735 ASSAY OF MAGNESIUM: CPT | Mod: 91,HCNC

## 2019-04-09 PROCEDURE — 93010 ELECTROCARDIOGRAM REPORT: CPT | Mod: HCNC,,, | Performed by: INTERNAL MEDICINE

## 2019-04-09 PROCEDURE — 25000003 PHARM REV CODE 250: Mod: HCNC | Performed by: STUDENT IN AN ORGANIZED HEALTH CARE EDUCATION/TRAINING PROGRAM

## 2019-04-09 PROCEDURE — 85025 COMPLETE CBC W/AUTO DIFF WBC: CPT | Mod: 91,HCNC

## 2019-04-09 PROCEDURE — 93010 EKG 12-LEAD: ICD-10-PCS | Mod: HCNC,76,, | Performed by: INTERNAL MEDICINE

## 2019-04-09 PROCEDURE — 96372 THER/PROPH/DIAG INJ SC/IM: CPT | Mod: 59,HCNC

## 2019-04-09 PROCEDURE — 85610 PROTHROMBIN TIME: CPT | Mod: HCNC

## 2019-04-09 PROCEDURE — 63600175 PHARM REV CODE 636 W HCPCS: Mod: HCNC | Performed by: EMERGENCY MEDICINE

## 2019-04-09 PROCEDURE — 94761 N-INVAS EAR/PLS OXIMETRY MLT: CPT | Mod: HCNC

## 2019-04-09 PROCEDURE — 83735 ASSAY OF MAGNESIUM: CPT | Mod: HCNC

## 2019-04-09 PROCEDURE — 85014 HEMATOCRIT: CPT | Mod: HCNC

## 2019-04-09 PROCEDURE — 85730 THROMBOPLASTIN TIME PARTIAL: CPT | Mod: HCNC

## 2019-04-09 PROCEDURE — 84443 ASSAY THYROID STIM HORMONE: CPT | Mod: HCNC

## 2019-04-09 PROCEDURE — 36600 WITHDRAWAL OF ARTERIAL BLOOD: CPT | Mod: HCNC

## 2019-04-09 PROCEDURE — 99291 CRITICAL CARE FIRST HOUR: CPT | Mod: 25,HCNC

## 2019-04-09 PROCEDURE — 93010 ELECTROCARDIOGRAM REPORT: CPT | Mod: HCNC,76,, | Performed by: INTERNAL MEDICINE

## 2019-04-09 PROCEDURE — 96374 THER/PROPH/DIAG INJ IV PUSH: CPT | Mod: HCNC

## 2019-04-09 PROCEDURE — 99900035 HC TECH TIME PER 15 MIN (STAT): Mod: HCNC

## 2019-04-09 PROCEDURE — 82565 ASSAY OF CREATININE: CPT | Mod: HCNC

## 2019-04-09 PROCEDURE — 81001 URINALYSIS AUTO W/SCOPE: CPT | Mod: HCNC

## 2019-04-09 PROCEDURE — 84132 ASSAY OF SERUM POTASSIUM: CPT | Mod: HCNC

## 2019-04-09 PROCEDURE — 25000242 PHARM REV CODE 250 ALT 637 W/ HCPCS: Mod: HCNC | Performed by: EMERGENCY MEDICINE

## 2019-04-09 PROCEDURE — 99291 CRITICAL CARE FIRST HOUR: CPT | Mod: ,,, | Performed by: EMERGENCY MEDICINE

## 2019-04-09 PROCEDURE — 84540 ASSAY OF URINE/UREA-N: CPT | Mod: HCNC

## 2019-04-09 PROCEDURE — 84484 ASSAY OF TROPONIN QUANT: CPT | Mod: 91,HCNC

## 2019-04-09 PROCEDURE — 84100 ASSAY OF PHOSPHORUS: CPT | Mod: HCNC

## 2019-04-09 PROCEDURE — 84300 ASSAY OF URINE SODIUM: CPT | Mod: HCNC

## 2019-04-09 PROCEDURE — 94640 AIRWAY INHALATION TREATMENT: CPT | Mod: HCNC

## 2019-04-09 PROCEDURE — 80307 DRUG TEST PRSMV CHEM ANLYZR: CPT | Mod: HCNC

## 2019-04-09 PROCEDURE — 25000242 PHARM REV CODE 250 ALT 637 W/ HCPCS: Mod: HCNC | Performed by: STUDENT IN AN ORGANIZED HEALTH CARE EDUCATION/TRAINING PROGRAM

## 2019-04-09 PROCEDURE — 82803 BLOOD GASES ANY COMBINATION: CPT | Mod: HCNC

## 2019-04-09 PROCEDURE — 83690 ASSAY OF LIPASE: CPT | Mod: HCNC

## 2019-04-09 PROCEDURE — 83605 ASSAY OF LACTIC ACID: CPT | Mod: 91,HCNC

## 2019-04-09 PROCEDURE — 87040 BLOOD CULTURE FOR BACTERIA: CPT | Mod: 59,HCNC

## 2019-04-09 PROCEDURE — 25000003 PHARM REV CODE 250: Mod: HCNC | Performed by: EMERGENCY MEDICINE

## 2019-04-09 PROCEDURE — 20000000 HC ICU ROOM: Mod: HCNC

## 2019-04-09 PROCEDURE — 27000221 HC OXYGEN, UP TO 24 HOURS: Mod: HCNC

## 2019-04-09 PROCEDURE — 84295 ASSAY OF SERUM SODIUM: CPT | Mod: HCNC

## 2019-04-09 RX ORDER — ATORVASTATIN CALCIUM 20 MG/1
40 TABLET, FILM COATED ORAL DAILY
Status: DISCONTINUED | OUTPATIENT
Start: 2019-04-10 | End: 2019-04-12

## 2019-04-09 RX ORDER — NITROGLYCERIN 0.4 MG/1
TABLET SUBLINGUAL
Status: DISPENSED
Start: 2019-04-09 | End: 2019-04-10

## 2019-04-09 RX ORDER — HEPARIN SODIUM,PORCINE/D5W 25000/250
18 INTRAVENOUS SOLUTION INTRAVENOUS CONTINUOUS
Status: DISCONTINUED | OUTPATIENT
Start: 2019-04-09 | End: 2019-04-10

## 2019-04-09 RX ORDER — ASPIRIN 81 MG/1
81 TABLET ORAL DAILY
Status: DISCONTINUED | OUTPATIENT
Start: 2019-04-10 | End: 2019-04-15 | Stop reason: HOSPADM

## 2019-04-09 RX ORDER — SODIUM CHLORIDE 0.9 % (FLUSH) 0.9 %
10 SYRINGE (ML) INJECTION
Status: DISCONTINUED | OUTPATIENT
Start: 2019-04-09 | End: 2019-04-15 | Stop reason: HOSPADM

## 2019-04-09 RX ORDER — ONDANSETRON 2 MG/ML
4 INJECTION INTRAMUSCULAR; INTRAVENOUS
Status: COMPLETED | OUTPATIENT
Start: 2019-04-09 | End: 2019-04-09

## 2019-04-09 RX ORDER — HYDROMORPHONE HYDROCHLORIDE 1 MG/ML
0.2 INJECTION, SOLUTION INTRAMUSCULAR; INTRAVENOUS; SUBCUTANEOUS
Status: COMPLETED | OUTPATIENT
Start: 2019-04-09 | End: 2019-04-09

## 2019-04-09 RX ORDER — IPRATROPIUM BROMIDE 0.5 MG/2.5ML
1 SOLUTION RESPIRATORY (INHALATION)
Status: COMPLETED | OUTPATIENT
Start: 2019-04-09 | End: 2019-04-09

## 2019-04-09 RX ORDER — METOPROLOL TARTRATE 25 MG/1
12.5 TABLET ORAL 2 TIMES DAILY
Status: DISCONTINUED | OUTPATIENT
Start: 2019-04-09 | End: 2019-04-09

## 2019-04-09 RX ORDER — IPRATROPIUM BROMIDE AND ALBUTEROL SULFATE 2.5; .5 MG/3ML; MG/3ML
3 SOLUTION RESPIRATORY (INHALATION) EVERY 4 HOURS
Status: DISCONTINUED | OUTPATIENT
Start: 2019-04-09 | End: 2019-04-15 | Stop reason: HOSPADM

## 2019-04-09 RX ORDER — FLUTICASONE FUROATE AND VILANTEROL 200; 25 UG/1; UG/1
1 POWDER RESPIRATORY (INHALATION) DAILY
Status: DISCONTINUED | OUTPATIENT
Start: 2019-04-10 | End: 2019-04-15 | Stop reason: HOSPADM

## 2019-04-09 RX ORDER — NITROGLYCERIN 0.4 MG/1
0.4 TABLET SUBLINGUAL EVERY 5 MIN PRN
Status: DISCONTINUED | OUTPATIENT
Start: 2019-04-09 | End: 2019-04-09

## 2019-04-09 RX ORDER — ALBUTEROL SULFATE 90 UG/1
2 AEROSOL, METERED RESPIRATORY (INHALATION) EVERY 6 HOURS PRN
Status: DISCONTINUED | OUTPATIENT
Start: 2019-04-09 | End: 2019-04-15 | Stop reason: HOSPADM

## 2019-04-09 RX ORDER — ASPIRIN 325 MG
325 TABLET ORAL
Status: DISPENSED | OUTPATIENT
Start: 2019-04-09 | End: 2019-04-09

## 2019-04-09 RX ORDER — ACETAMINOPHEN 500 MG
500 TABLET ORAL EVERY 6 HOURS PRN
Status: DISCONTINUED | OUTPATIENT
Start: 2019-04-09 | End: 2019-04-15 | Stop reason: HOSPADM

## 2019-04-09 RX ORDER — DORZOLAMIDE HCL 20 MG/ML
1 SOLUTION/ DROPS OPHTHALMIC 2 TIMES DAILY
Status: DISCONTINUED | OUTPATIENT
Start: 2019-04-09 | End: 2019-04-15 | Stop reason: HOSPADM

## 2019-04-09 RX ORDER — METOPROLOL TARTRATE 25 MG/1
12.5 TABLET ORAL 2 TIMES DAILY
Status: DISCONTINUED | OUTPATIENT
Start: 2019-04-09 | End: 2019-04-10

## 2019-04-09 RX ORDER — ALBUTEROL SULFATE 2.5 MG/.5ML
15 SOLUTION RESPIRATORY (INHALATION)
Status: COMPLETED | OUTPATIENT
Start: 2019-04-09 | End: 2019-04-09

## 2019-04-09 RX ORDER — LEVOTHYROXINE SODIUM 100 UG/1
100 TABLET ORAL DAILY
Status: DISCONTINUED | OUTPATIENT
Start: 2019-04-10 | End: 2019-04-15 | Stop reason: HOSPADM

## 2019-04-09 RX ORDER — LEVOTHYROXINE SODIUM 50 UG/1
100 TABLET ORAL DAILY
Status: DISCONTINUED | OUTPATIENT
Start: 2019-04-10 | End: 2019-04-09

## 2019-04-09 RX ORDER — HEPARIN 100 UNIT/ML
100 SYRINGE INTRAVENOUS
Status: DISCONTINUED | OUTPATIENT
Start: 2019-04-09 | End: 2019-04-15 | Stop reason: HOSPADM

## 2019-04-09 RX ORDER — DORZOLAMIDE HYDROCHLORIDE AND TIMOLOL MALEATE 20; 5 MG/ML; MG/ML
1 SOLUTION/ DROPS OPHTHALMIC 2 TIMES DAILY
Status: DISCONTINUED | OUTPATIENT
Start: 2019-04-09 | End: 2019-04-09 | Stop reason: SDUPTHER

## 2019-04-09 RX ORDER — TIMOLOL MALEATE 5 MG/ML
1 SOLUTION/ DROPS OPHTHALMIC 2 TIMES DAILY
Status: DISCONTINUED | OUTPATIENT
Start: 2019-04-09 | End: 2019-04-15 | Stop reason: HOSPADM

## 2019-04-09 RX ORDER — MAGNESIUM SULFATE HEPTAHYDRATE 40 MG/ML
2 INJECTION, SOLUTION INTRAVENOUS ONCE
Status: COMPLETED | OUTPATIENT
Start: 2019-04-09 | End: 2019-04-09

## 2019-04-09 RX ADMIN — IPRATROPIUM BROMIDE AND ALBUTEROL SULFATE 3 ML: .5; 3 SOLUTION RESPIRATORY (INHALATION) at 08:04

## 2019-04-09 RX ADMIN — MAGNESIUM SULFATE IN WATER 2 G: 40 INJECTION, SOLUTION INTRAVENOUS at 08:04

## 2019-04-09 RX ADMIN — SODIUM CHLORIDE 1000 ML: 0.9 INJECTION, SOLUTION INTRAVENOUS at 12:04

## 2019-04-09 RX ADMIN — ALBUTEROL SULFATE 15 MG: 2.5 SOLUTION RESPIRATORY (INHALATION) at 12:04

## 2019-04-09 RX ADMIN — HEPARIN 100 UNITS: 100 SYRINGE at 02:04

## 2019-04-09 RX ADMIN — HEPARIN SODIUM AND DEXTROSE 18 UNITS/KG/HR: 10000; 5 INJECTION INTRAVENOUS at 06:04

## 2019-04-09 RX ADMIN — SODIUM CHLORIDE 1986 ML: 0.9 INJECTION, SOLUTION INTRAVENOUS at 12:04

## 2019-04-09 RX ADMIN — ONDANSETRON 4 MG: 2 INJECTION INTRAMUSCULAR; INTRAVENOUS at 12:04

## 2019-04-09 RX ADMIN — TIMOLOL MALEATE 1 DROP: 5 SOLUTION OPHTHALMIC at 08:04

## 2019-04-09 RX ADMIN — NITROGLYCERIN 0.4 MG: 0.4 TABLET SUBLINGUAL at 03:04

## 2019-04-09 RX ADMIN — DORZOLAMIDE HYDROCHLORIDE 1 DROP: 20 SOLUTION/ DROPS OPHTHALMIC at 08:04

## 2019-04-09 RX ADMIN — HYDROMORPHONE HYDROCHLORIDE 0.2 MG: 1 INJECTION, SOLUTION INTRAMUSCULAR; INTRAVENOUS; SUBCUTANEOUS at 05:04

## 2019-04-09 RX ADMIN — IPRATROPIUM BROMIDE AND ALBUTEROL SULFATE 3 ML: .5; 3 SOLUTION RESPIRATORY (INHALATION) at 04:04

## 2019-04-09 RX ADMIN — METOPROLOL TARTRATE 12.5 MG: 25 TABLET, FILM COATED ORAL at 09:04

## 2019-04-09 RX ADMIN — IPRATROPIUM BROMIDE 1 MG: 0.5 SOLUTION RESPIRATORY (INHALATION) at 12:04

## 2019-04-09 NOTE — ASSESSMENT & PLAN NOTE
- He is taking Lisinopril 20 mg and Metoprolol 12.5 mg PO BID  - Hold on his ACEi in the setting of SEVERO; resume Metoprolol if hypertensive.

## 2019-04-09 NOTE — ASSESSMENT & PLAN NOTE
- Compensated by tachypnea and respiratory alkalosis  - Although we can't proceed with CTA given the SEVERO but PE is very likely in his case   - Wells' Criteria for PE 8.5 points represent High risk group with 40.6% chance of PE  - Will proceed with full anticoagulation with Heparin and U/S lower extremities.   - Currently stable alternating between BiPAP and Nasal Canula

## 2019-04-09 NOTE — ED NOTES
LOC: The patient is awake, alert, and aware of environment. The patient is oriented x 3 and speaking appropriately.   PSYCHOSOCIAL: Patient is calm and cooperative.   SKIN: The skin is warm, dry.   RESPIRATORY:  RR 46. Accessory muscle use. Lungs sounds clear in upper and lower lobes.   CARDIAC: Patient has a normal rate and rhythm.   ABDOMEN: Soft and non tender to palpation. No distention noted.   URINARY:  Voids independently.   NEUROLOGIC: Eyes open spontaneously. Speech clear. Tolerating saliva secretions well. Able to follow commands, demonstrating ability to actively and appropriately communicate within context of current conversation. Symmetrical facial muscles. Moving all extremities well. Movement is purposeful.   MUSCULOSKELETAL: No obvious deformities noted.

## 2019-04-09 NOTE — ASSESSMENT & PLAN NOTE
- Dx 12/2015 with HCC in the setting of hepatitis C.  - S/p multiple treatments with TACE.    - Had prior MRI shows progression of malignancy  - Nivolumab 04/2018 - 9/2018 (discontinued due to progression). Regorafenib 10/2018 -12/2018 (discontinued due to progression).   - Lenvima 1/2019; as one of the common side effect is abdominal pain (31%)

## 2019-04-09 NOTE — ED NOTES
Patient reporting chest pain - mid sternal, non-radiating, and sharp in nature. Patient rated pain as 9/10 and reports it is intermittent,

## 2019-04-09 NOTE — ED NOTES
Dr. Mendoza made aware that blood specimens hemolyzed and that we are having difficulty drawing blood from port. RBVO for heparin flush for port.

## 2019-04-09 NOTE — ED NOTES
Patient tachypyneic, reports SOB and chest pain following removal from bi-pap. Critical care MD made aware

## 2019-04-09 NOTE — ED NOTES
Patient remains on supplemental O2 at 6L via nasal cannula at this time. Tolerating well. Sat 100%.

## 2019-04-09 NOTE — ASSESSMENT & PLAN NOTE
- S/p radical prostatectomy on January 6, 2011 for a [place] 7 adenocarcinoma, ([id number]), with negative margins. Subsequently had a local recurrence for which he received XRT at [facility] (completed 10/28/2011).   - No active plan while on admission
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
## 2019-04-09 NOTE — ASSESSMENT & PLAN NOTE
- Following with Dr. Cummings in pulmonary, and on inhalers for controlled the symptoms   - PRN Albuterol and Spiriva

## 2019-04-09 NOTE — PROCEDURES
"Robin Hawkins is a 68 y.o. male patient.    Temp: 97.4 °F (36.3 °C) (04/09/19 1137)  Pulse: 83 (04/09/19 1753)  Resp: (!) 37 (04/09/19 1609)  BP: (!) 179/101 (04/09/19 1753)  SpO2: 100 % (04/09/19 1753)  Weight: 66.2 kg (146 lb) (04/09/19 1137)  Height: 5' 11" (180.3 cm) (04/09/19 1137)       Central Line  Date/Time: 4/9/2019 6:24 PM  Location procedure was performed: [facility] CRITICAL CARE MEDICINE  Performed by: Jeronimo Giron MD  Assisting provider: Jeronimo Giron MD  Pre-operative Diagnosis: acute hypoxemic respiratory failure  Post-operative diagnosis: acute hypoxemic respiratory failure  Consent Done: Yes  Time out: Immediately prior to procedure a "time out" was called to verify the correct patient, procedure, equipment, support staff and site/side marked as required.  Indications: med administration and vascular access  Anesthesia: local infiltration    Anesthesia:  Local Anesthetic: lidocaine 1% without epinephrine  Preparation: skin prepped with ChloraPrep  Location details: right internal jugular  Catheter size: 7 Fr  Ultrasound guidance: yes  Vessel Caliber: medium, compressibility normal  Assessment: placement verified by x-ray and no pneumothorax on x-ray  Specimens: No  Implants: No  Complications: No          Jeronimo Giron  4/9/2019  "

## 2019-04-09 NOTE — HPI
This is Mr. Robin Hawkins, 68 year old male with medical history significant for hepatocellular carcinoma, Essential hypertension, History of prostate cancer, COPD, CAD, Subclinical hypothyroidism. He presented to ED with two day history of epigastric pain, shortness of breath more prominent on lying flat and found to have acute hypoxemic respiratory failure. For the last couple of months; he endorsed that he developed nausea, poor oral intake; and decrease appetite; however, this presentation was mainly the epigastric pain and feeling shortness of breath. He denies fever, chills, respiratory symptoms; loss of consciousness or recent change in his mediations. Of note, Two previous COPD exacerbation admission 10/23-24 2018; and 11/26-29 2018 for for dyspnea, atypical chest pain. In ED, he was having tachypeic, with resp rate > 40; and hypoxic that require BiPAP. On evaluation he was very limited in his conversation secondary to tachypnea and tripoding.     Hepatocellular carcinoma  His history dates to 12/2015 when he was diagnosed with hepatocellular carcinoma in the setting of hepatitis C.  He had a 2.5 cm mass which demonstrated arterial hyperenhancement.  This had enlarged from prior imaging and AFP was elevated.  He underwent TACE in 1/2016 then 7/2017,  radioembolization, right hepatic lobe 4/4/17. Then he had Sorafenib 6/2017 - 4/2018 discontinued due to progression. TACE: 7/2017,  8/25/17, 3/2018. Nivolumab 04/2018 - 9/2018 (discontinued due to progression). Regorafenib 10/2018 -12/2018 (discontinued due to progression). Geraldvima 1/2019    Prostate Cancer  Underwent radical prostatectomy on January 6, 2011 for a Inderjit 7 adenocarcinoma, ([id number]), with negative margins. He subsequently had a local recurrence for which he received XRT at [facility] (completed 10/28/2011). Last available PSA was 0.03 (10/18/16).     Colon Cancer  He also history history of early stage colon cancer which was completely resected at  [facility] which required no adjuvant therapy.

## 2019-04-09 NOTE — ASSESSMENT & PLAN NOTE
- Baseline Creatinine is 0.9-1.0, dated two months ago   - Current Creatinine is 2.7 (on POC)   - Differential Diagnoses include pre renal in the setting of poor intake; less likely obstructive as he is able to urinate and no No hydronephrosis on CT scan   - Will continue trending urine output and renal function    - Avoid NSAIDs, contrast, nephrotoxins, monitor strict I/Os, daily weights

## 2019-04-09 NOTE — ED TRIAGE NOTES
Pt presents to ed in respiratory distress. Pt is being treated for liver cancer. Per sister pt is usually sob with exertion but this morning began to have severe respiratory distress. RR 44 on arrival.

## 2019-04-09 NOTE — ED NOTES
Critical care made aware that patient port and PIV do not draw and that multiple attempts have been made to obtain blood from patient and specimens continue to come back as hemolyzed.
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
## 2019-04-10 PROBLEM — N30.00 ACUTE CYSTITIS: Status: ACTIVE | Noted: 2019-04-10

## 2019-04-10 LAB
ALBUMIN SERPL BCP-MCNC: 2.7 G/DL (ref 3.5–5.2)
ALLENS TEST: ABNORMAL
ALLENS TEST: ABNORMAL
ALP SERPL-CCNC: 126 U/L (ref 55–135)
ALT SERPL W/O P-5'-P-CCNC: 23 U/L (ref 10–44)
ANION GAP SERPL CALC-SCNC: 9 MMOL/L (ref 8–16)
APTT BLDCRRT: 74.2 SEC (ref 21–32)
APTT BLDCRRT: >150 SEC (ref 21–32)
AST SERPL-CCNC: 58 U/L (ref 10–40)
AV INDEX (PROSTH): 0.99
AV MEAN GRADIENT: 1.55 MMHG
AV PEAK GRADIENT: 2.76 MMHG
AV VALVE AREA: 3.06 CM2
AV VELOCITY RATIO: 0.88
BASOPHILS # BLD AUTO: 0.01 K/UL (ref 0–0.2)
BASOPHILS NFR BLD: 0.1 % (ref 0–1.9)
BILIRUB SERPL-MCNC: 0.7 MG/DL (ref 0.1–1)
BSA FOR ECHO PROCEDURE: 1.84 M2
BUN SERPL-MCNC: 28 MG/DL (ref 8–23)
CALCIUM SERPL-MCNC: 8.4 MG/DL (ref 8.7–10.5)
CHLORIDE SERPL-SCNC: 106 MMOL/L (ref 95–110)
CO2 SERPL-SCNC: 19 MMOL/L (ref 23–29)
CREAT SERPL-MCNC: 1.9 MG/DL (ref 0.5–1.4)
CV ECHO LV RWT: 0.37 CM
DELSYS: ABNORMAL
DELSYS: ABNORMAL
DIFFERENTIAL METHOD: ABNORMAL
DOP CALC AO PEAK VEL: 0.83 M/S
DOP CALC AO VTI: 16.59 CM
DOP CALC LVOT AREA: 3.08 CM2
DOP CALC LVOT DIAMETER: 1.98 CM
DOP CALC LVOT PEAK VEL: 0.73 M/S
DOP CALC LVOT STROKE VOLUME: 50.69 CM3
DOP CALCLVOT PEAK VEL VTI: 16.47 CM
E WAVE DECELERATION TIME: 212.5 MSEC
E/A RATIO: 0.7
E/E' RATIO: 7.17
ECHO LV POSTERIOR WALL: 0.75 CM (ref 0.6–1.1)
EOSINOPHIL # BLD AUTO: 0 K/UL (ref 0–0.5)
EOSINOPHIL NFR BLD: 0.1 % (ref 0–8)
EOSINOPHIL URNS QL WRIGHT STN: ABNORMAL
EP: 6
ERYTHROCYTE [DISTWIDTH] IN BLOOD BY AUTOMATED COUNT: 21.6 % (ref 11.5–14.5)
ERYTHROCYTE [SEDIMENTATION RATE] IN BLOOD BY WESTERGREN METHOD: 18 MM/H
ERYTHROCYTE [SEDIMENTATION RATE] IN BLOOD BY WESTERGREN METHOD: 20 MM/H
EST. GFR  (AFRICAN AMERICAN): 40.4 ML/MIN/1.73 M^2
EST. GFR  (NON AFRICAN AMERICAN): 34.9 ML/MIN/1.73 M^2
FIO2: 30
FIO2: 36
FLOW: 4
FRACTIONAL SHORTENING: 26 % (ref 28–44)
GLUCOSE SERPL-MCNC: 109 MG/DL (ref 70–110)
HCO3 UR-SCNC: 19 MMOL/L (ref 24–28)
HCO3 UR-SCNC: 19.1 MMOL/L (ref 24–28)
HCT VFR BLD AUTO: 35.1 % (ref 40–54)
HGB BLD-MCNC: 11.1 G/DL (ref 14–18)
IMM GRANULOCYTES # BLD AUTO: 0.04 K/UL (ref 0–0.04)
IMM GRANULOCYTES NFR BLD AUTO: 0.4 % (ref 0–0.5)
INTERVENTRICULAR SEPTUM: 0.66 CM (ref 0.6–1.1)
IP: 12
LA MAJOR: 3.63 CM
LA MINOR: 4.07 CM
LA WIDTH: 2.5 CM
LACTATE SERPL-SCNC: 0.9 MMOL/L (ref 0.5–2.2)
LEFT ATRIUM SIZE: 1.83 CM
LEFT ATRIUM VOLUME INDEX: 8 ML/M2
LEFT ATRIUM VOLUME: 14.92 CM3
LEFT INTERNAL DIMENSION IN SYSTOLE: 2.97 CM (ref 2.1–4)
LEFT VENTRICLE DIASTOLIC VOLUME INDEX: 38.42 ML/M2
LEFT VENTRICLE DIASTOLIC VOLUME: 71.5 ML
LEFT VENTRICLE MASS INDEX: 43.2 G/M2
LEFT VENTRICLE SYSTOLIC VOLUME INDEX: 18.4 ML/M2
LEFT VENTRICLE SYSTOLIC VOLUME: 34.18 ML
LEFT VENTRICULAR INTERNAL DIMENSION IN DIASTOLE: 4.04 CM (ref 3.5–6)
LEFT VENTRICULAR MASS: 80.42 G
LV LATERAL E/E' RATIO: 7.17
LV SEPTAL E/E' RATIO: 7.17
LYMPHOCYTES # BLD AUTO: 0.9 K/UL (ref 1–4.8)
LYMPHOCYTES NFR BLD: 9.7 % (ref 18–48)
MAGNESIUM SERPL-MCNC: 2.1 MG/DL (ref 1.6–2.6)
MAGNESIUM SERPL-MCNC: 2.2 MG/DL (ref 1.6–2.6)
MCH RBC QN AUTO: 28.2 PG (ref 27–31)
MCHC RBC AUTO-ENTMCNC: 31.6 G/DL (ref 32–36)
MCV RBC AUTO: 89 FL (ref 82–98)
MODE: ABNORMAL
MODE: ABNORMAL
MONOCYTES # BLD AUTO: 0.9 K/UL (ref 0.3–1)
MONOCYTES NFR BLD: 9.5 % (ref 4–15)
MV PEAK A VEL: 0.61 M/S
MV PEAK E VEL: 0.43 M/S
NEUTROPHILS # BLD AUTO: 7.1 K/UL (ref 1.8–7.7)
NEUTROPHILS NFR BLD: 80.2 % (ref 38–73)
NRBC BLD-RTO: 0 /100 WBC
PCO2 BLDA: 34 MMHG (ref 35–45)
PCO2 BLDA: 40.7 MMHG (ref 35–45)
PH SMN: 7.28 [PH] (ref 7.35–7.45)
PH SMN: 7.36 [PH] (ref 7.35–7.45)
PHOSPHATE SERPL-MCNC: 3.5 MG/DL (ref 2.7–4.5)
PLATELET # BLD AUTO: 197 K/UL (ref 150–350)
PMV BLD AUTO: 10.9 FL (ref 9.2–12.9)
PO2 BLDA: 22 MMHG (ref 40–60)
PO2 BLDA: 35 MMHG (ref 40–60)
POC BE: -6 MMOL/L
POC BE: -8 MMOL/L
POC SATURATED O2: 36 % (ref 95–100)
POC SATURATED O2: 60 % (ref 95–100)
POC TCO2: 20 MMOL/L (ref 24–29)
POC TCO2: 20 MMOL/L (ref 24–29)
POTASSIUM SERPL-SCNC: 3.4 MMOL/L (ref 3.5–5.1)
POTASSIUM SERPL-SCNC: 3.8 MMOL/L (ref 3.5–5.1)
PROT SERPL-MCNC: 6.3 G/DL (ref 6–8.4)
RA MAJOR: 3.44 CM
RA PRESSURE: 3 MMHG
RA WIDTH: 2.63 CM
RBC # BLD AUTO: 3.94 M/UL (ref 4.6–6.2)
RIGHT VENTRICULAR END-DIASTOLIC DIMENSION: 3.19 CM
SAMPLE: ABNORMAL
SAMPLE: ABNORMAL
SINUS: 2.81 CM
SITE: ABNORMAL
SITE: ABNORMAL
SODIUM SERPL-SCNC: 134 MMOL/L (ref 136–145)
SP02: 100
TDI LATERAL: 0.06
TDI SEPTAL: 0.06
TDI: 0.06
TRICUSPID ANNULAR PLANE SYSTOLIC EXCURSION: 1.29 CM
TROPONIN I SERPL DL<=0.01 NG/ML-MCNC: 0.15 NG/ML (ref 0–0.03)
TROPONIN I SERPL DL<=0.01 NG/ML-MCNC: 0.15 NG/ML (ref 0–0.03)
WBC # BLD AUTO: 8.91 K/UL (ref 3.9–12.7)

## 2019-04-10 PROCEDURE — 99291 CRITICAL CARE FIRST HOUR: CPT | Mod: HCNC,,, | Performed by: INTERNAL MEDICINE

## 2019-04-10 PROCEDURE — 84100 ASSAY OF PHOSPHORUS: CPT | Mod: HCNC

## 2019-04-10 PROCEDURE — 25000003 PHARM REV CODE 250: Mod: HCNC | Performed by: STUDENT IN AN ORGANIZED HEALTH CARE EDUCATION/TRAINING PROGRAM

## 2019-04-10 PROCEDURE — 84484 ASSAY OF TROPONIN QUANT: CPT | Mod: HCNC

## 2019-04-10 PROCEDURE — 11000001 HC ACUTE MED/SURG PRIVATE ROOM: Mod: HCNC

## 2019-04-10 PROCEDURE — 63600175 PHARM REV CODE 636 W HCPCS: Mod: HCNC | Performed by: INTERNAL MEDICINE

## 2019-04-10 PROCEDURE — 25000242 PHARM REV CODE 250 ALT 637 W/ HCPCS: Mod: HCNC | Performed by: STUDENT IN AN ORGANIZED HEALTH CARE EDUCATION/TRAINING PROGRAM

## 2019-04-10 PROCEDURE — 94660 CPAP INITIATION&MGMT: CPT | Mod: HCNC

## 2019-04-10 PROCEDURE — 94640 AIRWAY INHALATION TREATMENT: CPT | Mod: HCNC

## 2019-04-10 PROCEDURE — 99900035 HC TECH TIME PER 15 MIN (STAT): Mod: HCNC

## 2019-04-10 PROCEDURE — 63600175 PHARM REV CODE 636 W HCPCS: Mod: HCNC | Performed by: STUDENT IN AN ORGANIZED HEALTH CARE EDUCATION/TRAINING PROGRAM

## 2019-04-10 PROCEDURE — 27000190 HC CPAP FULL FACE MASK W/VALVE: Mod: HCNC

## 2019-04-10 PROCEDURE — 27000221 HC OXYGEN, UP TO 24 HOURS: Mod: HCNC

## 2019-04-10 PROCEDURE — 85730 THROMBOPLASTIN TIME PARTIAL: CPT | Mod: HCNC

## 2019-04-10 PROCEDURE — 84132 ASSAY OF SERUM POTASSIUM: CPT | Mod: HCNC

## 2019-04-10 PROCEDURE — 80053 COMPREHEN METABOLIC PANEL: CPT | Mod: HCNC

## 2019-04-10 PROCEDURE — 84484 ASSAY OF TROPONIN QUANT: CPT | Mod: 91,HCNC

## 2019-04-10 PROCEDURE — 87205 SMEAR GRAM STAIN: CPT | Mod: HCNC

## 2019-04-10 PROCEDURE — 99291 PR CRITICAL CARE, E/M 30-74 MINUTES: ICD-10-PCS | Mod: HCNC,,, | Performed by: INTERNAL MEDICINE

## 2019-04-10 PROCEDURE — 83605 ASSAY OF LACTIC ACID: CPT | Mod: HCNC

## 2019-04-10 PROCEDURE — 94761 N-INVAS EAR/PLS OXIMETRY MLT: CPT | Mod: HCNC

## 2019-04-10 PROCEDURE — 85730 THROMBOPLASTIN TIME PARTIAL: CPT | Mod: 91,HCNC

## 2019-04-10 PROCEDURE — 83735 ASSAY OF MAGNESIUM: CPT | Mod: HCNC

## 2019-04-10 PROCEDURE — 85025 COMPLETE CBC W/AUTO DIFF WBC: CPT | Mod: HCNC

## 2019-04-10 PROCEDURE — 82803 BLOOD GASES ANY COMBINATION: CPT | Mod: HCNC

## 2019-04-10 PROCEDURE — 83735 ASSAY OF MAGNESIUM: CPT | Mod: 91,HCNC

## 2019-04-10 RX ORDER — METOPROLOL TARTRATE 25 MG/1
25 TABLET, FILM COATED ORAL 2 TIMES DAILY
Status: DISCONTINUED | OUTPATIENT
Start: 2019-04-10 | End: 2019-04-15 | Stop reason: HOSPADM

## 2019-04-10 RX ORDER — HEPARIN SODIUM 5000 [USP'U]/ML
5000 INJECTION, SOLUTION INTRAVENOUS; SUBCUTANEOUS EVERY 8 HOURS
Status: DISCONTINUED | OUTPATIENT
Start: 2019-04-10 | End: 2019-04-15 | Stop reason: HOSPADM

## 2019-04-10 RX ORDER — HYDROMORPHONE HYDROCHLORIDE 1 MG/ML
0.2 INJECTION, SOLUTION INTRAMUSCULAR; INTRAVENOUS; SUBCUTANEOUS ONCE AS NEEDED
Status: COMPLETED | OUTPATIENT
Start: 2019-04-10 | End: 2019-04-10

## 2019-04-10 RX ORDER — CEFTRIAXONE 1 G/1
1 INJECTION, POWDER, FOR SOLUTION INTRAMUSCULAR; INTRAVENOUS
Status: DISCONTINUED | OUTPATIENT
Start: 2019-04-10 | End: 2019-04-12

## 2019-04-10 RX ORDER — CALCIUM CARBONATE 200(500)MG
500 TABLET,CHEWABLE ORAL 3 TIMES DAILY PRN
Status: DISCONTINUED | OUTPATIENT
Start: 2019-04-10 | End: 2019-04-15 | Stop reason: HOSPADM

## 2019-04-10 RX ORDER — FAMOTIDINE 20 MG/1
20 TABLET, FILM COATED ORAL DAILY
Status: DISCONTINUED | OUTPATIENT
Start: 2019-04-10 | End: 2019-04-13

## 2019-04-10 RX ORDER — PANTOPRAZOLE SODIUM 40 MG/1
40 TABLET, DELAYED RELEASE ORAL DAILY
Status: DISCONTINUED | OUTPATIENT
Start: 2019-04-10 | End: 2019-04-15 | Stop reason: HOSPADM

## 2019-04-10 RX ORDER — HYDROMORPHONE HYDROCHLORIDE 1 MG/ML
0.2 INJECTION, SOLUTION INTRAMUSCULAR; INTRAVENOUS; SUBCUTANEOUS ONCE AS NEEDED
Status: DISCONTINUED | OUTPATIENT
Start: 2019-04-10 | End: 2019-04-10

## 2019-04-10 RX ADMIN — METOPROLOL TARTRATE 25 MG: 25 TABLET ORAL at 09:04

## 2019-04-10 RX ADMIN — FLUTICASONE FUROATE AND VILANTEROL TRIFENATATE 1 PUFF: 200; 25 POWDER RESPIRATORY (INHALATION) at 10:04

## 2019-04-10 RX ADMIN — IPRATROPIUM BROMIDE AND ALBUTEROL SULFATE 3 ML: .5; 3 SOLUTION RESPIRATORY (INHALATION) at 08:04

## 2019-04-10 RX ADMIN — ATORVASTATIN CALCIUM 40 MG: 20 TABLET, FILM COATED ORAL at 08:04

## 2019-04-10 RX ADMIN — METHYLPREDNISOLONE SODIUM SUCCINATE 80 MG: 40 INJECTION, POWDER, FOR SOLUTION INTRAMUSCULAR; INTRAVENOUS at 02:04

## 2019-04-10 RX ADMIN — IPRATROPIUM BROMIDE AND ALBUTEROL SULFATE 3 ML: .5; 3 SOLUTION RESPIRATORY (INHALATION) at 11:04

## 2019-04-10 RX ADMIN — IPRATROPIUM BROMIDE AND ALBUTEROL SULFATE 3 ML: .5; 3 SOLUTION RESPIRATORY (INHALATION) at 12:04

## 2019-04-10 RX ADMIN — IPRATROPIUM BROMIDE AND ALBUTEROL SULFATE 3 ML: .5; 3 SOLUTION RESPIRATORY (INHALATION) at 04:04

## 2019-04-10 RX ADMIN — PANTOPRAZOLE SODIUM 40 MG: 40 TABLET, DELAYED RELEASE ORAL at 11:04

## 2019-04-10 RX ADMIN — METHYLPREDNISOLONE SODIUM SUCCINATE 80 MG: 40 INJECTION, POWDER, FOR SOLUTION INTRAMUSCULAR; INTRAVENOUS at 09:04

## 2019-04-10 RX ADMIN — CEFTRIAXONE SODIUM 1 G: 1 INJECTION, POWDER, FOR SOLUTION INTRAMUSCULAR; INTRAVENOUS at 08:04

## 2019-04-10 RX ADMIN — POTASSIUM BICARBONATE 25 MEQ: 25 TABLET, EFFERVESCENT ORAL at 05:04

## 2019-04-10 RX ADMIN — IPRATROPIUM BROMIDE AND ALBUTEROL SULFATE 3 ML: .5; 3 SOLUTION RESPIRATORY (INHALATION) at 03:04

## 2019-04-10 RX ADMIN — ASPIRIN 81 MG: 81 TABLET, COATED ORAL at 08:04

## 2019-04-10 RX ADMIN — HEPARIN SODIUM AND DEXTROSE 14 UNITS/KG/HR: 10000; 5 INJECTION INTRAVENOUS at 11:04

## 2019-04-10 RX ADMIN — METOPROLOL TARTRATE 25 MG: 25 TABLET ORAL at 08:04

## 2019-04-10 RX ADMIN — FAMOTIDINE 20 MG: 20 TABLET, FILM COATED ORAL at 11:04

## 2019-04-10 RX ADMIN — HYDROMORPHONE HYDROCHLORIDE 0.2 MG: 1 INJECTION, SOLUTION INTRAMUSCULAR; INTRAVENOUS; SUBCUTANEOUS at 11:04

## 2019-04-10 RX ADMIN — DORZOLAMIDE HYDROCHLORIDE 1 DROP: 20 SOLUTION/ DROPS OPHTHALMIC at 08:04

## 2019-04-10 RX ADMIN — LEVOTHYROXINE SODIUM 100 MCG: 100 TABLET ORAL at 05:04

## 2019-04-10 RX ADMIN — IPRATROPIUM BROMIDE AND ALBUTEROL SULFATE 3 ML: .5; 3 SOLUTION RESPIRATORY (INHALATION) at 07:04

## 2019-04-10 RX ADMIN — TIMOLOL MALEATE 1 DROP: 5 SOLUTION OPHTHALMIC at 08:04

## 2019-04-10 NOTE — RESIDENT HANDOFF
Handoff     Primary Team: [facility] CRITICAL CARE MEDICINE Room Number: [id number]     Patient Name: Robin Hawkins MRN: [id number]     Date of Birth: [id number] Allergies: Patient has no known allergies.     Age: 68 y.o. Admit Date: 4/9/2019     Sex: male  BMI: Body mass index is 20.88 kg/m².     Code Status: Full Code        Illness Level (current clinical status): Watcher - No    Reason for Admission: Acute hypoxemic respiratory failure    Brief HPI    This is Mr. Robin Hawkins, 68 year old male with medical history significant for hepatocellular carcinoma, Essential hypertension, History of prostate cancer, COPD, CAD, Subclinical hypothyroidism. He presented to ED with two day history of epigastric pain, shortness of breath more prominent on lying flat and found to have acute hypoxemic respiratory failure. For the last couple of months; he endorsed that he developed nausea, poor oral intake; and decrease appetite; however, this presentation was mainly the epigastric pain and feeling shortness of breath. He denies fever, chills, respiratory symptoms; loss of consciousness or recent change in his mediations. Of note, Two previous COPD exacerbation admission 10/23-24 2018; and 11/26-29 2018 for for dyspnea, atypical chest pain. In ED, he was having tachypeic, with resp rate > 40; and hypoxic that require BiPAP. On evaluation he was very limited in his conversation secondary to tachypnea and tripoding.       Procedure Date: None.     Hospital Course   After admission, he has high suspicious of PE; however we were unable to obtain CTA in the setting of SEVERO. Started on anticoagulation with heparin infusion. He endorsed better in his symptoms and improved clinically. U/S lower extremities in process.     Tasks and Contingency Plan     At high risk for pulmonary embolism   - Given the result V/Q scan, TTE and negative U/S lower extremities will hold off on anticoagulation with heparin     Bilateral complete vocal fold  paralysis   - Pending SLP eval for swallow     Acute kidney injury    - Follow Vazquez stain (work up for intra-renal etiology of his SEVERO)    - Consult Nephrology if his sCr worsening     Hepatocellular carcinoma   - Hold on Lenvima as it may contrubte to his SEVERO   - Follow up with Dr. Carrillo upon discharge      Estimated Discharge Date: 4/14/2019    Discharge Disposition: Home or Self Care    Mentored By: ICU Staff and MICU team

## 2019-04-10 NOTE — ASSESSMENT & PLAN NOTE
- Compensated by tachypnea and respiratory alkalosis  - Although we can't proceed with CTA given the SEVERO but PE is very likely in his case   - Wells' Criteria for PE 8.5 points represent High risk group with 40.6% chance of PE  - Will proceed with full anticoagulation with Heparin  - Currently stable alternating between BiPAP and Nasal Canula   - Pending  U/S lower extremities, TTE (for signs of RV stain) and V/Q scan as alternative methods

## 2019-04-10 NOTE — PROGRESS NOTES
Pt arrived to SICU bed [id number]. Pt connected to continuous monitoring and 6L NC. Charge nurse and CCS notified. Mag replaced. BIPAP in room if needed. MD verbalized they will come assess Pt. VSS. Will continue to monitor.

## 2019-04-10 NOTE — ASSESSMENT & PLAN NOTE
- Dx 12/2015 with HCC in the setting of hepatitis C.  - S/p multiple treatments with TACE.    - Had prior MRI shows progression of malignancy  - Nivolumab 04/2018 - 9/2018 (discontinued due to progression). Regorafenib 10/2018 -12/2018 (discontinued due to progression).   - No active plan while inpatient, needs follow up with his Oncologist.

## 2019-04-10 NOTE — HOSPITAL COURSE
4/09/2019: admitted to MICU for concern of PE  4/10/2019  No acute events overnight. He was on BiPAP and has restful night. No signs of hemodynamic compromise or instability. Still having the atypical chest pain. Mild oozing from his vascular access.

## 2019-04-10 NOTE — PROGRESS NOTES
Updated MD Mack on patient's status. Patient complaining of chest pain with labored breathing.RR 25-35.  O2 Sats %. Ordered that patient to remain off BiPAP for VQ Scan. No further orders were given. Will continue to monitor

## 2019-04-10 NOTE — PLAN OF CARE
Problem: Adult Inpatient Plan of Care  Goal: Plan of Care Review  Outcome: Ongoing (interventions implemented as appropriate)  POC reviewed with Pt. Pt is AAOX4. Pt O2 sats stable on 4L NC. VBG overnight showed SVO2 in 30s. BIPAP on Pt since then with no issues. Pt looks more comfortable and RR 20s. VBG this AM with SVO2 in 60s. Pts HR has been 70s-80s. SBP<180. Pt slept overnight. Pt received 1L LR bolus for low U/O. Labs trended. No skin breakdown. Left port intact. Right IJ intact. Heparin gtt stopped overnight for PTT>150. MD verbalized to follow algorithm. Mag replaced. No more Chest pain episodes. VSS. Will continue to monitor.

## 2019-04-10 NOTE — ASSESSMENT & PLAN NOTE
- Baseline Creatinine is 0.9-1.0, dated two months ago   - Current Creatinine is 2.7 >> 1.9   - FENa is more consist ant with Prerenal; however he might have intrinsic factors (Chemotherapy; Cancer) that cause intrinsic SEVERO   - Follow Vazquez stain; and continue monitor I/O and if worsening, consider consult Nephrology.   - Differential Diagnoses include pre renal in the setting of poor intake; less likely obstructive as he is able to urinate and no No hydronephrosis on CT scan   - Will continue trending urine output and renal function    - Avoid NSAIDs, contrast, nephrotoxins, monitor strict I/Os, daily weights

## 2019-04-10 NOTE — SUBJECTIVE & OBJECTIVE
Interval History/Significant Events: No acute events overnight. He was on BiPAP and has restful night. No signs of hemodynamic compromise or instability. Still having the atypical chest pain. Mild oozing from his vascular access.     Review of Systems   Constitutional: Negative for activity change, diaphoresis and fatigue.   HENT: Positive for congestion.    Respiratory: Positive for shortness of breath. Negative for apnea and chest tightness.    Cardiovascular: Positive for chest pain. Negative for palpitations and leg swelling.   Gastrointestinal: Positive for abdominal pain. Negative for abdominal distention, anal bleeding, blood in stool and constipation.   Genitourinary: Negative for difficulty urinating and dysuria.   Musculoskeletal: Positive for gait problem. Negative for arthralgias and joint swelling.   Skin: Negative for color change.   Psychiatric/Behavioral: Negative for agitation.     Objective:     Vital Signs (Most Recent):  Temp: 97.7 °F (36.5 °C) (04/10/19 1102)  Pulse: 69 (04/10/19 1131)  Resp: (!) 35 (04/10/19 1131)  BP: (!) 153/85 (04/10/19 1102)  SpO2: 100 % (04/10/19 1131) Vital Signs (24h Range):  Temp:  [97.5 °F (36.4 °C)-98 °F (36.7 °C)] 97.7 °F (36.5 °C)  Pulse:  [68-98] 69  Resp:  [10-50] 35  SpO2:  [93 %-100 %] 100 %  BP: (116-183)/() 153/85   Weight: 67.6 kg (149 lb)  Body mass index is 20.78 kg/m².      Intake/Output Summary (Last 24 hours) at 4/10/2019 1150  Last data filed at 4/10/2019 1100  Gross per 24 hour   Intake 4146 ml   Output 320 ml   Net 3826 ml       Physical Exam   Constitutional: He is oriented to person, place, and time. He appears distressed.   HENT:   Head: Normocephalic.   Hoarseness voice    Eyes: Pupils are equal, round, and reactive to light.   Neck: Normal range of motion. No JVD present. No thyromegaly present.   Cardiovascular: Normal rate. Exam reveals no friction rub.   No murmur heard.  Pulmonary/Chest: Effort normal. No respiratory distress. He has no  wheezes. He has no rales.   Abdominal: Soft. He exhibits no distension. There is tenderness. There is no guarding.   Musculoskeletal: He exhibits no edema.   Neurological: He is alert and oriented to person, place, and time.       Vents:  Oxygen Concentration (%): 30 (04/10/19 1046)  Lines/Drains/Airways     Central Venous Catheter Line                 Port A Cath Single Lumen 03/22/19 0747 left subclavian 19 days         Percutaneous Central Line Insertion/Assessment - triple lumen  04/09/19 1824 right internal jugular less than 1 day          Drain                 Urethral Catheter 04/09/19 1628 Coude 16 Fr. less than 1 day              Significant Labs:    CBC/Anemia Profile:  Recent Labs   Lab 04/09/19  1159 04/09/19  1211 04/09/19  1815 04/10/19  0300   WBC 8.51  --  7.87 8.91   HGB 16.7  --  11.8* 11.1*   HCT 52.3 44 35.8* 35.1*     --  211 197   MCV 89  --  87 89   RDW 23.2*  --  21.2* 21.6*        Chemistries:  Recent Labs   Lab 04/09/19  1815 04/09/19  2053 04/10/19  0014 04/10/19  0300     --   --  134*   K 3.6  --  3.8 3.4*     --   --  106   CO2 14*  --   --  19*   BUN 25*  --   --  28*   CREATININE 2.2*  --   --  1.9*   CALCIUM 8.3*  --   --  8.4*   ALBUMIN 2.6*  --   --  2.7*   PROT 6.3  --   --  6.3   BILITOT 0.9  --   --  0.7   ALKPHOS 131  --   --  126   ALT 22  --   --  23   AST 54*  --   --  58*   MG 1.0* 1.7 2.2 2.1   PHOS 3.1  --   --  3.5       BMP:   Recent Labs   Lab 04/10/19  0300      *   K 3.4*      CO2 19*   BUN 28*   CREATININE 1.9*   CALCIUM 8.4*   MG 2.1     CMP:   Recent Labs   Lab 04/09/19  1815 04/10/19  0014 04/10/19  0300     --  134*   K 3.6 3.8 3.4*     --  106   CO2 14*  --  19*   *  --  109   BUN 25*  --  28*   CREATININE 2.2*  --  1.9*   CALCIUM 8.3*  --  8.4*   PROT 6.3  --  6.3   ALBUMIN 2.6*  --  2.7*   BILITOT 0.9  --  0.7   ALKPHOS 131  --  126   AST 54*  --  58*   ALT 22  --  23   ANIONGAP 14  --  9   EGFRNONAA  29.3*  --  34.9*     Significant Imaging:  I have reviewed all pertinent imaging results/findings within the past 24 hours.  I have reviewed and interpreted all pertinent imaging results/findings within the past 24 hours.

## 2019-04-10 NOTE — PROGRESS NOTES
MD notified of Pt complaining of Chest pain and SOB. MD to come assess Pt. Md ordered EKG, Labs, Breathing treatment, and VBG. MD notified of U/O of 10-20cc/hr. MD Ordered 1L LR bolus. VSS. Will continue to monitor.

## 2019-04-10 NOTE — ASSESSMENT & PLAN NOTE
- On home PPI and add H2 blocker for severe GERD symptoms   - Stable problem, no acute change, continue current medication.

## 2019-04-10 NOTE — PROGRESS NOTES
Patient transferred to 6 via bed. Patient transferred on 2L NC. RIJ and L chestwall port intact. No redness or swelling noted. Heparin gtt continued per MAR. All personal belongings, chart and meds transfered with patient. Patient resting comfortable in bed. RN notified of patient arrival.

## 2019-04-10 NOTE — PROGRESS NOTES
Pt sitting up in bed using accessory muscles with labored respiratory effort and complains of shortness of breath and chest pain. Pt taken off of nasal cannula and placed on BiPap 12/5 30% FiO2. Pt's respiratory effort now appears unlabored and pt states he is comfortable. SpO2 100%. MD notified. Will continue to monitor closely.

## 2019-04-10 NOTE — ASSESSMENT & PLAN NOTE
- S/p radical prostatectomy on January 6, 2011 for a Urania 7 adenocarcinoma, ([id number]), with negative margins. Subsequently had a local recurrence for which he received XRT at [facility] (completed 10/28/2011).   - No active plan while on admission

## 2019-04-10 NOTE — PROGRESS NOTES
[facility]  Critical Care Medicine  Progress Note    Patient Name: Robin Hawkins  MRN: [id number]  Admission Date: 4/9/2019  Hospital Length of Stay: 1 days  Code Status: Full Code  Attending Provider: Izaiah Degroot MD  Primary Care Provider: Venancio Mcneil MD   Principal Problem: Acute hypoxemic respiratory failure    Subjective:     HPI:  This is Mr. Robin Hawkins, 68 year old male with medical history significant for hepatocellular carcinoma, Essential hypertension, History of prostate cancer, COPD, CAD, Subclinical hypothyroidism. He presented to ED with two day history of epigastric pain, shortness of breath more prominent on lying flat and found to have acute hypoxemic respiratory failure. For the last couple of months; he endorsed that he developed nausea, poor oral intake; and decrease appetite; however, this presentation was mainly the epigastric pain and feeling shortness of breath. He denies fever, chills, respiratory symptoms; loss of consciousness or recent change in his mediations. Of note, Two previous COPD exacerbation admission 10/23-24 2018; and 11/26-29 2018 for for dyspnea, atypical chest pain. In ED, he was having tachypeic, with resp rate > 40; and hypoxic that require BiPAP. On evaluation he was very limited in his conversation secondary to tachypnea and tripoding.     Hepatocellular carcinoma  His history dates to 12/2015 when he was diagnosed with hepatocellular carcinoma in the setting of hepatitis C.  He had a 2.5 cm mass which demonstrated arterial hyperenhancement.  This had enlarged from prior imaging and AFP was elevated.  He underwent TACE in 1/2016 then 7/2017,  radioembolization, right hepatic lobe 4/4/17. Then he had Sorafenib 6/2017 - 4/2018 discontinued due to progression. TACE: 7/2017,  8/25/17, 3/2018. Nivolumab 04/2018 - 9/2018 (discontinued due to progression). Regorafenib 10/2018 -12/2018 (discontinued due to progression). Lenvima  1/2019    Prostate Cancer  Underwent radical prostatectomy on January 6, 2011 for a [place] 7 adenocarcinoma, ([id number]), with negative margins. He subsequently had a local recurrence for which he received XRT at [facility] (completed 10/28/2011). Last available PSA was 0.03 (10/18/16).     Colon Cancer  He also history history of early stage colon cancer which was completely resected at [facility] which required no adjuvant therapy.    Hospital/ICU Course:  4/09/2019: admitted to MICU for concern of PE  4/10/2019  No acute events overnight. He was on BiPAP and has restful night. No signs of hemodynamic compromise or instability. Still having the atypical chest pain. Mild oozing from his vascular access.     Interval History/Significant Events: No acute events overnight. He was on BiPAP and has restful night. No signs of hemodynamic compromise or instability. Still having the atypical chest pain. Mild oozing from his vascular access.     Review of Systems   Constitutional: Negative for activity change, diaphoresis and fatigue.   HENT: Positive for congestion.    Respiratory: Positive for shortness of breath. Negative for apnea and chest tightness.    Cardiovascular: Positive for chest pain. Negative for palpitations and leg swelling.   Gastrointestinal: Positive for abdominal pain. Negative for abdominal distention, anal bleeding, blood in stool and constipation.   Genitourinary: Negative for difficulty urinating and dysuria.   Musculoskeletal: Positive for gait problem. Negative for arthralgias and joint swelling.   Skin: Negative for color change.   Psychiatric/Behavioral: Negative for agitation.     Objective:     Vital Signs (Most Recent):  Temp: 97.7 °F (36.5 °C) (04/10/19 1102)  Pulse: 69 (04/10/19 1131)  Resp: (!) 35 (04/10/19 1131)  BP: (!) 153/85 (04/10/19 1102)  SpO2: 100 % (04/10/19 1131) Vital Signs (24h Range):  Temp:  [97.5 °F (36.4 °C)-98 °F (36.7 °C)] 97.7 °F (36.5 °C)  Pulse:  [68-98] 69  Resp:  [10-50]  35  SpO2:  [93 %-100 %] 100 %  BP: (116-183)/() 153/85   Weight: 67.6 kg (149 lb)  Body mass index is 20.78 kg/m².      Intake/Output Summary (Last 24 hours) at 4/10/2019 1150  Last data filed at 4/10/2019 1100  Gross per 24 hour   Intake 4146 ml   Output 320 ml   Net 3826 ml       Physical Exam   Constitutional: He is oriented to person, place, and time. He appears distressed.   HENT:   Head: Normocephalic.   Hoarseness voice    Eyes: Pupils are equal, round, and reactive to light.   Neck: Normal range of motion. No JVD present. No thyromegaly present.   Cardiovascular: Normal rate. Exam reveals no friction rub.   No murmur heard.  Pulmonary/Chest: Effort normal. No respiratory distress. He has no wheezes. He has no rales.   Abdominal: Soft. He exhibits no distension. There is tenderness. There is no guarding.   Musculoskeletal: He exhibits no edema.   Neurological: He is alert and oriented to person, place, and time.       Vents:  Oxygen Concentration (%): 30 (04/10/19 1046)  Lines/Drains/Airways     Central Venous Catheter Line                 Port A Cath Single Lumen 03/22/19 0747 left subclavian 19 days         Percutaneous Central Line Insertion/Assessment - triple lumen  04/09/19 1824 right internal jugular less than 1 day          Drain                 Urethral Catheter 04/09/19 1628 Coude 16 Fr. less than 1 day              Significant Labs:    CBC/Anemia Profile:  Recent Labs   Lab 04/09/19  1159 04/09/19  1211 04/09/19  1815 04/10/19  0300   WBC 8.51  --  7.87 8.91   HGB 16.7  --  11.8* 11.1*   HCT 52.3 44 35.8* 35.1*     --  211 197   MCV 89  --  87 89   RDW 23.2*  --  21.2* 21.6*        Chemistries:  Recent Labs   Lab 04/09/19  1815 04/09/19  2053 04/10/19  0014 04/10/19  0300     --   --  134*   K 3.6  --  3.8 3.4*     --   --  106   CO2 14*  --   --  19*   BUN 25*  --   --  28*   CREATININE 2.2*  --   --  1.9*   CALCIUM 8.3*  --   --  8.4*   ALBUMIN 2.6*  --   --  2.7*   PROT  6.3  --   --  6.3   BILITOT 0.9  --   --  0.7   ALKPHOS 131  --   --  126   ALT 22  --   --  23   AST 54*  --   --  58*   MG 1.0* 1.7 2.2 2.1   PHOS 3.1  --   --  3.5       BMP:   Recent Labs   Lab 04/10/19  0300      *   K 3.4*      CO2 19*   BUN 28*   CREATININE 1.9*   CALCIUM 8.4*   MG 2.1     CMP:   Recent Labs   Lab 04/09/19  1815 04/10/19  0014 04/10/19  0300     --  134*   K 3.6 3.8 3.4*     --  106   CO2 14*  --  19*   *  --  109   BUN 25*  --  28*   CREATININE 2.2*  --  1.9*   CALCIUM 8.3*  --  8.4*   PROT 6.3  --  6.3   ALBUMIN 2.6*  --  2.7*   BILITOT 0.9  --  0.7   ALKPHOS 131  --  126   AST 54*  --  58*   ALT 22  --  23   ANIONGAP 14  --  9   EGFRNONAA 29.3*  --  34.9*     Significant Imaging:  I have reviewed all pertinent imaging results/findings within the past 24 hours.  I have reviewed and interpreted all pertinent imaging results/findings within the past 24 hours.      ABG  Recent Labs   Lab 04/10/19  0414   PH 7.278*   PO2 35*   PCO2 40.7   HCO3 19.0*   BE -8     Assessment/Plan:     ENT  Bilateral complete vocal fold paralysis  - From prior shock and prior Tracheostomy that was decannulated two years ago     Pulmonary  * Acute hypoxemic respiratory failure  - Compensated by tachypnea and respiratory alkalosis  - Although we can't proceed with CTA given the SEVERO but PE is very likely in his case   - Wells' Criteria for PE 8.5 points represent High risk group with 40.6% chance of PE  - Given the result V/Q scan, TTE and negative U/S lower extremities will hold off on anticoagulation with heparin    Centrilobular emphysema  - Following with Dr. Cummings in pulmonary, and on inhalers for controlled the symptoms   - PRN Albuterol and Spiriva     Cardiac/Vascular  Hyperlipidemia  - Taking home Statin; resume when stable     Essential hypertension  - He is taking Lisinopril 20 mg and Metoprolol 12.5 mg PO BID  - Hold on his ACEi in the setting of SEVERO; resume and  increase there dose of Metoprolol to 25 mg PO BID    Renal/  Acute cystitis  - Although asymptomatic; UA showed borderline UTI with his acidosis will treat with Ceftriaxone     SEVERO (acute kidney injury)  - Baseline Creatinine is 0.9-1.0, dated two months ago   - Current Creatinine is 2.7 >> 1.9   - FENa is more consist ant with Prerenal; however he might have intrinsic factors (Chemotherapy; Cancer) that cause intrinsic SEVERO   - Follow Vazquez stain; and continue monitor I/O and if worsening, consider consult Nephrology.   - Differential Diagnoses include pre renal in the setting of poor intake; less likely obstructive as he is able to urinate and no No hydronephrosis on CT scan   - Will continue trending urine output and renal function    - Avoid NSAIDs, contrast, nephrotoxins, monitor strict I/Os, daily weights    Hematology  At high risk for pulmonary embolism  - See principal problem for more elaboration.     Oncology  Hepatocellular carcinoma  - Dx 12/2015 with HCC in the setting of hepatitis C.  - S/p multiple treatments with TACE.    - Had prior MRI shows progression of malignancy  - Nivolumab 04/2018 - 9/2018 (discontinued due to progression). Regorafenib 10/2018 -12/2018 (discontinued due to progression).   - No active plan while inpatient, needs follow up with his Oncologist.     History of prostate cancer  - S/p radical prostatectomy on January 6, 2011 for a [place] 7 adenocarcinoma, ([id number]), with negative margins. Subsequently had a local recurrence for which he received XRT at [facility] (completed 10/28/2011).   - No active plan while on admission     GI  Cirrhosis   - See hepatocellular carcinoma for more elaboration     Gastroesophageal reflux disease  - On home PPI and add H2 blocker for severe GERD symptoms   - Stable problem, no acute change, continue current medication.     Other  SOB (shortness of breath)  - See principal problem for more elaboration.      Critical Care Daily Checklist:    A:  Awake: RASS Goal/Actual Goal:    Actual: Reza Agitation Sedation Scale (RASS): Alert and calm   B: Spontaneous Breathing Trial Performed?     C: SAT & SBT Coordinated?  No intubated                       D: Delirium: CAM-ICU     E: Early Mobility Performed? Yes   F: Feeding Goal:    Status:     Current Diet Order   Procedures    Diet Dysphagia Mechanical Soft (IDDSI Level 5)      AS: Analgesia/Sedation yes   T: Thromboembolic Prophylaxis Yes full anticoagulation    H: HOB > 300 Yes   U: Stress Ulcer Prophylaxis (if needed) yes   G: Glucose Control yes   B: Bowel Function     I: Indwelling Catheter (Lines & Joseph) Necessity yes   D: De-escalation of Antimicrobials/Pharmacotherapies yes    Plan for the day/ETD Ongoing     Code Status:  Family/Goals of Care: Full Code  Ongoing        Critical secondary to Patient has a condition that poses threat to life and bodily function: Acute Renal Failure      Critical care was time spent personally by me on the following activities: development of treatment plan with patient or surrogate and bedside caregivers, discussions with consultants, evaluation of patient's response to treatment, examination of patient, ordering and performing treatments and interventions, ordering and review of laboratory studies, ordering and review of radiographic studies, pulse oximetry, re-evaluation of patient's condition. This critical care time did not overlap with that of any other provider or involve time for any procedures.     Jeronimo Giron MD  Critical Care Medicine  [facility]

## 2019-04-10 NOTE — ASSESSMENT & PLAN NOTE
- He is taking Lisinopril 20 mg and Metoprolol 12.5 mg PO BID  - Hold on his ACEi in the setting of SEVERO; resume and increase there dose of Metoprolol to 25 mg PO BID

## 2019-04-10 NOTE — PLAN OF CARE
Venancio Mcneil MD  [address] [facility] [address]    [facility] [address] [place] [address] EXP AT [facility] & [address] [address]  Phone: [phone] Fax: [phone]    Payor: HUMANA MANAGED MEDICARE / Plan: HUMANA SNP (SPECIAL NEEDS PLAN) / Product Type: Medicare Advantage /     Future Appointments   Date Time Provider Department Center   4/23/2019 10:15 AM INJECTION, [facility] INFUSION [facility]   4/23/2019 11:00 AM [facility] OIC-CT1 500 LB LIMIT [facility]   4/24/2019 10:20 AM Ortiz Carrillo DO, FACP [facility] HEM ONC [facility]     Extended Emergency Contact Information  Primary Emergency Contact: Cirilo Ellison   [place]  Mobile Phone: [phone]  Relation: Relative  Secondary Emergency Contact: Manju Canales  Address: [address] [place] [address]  Home Phone: [phone]  Mobile Phone: [phone]  Relation: Sister     04/10/19 1255   Discharge Assessment   Assessment Type Discharge Planning Assessment   Confirmed/corrected address and phone number on facesheet? Yes   Assessment information obtained from? Patient;Caregiver  (Patient provided consent to speak with Manju Canales (sister))   Expected Length of Stay (days) 5   Communicated expected length of stay with patient/caregiver no   Prior to hospitilization cognitive status: Alert/Oriented   Prior to hospitalization functional status: Assistive Equipment;Needs Assistance   Current cognitive status: Alert/Oriented   Current Functional Status: Assistive Equipment;Needs Assistance   Facility Arrived From: ED admit   Lives With sibling(s)  (Darby Hawkins )   Able to Return to Prior Arrangements yes   Is patient able to care for self after discharge? Unable to determine at this time (comments)   Who are your caregiver(s) and their phone number(s)? Manju Canales (sister) [phone]   Patient's perception of  discharge disposition home health   Readmission Within the Last 30 Days current reason for admission unrelated to previous admission   If yes, most recent facility name: [facility]   Patient currently being followed by outpatient case management? No   Patient currently receives any other outside agency services? No  (HH in past, sister can't recall agency name)   Equipment Currently Used at Home walker, rolling  (patient been unable to qualify for home O2 prior)   Do you have any problems affording any of your prescribed medications? No   Is the patient taking medications as prescribed? yes   Does the patient have transportation home? Yes   Transportation Anticipated family or friend will provide  (Manju Canales)   Dialysis Name and Scheduled days N/A   Does the patient receive services at the [facility]madin Clinic? No   Discharge Plan A Home with family;Home Health   Discharge Plan B Skilled Nursing Facility   DME Needed Upon Discharge  other (see comments)  (TBD)   Patient/Family in Agreement with Plan yes   CM spoke with patient at bedside who gave permission for this CM to call his sister.  Manju Canales provided assessment information.  Patient has been unable to qualify for home O2.  CM suggested orders for O2 walk test in the event patient requires home O2 on dc.      Iliana Mathew, RN, BSN, CCM  Case Management  [facility]  Ext. [phone]

## 2019-04-11 PROBLEM — E03.9 ACQUIRED HYPOTHYROIDISM: Status: RESOLVED | Noted: 2019-04-11 | Resolved: 2019-04-11

## 2019-04-11 PROBLEM — E03.9 ACQUIRED HYPOTHYROIDISM: Status: ACTIVE | Noted: 2019-04-11

## 2019-04-11 PROBLEM — J44.1 COPD EXACERBATION: Status: ACTIVE | Noted: 2019-04-11

## 2019-04-11 PROBLEM — Z91.89 AT HIGH RISK FOR PULMONARY EMBOLISM: Status: RESOLVED | Noted: 2019-04-09 | Resolved: 2019-04-11

## 2019-04-11 LAB
ALBUMIN SERPL BCP-MCNC: 2.6 G/DL (ref 3.5–5.2)
ALP SERPL-CCNC: 156 U/L (ref 55–135)
ALT SERPL W/O P-5'-P-CCNC: 35 U/L (ref 10–44)
ANION GAP SERPL CALC-SCNC: 8 MMOL/L (ref 8–16)
APTT BLDCRRT: 26.2 SEC (ref 21–32)
APTT BLDCRRT: 26.2 SEC (ref 21–32)
AST SERPL-CCNC: 82 U/L (ref 10–40)
BASOPHILS # BLD AUTO: 0.01 K/UL (ref 0–0.2)
BASOPHILS NFR BLD: 0.1 % (ref 0–1.9)
BILIRUB SERPL-MCNC: 0.7 MG/DL (ref 0.1–1)
BUN SERPL-MCNC: 26 MG/DL (ref 8–23)
CALCIUM SERPL-MCNC: 8.9 MG/DL (ref 8.7–10.5)
CHLORIDE SERPL-SCNC: 106 MMOL/L (ref 95–110)
CO2 SERPL-SCNC: 21 MMOL/L (ref 23–29)
CREAT SERPL-MCNC: 1.3 MG/DL (ref 0.5–1.4)
DIFFERENTIAL METHOD: ABNORMAL
EOSINOPHIL # BLD AUTO: 0 K/UL (ref 0–0.5)
EOSINOPHIL NFR BLD: 0 % (ref 0–8)
ERYTHROCYTE [DISTWIDTH] IN BLOOD BY AUTOMATED COUNT: 21.9 % (ref 11.5–14.5)
EST. GFR  (AFRICAN AMERICAN): >60 ML/MIN/1.73 M^2
EST. GFR  (NON AFRICAN AMERICAN): 55.3 ML/MIN/1.73 M^2
GLUCOSE SERPL-MCNC: 125 MG/DL (ref 70–110)
HCT VFR BLD AUTO: 34.2 % (ref 40–54)
HGB BLD-MCNC: 11.3 G/DL (ref 14–18)
IMM GRANULOCYTES # BLD AUTO: 0.1 K/UL (ref 0–0.04)
IMM GRANULOCYTES NFR BLD AUTO: 1 % (ref 0–0.5)
LYMPHOCYTES # BLD AUTO: 0.6 K/UL (ref 1–4.8)
LYMPHOCYTES NFR BLD: 5.5 % (ref 18–48)
MAGNESIUM SERPL-MCNC: 1.8 MG/DL (ref 1.6–2.6)
MCH RBC QN AUTO: 29.1 PG (ref 27–31)
MCHC RBC AUTO-ENTMCNC: 33 G/DL (ref 32–36)
MCV RBC AUTO: 88 FL (ref 82–98)
MONOCYTES # BLD AUTO: 0.3 K/UL (ref 0.3–1)
MONOCYTES NFR BLD: 2.6 % (ref 4–15)
NEUTROPHILS # BLD AUTO: 9.3 K/UL (ref 1.8–7.7)
NEUTROPHILS NFR BLD: 90.8 % (ref 38–73)
NRBC BLD-RTO: 0 /100 WBC
PHOSPHATE SERPL-MCNC: 2.9 MG/DL (ref 2.7–4.5)
PLATELET # BLD AUTO: 206 K/UL (ref 150–350)
PMV BLD AUTO: 11.1 FL (ref 9.2–12.9)
POTASSIUM SERPL-SCNC: 3.8 MMOL/L (ref 3.5–5.1)
PROT SERPL-MCNC: 6.5 G/DL (ref 6–8.4)
RBC # BLD AUTO: 3.88 M/UL (ref 4.6–6.2)
SODIUM SERPL-SCNC: 135 MMOL/L (ref 136–145)
WBC # BLD AUTO: 10.28 K/UL (ref 3.9–12.7)

## 2019-04-11 PROCEDURE — 99232 SBSQ HOSP IP/OBS MODERATE 35: CPT | Mod: HCNC,,, | Performed by: HOSPITALIST

## 2019-04-11 PROCEDURE — 84100 ASSAY OF PHOSPHORUS: CPT | Mod: HCNC

## 2019-04-11 PROCEDURE — 63600175 PHARM REV CODE 636 W HCPCS: Mod: HCNC | Performed by: STUDENT IN AN ORGANIZED HEALTH CARE EDUCATION/TRAINING PROGRAM

## 2019-04-11 PROCEDURE — 80053 COMPREHEN METABOLIC PANEL: CPT | Mod: HCNC

## 2019-04-11 PROCEDURE — 83735 ASSAY OF MAGNESIUM: CPT | Mod: HCNC

## 2019-04-11 PROCEDURE — 85730 THROMBOPLASTIN TIME PARTIAL: CPT | Mod: HCNC

## 2019-04-11 PROCEDURE — 27000221 HC OXYGEN, UP TO 24 HOURS: Mod: HCNC

## 2019-04-11 PROCEDURE — 25000003 PHARM REV CODE 250: Mod: HCNC | Performed by: STUDENT IN AN ORGANIZED HEALTH CARE EDUCATION/TRAINING PROGRAM

## 2019-04-11 PROCEDURE — 92610 EVALUATE SWALLOWING FUNCTION: CPT | Mod: HCNC

## 2019-04-11 PROCEDURE — 11000001 HC ACUTE MED/SURG PRIVATE ROOM: Mod: HCNC

## 2019-04-11 PROCEDURE — 63600175 PHARM REV CODE 636 W HCPCS: Mod: HCNC | Performed by: INTERNAL MEDICINE

## 2019-04-11 PROCEDURE — 85025 COMPLETE CBC W/AUTO DIFF WBC: CPT | Mod: HCNC

## 2019-04-11 PROCEDURE — 94640 AIRWAY INHALATION TREATMENT: CPT | Mod: HCNC

## 2019-04-11 PROCEDURE — 99232 PR SUBSEQUENT HOSPITAL CARE,LEVL II: ICD-10-PCS | Mod: HCNC,,, | Performed by: HOSPITALIST

## 2019-04-11 PROCEDURE — 25000242 PHARM REV CODE 250 ALT 637 W/ HCPCS: Mod: HCNC | Performed by: STUDENT IN AN ORGANIZED HEALTH CARE EDUCATION/TRAINING PROGRAM

## 2019-04-11 PROCEDURE — 94761 N-INVAS EAR/PLS OXIMETRY MLT: CPT | Mod: HCNC

## 2019-04-11 RX ADMIN — TIMOLOL MALEATE 1 DROP: 5 SOLUTION OPHTHALMIC at 09:04

## 2019-04-11 RX ADMIN — LEVOTHYROXINE SODIUM 100 MCG: 100 TABLET ORAL at 09:04

## 2019-04-11 RX ADMIN — PANTOPRAZOLE SODIUM 40 MG: 40 TABLET, DELAYED RELEASE ORAL at 08:04

## 2019-04-11 RX ADMIN — TIMOLOL MALEATE 1 DROP: 5 SOLUTION OPHTHALMIC at 10:04

## 2019-04-11 RX ADMIN — IPRATROPIUM BROMIDE AND ALBUTEROL SULFATE 3 ML: .5; 3 SOLUTION RESPIRATORY (INHALATION) at 12:04

## 2019-04-11 RX ADMIN — METOPROLOL TARTRATE 25 MG: 25 TABLET ORAL at 10:04

## 2019-04-11 RX ADMIN — ASPIRIN 81 MG: 81 TABLET, COATED ORAL at 08:04

## 2019-04-11 RX ADMIN — IPRATROPIUM BROMIDE AND ALBUTEROL SULFATE 3 ML: .5; 3 SOLUTION RESPIRATORY (INHALATION) at 11:04

## 2019-04-11 RX ADMIN — ATORVASTATIN CALCIUM 40 MG: 20 TABLET, FILM COATED ORAL at 08:04

## 2019-04-11 RX ADMIN — METHYLPREDNISOLONE SODIUM SUCCINATE 80 MG: 40 INJECTION, POWDER, FOR SOLUTION INTRAMUSCULAR; INTRAVENOUS at 10:04

## 2019-04-11 RX ADMIN — FAMOTIDINE 20 MG: 20 TABLET, FILM COATED ORAL at 08:04

## 2019-04-11 RX ADMIN — METHYLPREDNISOLONE SODIUM SUCCINATE 80 MG: 40 INJECTION, POWDER, FOR SOLUTION INTRAMUSCULAR; INTRAVENOUS at 08:04

## 2019-04-11 RX ADMIN — HEPARIN SODIUM 5000 UNITS: 5000 INJECTION, SOLUTION INTRAVENOUS; SUBCUTANEOUS at 06:04

## 2019-04-11 RX ADMIN — FLUTICASONE FUROATE AND VILANTEROL TRIFENATATE 1 PUFF: 200; 25 POWDER RESPIRATORY (INHALATION) at 09:04

## 2019-04-11 RX ADMIN — DORZOLAMIDE HYDROCHLORIDE 1 DROP: 20 SOLUTION/ DROPS OPHTHALMIC at 09:04

## 2019-04-11 RX ADMIN — HEPARIN SODIUM 5000 UNITS: 5000 INJECTION, SOLUTION INTRAVENOUS; SUBCUTANEOUS at 03:04

## 2019-04-11 RX ADMIN — METOPROLOL TARTRATE 25 MG: 25 TABLET ORAL at 08:04

## 2019-04-11 RX ADMIN — IPRATROPIUM BROMIDE AND ALBUTEROL SULFATE 3 ML: .5; 3 SOLUTION RESPIRATORY (INHALATION) at 09:04

## 2019-04-11 RX ADMIN — IPRATROPIUM BROMIDE AND ALBUTEROL SULFATE 3 ML: .5; 3 SOLUTION RESPIRATORY (INHALATION) at 04:04

## 2019-04-11 RX ADMIN — HEPARIN SODIUM 5000 UNITS: 5000 INJECTION, SOLUTION INTRAVENOUS; SUBCUTANEOUS at 10:04

## 2019-04-11 RX ADMIN — IPRATROPIUM BROMIDE AND ALBUTEROL SULFATE 3 ML: .5; 3 SOLUTION RESPIRATORY (INHALATION) at 07:04

## 2019-04-11 RX ADMIN — CEFTRIAXONE SODIUM 1 G: 1 INJECTION, POWDER, FOR SOLUTION INTRAMUSCULAR; INTRAVENOUS at 08:04

## 2019-04-11 NOTE — ASSESSMENT & PLAN NOTE
Although asymptomatic; UA showed borderline UTI with his acidosis will treat with Ceftriaxone

## 2019-04-11 NOTE — PLAN OF CARE
Problem: Adult Inpatient Plan of Care  Goal: Plan of Care Review  Outcome: Ongoing (interventions implemented as appropriate)     04/11/19 1593   Plan of Care Review   Plan of Care Reviewed With patient   Progress improving     Pt resting aaox4 no c/o pain or discomfort. V/s stable. Pt ambulates in room w/o complaints. Pt arrived to the floor with a sin. Pt stated sin was causing him discomfort. Sin d/c per md order. Pt remain free of falls and injuries. Will continue to monitor and interventions as appropriate.

## 2019-04-11 NOTE — PLAN OF CARE
Problem: SLP Goal  Goal: SLP Goal  Bedside swallow study completed. Continue soft diet/thin liquids at this time.   Vesna Serrano CCC-SLP  4/11/2019

## 2019-04-11 NOTE — HOSPITAL COURSE
The patient was admitted to MICU for concern of PE which was negative. He was maintained on BIPAP but still having atypical chest pain. He was initially placed on a heparin infusion but then stopped after his PE work up was negative. His hypoxemic resp failure was attributed to COPD exacerbation and started on duonebs and solumedrol. He was stepped down to medicine and continued to improved and was switched to augmentin and prednisone. His statin was held after an elevation in his transaminases and this started to trend down. The patient was advised to hold his statin and follow up with his PCP to continue to have this monitored. He will complete 4 more days of prednisone and augmentin upon dc. He decline home health.

## 2019-04-11 NOTE — ASSESSMENT & PLAN NOTE
Cont with current dose of levothyroxine to treat   Lab Results   Component Value Date    TSH 16.450 (H) 04/09/2019    [id number] 7.2 12/22/2017    FREET4 0.85 04/09/2019     Current treatment plan effective  No change in therapy  Repeat TSH as outpatient with PCP

## 2019-04-11 NOTE — ASSESSMENT & PLAN NOTE
Cont LAMA, LABA, ICS inhalers  Cont scheduled duo nebs.   Cont with systemic glucocorticoids with IV solumedrol. Then will transition to oral prednisone 40mg PO daily for 7-10 days.  Cont supplemental oxygen via NC in order to assure adequate oxygenation. Currently on 2L NC. Goal sats > than 88%  Cont with Ceftriaxone which should target likely bacterial pathogens.   Can consider mucoactive agents such as mucomyst and chest physiotherapy to help with secretion clearance.   Will use BiPaP as needed

## 2019-04-11 NOTE — ASSESSMENT & PLAN NOTE
Workup for PE came back negative.   Stopped heparin infusion.   Suspect pt's acute hypoxemic respiratory failure was due to COPD exacerbation.   Treat with solumedrol & duonebs.
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
## 2019-04-11 NOTE — NURSING
Found patient lying in bed AAO, 2L O2 via NC, right IJ intact, left chest port intact and infusing heparin, sin intact, skin intact, orientated to the room.

## 2019-04-11 NOTE — SUBJECTIVE & OBJECTIVE
Interval History: No acute events. No family at bedside. Patient states he feels better. Denies SOB.     Review of Systems   Constitutional: Negative for activity change, appetite change, chills and fever.   HENT: Negative for congestion.    Respiratory: Negative for cough and chest tightness.    Cardiovascular: Negative for chest pain, palpitations and leg swelling.   Gastrointestinal: Negative for abdominal distention and abdominal pain.   Musculoskeletal: Negative for arthralgias.   Neurological: Negative for dizziness and headaches.   Hematological: Negative for adenopathy.   Psychiatric/Behavioral: Negative for agitation.     Objective:     Vital Signs (Most Recent):  Temp: 98 °F (36.7 °C) (04/11/19 1145)  Pulse: 74 (04/11/19 1145)  Resp: 18 (04/11/19 1145)  BP: (!) 144/90 (04/11/19 1145)  SpO2: 100 % (04/11/19 1145) Vital Signs (24h Range):  Temp:  [97.4 °F (36.3 °C)-98 °F (36.7 °C)] 98 °F (36.7 °C)  Pulse:  [63-79] 74  Resp:  [16-43] 18  SpO2:  [98 %-100 %] 100 %  BP: (128-184)/(64-98) 144/90     Weight: 72.2 kg (159 lb 2.8 oz)  Body mass index is 22.2 kg/m².    Intake/Output Summary (Last 24 hours) at 4/11/2019 1156  Last data filed at 4/11/2019 0800  Gross per 24 hour   Intake 1837 ml   Output 360 ml   Net 1477 ml      Physical Exam   Constitutional: He is oriented to person, place, and time. He appears well-developed and well-nourished. No distress. Nasal cannula in place.   HENT:   Head: Normocephalic and atraumatic.   Mouth/Throat: Abnormal dentition.   Eyes: Pupils are equal, round, and reactive to light. No scleral icterus.   Neck: Normal range of motion.   + right TLC in place.    Cardiovascular: Normal rate, regular rhythm and normal heart sounds.   No murmur heard.      Pulmonary/Chest: Effort normal and breath sounds normal. No respiratory distress. He has no wheezes.   Abdominal: Soft. Bowel sounds are normal. He exhibits no distension. There is no tenderness.   Musculoskeletal: He exhibits no  edema or deformity.   Neurological: He is alert and oriented to person, place, and time.   Skin: Skin is warm and dry. He is not diaphoretic. No erythema.   Psychiatric: He has a normal mood and affect. His behavior is normal. Judgment and thought content normal.       Significant Labs: All pertinent labs within the past 24 hours have been reviewed.    Significant Imaging: I have reviewed all pertinent imaging results/findings within the past 24 hours.

## 2019-04-11 NOTE — PROGRESS NOTES
Telemetry called to notify me that the pt leads were failing. Upon entering the pt room I saw that the Pt had removed telemetry leads and MAIN brown port dressing, md notified, MAIN d/c per md orders.

## 2019-04-11 NOTE — NURSING
Pt report sin causing discomfort. D/c per md orders. Pt able to ambulate w/o complaint. urinal at bedside.

## 2019-04-11 NOTE — ASSESSMENT & PLAN NOTE
Dx 12/2015 with HCC in the setting of hepatitis C.  S/p multiple treatments with TACE.    Had prior MRI shows progression of malignancy  Nivolumab 04/2018 - 9/2018 (discontinued due to progression). Regorafenib 10/2018 -12/2018 (discontinued due to progression).   No active plan while inpatient, needs follow up with his Oncologist.

## 2019-04-11 NOTE — ASSESSMENT & PLAN NOTE
BP Readings from Last 3 Encounters:   04/11/19 (!) 144/90   04/05/19 131/77   04/05/19 127/80     Slightly elevated likely due to steroids.   Cont metoprolol to treat  Home ACE on hold   Low Na diet

## 2019-04-11 NOTE — ASSESSMENT & PLAN NOTE
From prior shock and prior Tracheostomy that was decannulated two years ago   No acute issues

## 2019-04-11 NOTE — ASSESSMENT & PLAN NOTE
On home PPI and add H2 blocker for severe GERD symptoms   Stable problem, no acute change, continue current medication.

## 2019-04-11 NOTE — PROGRESS NOTES
[facility] Hospital Medicine  Progress Note    Patient Name: Robin Hawkins  MRN: [id number]  Patient Class: IP- Inpatient   Admission Date: 4/9/2019  Length of Stay: 2 days  Attending Physician: Joyce Light MD  Primary Care Provider: Venancio Mcneil MD    [facility] Medicine Team: [facility] MED  Joyce Wild MD    Subjective:     Principal Problem:COPD exacerbation    HPI:  This is Mr. Robin Hawkins, 68 year old male with medical history significant for hepatocellular carcinoma, Essential hypertension, History of prostate cancer, COPD, CAD, Subclinical hypothyroidism. He presented to ED with two day history of epigastric pain, shortness of breath more prominent on lying flat and found to have acute hypoxemic respiratory failure. For the last couple of months; he endorsed that he developed nausea, poor oral intake; and decrease appetite; however, this presentation was mainly the epigastric pain and feeling shortness of breath. He denies fever, chills, respiratory symptoms; loss of consciousness or recent change in his mediations. Of note, Two previous COPD exacerbation admission 10/23-24 2018; and 11/26-29 2018 for for dyspnea, atypical chest pain. In ED, he was having tachypeic, with resp rate > 40; and hypoxic that require BiPAP. On evaluation he was very limited in his conversation secondary to tachypnea and tripoding.     Hospital Course:  4/09/2019: admitted to MICU for concern of PE  4/10/2019  No acute events overnight. He was on BiPAP and has restful night. No signs of hemodynamic compromise or instability. Still having the atypical chest pain. Mild oozing from his vascular access. Workup for PE came back negative. Stopped heparin infusion. Suspect pt's acute hypoxemic respiratory failure was due to COPD exacerbation. Trialed patient off noninvasive ventilation today, & he did well. Will stop noninvasive ventilation altogether. Treat with solumedrol & duonebs  4/11: stepped down from  MICU to hospital medicine.     Interval History: No acute events. No family at bedside. Patient states he feels better. Denies SOB.     Review of Systems   Constitutional: Negative for activity change, appetite change, chills and fever.   HENT: Negative for congestion.    Respiratory: Negative for cough and chest tightness.    Cardiovascular: Negative for chest pain, palpitations and leg swelling.   Gastrointestinal: Negative for abdominal distention and abdominal pain.   Musculoskeletal: Negative for arthralgias.   Neurological: Negative for dizziness and headaches.   Hematological: Negative for adenopathy.   Psychiatric/Behavioral: Negative for agitation.     Objective:     Vital Signs (Most Recent):  Temp: 98 °F (36.7 °C) (04/11/19 1145)  Pulse: 74 (04/11/19 1145)  Resp: 18 (04/11/19 1145)  BP: (!) 144/90 (04/11/19 1145)  SpO2: 100 % (04/11/19 1145) Vital Signs (24h Range):  Temp:  [97.4 °F (36.3 °C)-98 °F (36.7 °C)] 98 °F (36.7 °C)  Pulse:  [63-79] 74  Resp:  [16-43] 18  SpO2:  [98 %-100 %] 100 %  BP: (128-184)/(64-98) 144/90     Weight: 72.2 kg (159 lb 2.8 oz)  Body mass index is 22.2 kg/m².    Intake/Output Summary (Last 24 hours) at 4/11/2019 1156  Last data filed at 4/11/2019 0800  Gross per 24 hour   Intake 1837 ml   Output 360 ml   Net 1477 ml      Physical Exam   Constitutional: He is oriented to person, place, and time. He appears well-developed and well-nourished. No distress. Nasal cannula in place.   HENT:   Head: Normocephalic and atraumatic.   Mouth/Throat: Abnormal dentition.   Eyes: Pupils are equal, round, and reactive to light. No scleral icterus.   Neck: Normal range of motion.   + right TLC in place.    Cardiovascular: Normal rate, regular rhythm and normal heart sounds.   No murmur heard.      Pulmonary/Chest: Effort normal and breath sounds normal. No respiratory distress. He has no wheezes.   Abdominal: Soft. Bowel sounds are normal. He exhibits no distension. There is no tenderness.    Musculoskeletal: He exhibits no edema or deformity.   Neurological: He is alert and oriented to person, place, and time.   Skin: Skin is warm and dry. He is not diaphoretic. No erythema.   Psychiatric: He has a normal mood and affect. His behavior is normal. Judgment and thought content normal.       Significant Labs: All pertinent labs within the past 24 hours have been reviewed.    Significant Imaging: I have reviewed all pertinent imaging results/findings within the past 24 hours.    Assessment/Plan:      * COPD exacerbation  Cont LAMA, LABA, ICS inhalers  Cont scheduled duo nebs.   Cont with systemic glucocorticoids with IV solumedrol. Then will transition to oral prednisone 40mg PO daily for 7-10 days.  Cont supplemental oxygen via NC in order to assure adequate oxygenation. Currently on 2L NC. Goal sats > than 88%  Cont with Ceftriaxone which should target likely bacterial pathogens.   Can consider mucoactive agents such as mucomyst and chest physiotherapy to help with secretion clearance.   Will use BiPaP as needed    Acute hypoxemic respiratory failure  Workup for PE came back negative.   Stopped heparin infusion.   Suspect pt's acute hypoxemic respiratory failure was due to COPD exacerbation.   Treat with solumedrol & duonebs.      Acute cystitis  Although asymptomatic; UA showed borderline UTI with his acidosis will treat with Ceftriaxone           SEVERO (acute kidney injury)  Lab Results   Component Value Date    CREATININE 1.3 04/11/2019     Sr Cr improved.   Cont to monitor with daily labs   Avoid nephrotoxins.   Renally dose all medications   Monitor events that may lead to decreased renal perfusion (hypovolemia, hypotension, sepsis).   Monitor urine output   Hold home ACE inhibitor     Centrilobular emphysema  Plan as above.       Cirrhosis   See hepatocellular carcinoma for more elaboration     Gastroesophageal reflux disease  On home PPI and add H2 blocker for severe GERD symptoms   Stable problem,  no acute change, continue current medication.         Hepatocellular carcinoma  Dx 12/2015 with HCC in the setting of hepatitis C.  S/p multiple treatments with TACE.    Had prior MRI shows progression of malignancy  Nivolumab 04/2018 - 9/2018 (discontinued due to progression). Regorafenib 10/2018 -12/2018 (discontinued due to progression).   No active plan while inpatient, needs follow up with his Oncologist.     Bilateral complete vocal fold paralysis  From prior shock and prior Tracheostomy that was decannulated two years ago   No acute issues          History of prostate cancer  S/p radical prostatectomy on January 6, 2011 for a [place] 7 adenocarcinoma, ([id number]), with negative margins. Subsequently had a local recurrence for which he received XRT at [facility] (completed 10/28/2011).   No active plan while on admission           Hyperlipidemia  Cont atorvastatin to treat.       Essential hypertension  BP Readings from Last 3 Encounters:   04/11/19 (!) 144/90   04/05/19 131/77   04/05/19 127/80     Slightly elevated likely due to steroids.   Cont metoprolol to treat  Home ACE on hold   Low Na diet      VTE Risk Mitigation (From admission, onward)        Ordered     heparin (porcine) injection 5,000 Units  Every 8 hours      04/10/19 1551     Reason for No Pharmacological VTE Prophylaxis  Once      04/09/19 1600     IP VTE HIGH RISK PATIENT  Once      04/09/19 1600     heparin, porcine (PF) 100 unit/mL injection flush 100 Units  As needed (PRN)      04/09/19 1326              Joyce Wild MD  Department of Hospital Medicine   [facility]

## 2019-04-11 NOTE — ASSESSMENT & PLAN NOTE
Lab Results   Component Value Date    CREATININE 1.3 04/11/2019     Sr Cr improved.   Cont to monitor with daily labs   Avoid nephrotoxins.   Renally dose all medications   Monitor events that may lead to decreased renal perfusion (hypovolemia, hypotension, sepsis).   Monitor urine output   Hold home ACE inhibitor

## 2019-04-11 NOTE — ASSESSMENT & PLAN NOTE
S/p radical prostatectomy on January 6, 2011 for a [place] 7 adenocarcinoma, ([id number]), with negative margins. Subsequently had a local recurrence for which he received XRT at [facility] (completed 10/28/2011).   No active plan while on admission

## 2019-04-12 LAB
ALBUMIN SERPL BCP-MCNC: 2.4 G/DL (ref 3.5–5.2)
ALP SERPL-CCNC: 134 U/L (ref 55–135)
ALT SERPL W/O P-5'-P-CCNC: 52 U/L (ref 10–44)
ANION GAP SERPL CALC-SCNC: 6 MMOL/L (ref 8–16)
AST SERPL-CCNC: 95 U/L (ref 10–40)
BASOPHILS # BLD AUTO: 0 K/UL (ref 0–0.2)
BASOPHILS NFR BLD: 0 % (ref 0–1.9)
BILIRUB SERPL-MCNC: 0.6 MG/DL (ref 0.1–1)
BUN SERPL-MCNC: 26 MG/DL (ref 8–23)
CALCIUM SERPL-MCNC: 8.6 MG/DL (ref 8.7–10.5)
CHLORIDE SERPL-SCNC: 108 MMOL/L (ref 95–110)
CO2 SERPL-SCNC: 22 MMOL/L (ref 23–29)
CREAT SERPL-MCNC: 1.3 MG/DL (ref 0.5–1.4)
DIFFERENTIAL METHOD: ABNORMAL
EOSINOPHIL # BLD AUTO: 0 K/UL (ref 0–0.5)
EOSINOPHIL NFR BLD: 0 % (ref 0–8)
ERYTHROCYTE [DISTWIDTH] IN BLOOD BY AUTOMATED COUNT: 22.2 % (ref 11.5–14.5)
EST. GFR  (AFRICAN AMERICAN): >60 ML/MIN/1.73 M^2
EST. GFR  (NON AFRICAN AMERICAN): 55.3 ML/MIN/1.73 M^2
GLUCOSE SERPL-MCNC: 123 MG/DL (ref 70–110)
HCT VFR BLD AUTO: 31.5 % (ref 40–54)
HGB BLD-MCNC: 10.2 G/DL (ref 14–18)
IMM GRANULOCYTES # BLD AUTO: 0.03 K/UL (ref 0–0.04)
IMM GRANULOCYTES NFR BLD AUTO: 0.5 % (ref 0–0.5)
LYMPHOCYTES # BLD AUTO: 0.4 K/UL (ref 1–4.8)
LYMPHOCYTES NFR BLD: 6.2 % (ref 18–48)
MAGNESIUM SERPL-MCNC: 1.8 MG/DL (ref 1.6–2.6)
MCH RBC QN AUTO: 28.5 PG (ref 27–31)
MCHC RBC AUTO-ENTMCNC: 32.4 G/DL (ref 32–36)
MCV RBC AUTO: 88 FL (ref 82–98)
MONOCYTES # BLD AUTO: 0.4 K/UL (ref 0.3–1)
MONOCYTES NFR BLD: 5.9 % (ref 4–15)
NEUTROPHILS # BLD AUTO: 5.8 K/UL (ref 1.8–7.7)
NEUTROPHILS NFR BLD: 87.4 % (ref 38–73)
NRBC BLD-RTO: 0 /100 WBC
PHOSPHATE SERPL-MCNC: 2.5 MG/DL (ref 2.7–4.5)
PLATELET # BLD AUTO: 139 K/UL (ref 150–350)
PMV BLD AUTO: 11.3 FL (ref 9.2–12.9)
POTASSIUM SERPL-SCNC: 4.2 MMOL/L (ref 3.5–5.1)
PROT SERPL-MCNC: 5.8 G/DL (ref 6–8.4)
RBC # BLD AUTO: 3.58 M/UL (ref 4.6–6.2)
SODIUM SERPL-SCNC: 136 MMOL/L (ref 136–145)
WBC # BLD AUTO: 6.59 K/UL (ref 3.9–12.7)

## 2019-04-12 PROCEDURE — 25000003 PHARM REV CODE 250: Mod: HCNC | Performed by: STUDENT IN AN ORGANIZED HEALTH CARE EDUCATION/TRAINING PROGRAM

## 2019-04-12 PROCEDURE — 94640 AIRWAY INHALATION TREATMENT: CPT | Mod: HCNC

## 2019-04-12 PROCEDURE — 85025 COMPLETE CBC W/AUTO DIFF WBC: CPT | Mod: HCNC

## 2019-04-12 PROCEDURE — 63600175 PHARM REV CODE 636 W HCPCS: Mod: HCNC | Performed by: STUDENT IN AN ORGANIZED HEALTH CARE EDUCATION/TRAINING PROGRAM

## 2019-04-12 PROCEDURE — 99232 SBSQ HOSP IP/OBS MODERATE 35: CPT | Mod: HCNC,,, | Performed by: HOSPITALIST

## 2019-04-12 PROCEDURE — 11000001 HC ACUTE MED/SURG PRIVATE ROOM: Mod: HCNC

## 2019-04-12 PROCEDURE — 36415 COLL VENOUS BLD VENIPUNCTURE: CPT | Mod: HCNC

## 2019-04-12 PROCEDURE — 94761 N-INVAS EAR/PLS OXIMETRY MLT: CPT | Mod: HCNC

## 2019-04-12 PROCEDURE — 25000003 PHARM REV CODE 250: Mod: HCNC | Performed by: HOSPITALIST

## 2019-04-12 PROCEDURE — 25000242 PHARM REV CODE 250 ALT 637 W/ HCPCS: Mod: HCNC | Performed by: STUDENT IN AN ORGANIZED HEALTH CARE EDUCATION/TRAINING PROGRAM

## 2019-04-12 PROCEDURE — 63600175 PHARM REV CODE 636 W HCPCS: Mod: HCNC | Performed by: INTERNAL MEDICINE

## 2019-04-12 PROCEDURE — 99232 PR SUBSEQUENT HOSPITAL CARE,LEVL II: ICD-10-PCS | Mod: HCNC,,, | Performed by: HOSPITALIST

## 2019-04-12 PROCEDURE — 83735 ASSAY OF MAGNESIUM: CPT | Mod: HCNC

## 2019-04-12 PROCEDURE — 80053 COMPREHEN METABOLIC PANEL: CPT | Mod: HCNC

## 2019-04-12 PROCEDURE — 27000221 HC OXYGEN, UP TO 24 HOURS: Mod: HCNC

## 2019-04-12 PROCEDURE — 84100 ASSAY OF PHOSPHORUS: CPT | Mod: HCNC

## 2019-04-12 RX ORDER — AMOXICILLIN AND CLAVULANATE POTASSIUM 875; 125 MG/1; MG/1
1 TABLET, FILM COATED ORAL EVERY 12 HOURS
Status: DISCONTINUED | OUTPATIENT
Start: 2019-04-13 | End: 2019-04-15 | Stop reason: HOSPADM

## 2019-04-12 RX ORDER — SODIUM,POTASSIUM PHOSPHATES 280-250MG
1 POWDER IN PACKET (EA) ORAL
Status: COMPLETED | OUTPATIENT
Start: 2019-04-12 | End: 2019-04-13

## 2019-04-12 RX ORDER — PREDNISONE 20 MG/1
40 TABLET ORAL DAILY
Status: DISCONTINUED | OUTPATIENT
Start: 2019-04-13 | End: 2019-04-15 | Stop reason: HOSPADM

## 2019-04-12 RX ADMIN — IPRATROPIUM BROMIDE AND ALBUTEROL SULFATE 3 ML: .5; 3 SOLUTION RESPIRATORY (INHALATION) at 11:04

## 2019-04-12 RX ADMIN — HEPARIN SODIUM 5000 UNITS: 5000 INJECTION, SOLUTION INTRAVENOUS; SUBCUTANEOUS at 02:04

## 2019-04-12 RX ADMIN — IPRATROPIUM BROMIDE AND ALBUTEROL SULFATE 3 ML: .5; 3 SOLUTION RESPIRATORY (INHALATION) at 03:04

## 2019-04-12 RX ADMIN — METOPROLOL TARTRATE 25 MG: 25 TABLET ORAL at 09:04

## 2019-04-12 RX ADMIN — PANTOPRAZOLE SODIUM 40 MG: 40 TABLET, DELAYED RELEASE ORAL at 09:04

## 2019-04-12 RX ADMIN — ASPIRIN 81 MG: 81 TABLET, COATED ORAL at 09:04

## 2019-04-12 RX ADMIN — POTASSIUM & SODIUM PHOSPHATES POWDER PACK 280-160-250 MG 1 PACKET: 280-160-250 PACK at 09:04

## 2019-04-12 RX ADMIN — TIMOLOL MALEATE 1 DROP: 5 SOLUTION OPHTHALMIC at 09:04

## 2019-04-12 RX ADMIN — IPRATROPIUM BROMIDE AND ALBUTEROL SULFATE 3 ML: .5; 3 SOLUTION RESPIRATORY (INHALATION) at 08:04

## 2019-04-12 RX ADMIN — CEFTRIAXONE SODIUM 1 G: 1 INJECTION, POWDER, FOR SOLUTION INTRAMUSCULAR; INTRAVENOUS at 09:04

## 2019-04-12 RX ADMIN — POTASSIUM & SODIUM PHOSPHATES POWDER PACK 280-160-250 MG 1 PACKET: 280-160-250 PACK at 11:04

## 2019-04-12 RX ADMIN — METHYLPREDNISOLONE SODIUM SUCCINATE 80 MG: 40 INJECTION, POWDER, FOR SOLUTION INTRAMUSCULAR; INTRAVENOUS at 09:04

## 2019-04-12 RX ADMIN — HEPARIN SODIUM 5000 UNITS: 5000 INJECTION, SOLUTION INTRAVENOUS; SUBCUTANEOUS at 06:04

## 2019-04-12 RX ADMIN — FAMOTIDINE 20 MG: 20 TABLET, FILM COATED ORAL at 09:04

## 2019-04-12 RX ADMIN — DORZOLAMIDE HYDROCHLORIDE 1 DROP: 20 SOLUTION/ DROPS OPHTHALMIC at 09:04

## 2019-04-12 RX ADMIN — HEPARIN SODIUM 5000 UNITS: 5000 INJECTION, SOLUTION INTRAVENOUS; SUBCUTANEOUS at 09:04

## 2019-04-12 RX ADMIN — LEVOTHYROXINE SODIUM 100 MCG: 100 TABLET ORAL at 06:04

## 2019-04-12 RX ADMIN — FLUTICASONE FUROATE AND VILANTEROL TRIFENATATE 1 PUFF: 200; 25 POWDER RESPIRATORY (INHALATION) at 09:04

## 2019-04-12 RX ADMIN — POTASSIUM & SODIUM PHOSPHATES POWDER PACK 280-160-250 MG 1 PACKET: 280-160-250 PACK at 05:04

## 2019-04-12 NOTE — PLAN OF CARE
Problem: Adult Inpatient Plan of Care  Goal: Plan of Care Review  Outcome: Ongoing (interventions implemented as appropriate)     04/12/19 2634   Plan of Care Review   Plan of Care Reviewed With patient   Pt is AAO x4, denies pain , noted with frequent non productive cough : supplement oxygen @ 2 liters per nasal cannula, neb treatments scheduled  : voiding without difficulty: Tele , NSR . Supervised activity : independent of bed mobility : safety rounds performed : VSS, will cont to monitor.

## 2019-04-12 NOTE — PLAN OF CARE
Plan is to discharge home with home health.       04/12/19 1313   Discharge Reassessment   Assessment Type Discharge Planning Reassessment   Do you have any problems affording any of your prescribed medications? No   Discharge Plan A Home Health   Discharge Plan B Skilled Nursing Facility   Can the patient answer the patient profile reliably? Yes, cognitively intact   How does the patient rate their overall health at the present time? Fair   Describe the patient's ability to walk at the present time. Minor restrictions or changes   How often would a person be available to care for the patient? Whenever needed   Number of comorbid conditions (as recorded on the chart) Four   During the past month, has the patient often been bothered by feeling down, depressed or hopeless? No   During the past month, has the patient often been bothered by little interest or pleasure in doing things? No

## 2019-04-12 NOTE — PLAN OF CARE
Problem: Adult Inpatient Plan of Care  Goal: Plan of Care Review  Outcome: Ongoing (interventions implemented as appropriate)     04/12/19 2308   Plan of Care Review   Plan of Care Reviewed With patient   Progress improving     Pt resting aaox4 no c/o pain or discomfort. V/s stable. Pt ambulates in room w/o complaints. Remained free of falls and injuries. Will continue to monitor and interventions as appropriate.

## 2019-04-12 NOTE — ASSESSMENT & PLAN NOTE
BP Readings from Last 3 Encounters:   04/12/19 136/63   04/05/19 131/77   04/05/19 127/80     Slightly elevated likely due to steroids.   Cont metoprolol to treat  Home ACE on hold   Low Na diet

## 2019-04-12 NOTE — PROGRESS NOTES
[facility] Hospital Medicine  Progress Note    Patient Name: Robin Hawkins  MRN: [id number]  Patient Class: IP- Inpatient   Admission Date: 4/9/2019  Length of Stay: 3 days  Attending Physician: Joyce Light MD  Primary Care Provider: Venancio Mcneil MD    [facility] Medicine Team: [facility] MED  Joyce Wild MD    Subjective:     Principal Problem:COPD exacerbation    HPI:  This is Mr. Robin Hawkins, 68 year old male with medical history significant for hepatocellular carcinoma, Essential hypertension, History of prostate cancer, COPD, CAD, Subclinical hypothyroidism. He presented to ED with two day history of epigastric pain, shortness of breath more prominent on lying flat and found to have acute hypoxemic respiratory failure. For the last couple of months; he endorsed that he developed nausea, poor oral intake; and decrease appetite; however, this presentation was mainly the epigastric pain and feeling shortness of breath. He denies fever, chills, respiratory symptoms; loss of consciousness or recent change in his mediations. Of note, Two previous COPD exacerbation admission 10/23-24 2018; and 11/26-29 2018 for for dyspnea, atypical chest pain. In ED, he was having tachypeic, with resp rate > 40; and hypoxic that require BiPAP. On evaluation he was very limited in his conversation secondary to tachypnea and tripoding.     Hospital Course:  4/09/2019: admitted to MICU for concern of PE  4/10/2019  No acute events overnight. He was on BiPAP and has restful night. No signs of hemodynamic compromise or instability. Still having the atypical chest pain. Mild oozing from his vascular access. Workup for PE came back negative. Stopped heparin infusion. Suspect pt's acute hypoxemic respiratory failure was due to COPD exacerbation. Trialed patient off noninvasive ventilation today, & he did well. Will stop noninvasive ventilation altogether. Treat with solumedrol & duonebs  4/11: stepped down from  MICU to hospital medicine.     Interval History: No acute events. No family at bedside. Patient states he feels better. Denies SOB.     Review of Systems   Constitutional: Negative for activity change, appetite change, chills and fever.   HENT: Negative for congestion.    Respiratory: Negative for cough and chest tightness.    Cardiovascular: Negative for chest pain, palpitations and leg swelling.   Gastrointestinal: Negative for abdominal distention and abdominal pain.   Musculoskeletal: Negative for arthralgias.   Neurological: Negative for dizziness and headaches.   Hematological: Negative for adenopathy.   Psychiatric/Behavioral: Negative for agitation.     Objective:     Vital Signs (Most Recent):  Temp: 98 °F (36.7 °C) (04/12/19 0742)  Pulse: 74 (04/12/19 0904)  Resp: 18 (04/12/19 0904)  BP: 136/63 (04/12/19 0742)  SpO2: 99 % (04/12/19 0801) Vital Signs (24h Range):  Temp:  [96.6 °F (35.9 °C)-98 °F (36.7 °C)] 98 °F (36.7 °C)  Pulse:  [54-75] 74  Resp:  [14-20] 18  SpO2:  [95 %-100 %] 99 %  BP: (127-144)/(61-90) 136/63     Weight: 72.2 kg (159 lb 2.8 oz)  Body mass index is 22.2 kg/m².    Intake/Output Summary (Last 24 hours) at 4/12/2019 1044  Last data filed at 4/12/2019 0000  Gross per 24 hour   Intake 680 ml   Output 300 ml   Net 380 ml      Physical Exam   Constitutional: He is oriented to person, place, and time. He appears well-developed and well-nourished. No distress. Nasal cannula in place.   HENT:   Head: Normocephalic and atraumatic.   Mouth/Throat: Abnormal dentition.   Eyes: Pupils are equal, round, and reactive to light. No scleral icterus.   Neck: Normal range of motion.   Cardiovascular: Normal rate, regular rhythm and normal heart sounds.   No murmur heard.      Pulmonary/Chest: Effort normal and breath sounds normal. No respiratory distress. He has no wheezes.   Abdominal: Soft. Bowel sounds are normal. He exhibits no distension. There is no tenderness.   Musculoskeletal: He exhibits no edema  or deformity.   Neurological: He is alert and oriented to person, place, and time.   Skin: Skin is warm and dry. He is not diaphoretic. No erythema.   Psychiatric: He has a normal mood and affect. His behavior is normal. Judgment and thought content normal.       Significant Labs: All pertinent labs within the past 24 hours have been reviewed.    Significant Imaging: I have reviewed all pertinent imaging results/findings within the past 24 hours.    Assessment/Plan:      * COPD exacerbation  Cont LAMA, LABA, ICS inhalers  Cont scheduled duo nebs.   Cont with systemic glucocorticoids with IV solumedrol. Then will transition to oral prednisone 40mg PO daily for 7-10 days.  Cont supplemental oxygen via NC in order to assure adequate oxygenation. Currently on 2L NC. Goal sats > than 88%  Cont with Ceftriaxone which should target likely bacterial pathogens.   Can consider mucoactive agents such as mucomyst and chest physiotherapy to help with secretion clearance.   Will use BiPaP as needed    Acute hypoxemic respiratory failure  Workup for PE came back negative.   Stopped heparin infusion.   Suspect pt's acute hypoxemic respiratory failure was due to COPD exacerbation.   Treat with solumedrol & duonebs.      Acute cystitis  Although asymptomatic; UA showed borderline UTI with his acidosis will treat with Ceftriaxone           SEVERO (acute kidney injury)  Lab Results   Component Value Date    CREATININE 1.3 04/11/2019     Sr Cr improved.   Cont to monitor with daily labs   Avoid nephrotoxins.   Renally dose all medications   Monitor events that may lead to decreased renal perfusion (hypovolemia, hypotension, sepsis).   Monitor urine output   Hold home ACE inhibitor     Centrilobular emphysema  Plan as above.       Cirrhosis   See hepatocellular carcinoma for more elaboration     Gastroesophageal reflux disease  On home PPI and add H2 blocker for severe GERD symptoms   Stable problem, no acute change, continue current  medication.         Hepatocellular carcinoma  Dx 12/2015 with HCC in the setting of hepatitis C.  S/p multiple treatments with TACE.    Had prior MRI shows progression of malignancy  Nivolumab 04/2018 - 9/2018 (discontinued due to progression). Regorafenib 10/2018 -12/2018 (discontinued due to progression).   No active plan while inpatient, needs follow up with his Oncologist.     Bilateral complete vocal fold paralysis  From prior shock and prior Tracheostomy that was decannulated two years ago   No acute issues          History of prostate cancer  S/p radical prostatectomy on January 6, 2011 for a [place] 7 adenocarcinoma, ([id number]), with negative margins. Subsequently had a local recurrence for which he received XRT at [facility] (completed 10/28/2011).   No active plan while on admission           Hyperlipidemia  Cont atorvastatin to treat.       Essential hypertension  BP Readings from Last 3 Encounters:   04/12/19 136/63   04/05/19 131/77   04/05/19 127/80     Slightly elevated likely due to steroids.   Cont metoprolol to treat  Home ACE on hold   Low Na diet      VTE Risk Mitigation (From admission, onward)        Ordered     heparin (porcine) injection 5,000 Units  Every 8 hours      04/10/19 1551     Reason for No Pharmacological VTE Prophylaxis  Once      04/09/19 1600     IP VTE HIGH RISK PATIENT  Once      04/09/19 1600     heparin, porcine (PF) 100 unit/mL injection flush 100 Units  As needed (PRN)      04/09/19 1326              Joyce Wild MD  Department of Hospital Medicine   [facility]

## 2019-04-12 NOTE — PLAN OF CARE
Problem: Adult Inpatient Plan of Care  Goal: Plan of Care Review  Outcome: Ongoing (interventions implemented as appropriate)     04/12/19 9965   Plan of Care Review   Plan of Care Reviewed With patient   Progress improving   Pt resting aaox4 no c/o pain or discomfort. V/s stable. Ambulates in the room w/o complaints. Remained free of fall and injuries. Will continue to monitor and interventions as appropriate.

## 2019-04-12 NOTE — SUBJECTIVE & OBJECTIVE
Interval History: No acute events. No family at bedside. Patient states he feels better. Denies SOB.     Review of Systems   Constitutional: Negative for activity change, appetite change, chills and fever.   HENT: Negative for congestion.    Respiratory: Negative for cough and chest tightness.    Cardiovascular: Negative for chest pain, palpitations and leg swelling.   Gastrointestinal: Negative for abdominal distention and abdominal pain.   Musculoskeletal: Negative for arthralgias.   Neurological: Negative for dizziness and headaches.   Hematological: Negative for adenopathy.   Psychiatric/Behavioral: Negative for agitation.     Objective:     Vital Signs (Most Recent):  Temp: 98 °F (36.7 °C) (04/12/19 0742)  Pulse: 74 (04/12/19 0904)  Resp: 18 (04/12/19 0904)  BP: 136/63 (04/12/19 0742)  SpO2: 99 % (04/12/19 0801) Vital Signs (24h Range):  Temp:  [96.6 °F (35.9 °C)-98 °F (36.7 °C)] 98 °F (36.7 °C)  Pulse:  [54-75] 74  Resp:  [14-20] 18  SpO2:  [95 %-100 %] 99 %  BP: (127-144)/(61-90) 136/63     Weight: 72.2 kg (159 lb 2.8 oz)  Body mass index is 22.2 kg/m².    Intake/Output Summary (Last 24 hours) at 4/12/2019 1044  Last data filed at 4/12/2019 0000  Gross per 24 hour   Intake 680 ml   Output 300 ml   Net 380 ml      Physical Exam   Constitutional: He is oriented to person, place, and time. He appears well-developed and well-nourished. No distress. Nasal cannula in place.   HENT:   Head: Normocephalic and atraumatic.   Mouth/Throat: Abnormal dentition.   Eyes: Pupils are equal, round, and reactive to light. No scleral icterus.   Neck: Normal range of motion.   Cardiovascular: Normal rate, regular rhythm and normal heart sounds.   No murmur heard.      Pulmonary/Chest: Effort normal and breath sounds normal. No respiratory distress. He has no wheezes.   Abdominal: Soft. Bowel sounds are normal. He exhibits no distension. There is no tenderness.   Musculoskeletal: He exhibits no edema or deformity.   Neurological:  He is alert and oriented to person, place, and time.   Skin: Skin is warm and dry. He is not diaphoretic. No erythema.   Psychiatric: He has a normal mood and affect. His behavior is normal. Judgment and thought content normal.       Significant Labs: All pertinent labs within the past 24 hours have been reviewed.    Significant Imaging: I have reviewed all pertinent imaging results/findings within the past 24 hours.

## 2019-04-13 LAB
ALBUMIN SERPL BCP-MCNC: 2.5 G/DL (ref 3.5–5.2)
ALP SERPL-CCNC: 149 U/L (ref 55–135)
ALT SERPL W/O P-5'-P-CCNC: 89 U/L (ref 10–44)
ANION GAP SERPL CALC-SCNC: 9 MMOL/L (ref 8–16)
AST SERPL-CCNC: 126 U/L (ref 10–40)
BASOPHILS # BLD AUTO: 0.01 K/UL (ref 0–0.2)
BASOPHILS NFR BLD: 0.1 % (ref 0–1.9)
BILIRUB SERPL-MCNC: 0.5 MG/DL (ref 0.1–1)
BUN SERPL-MCNC: 28 MG/DL (ref 8–23)
CALCIUM SERPL-MCNC: 8.8 MG/DL (ref 8.7–10.5)
CHLORIDE SERPL-SCNC: 109 MMOL/L (ref 95–110)
CO2 SERPL-SCNC: 20 MMOL/L (ref 23–29)
CREAT SERPL-MCNC: 1.1 MG/DL (ref 0.5–1.4)
DIFFERENTIAL METHOD: ABNORMAL
EOSINOPHIL # BLD AUTO: 0 K/UL (ref 0–0.5)
EOSINOPHIL NFR BLD: 0 % (ref 0–8)
ERYTHROCYTE [DISTWIDTH] IN BLOOD BY AUTOMATED COUNT: 22.3 % (ref 11.5–14.5)
EST. GFR  (AFRICAN AMERICAN): >60 ML/MIN/1.73 M^2
EST. GFR  (NON AFRICAN AMERICAN): >60 ML/MIN/1.73 M^2
GLUCOSE SERPL-MCNC: 124 MG/DL (ref 70–110)
HCT VFR BLD AUTO: 32.7 % (ref 40–54)
HGB BLD-MCNC: 10.6 G/DL (ref 14–18)
IMM GRANULOCYTES # BLD AUTO: 0.03 K/UL (ref 0–0.04)
IMM GRANULOCYTES NFR BLD AUTO: 0.3 % (ref 0–0.5)
LYMPHOCYTES # BLD AUTO: 0.5 K/UL (ref 1–4.8)
LYMPHOCYTES NFR BLD: 5 % (ref 18–48)
MAGNESIUM SERPL-MCNC: 1.7 MG/DL (ref 1.6–2.6)
MCH RBC QN AUTO: 29.2 PG (ref 27–31)
MCHC RBC AUTO-ENTMCNC: 32.4 G/DL (ref 32–36)
MCV RBC AUTO: 90 FL (ref 82–98)
MONOCYTES # BLD AUTO: 0.5 K/UL (ref 0.3–1)
MONOCYTES NFR BLD: 5.5 % (ref 4–15)
NEUTROPHILS # BLD AUTO: 8 K/UL (ref 1.8–7.7)
NEUTROPHILS NFR BLD: 89.1 % (ref 38–73)
NRBC BLD-RTO: 0 /100 WBC
PHOSPHATE SERPL-MCNC: 2.7 MG/DL (ref 2.7–4.5)
PLATELET # BLD AUTO: 191 K/UL (ref 150–350)
PMV BLD AUTO: 11.8 FL (ref 9.2–12.9)
POTASSIUM SERPL-SCNC: 4 MMOL/L (ref 3.5–5.1)
PROT SERPL-MCNC: 5.9 G/DL (ref 6–8.4)
RBC # BLD AUTO: 3.63 M/UL (ref 4.6–6.2)
SODIUM SERPL-SCNC: 138 MMOL/L (ref 136–145)
WBC # BLD AUTO: 9.02 K/UL (ref 3.9–12.7)

## 2019-04-13 PROCEDURE — 25000003 PHARM REV CODE 250: Mod: HCNC | Performed by: HOSPITALIST

## 2019-04-13 PROCEDURE — 99232 SBSQ HOSP IP/OBS MODERATE 35: CPT | Mod: HCNC,,, | Performed by: HOSPITALIST

## 2019-04-13 PROCEDURE — 94640 AIRWAY INHALATION TREATMENT: CPT | Mod: HCNC

## 2019-04-13 PROCEDURE — 80053 COMPREHEN METABOLIC PANEL: CPT | Mod: HCNC

## 2019-04-13 PROCEDURE — 25000003 PHARM REV CODE 250: Mod: HCNC | Performed by: STUDENT IN AN ORGANIZED HEALTH CARE EDUCATION/TRAINING PROGRAM

## 2019-04-13 PROCEDURE — 36415 COLL VENOUS BLD VENIPUNCTURE: CPT | Mod: HCNC

## 2019-04-13 PROCEDURE — 63600175 PHARM REV CODE 636 W HCPCS: Mod: HCNC | Performed by: INTERNAL MEDICINE

## 2019-04-13 PROCEDURE — 99232 PR SUBSEQUENT HOSPITAL CARE,LEVL II: ICD-10-PCS | Mod: HCNC,,, | Performed by: HOSPITALIST

## 2019-04-13 PROCEDURE — 99900035 HC TECH TIME PER 15 MIN (STAT): Mod: HCNC

## 2019-04-13 PROCEDURE — 85025 COMPLETE CBC W/AUTO DIFF WBC: CPT | Mod: HCNC

## 2019-04-13 PROCEDURE — 97165 OT EVAL LOW COMPLEX 30 MIN: CPT | Mod: HCNC

## 2019-04-13 PROCEDURE — 83735 ASSAY OF MAGNESIUM: CPT | Mod: HCNC

## 2019-04-13 PROCEDURE — 84100 ASSAY OF PHOSPHORUS: CPT | Mod: HCNC

## 2019-04-13 PROCEDURE — 25000242 PHARM REV CODE 250 ALT 637 W/ HCPCS: Mod: HCNC | Performed by: STUDENT IN AN ORGANIZED HEALTH CARE EDUCATION/TRAINING PROGRAM

## 2019-04-13 PROCEDURE — 11000001 HC ACUTE MED/SURG PRIVATE ROOM: Mod: HCNC

## 2019-04-13 PROCEDURE — 94761 N-INVAS EAR/PLS OXIMETRY MLT: CPT | Mod: HCNC

## 2019-04-13 PROCEDURE — 63600175 PHARM REV CODE 636 W HCPCS: Mod: HCNC | Performed by: HOSPITALIST

## 2019-04-13 RX ORDER — FAMOTIDINE 20 MG/1
20 TABLET, FILM COATED ORAL 2 TIMES DAILY
Status: DISCONTINUED | OUTPATIENT
Start: 2019-04-13 | End: 2019-04-15 | Stop reason: HOSPADM

## 2019-04-13 RX ADMIN — IPRATROPIUM BROMIDE AND ALBUTEROL SULFATE 3 ML: .5; 3 SOLUTION RESPIRATORY (INHALATION) at 11:04

## 2019-04-13 RX ADMIN — AMOXICILLIN AND CLAVULANATE POTASSIUM 1 TABLET: 875; 125 TABLET, FILM COATED ORAL at 10:04

## 2019-04-13 RX ADMIN — IPRATROPIUM BROMIDE AND ALBUTEROL SULFATE 3 ML: .5; 3 SOLUTION RESPIRATORY (INHALATION) at 07:04

## 2019-04-13 RX ADMIN — HEPARIN SODIUM 5000 UNITS: 5000 INJECTION, SOLUTION INTRAVENOUS; SUBCUTANEOUS at 02:04

## 2019-04-13 RX ADMIN — FAMOTIDINE 20 MG: 20 TABLET, FILM COATED ORAL at 09:04

## 2019-04-13 RX ADMIN — HEPARIN SODIUM 5000 UNITS: 5000 INJECTION, SOLUTION INTRAVENOUS; SUBCUTANEOUS at 06:04

## 2019-04-13 RX ADMIN — METOPROLOL TARTRATE 25 MG: 25 TABLET ORAL at 10:04

## 2019-04-13 RX ADMIN — METOPROLOL TARTRATE 25 MG: 25 TABLET ORAL at 09:04

## 2019-04-13 RX ADMIN — FAMOTIDINE 20 MG: 20 TABLET, FILM COATED ORAL at 10:04

## 2019-04-13 RX ADMIN — AMOXICILLIN AND CLAVULANATE POTASSIUM 1 TABLET: 875; 125 TABLET, FILM COATED ORAL at 09:04

## 2019-04-13 RX ADMIN — POTASSIUM & SODIUM PHOSPHATES POWDER PACK 280-160-250 MG 1 PACKET: 280-160-250 PACK at 09:04

## 2019-04-13 RX ADMIN — IPRATROPIUM BROMIDE AND ALBUTEROL SULFATE 3 ML: .5; 3 SOLUTION RESPIRATORY (INHALATION) at 03:04

## 2019-04-13 RX ADMIN — PREDNISONE 40 MG: 20 TABLET ORAL at 09:04

## 2019-04-13 RX ADMIN — HEPARIN SODIUM 5000 UNITS: 5000 INJECTION, SOLUTION INTRAVENOUS; SUBCUTANEOUS at 10:04

## 2019-04-13 RX ADMIN — LEVOTHYROXINE SODIUM 100 MCG: 100 TABLET ORAL at 06:04

## 2019-04-13 RX ADMIN — ASPIRIN 81 MG: 81 TABLET, COATED ORAL at 09:04

## 2019-04-13 RX ADMIN — PANTOPRAZOLE SODIUM 40 MG: 40 TABLET, DELAYED RELEASE ORAL at 09:04

## 2019-04-13 NOTE — PROGRESS NOTES
Pharmacist Renal Dose Adjustment Note    Robin Hawkins is a 68 y.o. male being treated with the medication famotidine    Patient Data:    Vital Signs (Most Recent):  Temp: 98.3 °F (36.8 °C) (04/13/19 0830)  Pulse: 82 (04/13/19 0830)  Resp: 18 (04/13/19 0830)  BP: 131/84 (04/13/19 0830)  SpO2: 99 % (04/13/19 0830) Vital Signs (72h Range):  Temp:  [96.6 °F (35.9 °C)-98.3 °F (36.8 °C)]   Pulse:  []   Resp:  [14-43]   BP: (115-184)/(58-98)   SpO2:  [92 %-100 %]      Recent Labs   Lab 04/11/19  0503 04/12/19  0605 04/13/19  0505   CREATININE 1.3 1.3 1.1     Serum creatinine: 1.1 mg/dL 04/13/19 0505  Estimated creatinine clearance: 63.8 mL/min    Famotidine 20mg PO daily will be changed to famotidine 20mg PO BID    Pharmacist's Name: Piedad Villegas, PharmD, BCPS  Pharmacist's Extension: [phone]

## 2019-04-13 NOTE — ASSESSMENT & PLAN NOTE
S/p radical prostatectomy on January 6, 2011 for a [place] 7 adenocarcinoma, ([id number]), with negative margins. Subsequently had a local recurrence for which he received XRT at [facility] (completed 10/28/2011).   No active plan while on admission

## 2019-04-13 NOTE — PLAN OF CARE
Problem: Occupational Therapy Goal  Goal: Occupational Therapy Goal  Goals not set 2* no further OT needed.  Outcome: Outcome(s) achieved Date Met: 04/13/19  OT evaluation completed.  ALVARADO Foster  4/13/2019

## 2019-04-13 NOTE — SUBJECTIVE & OBJECTIVE
Interval History: No acute events. No family at bedside. Patient states he feels better. Denies SOB. Dispo pending PT evaluation     Review of Systems   Constitutional: Negative for activity change, appetite change, chills and fever.   HENT: Negative for congestion.    Respiratory: Negative for cough and chest tightness.    Cardiovascular: Negative for chest pain, palpitations and leg swelling.   Gastrointestinal: Negative for abdominal distention and abdominal pain.   Musculoskeletal: Negative for arthralgias.   Neurological: Negative for dizziness and headaches.   Hematological: Negative for adenopathy.   Psychiatric/Behavioral: Negative for agitation.     Objective:     Vital Signs (Most Recent):  Temp: 98.3 °F (36.8 °C) (04/13/19 0830)  Pulse: 72 (04/13/19 1150)  Resp: 18 (04/13/19 1150)  BP: 131/84 (04/13/19 0830)  SpO2: 100 % (04/13/19 1150) Vital Signs (24h Range):  Temp:  [96.6 °F (35.9 °C)-98.3 °F (36.8 °C)] 98.3 °F (36.8 °C)  Pulse:  [] 72  Resp:  [16-18] 18  SpO2:  [92 %-100 %] 100 %  BP: (115-140)/(58-84) 131/84     Weight: 72.2 kg (159 lb 2.8 oz)  Body mass index is 22.2 kg/m².    Intake/Output Summary (Last 24 hours) at 4/13/2019 1211  Last data filed at 4/12/2019 1800  Gross per 24 hour   Intake 240 ml   Output --   Net 240 ml      Physical Exam   Constitutional: He is oriented to person, place, and time. He appears well-developed and well-nourished. No distress. Nasal cannula in place.   HENT:   Head: Normocephalic and atraumatic.   Mouth/Throat: Abnormal dentition.   Eyes: Pupils are equal, round, and reactive to light. No scleral icterus.   Neck: Normal range of motion.   Cardiovascular: Normal rate, regular rhythm and normal heart sounds.   No murmur heard.      Pulmonary/Chest: Effort normal and breath sounds normal. No respiratory distress. He has no wheezes.   Abdominal: Soft. Bowel sounds are normal. He exhibits no distension. There is no tenderness.   Musculoskeletal: He exhibits no  edema or deformity.   Neurological: He is alert and oriented to person, place, and time.   Skin: Skin is warm and dry. He is not diaphoretic. No erythema.   Psychiatric: He has a normal mood and affect. His behavior is normal. Judgment and thought content normal.       Significant Labs: All pertinent labs within the past 24 hours have been reviewed.    Significant Imaging: I have reviewed all pertinent imaging results/findings within the past 24 hours.

## 2019-04-13 NOTE — ASSESSMENT & PLAN NOTE
Lab Results   Component Value Date    CREATININE 1.1 04/13/2019     Sr Cr improved.   Cont to monitor with daily labs   Avoid nephrotoxins.   Renally dose all medications   Monitor events that may lead to decreased renal perfusion (hypovolemia, hypotension, sepsis).   Monitor urine output   Hold home ACE inhibitor

## 2019-04-13 NOTE — PROGRESS NOTES
[facility] Hospital Medicine  Progress Note    Patient Name: Robin Hawkins  MRN: [id number]  Patient Class: IP- Inpatient   Admission Date: 4/9/2019  Length of Stay: 4 days  Attending Physician: Joyce Light MD  Primary Care Provider: Venancio Mcneil MD    [facility] Medicine Team: [facility] MED  Joyce Wild MD    Subjective:     Principal Problem:COPD exacerbation    HPI:  This is Mr. Robin Hawkins, 68 year old male with medical history significant for hepatocellular carcinoma, Essential hypertension, History of prostate cancer, COPD, CAD, Subclinical hypothyroidism. He presented to ED with two day history of epigastric pain, shortness of breath more prominent on lying flat and found to have acute hypoxemic respiratory failure. For the last couple of months; he endorsed that he developed nausea, poor oral intake; and decrease appetite; however, this presentation was mainly the epigastric pain and feeling shortness of breath. He denies fever, chills, respiratory symptoms; loss of consciousness or recent change in his mediations. Of note, Two previous COPD exacerbation admission 10/23-24 2018; and 11/26-29 2018 for for dyspnea, atypical chest pain. In ED, he was having tachypeic, with resp rate > 40; and hypoxic that require BiPAP. On evaluation he was very limited in his conversation secondary to tachypnea and tripoding.     Hospital Course:  4/09/2019: admitted to MICU for concern of PE  4/10/2019  No acute events overnight. He was on BiPAP and has restful night. No signs of hemodynamic compromise or instability. Still having the atypical chest pain. Mild oozing from his vascular access. Workup for PE came back negative. Stopped heparin infusion. Suspect pt's acute hypoxemic respiratory failure was due to COPD exacerbation. Trialed patient off noninvasive ventilation today, & he did well. Will stop noninvasive ventilation altogether. Treat with solumedrol & duonebs  4/11: stepped down from  MICU to hospital medicine.     Interval History: No acute events. No family at bedside. Patient states he feels better. Denies SOB. Dispo pending PT evaluation     Review of Systems   Constitutional: Negative for activity change, appetite change, chills and fever.   HENT: Negative for congestion.    Respiratory: Negative for cough and chest tightness.    Cardiovascular: Negative for chest pain, palpitations and leg swelling.   Gastrointestinal: Negative for abdominal distention and abdominal pain.   Musculoskeletal: Negative for arthralgias.   Neurological: Negative for dizziness and headaches.   Hematological: Negative for adenopathy.   Psychiatric/Behavioral: Negative for agitation.     Objective:     Vital Signs (Most Recent):  Temp: 98.3 °F (36.8 °C) (04/13/19 0830)  Pulse: 72 (04/13/19 1150)  Resp: 18 (04/13/19 1150)  BP: 131/84 (04/13/19 0830)  SpO2: 100 % (04/13/19 1150) Vital Signs (24h Range):  Temp:  [96.6 °F (35.9 °C)-98.3 °F (36.8 °C)] 98.3 °F (36.8 °C)  Pulse:  [] 72  Resp:  [16-18] 18  SpO2:  [92 %-100 %] 100 %  BP: (115-140)/(58-84) 131/84     Weight: 72.2 kg (159 lb 2.8 oz)  Body mass index is 22.2 kg/m².    Intake/Output Summary (Last 24 hours) at 4/13/2019 1211  Last data filed at 4/12/2019 1800  Gross per 24 hour   Intake 240 ml   Output --   Net 240 ml      Physical Exam   Constitutional: He is oriented to person, place, and time. He appears well-developed and well-nourished. No distress. Nasal cannula in place.   HENT:   Head: Normocephalic and atraumatic.   Mouth/Throat: Abnormal dentition.   Eyes: Pupils are equal, round, and reactive to light. No scleral icterus.   Neck: Normal range of motion.   Cardiovascular: Normal rate, regular rhythm and normal heart sounds.   No murmur heard.      Pulmonary/Chest: Effort normal and breath sounds normal. No respiratory distress. He has no wheezes.   Abdominal: Soft. Bowel sounds are normal. He exhibits no distension. There is no tenderness.    Musculoskeletal: He exhibits no edema or deformity.   Neurological: He is alert and oriented to person, place, and time.   Skin: Skin is warm and dry. He is not diaphoretic. No erythema.   Psychiatric: He has a normal mood and affect. His behavior is normal. Judgment and thought content normal.       Significant Labs: All pertinent labs within the past 24 hours have been reviewed.    Significant Imaging: I have reviewed all pertinent imaging results/findings within the past 24 hours.    Assessment/Plan:      * COPD exacerbation  Cont LAMA, LABA, ICS inhalers  Cont scheduled duo nebs.   Cont with systemic glucocorticoids with oral prednisone 40mg PO daily for 7-10 days.  Cont supplemental oxygen via NC in order to assure adequate oxygenation. Currently on 2L NC. Goal sats > than 88%  Cont with Ceftriaxone which should target likely bacterial pathogens.   Can consider mucoactive agents such as mucomyst and chest physiotherapy to help with secretion clearance.   Will use BiPaP as needed    Acute hypoxemic respiratory failure  Workup for PE came back negative.   Stopped heparin infusion.   Suspect pt's acute hypoxemic respiratory failure was due to COPD exacerbation.   Treat with steroids & duonebs.      Acute cystitis  Although asymptomatic; UA showed borderline UTI with his acidosis will treat with Ceftriaxone           SEVERO (acute kidney injury)  Lab Results   Component Value Date    CREATININE 1.1 04/13/2019     Sr Cr improved.   Cont to monitor with daily labs   Avoid nephrotoxins.   Renally dose all medications   Monitor events that may lead to decreased renal perfusion (hypovolemia, hypotension, sepsis).   Monitor urine output   Hold home ACE inhibitor     Centrilobular emphysema  Plan as above.       Cirrhosis   See hepatocellular carcinoma for more elaboration     Gastroesophageal reflux disease  On home PPI and add H2 blocker for severe GERD symptoms   Stable problem, no acute change, continue current  medication.         Hepatocellular carcinoma  Dx 12/2015 with HCC in the setting of hepatitis C.  S/p multiple treatments with TACE.    Had prior MRI shows progression of malignancy  Nivolumab 04/2018 - 9/2018 (discontinued due to progression). Regorafenib 10/2018 -12/2018 (discontinued due to progression).   No active plan while inpatient, needs follow up with his Oncologist.     Bilateral complete vocal fold paralysis  From prior shock and prior Tracheostomy that was decannulated two years ago   No acute issues          History of prostate cancer  S/p radical prostatectomy on January 6, 2011 for a [place] 7 adenocarcinoma, ([id number]), with negative margins. Subsequently had a local recurrence for which he received XRT at [facility] (completed 10/28/2011).   No active plan while on admission           Hyperlipidemia  Holding atorvastatin due to elevated LFT's.      Essential hypertension  BP Readings from Last 3 Encounters:   04/13/19 131/84   04/05/19 131/77   04/05/19 127/80     Slightly elevated likely due to steroids.   Cont metoprolol to treat  Home ACE on hold   Low Na diet      VTE Risk Mitigation (From admission, onward)        Ordered     heparin (porcine) injection 5,000 Units  Every 8 hours      04/10/19 1551     Reason for No Pharmacological VTE Prophylaxis  Once      04/09/19 1600     IP VTE HIGH RISK PATIENT  Once      04/09/19 1600     heparin, porcine (PF) 100 unit/mL injection flush 100 Units  As needed (PRN)      04/09/19 1326              Joyce Wild MD  Department of Hospital Medicine   [facility]

## 2019-04-13 NOTE — PLAN OF CARE
Problem: Adult Inpatient Plan of Care  Goal: Plan of Care Review  Outcome: Ongoing (interventions implemented as appropriate)  NO FALLS. SAFETY MAINTAINED. MEDICATIONS ADMINISTERED. PATIENT EDUCATION PROVIDED. PATIENT QUESTIONS ENCOURAGED. PAIN MANAGED WITH PRN MEDICATIONS. TELEMETRY MONITORING DISCONTINUED.

## 2019-04-13 NOTE — ASSESSMENT & PLAN NOTE
Cont LAMA, LABA, ICS inhalers  Cont scheduled duo nebs.   Cont with systemic glucocorticoids with oral prednisone 40mg PO daily for 7-10 days.  Cont supplemental oxygen via NC in order to assure adequate oxygenation. Currently on 2L NC. Goal sats > than 88%  Cont with Ceftriaxone which should target likely bacterial pathogens.   Can consider mucoactive agents such as mucomyst and chest physiotherapy to help with secretion clearance.   Will use BiPaP as needed

## 2019-04-13 NOTE — ASSESSMENT & PLAN NOTE
BP Readings from Last 3 Encounters:   04/13/19 131/84   04/05/19 131/77   04/05/19 127/80     Slightly elevated likely due to steroids.   Cont metoprolol to treat  Home ACE on hold   Low Na diet
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
## 2019-04-13 NOTE — ASSESSMENT & PLAN NOTE
Workup for PE came back negative.   Stopped heparin infusion.   Suspect pt's acute hypoxemic respiratory failure was due to COPD exacerbation.   Treat with steroids & duonebs.

## 2019-04-14 VITALS
RESPIRATION RATE: 20 BRPM | HEIGHT: 71 IN | BODY MASS INDEX: 22.29 KG/M2 | DIASTOLIC BLOOD PRESSURE: 81 MMHG | WEIGHT: 159.19 LBS | HEART RATE: 61 BPM | OXYGEN SATURATION: 96 % | TEMPERATURE: 97 F | SYSTOLIC BLOOD PRESSURE: 185 MMHG

## 2019-04-14 LAB
ALBUMIN SERPL BCP-MCNC: 2.3 G/DL (ref 3.5–5.2)
ALP SERPL-CCNC: 117 U/L (ref 55–135)
ALT SERPL W/O P-5'-P-CCNC: 74 U/L (ref 10–44)
ANION GAP SERPL CALC-SCNC: 8 MMOL/L (ref 8–16)
AST SERPL-CCNC: 86 U/L (ref 10–40)
BACTERIA BLD CULT: NORMAL
BACTERIA BLD CULT: NORMAL
BASOPHILS # BLD AUTO: 0.01 K/UL (ref 0–0.2)
BASOPHILS NFR BLD: 0.1 % (ref 0–1.9)
BILIRUB SERPL-MCNC: 0.5 MG/DL (ref 0.1–1)
BUN SERPL-MCNC: 26 MG/DL (ref 8–23)
CALCIUM SERPL-MCNC: 8.4 MG/DL (ref 8.7–10.5)
CHLORIDE SERPL-SCNC: 107 MMOL/L (ref 95–110)
CO2 SERPL-SCNC: 21 MMOL/L (ref 23–29)
CREAT SERPL-MCNC: 1 MG/DL (ref 0.5–1.4)
DIFFERENTIAL METHOD: ABNORMAL
EOSINOPHIL # BLD AUTO: 0 K/UL (ref 0–0.5)
EOSINOPHIL NFR BLD: 0 % (ref 0–8)
ERYTHROCYTE [DISTWIDTH] IN BLOOD BY AUTOMATED COUNT: 22.3 % (ref 11.5–14.5)
EST. GFR  (AFRICAN AMERICAN): >60 ML/MIN/1.73 M^2
EST. GFR  (NON AFRICAN AMERICAN): >60 ML/MIN/1.73 M^2
GLUCOSE SERPL-MCNC: 115 MG/DL (ref 70–110)
HCT VFR BLD AUTO: 27.9 % (ref 40–54)
HGB BLD-MCNC: 9.1 G/DL (ref 14–18)
IMM GRANULOCYTES # BLD AUTO: 0.03 K/UL (ref 0–0.04)
IMM GRANULOCYTES NFR BLD AUTO: 0.4 % (ref 0–0.5)
LYMPHOCYTES # BLD AUTO: 0.8 K/UL (ref 1–4.8)
LYMPHOCYTES NFR BLD: 9 % (ref 18–48)
MAGNESIUM SERPL-MCNC: 1.7 MG/DL (ref 1.6–2.6)
MCH RBC QN AUTO: 28.5 PG (ref 27–31)
MCHC RBC AUTO-ENTMCNC: 32.6 G/DL (ref 32–36)
MCV RBC AUTO: 88 FL (ref 82–98)
MONOCYTES # BLD AUTO: 0.8 K/UL (ref 0.3–1)
MONOCYTES NFR BLD: 9.4 % (ref 4–15)
NEUTROPHILS # BLD AUTO: 6.8 K/UL (ref 1.8–7.7)
NEUTROPHILS NFR BLD: 81.1 % (ref 38–73)
NRBC BLD-RTO: 0 /100 WBC
PHOSPHATE SERPL-MCNC: 2.4 MG/DL (ref 2.7–4.5)
PLATELET # BLD AUTO: 161 K/UL (ref 150–350)
PMV BLD AUTO: 11.8 FL (ref 9.2–12.9)
POTASSIUM SERPL-SCNC: 3.6 MMOL/L (ref 3.5–5.1)
PROT SERPL-MCNC: 5.3 G/DL (ref 6–8.4)
RBC # BLD AUTO: 3.19 M/UL (ref 4.6–6.2)
SODIUM SERPL-SCNC: 136 MMOL/L (ref 136–145)
WBC # BLD AUTO: 8.32 K/UL (ref 3.9–12.7)

## 2019-04-14 PROCEDURE — 85025 COMPLETE CBC W/AUTO DIFF WBC: CPT | Mod: HCNC

## 2019-04-14 PROCEDURE — 83735 ASSAY OF MAGNESIUM: CPT | Mod: HCNC

## 2019-04-14 PROCEDURE — 80053 COMPREHEN METABOLIC PANEL: CPT | Mod: HCNC

## 2019-04-14 PROCEDURE — 84100 ASSAY OF PHOSPHORUS: CPT | Mod: HCNC

## 2019-04-14 PROCEDURE — 99232 PR SUBSEQUENT HOSPITAL CARE,LEVL II: ICD-10-PCS | Mod: HCNC,,, | Performed by: HOSPITALIST

## 2019-04-14 PROCEDURE — 99232 SBSQ HOSP IP/OBS MODERATE 35: CPT | Mod: HCNC,,, | Performed by: HOSPITALIST

## 2019-04-14 PROCEDURE — 94761 N-INVAS EAR/PLS OXIMETRY MLT: CPT | Mod: HCNC

## 2019-04-14 PROCEDURE — 36415 COLL VENOUS BLD VENIPUNCTURE: CPT | Mod: HCNC

## 2019-04-15 NOTE — PLAN OF CARE
Plan is to discharge home with family.    Future Appointments   Date Time Provider Department Center   4/23/2019 10:15 AM INJECTION, [facility] INFUSION [facility] CHEMO Murray Canangela   4/23/2019 11:00 AM [facility] OIC-CT1 500 LB LIMIT [facility]   4/24/2019 10:20 AM Ortiz Carrillo DO, FACP [facility] HEM ONC Trevor Clements        04/15/19 0754   Final Note   Assessment Type Discharge Planning Assessment   Anticipated Discharge Disposition Home   Right Care Referral Info   Post Acute Recommendation No Care

## 2019-04-15 NOTE — ASSESSMENT & PLAN NOTE
S/p radical prostatectomy on January 6, 2011 for a [place] 7 adenocarcinoma, ([id number]), with negative margins. Subsequently had a local recurrence for which he received XRT at [facility] (completed 10/28/2011).   No active plan while on admission

## 2019-04-15 NOTE — DISCHARGE SUMMARY
[facility] Hospital Medicine  Discharge Summary      Patient Name: Robin Hawkins  MRN: [id number]  Admission Date: 4/9/2019  Hospital Length of Stay: 5 days  Discharge Date and Time:  04/14/2019 9:58 PM  Attending Physician: Braden Light MD   Discharging Provider: Braden Light MD  Primary Care Provider: Venancio Mcneil MD  [facility] Medicine Team: [facility] Braden Light MD    HPI:   This is Mr. Robin Hawkins, 68 year old male with medical history significant for hepatocellular carcinoma, Essential hypertension, History of prostate cancer, COPD, CAD, Subclinical hypothyroidism. He presented to ED with two day history of epigastric pain, shortness of breath more prominent on lying flat and found to have acute hypoxemic respiratory failure. For the last couple of months; he endorsed that he developed nausea, poor oral intake; and decrease appetite; however, this presentation was mainly the epigastric pain and feeling shortness of breath. He denies fever, chills, respiratory symptoms; loss of consciousness or recent change in his mediations. Of note, Two previous COPD exacerbation admission 10/23-24 2018; and 11/26-29 2018 for for dyspnea, atypical chest pain. In ED, he was having tachypeic, with resp rate > 40; and hypoxic that require BiPAP. On evaluation he was very limited in his conversation secondary to tachypnea and tripoding.     * No surgery found *      Hospital Course:   The patient was admitted to MICU for concern of PE which was negative. He was maintained on BIPAP but still having atypical chest pain. He was initially placed on a heparin infusion but then stopped after his PE work up was negative. His hypoxemic resp failure was attributed to COPD exacerbation and started on duonebs and solumedrol. He was stepped down to medicine and continued to improved and was switched to augmentin and prednisone. His statin was held after an elevation in his transaminases and this  started to trend down. The patient was advised to hold his statin and follow up with his PCP to continue to have this monitored. He will complete 4 more days of prednisone and augmentin upon dc. He decline home health.            Consults:   Consults (From admission, onward)        Status Ordering Provider     Inpatient consult to Critical Care Medicine  Once     Provider:  (Not yet assigned)    Completed RAUL VAZQUEZ          * COPD exacerbation  Cont LAMA, LABA, ICS inhalers  Cont scheduled duo nebs.   Cont with systemic glucocorticoids with oral prednisone 40mg PO daily for 7-10 days.  Cont supplemental oxygen via NC in order to assure adequate oxygenation. Currently on 2L NC. Goal sats > than 88%  Cont with Ceftriaxone which should target likely bacterial pathogens.   Can consider mucoactive agents such as mucomyst and chest physiotherapy to help with secretion clearance.   Will use BiPaP as needed    Acute cystitis  Although asymptomatic; UA showed borderline UTI with his acidosis will treat with Ceftriaxone           SEVERO (acute kidney injury)  Lab Results   Component Value Date    CREATININE 1.0 04/14/2019     Sr Cr improved.   Cont to monitor with daily labs   Avoid nephrotoxins.   Renally dose all medications   Monitor events that may lead to decreased renal perfusion (hypovolemia, hypotension, sepsis).   Monitor urine output   Hold home ACE inhibitor     Acute hypoxemic respiratory failure  Workup for PE came back negative.   Stopped heparin infusion.   Suspect pt's acute hypoxemic respiratory failure was due to COPD exacerbation.   Treat with steroids & duonebs.      Centrilobular emphysema  Plan as above.       Cirrhosis   See hepatocellular carcinoma for more elaboration     Gastroesophageal reflux disease  On home PPI and add H2 blocker for severe GERD symptoms   Stable problem, no acute change, continue current medication.         Hepatocellular carcinoma  Dx 12/2015 with HCC in the setting of  hepatitis C.  S/p multiple treatments with TACE.    Had prior MRI shows progression of malignancy  Nivolumab 04/2018 - 9/2018 (discontinued due to progression). Regorafenib 10/2018 -12/2018 (discontinued due to progression).   No active plan while inpatient, needs follow up with his Oncologist.     Bilateral complete vocal fold paralysis  From prior shock and prior Tracheostomy that was decannulated two years ago   No acute issues          History of prostate cancer  S/p radical prostatectomy on January 6, 2011 for a [place] 7 adenocarcinoma, ([id number]), with negative margins. Subsequently had a local recurrence for which he received XRT at [facility] (completed 10/28/2011).   No active plan while on admission           Hyperlipidemia  Holding atorvastatin due to elevated LFT's.      Essential hypertension  BP Readings from Last 3 Encounters:   04/13/19 (!) 185/81   04/05/19 131/77   04/05/19 127/80     Slightly elevated likely due to steroids.   Cont metoprolol to treat  Home ACE on hold   Low Na diet      Final Active Diagnoses:    Diagnosis Date Noted POA    PRINCIPAL PROBLEM:  COPD exacerbation [J44.1] 04/11/2019 Yes    Acute cystitis [N30.00] 04/10/2019 Yes    Acute hypoxemic respiratory failure [J96.01] 04/09/2019 Yes    SEVERO (acute kidney injury) [N17.9] 04/09/2019 Yes    Hepatocellular carcinoma [C22.0] 04/16/2018 Yes     Chronic    Centrilobular emphysema [J43.2]  Yes     Chronic    Cirrhosis  [K74.60] 06/21/2016 Yes     Chronic    Gastroesophageal reflux disease [K21.9] 03/11/2016 Yes     Chronic    Bilateral complete vocal fold paralysis [J38.02] 01/25/2016 Yes    History of prostate cancer [Z85.46] 12/25/2014 Not Applicable     Chronic    Essential hypertension [I10] 12/10/2014 Yes     Chronic    Hyperlipidemia [E78.5] 12/10/2014 Yes     Chronic      Problems Resolved During this Admission:    Diagnosis Date Noted Date Resolved POA    Acquired hypothyroidism [E03.9] 04/11/2019 04/11/2019 Yes     At high risk for pulmonary embolism [Z91.89] 04/09/2019 04/11/2019 Yes       Discharged Condition: good    Disposition: Home or Self Care    Follow Up:    Patient Instructions:      Ambulatory referral to Outpatient Case Management   Referral Priority: Routine Referral Type: Consultation   Referral Reason: Specialty Services Required   Number of Visits Requested: 1       Significant Diagnostic Studies: see imaging    Pending Diagnostic Studies:     Procedure Component Value Units Date/Time    APTT [[id number]] Collected:  04/11/19 0503    Order Status:  Sent Lab Status:  In process Updated:  04/11/19 0503    Specimen:  Blood     APTT [[id number]] Collected:  04/11/19 0216    Order Status:  Sent Lab Status:  In process Updated:  04/11/19 0217    Specimen:  Blood     CBC auto differential [[id number]] Collected:  04/14/19 0259    Order Status:  Sent Lab Status:  In process Updated:  04/14/19 2047    Specimen:  Blood     Lactic acid, plasma [[id number]] Collected:  04/09/19 1815    Order Status:  Sent Lab Status:  In process Updated:  04/09/19 1816    Specimen:  Blood          Medications:  Reconciled Home Medications:      Medication List      ASK your doctor about these medications    acetaminophen 500 MG tablet  Commonly known as:  TYLENOL  Take 500 mg by mouth every 6 (six) hours as needed for Pain.     albuterol-ipratropium 2.5 mg-0.5 mg/3 mL nebulizer solution  Commonly known as:  DUO-NEB  USE 1 VIAL VIA NEBULIZER EVERY 4 HOURS AS NEEDED FOR WHEEZING; RESCUE     aspirin 81 MG EC tablet  Commonly known as:  ECOTRIN  Take 81 mg by mouth once daily.     atorvastatin 40 MG tablet  Commonly known as:  LIPITOR  TAKE 1 TABLET BY MOUTH EVERY DAY     BREO ELLIPTA 200-25 mcg/dose Dsdv diskus inhaler  Generic drug:  fluticasone-vilanterol  INHALE 1 PUFF INTO THE LUNGS EVERY DAY(CONTROLLER)     dorzolamide-timolol 2-0.5% 22.3-6.8 mg/mL ophthalmic solution  Commonly known as:  COSOPT  INSTILL 1 GTT INTO BOTH EYES ONCE DAILY      latanoprost 0.005 % ophthalmic solution  Place 1 drop into both eyes every evening.     lenvatinib 4 mg Cap  Take 3 capsules (12 mg total) by mouth once daily.     levothyroxine 100 MCG tablet  Commonly known as:  SYNTHROID  Take 1 tablet (100 mcg total) by mouth once daily.     losartan 50 MG tablet  Commonly known as:  COZAAR  Take 1 tablet (50 mg total) by mouth once daily. Replaces lisinopril     metoprolol tartrate 25 MG tablet  Commonly known as:  LOPRESSOR  TAKE 1/2 TABLET(12.5 MG) BY MOUTH TWICE DAILY     MYRBETRIQ 50 mg Tb24  Generic drug:  mirabegron  Take 1 tablet by mouth every morning.     omeprazole 20 MG capsule  Commonly known as:  PRILOSEC  TAKE 1 CAPSULE(20 MG) BY MOUTH EVERY DAY ON AN EMPTY STOMACH     ondansetron 8 MG Tbdl  Commonly known as:  ZOFRAN-ODT  Take 1 tablet (8 mg total) by mouth every 12 (twelve) hours as needed.     oxybutynin 10 MG 24 hr tablet  Commonly known as:  DITROPAN-XL  Take 10 mg by mouth.     potassium chloride SA 20 MEQ tablet  Commonly known as:  K-DUR,KLOR-CON  TAKE 2 TABLETS(40 MEQ) BY MOUTH TWICE DAILY     SPIRIVA WITH HANDIHALER 18 mcg inhalation capsule  Generic drug:  tiotropium  INHALE CONTENTS OF ONE CAPSULE BY MOUTH INTO THE LUNGS VIA HANDIHALER EVERY DAY(CONTROLLER)     traMADol 50 mg tablet  Commonly known as:  ULTRAM  Take 1 tablet (50 mg total) by mouth every 6 (six) hours as needed for Pain.     VENTOLIN HFA 90 mcg/actuation inhaler  Generic drug:  albuterol  Inhale 2 puffs into the lungs every 6 (six) hours as needed for Wheezing or Shortness of Breath. Rescue     vitamin D 1000 units Tab  Commonly known as:  VITAMIN D3  Take 1,000 Units by mouth once daily.            Indwelling Lines/Drains at time of discharge:   Lines/Drains/Airways     Central Venous Catheter Line                 Port A Cath Single Lumen 03/22/19 0747 left subclavian 23 days                Braden Light MD  Department of Hospital Medicine  [facility]

## 2019-04-15 NOTE — ASSESSMENT & PLAN NOTE
Lab Results   Component Value Date    CREATININE 1.0 04/14/2019     Sr Cr improved.   Cont to monitor with daily labs   Avoid nephrotoxins.   Renally dose all medications   Monitor events that may lead to decreased renal perfusion (hypovolemia, hypotension, sepsis).   Monitor urine output   Hold home ACE inhibitor

## 2019-04-16 ENCOUNTER — PATIENT OUTREACH (OUTPATIENT)
Dept: ADMINISTRATIVE | Facility: CLINIC | Age: 69
End: 2019-04-16

## 2019-04-16 DIAGNOSIS — J44.9 CHRONIC OBSTRUCTIVE PULMONARY DISEASE, UNSPECIFIED COPD TYPE: Primary | ICD-10-CM

## 2019-04-16 NOTE — PATIENT INSTRUCTIONS
COPD Flare    You have had a flare-up of your COPD.  COPD, or chronic obstructive pulmonary disease, is a common lung disease. It causes your airways to become irritated and narrower. This makes it harder for you to breathe. Emphysema and chronic bronchitis are both types of COPD. This is a chronic condition, which means you always have it. Sometimes it gets worse. When this happens, it is called a flare-up.  Symptoms of COPD  People with COPD may have symptoms most of the time. In a flare-up, your symptoms get worse. These symptoms may mean you are having a flare-up:  · Shortness of breath, shallow or rapid breathing, or wheezing that gets worse  · Lung infection  · Cough that gets worse  · More mucus, thicker mucus or mucus of a different color  · Tiredness, decreased energy, or trouble doing your usual activities  · Fever  · Chest tightness  · Your symptoms dont get better even when you use your usual medicines, inhalers, and nebulizer  · Trouble talking  · You feel confused  Causes of flare-ups  Unfortunately, a flare-up can happen even though you did everything right, and you followed your doctors instructions. Some causes of flare-ups are:  · Smoking or secondhand smoke  · Colds, the flu, or respiratory infections  · Air pollution  · Sudden change in the weather  · Dust, irritating chemicals, or strong fumes  · Not taking your medicines as prescribed  Home care  Here are some things you can do at home to treat a flare-up:  · Try not to panic. This makes it harder to breathe, and keeps you from doing the right things.  · Dont smoke or be around others who are smoking.  · Try to drink more fluids than usual during a flare-up, unless your doctor has told you not to because of heart and kidney problems. More fluids can help loosen the mucus.  · Use your inhalers and nebulizer, if you have one, as you have been told to.  · If you were given antibiotics, take them until they are used up or your doctor tells you  to stop. Its important to finish the antibiotics, even though you feel better. This will make sure the infection has cleared.  · If you were given prednisone or another steroid, finish it even if you feel better.  Preventing a flare-up  Even though flare-ups happen, the best way to treat one is to prevent it before it starts. Here are some pointers:  · Dont smoke or be around others who are smoking.  · Take your medicines as you have been told.  · Talk with your doctor about getting a flu shot every year. Also find out if you need a pneumonia shot.  · If there is a weather advisory warning to stay indoors, try to stay inside when possible.  · Try to eat healthy and get plenty of sleep.  · Try to avoid things that usually set you off, like dust, chemical fumes, hairsprays, or strong perfumes.  Follow-up care  Follow up with your healthcare provider, or as advised.  If a culture was done, you will be told if your treatment needs to be changed. You can call as directed for the results.  If X-rays were done, you will be notified of any new findings that may affect your care.  Call 911  Call 911 if any of these occur:  · You have trouble breathing  · You feel confused or its difficult to wake you up  · You faint or lose consciousness  · You have a rapid heart rate  · You have new pain in your chest, arm, shoulder, neck or upper back  When to seek medical advice  Call your healthcare provider right away if any of these occur:  · Wheezing or shortness of breath gets worse  · You need to use your inhalers more often than usual without relief  · Fever of 100.4°F (38ºC) or higher, or as directed by your healthcare provider  · Coughing up lots of dark-colored or bloody mucus (sputum)  · Chest pain with each breath  · You do not start to get better within 24 hours  · Swelling of your ankles gets worse  · Dizziness or weakness  Date Last Reviewed: 9/1/2016  © 7305-9603 [employer]. [address],  [place] [address]. All rights reserved. This information is not intended as a substitute for professional medical care. Always follow your healthcare professional's instructions.

## 2019-04-23 ENCOUNTER — INFUSION (OUTPATIENT)
Dept: INFUSION THERAPY | Facility: HOSPITAL | Age: 69
End: 2019-04-23
Attending: INTERNAL MEDICINE
Payer: MEDICARE

## 2019-04-23 ENCOUNTER — OUTPATIENT CASE MANAGEMENT (OUTPATIENT)
Dept: ADMINISTRATIVE | Facility: OTHER | Age: 69
End: 2019-04-23

## 2019-04-23 DIAGNOSIS — C22.0 HEPATOCELLULAR CARCINOMA: Chronic | ICD-10-CM

## 2019-04-23 DIAGNOSIS — E03.9 HYPOTHYROIDISM, UNSPECIFIED TYPE: ICD-10-CM

## 2019-04-23 DIAGNOSIS — C22.0 HCC (HEPATOCELLULAR CARCINOMA): Primary | ICD-10-CM

## 2019-04-23 DIAGNOSIS — E03.8 SUBCLINICAL HYPOTHYROIDISM: ICD-10-CM

## 2019-04-23 LAB
AFP SERPL-MCNC: 1412 NG/ML (ref 0–8.4)
ALBUMIN SERPL BCP-MCNC: 2.8 G/DL (ref 3.5–5.2)
ALP SERPL-CCNC: 122 U/L (ref 55–135)
ALT SERPL W/O P-5'-P-CCNC: 30 U/L (ref 10–44)
ANION GAP SERPL CALC-SCNC: 12 MMOL/L (ref 8–16)
AST SERPL-CCNC: 35 U/L (ref 10–40)
BILIRUB SERPL-MCNC: 0.9 MG/DL (ref 0.1–1)
BUN SERPL-MCNC: 25 MG/DL (ref 8–23)
CALCIUM SERPL-MCNC: 9.2 MG/DL (ref 8.7–10.5)
CHLORIDE SERPL-SCNC: 102 MMOL/L (ref 95–110)
CO2 SERPL-SCNC: 20 MMOL/L (ref 23–29)
CREAT SERPL-MCNC: 1.7 MG/DL (ref 0.5–1.4)
ERYTHROCYTE [DISTWIDTH] IN BLOOD BY AUTOMATED COUNT: 22.3 % (ref 11.5–14.5)
EST. GFR  (AFRICAN AMERICAN): 46.2 ML/MIN/1.73 M^2
EST. GFR  (NON AFRICAN AMERICAN): 40 ML/MIN/1.73 M^2
GLUCOSE SERPL-MCNC: 111 MG/DL (ref 70–110)
HCT VFR BLD AUTO: 40 % (ref 40–54)
HGB BLD-MCNC: 13.1 G/DL (ref 14–18)
IMM GRANULOCYTES # BLD AUTO: 0.02 K/UL (ref 0–0.04)
MCH RBC QN AUTO: 29 PG (ref 27–31)
MCHC RBC AUTO-ENTMCNC: 32.8 G/DL (ref 32–36)
MCV RBC AUTO: 89 FL (ref 82–98)
NEUTROPHILS # BLD AUTO: 4.1 K/UL (ref 1.8–7.7)
PLATELET # BLD AUTO: 230 K/UL (ref 150–350)
PMV BLD AUTO: 10.5 FL (ref 9.2–12.9)
POTASSIUM SERPL-SCNC: 4.2 MMOL/L (ref 3.5–5.1)
PROT SERPL-MCNC: 6.6 G/DL (ref 6–8.4)
RBC # BLD AUTO: 4.51 M/UL (ref 4.6–6.2)
SODIUM SERPL-SCNC: 134 MMOL/L (ref 136–145)
T4 FREE SERPL-MCNC: 1.35 NG/DL (ref 0.71–1.51)
TSH SERPL DL<=0.005 MIU/L-ACNC: 5.9 UIU/ML (ref 0.4–4)
WBC # BLD AUTO: 5.99 K/UL (ref 3.9–12.7)

## 2019-04-23 PROCEDURE — 85027 COMPLETE CBC AUTOMATED: CPT | Mod: HCNC

## 2019-04-23 PROCEDURE — A4216 STERILE WATER/SALINE, 10 ML: HCPCS | Mod: HCNC | Performed by: INTERNAL MEDICINE

## 2019-04-23 PROCEDURE — 84443 ASSAY THYROID STIM HORMONE: CPT | Mod: HCNC

## 2019-04-23 PROCEDURE — 63600175 PHARM REV CODE 636 W HCPCS: Mod: HCNC | Performed by: INTERNAL MEDICINE

## 2019-04-23 PROCEDURE — 82105 ALPHA-FETOPROTEIN SERUM: CPT | Mod: HCNC

## 2019-04-23 PROCEDURE — 80053 COMPREHEN METABOLIC PANEL: CPT | Mod: HCNC

## 2019-04-23 PROCEDURE — 84439 ASSAY OF FREE THYROXINE: CPT | Mod: HCNC

## 2019-04-23 PROCEDURE — 25000003 PHARM REV CODE 250: Mod: HCNC | Performed by: INTERNAL MEDICINE

## 2019-04-23 PROCEDURE — 36591 DRAW BLOOD OFF VENOUS DEVICE: CPT | Mod: HCNC

## 2019-04-23 RX ORDER — SODIUM CHLORIDE 0.9 % (FLUSH) 0.9 %
10 SYRINGE (ML) INJECTION
Status: CANCELLED | OUTPATIENT
Start: 2019-04-23

## 2019-04-23 RX ORDER — HEPARIN 100 UNIT/ML
500 SYRINGE INTRAVENOUS
Status: CANCELLED | OUTPATIENT
Start: 2019-04-23

## 2019-04-23 RX ORDER — HEPARIN 100 UNIT/ML
500 SYRINGE INTRAVENOUS
Status: COMPLETED | OUTPATIENT
Start: 2019-04-23 | End: 2019-04-23

## 2019-04-23 RX ORDER — SODIUM CHLORIDE 0.9 % (FLUSH) 0.9 %
10 SYRINGE (ML) INJECTION
Status: COMPLETED | OUTPATIENT
Start: 2019-04-23 | End: 2019-04-23

## 2019-04-23 RX ADMIN — HEPARIN 500 UNITS: 100 SYRINGE at 11:04

## 2019-04-23 RX ADMIN — Medication 10 ML: at 11:04

## 2019-04-23 NOTE — PROGRESS NOTES
Summary:  Initial and RN Assessments completed with patients sister and KRUNAL Canales on the phone today. Sister reports patient is taking oral Chemotherapy drugs at this time for Liver Cancer. Sister reports he was taking IV but could not tolerate. Sister reports sthis is not much better that patient is not eating and losing weight. Sister states patient is very weak and it is getting difficult for him to walk without a walker. Sister reports patient is not wanting any help at home though and becomes difficult to deal with at times. Sister states she does all she can for him as she live a few miles from him. Sister reports he lives in the family home with their other sister and she handles most of the household chores. Sister reports patient is still independent with all of his ADLs but she can see him steadily declining and getting weaker. Patient has PMH of COPD, HTN, HLP, Liver Cancer, Cirrhosis, Hep C, Hx of Prostate and colon cancer. Sister reports she would like to have Home Health come in but she does not know if he will agree to it. Referral made for [facility] per Dr. Jacky Gonzalez.      Procedure Modifiers Provider Requested Approved   [id number] - Referral to [facility] Nurse Practitioner [facility] 1 1   Diagnosis Information     Diagnosis   J44.9 (ICD-10-CM) - Chronic obstructive pulmonary disease, unspecified COPD type   Referral Notes   Number of Notes: 1   Type Date User Summary Attachment   Provider Comments 04/16/2019 12:16 PM Kusum Silverio, RN Provider Comments -   Note    Patient aware of the referral: sister is. Please call Manju to set up appt     Symptom Management: copd     Goals of Care: prevent readmission     Does patient have more than two hospitalizations in the past month for the same problem? No     Pt active oral chemo for HCC. PCP with no avail hospfu. Has onc appt 4\24. Please see on or before 4\22.                Encouraged sister to  communicate with physician and call this RN if having any questions/concerns. OPCM will continue to follow. Patient is agreeable to follow up call in 1 week. MADDI Dean OPCM   Todays OPCM Self-Management Care Plan was developed with the patients/caregivers input and was based on identified barriers from todays assessment.  Goals were written today with the patient/caregiver and the patient has agreed to work towards these goals to improve his/her overall well-being. Patient verbalized understanding of the care plan, goals, and all of today's instructions. Encouraged patient/caregiver to communicate with his/her physician and health care team about health conditions and the treatment plan. I provided my contact information today and encouraged sister to call me with any questions as needed.    Interventions:  Referral to [facility] for information on Medicaid availability (requested per patient sister and KRUNAL Robsourav Canales)  In basket message to Dr. Diez and Dr. Carrillo to order [facility]  Mailed education materials on Cancer services, Fall prevention and my contact letter    Plan:  F/u on Referral to [facility] for information on Medicaid availability (requested per patient sister and KRUNAL Canales)  F/u on In basket message to Dr. Diez and Dr. Carrillo to order [facility] for patient  F/u on receipt of education materials on Cancer services, Fall prevention and my contact letter

## 2019-04-23 NOTE — NURSING
Patient here for blood draw from left chest port-accesses easily with good blood return-blood to lab-line flushed and needle removed-patient tolerated well.

## 2019-04-24 ENCOUNTER — OFFICE VISIT (OUTPATIENT)
Dept: HEMATOLOGY/ONCOLOGY | Facility: CLINIC | Age: 69
End: 2019-04-24
Payer: MEDICARE

## 2019-04-24 ENCOUNTER — TELEPHONE (OUTPATIENT)
Dept: INTERNAL MEDICINE | Facility: CLINIC | Age: 69
End: 2019-04-24

## 2019-04-24 VITALS
SYSTOLIC BLOOD PRESSURE: 90 MMHG | HEART RATE: 85 BPM | DIASTOLIC BLOOD PRESSURE: 65 MMHG | TEMPERATURE: 99 F | OXYGEN SATURATION: 100 %

## 2019-04-24 DIAGNOSIS — N17.9 AKI (ACUTE KIDNEY INJURY): ICD-10-CM

## 2019-04-24 DIAGNOSIS — J44.1 COPD EXACERBATION: ICD-10-CM

## 2019-04-24 DIAGNOSIS — Z09 CHEMOTHERAPY FOLLOW-UP EXAMINATION: ICD-10-CM

## 2019-04-24 DIAGNOSIS — C22.0 HEPATOCELLULAR CARCINOMA: Primary | Chronic | ICD-10-CM

## 2019-04-24 PROCEDURE — 99499 UNLISTED E&M SERVICE: CPT | Mod: S$GLB,,, | Performed by: INTERNAL MEDICINE

## 2019-04-24 PROCEDURE — 1101F PR PT FALLS ASSESS DOC 0-1 FALLS W/OUT INJ PAST YR: ICD-10-PCS | Mod: HCNC,CPTII,S$GLB, | Performed by: INTERNAL MEDICINE

## 2019-04-24 PROCEDURE — 99214 OFFICE O/P EST MOD 30 MIN: CPT | Mod: HCNC,S$GLB,, | Performed by: INTERNAL MEDICINE

## 2019-04-24 PROCEDURE — 99999 PR PBB SHADOW E&M-EST. PATIENT-LVL IV: ICD-10-PCS | Mod: PBBFAC,HCNC,, | Performed by: INTERNAL MEDICINE

## 2019-04-24 PROCEDURE — 99214 PR OFFICE/OUTPT VISIT, EST, LEVL IV, 30-39 MIN: ICD-10-PCS | Mod: HCNC,S$GLB,, | Performed by: INTERNAL MEDICINE

## 2019-04-24 PROCEDURE — 99499 RISK ADDL DX/OHS AUDIT: ICD-10-PCS | Mod: S$GLB,,, | Performed by: INTERNAL MEDICINE

## 2019-04-24 PROCEDURE — 3078F DIAST BP <80 MM HG: CPT | Mod: HCNC,CPTII,S$GLB, | Performed by: INTERNAL MEDICINE

## 2019-04-24 PROCEDURE — 3078F PR MOST RECENT DIASTOLIC BLOOD PRESSURE < 80 MM HG: ICD-10-PCS | Mod: HCNC,CPTII,S$GLB, | Performed by: INTERNAL MEDICINE

## 2019-04-24 PROCEDURE — 3074F SYST BP LT 130 MM HG: CPT | Mod: HCNC,CPTII,S$GLB, | Performed by: INTERNAL MEDICINE

## 2019-04-24 PROCEDURE — 1101F PT FALLS ASSESS-DOCD LE1/YR: CPT | Mod: HCNC,CPTII,S$GLB, | Performed by: INTERNAL MEDICINE

## 2019-04-24 PROCEDURE — 3074F PR MOST RECENT SYSTOLIC BLOOD PRESSURE < 130 MM HG: ICD-10-PCS | Mod: HCNC,CPTII,S$GLB, | Performed by: INTERNAL MEDICINE

## 2019-04-24 PROCEDURE — 99999 PR PBB SHADOW E&M-EST. PATIENT-LVL IV: CPT | Mod: PBBFAC,HCNC,, | Performed by: INTERNAL MEDICINE

## 2019-04-24 RX ORDER — AMOXICILLIN AND CLAVULANATE POTASSIUM 875; 125 MG/1; MG/1
TABLET, FILM COATED ORAL
Refills: 0 | COMMUNITY
Start: 2019-04-14

## 2019-04-24 RX ORDER — PREDNISONE 20 MG/1
TABLET ORAL
Refills: 0 | COMMUNITY
Start: 2019-04-14
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
## 2019-04-24 NOTE — TELEPHONE ENCOUNTER
Patient did not come to primary care appt today but did see his oncologist and per note discussed hospice. From review of that note, referral for hospice would be appropriate and has been placed by Dr Carrillo; referral to Hospice/Palliative Care was also already placed by Dr Gonzalez on 4/16/19.  Will forward request re: home health services to Dr Carrillo, but this should be able to be addressed through the referrals that are placed/pending.  Will also cc OPCM RN

## 2019-04-24 NOTE — TELEPHONE ENCOUNTER
----- Message from Loraine Magallanes RN sent at 4/24/2019 10:55 AM CDT -----  Hi, this is Loraine Magallanes RN with [facility] team. I received a referral on the following patient. I spoke with patients sister Manju Canales today on the telephone.    Patient is in need of Home Health for SN/PT/OT. If you are agreeable please fax order to [facility]   [phone]    Please notify patient of your recommendations.     Thank you,  Loraine Magallanes RN

## 2019-04-24 NOTE — TELEPHONE ENCOUNTER
Patient seeing NP in this clinic today, will check in with her to see what specific concerns / problems need to be addressed by HH services as patient has not seen me in >3 months  He is following closely with his oncologist as well

## 2019-04-25 ENCOUNTER — DOCUMENTATION ONLY (OUTPATIENT)
Dept: HEMATOLOGY/ONCOLOGY | Facility: CLINIC | Age: 69
End: 2019-04-25

## 2019-04-25 NOTE — PROGRESS NOTES
Received referral from the clinic that the patient is in need of hospice care. Called the point of contact which is his sister Manju [phone]. Spoke with her about the request for services. They are in agreement. She did not express any preference in the provider for services. Referral sent to [facility].

## 2019-04-29 ENCOUNTER — OUTPATIENT CASE MANAGEMENT (OUTPATIENT)
Dept: ADMINISTRATIVE | Facility: OTHER | Age: 69
End: 2019-04-29

## 2019-04-30 ENCOUNTER — OUTPATIENT CASE MANAGEMENT (OUTPATIENT)
Dept: ADMINISTRATIVE | Facility: OTHER | Age: 69
End: 2019-04-30

## 2019-05-03 DIAGNOSIS — J43.2 CENTRILOBULAR EMPHYSEMA: Chronic | ICD-10-CM

## 2019-05-03 RX ORDER — IPRATROPIUM BROMIDE AND ALBUTEROL SULFATE 2.5; .5 MG/3ML; MG/3ML
SOLUTION RESPIRATORY (INHALATION)
Qty: 180 ML | Refills: 0 | OUTPATIENT
Start: 2019-05-03

## 2019-05-15 NOTE — PROGRESS NOTES
Follow-up Information     Jd Arora [address]  [address]  [place] [address]  [phone]             Jd R Keen, OCHSNER BLVD  [address]  [place] [address]  [phone]                         OCHSNER WEST BANK [facility] Case Management Team,     Génesis Huizar LMSW      II     [phone]           Progress Note   Hospital Medicine         Patient Name: Robin Hawkins  MRN:  [id number]  [facility] Medicine Team: [facility] HOSP MED O Greg Ventura MD  Date of Admission:  12/20/2018     Length of Stay:  LOS: 2 days   Expected Discharge Date: 12/24/2018  Principal Problem:  Respiratory distress       Subjective:     Interval History/Overnight Events:  Patient's main complaint today is chest pain, non-radiating; EKG done this morning does not show any evidence of ischemia, however patient does have elevated troponins, despite normal renal function; 2d echo ordered and getting cardiology consult; SOB is improving     Review of Systems   Constitutional: Negative for chills, fatigue, fever.   HENT: Negative for sore throat, trouble swallowing.    Eyes: Negative for photophobia, visual disturbance.   Respiratory: Negative for cough, shortness of breath.    Cardiovascular: Negative for chest pain, palpitations, leg swelling.   Gastrointestinal: Negative for abdominal pain, constipation, diarrhea, nausea, vomiting.   Endocrine: Negative for cold intolerance, heat intolerance.   Genitourinary: Negative for dysuria, frequency.   Musculoskeletal: Negative for arthralgias, myalgias.   Skin: Negative for rash, wound, erythema   Neurological: Negative for dizziness, syncope, weakness, light-headedness.   Psychiatric/Behavioral: Negative for confusion, hallucinations, anxiety  All other systems reviewed and are negative.    Objective:     Temp:  [96.6 °F (35.9 °C)-98 °F (36.7 °C)]   Pulse:  [54-79]   Resp:  [16-20]   BP: (119-147)/(61-66)   SpO2:  [95 %-100 %]       Physical Exam:  Constitutional: Appears well-developed and well-nourished.   Head: Normocephalic and atraumatic.   Mouth/Throat: Oropharynx is clear and moist.   Eyes: EOM are normal. Pupils are equal, round, and reactive to light. No scleral icterus.   Neck: Normal range of motion. Neck supple.   Cardiovascular: Normal rate and regular rhythm.  No murmur  heard.  Pulmonary/Chest: Effort normal and breath sounds normal. No respiratory distress. No wheezes, rales, or rhonchi  Abdominal: Soft. Bowel sounds are normal.  No distension or tenderness  Musculoskeletal: Normal range of motion. No edema.   Neurological: Alert and oriented to person, place, and time.   Skin: Skin is warm and dry.   Psychiatric: Normal mood and affect. Behavior is normal.     Recent Labs   Lab 12/20/18  1034 12/20/18  1329   WBC 7.41 9.71   HGB 12.2* 12.0*   HCT 38.3* 37.3*    176     Recent Labs   Lab 12/20/18  1329 12/21/18  0320 12/22/18  0333   * 134* 132*   K 4.2 3.5 4.3    101 99   CO2 23 23 26   BUN 12 12 14   CREATININE 1.3 1.0 1.0   GLU 90 121* 120*   CALCIUM 9.1 8.3* 8.3*   MG  --  2.0 2.3   PHOS  --  2.6* 2.5*     Recent Labs   Lab 12/20/18  1034 12/20/18  1329 12/21/18  0320 12/22/18  0333   ALKPHOS 111 116 99 97   ALT 35 37 30 28   AST 62* 67* 51* 46*   ALBUMIN 3.3* 3.3* 2.7* 2.8*   PROT 8.0 8.2 6.9 7.1   BILITOT 1.2* 1.3* 0.8 0.4   INR 1.0 1.0  --   --      Recent Labs   Lab 12/20/18  2150 12/21/18  0759 12/21/18  2305 12/22/18  0821   POCTGLUCOSE 100 111* 125* 117*        aspirin  81 mg Oral Daily    atorvastatin  40 mg Oral Daily    azithromycin  500 mg Oral Daily    cefTRIAXone (ROCEPHIN) IVPB  1 g Intravenous Q24H    enoxaparin  40 mg Subcutaneous Daily    fluticasone-vilanterol  1 puff Inhalation Daily    latanoprost  1 drop Both Eyes QHS    lisinopril  20 mg Oral Daily    metoprolol tartrate  12.5 mg Oral BID    miconazole   Topical (Top) BID    predniSONE  40 mg Oral Daily    tiotropium  1 capsule Inhalation Daily       Assessment and Plan     Mr. Robin Hawkins is a 68 y.o. male who presented to [facility] on 12/20/2018 with     Hospital Course:    Mr. Robin Hawkins was admitted to Hospital Medicine for management of     Active Hospital Problems    Diagnosis  POA    *Respiratory distress [R06.03]  Yes    CAP (community acquired pneumonia)  [J18.9]  Yes    Coronary artery disease involving native coronary artery of native heart [I25.10]  Yes    Anemia in neoplastic disease [D63.0]  Yes    Hepatocellular carcinoma [C22.0]  Yes     Chronic     Enlarging liver mass consistent with HCC on CT 1/2016. +elevated AFP.  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       Respiratory distress  CAP due to strep PNA     This is third admission since October 2018 since beginning new oral chemo  -DDx includes PNA vs COPD exacerbation vs SE of chemotherapy vs ACS vs malignancy   - Will initiate treatment for CAP w/ azitho and rocephin  - will initiate Duo-nebs and prednisone 40 x 5 days  - H/O consulted for further evaluation and consideration of alternate chemotherapy   - troponin .12 in ED trend x 3  - CTA chest was negative for PE or pulmonary edema, + for L upper lobe opacification                Coronary artery disease involving native coronary artery of native heart  Chest pain  Elevated troponins         - continue home ASA 81 and Lipitor 40  - getting cardiology consult and 2d echo      Anemia in neoplastic disease     Hgb stable at 12 pts baseline         Hepatocellular carcinoma  Decompensated Cirrhosis      MELD-Na score: 6 at 12/22/2018  3:33 AM  MELD score: 6 at 12/22/2018  3:33 AM  Calculated from:  Serum Creatinine: 1 mg/dL at 12/22/2018  3:33 AM  Serum Sodium: 132 mmol/L at 12/22/2018  3:33  AM  Total Bilirubin: 0.4 mg/dL (Rounded to 1 mg/dL) at 12/22/2018  3:33 AM  INR(ratio): 1 at 12/20/2018  1:29 PM  Age: 68 years       Dx 12/2015 with HCC in the setting of hepatitis C.  - f/w heme onc, evaluated and ok to resume chemotherapy on discharge  - following with Dr Scott for liver transplant evaluation.  Initially considered for transplant but taken off d/t ETOH abuse  - Stivarga started on 10/15/18  - S/p multiple treatments with TACE.    - MRI shows progression of malignancy  - liver enzymes near baseline  - Progress note from Dr. Scott today instructs holding future Stivarga  - H/O consulted      COPD (chronic obstructive pulmonary disease)     - c/w breo and spiriva. Duoneb treatments q4hr PRN  - prednisone 40mg x 5 days          Essential hypertension     Continue home lisinopril 20, metoprolol 12.5mg BID         R Thigh Rash        - 2/2 urinary incontinence, miconazole recommended at home        - Miconazole to R thigh BID                 Disposition:  In the next few days;    Greg Ventura MD  Medical Director [facility]  Spectra:  [phone]  Pager: [phone]

## 2019-05-26 NOTE — TELEPHONE ENCOUNTER
Documentation Only    Nexavar 200 mg approved 6-27-17 through 12-27-17     non-verbal indicators of pain/discomfort absent

## 2020-07-20 NOTE — TELEPHONE ENCOUNTER
----- Message from Octavia Scott MD sent at 10/16/2017 10:10 AM CDT -----  Please get HCV treated- relapsed from harvoni; has cirrhosis   This document is complete and the patient is ready for discharge.

## 2020-12-10 NOTE — PROGRESS NOTES
"Administered nitro x 3 doses pts states "chest pain much better and is about 7/10". VSS. Will continue to monitor pt.   " Yes

## 2021-08-23 NOTE — PROGRESS NOTES
----- Message from DEMETRA Camacho sent at 8/18/2021  4:33 PM CDT -----  Ferritin, iron stores is normalIron is normal Outreach to patients sister Manju Canales for f/u visit. Sister reports patient passed away at home last pm in care of Hospice. OPCM will close case. Notified LCSW.

## 2021-10-13 NOTE — TELEPHONE ENCOUNTER
"Called pt and his sister- reviewed radiology conference review and recs:   "Most recent study shows interval enlargement of left lobe lesions and recurrence at margin in right lobe superiorly. Multiple non specific foci of enhancement are concerning and require continued surveillance. Will discuss ablation during conference.     Recurrence at prior treatment site as well as 2 lesions that meet criteria; multiple additional areas of enhancement which are concerning for future development. MD to discuss with patient and family regarding goals of care and options for treatment at this point."    Most recent AFP is up to 1752. Not clear that RFA will be of great benefit. Dr Carrillo- wanted to keep you in the loop. He definitely does not want TACE/Y90. He will see you on the 17th.     " BMI: BMI (kg/m2): 33.1 (09-04-21 @ 07:48)  HbA1c: A1C with Estimated Average Glucose Result: 7.3 % (08-05-21 @ 10:24)    Glucose: POCT Blood Glucose.: 216 mg/dL (10-13-21 @ 12:06)    BP: --  Lipid Panel: Date/Time: 08-05-21 @ 10:24  Cholesterol, Serum: 139  Direct LDL: --  HDL Cholesterol, Serum: 42  Total Cholesterol/HDL Ration Measurement: --  Triglycerides, Serum: 234

## 2022-02-11 NOTE — TELEPHONE ENCOUNTER
----- Message from Ortiz Carrillo DO, FACP sent at 2/7/2018 12:03 PM CST -----  Refer ent  
No assistance needed

## 2022-02-16 NOTE — PROGRESS NOTES
ATTENDING PHYSICIAN: ISAURO AVILA MD      DATE OF CONSULTATION:      HISTORY OF PRESENT ILLNESS: This is a 60-year-old,  female   with history of diabetes mellitus, hypertension, and diabetic neuropathy, who   was admitted for evaluation of generalized body aches for 1-day duration.  The   patient states all of a sudden, she developed aches all over her body   associated with unsteady gait and dizziness.  She decided to come to emergency   room for further evaluation.  Although, the patient was afebrile, she had mild   leukocytosis.  Imaging studies including CT scan of the abdomen did not reveal   any remarkable finding except for nonspecific distention of the bladder, mild   dilatation of the right greater than left proximal collecting system.  CT scan   of the head did not reveal any significant finding.  Ultrasound of the abdomen   was normal.  The patient's blood culture revealed Streptococcus pyogenes and I   was asked to evaluate the patient for further management.  The patient denies   any sore throat, any recent upper respiratory infection, or skin infection.   Denies any recent skin infection or boils.  The patient also denies history of   travel or sick contacts.      PAST MEDICAL HISTORY: Diabetes mellitus, diabetic neuropathy, hypertension,   history of hysterectomy.      ALLERGIES: None.      FAMILY HISTORY: Noncontributory.      SOCIAL HISTORY: Social alcohol use.  Denies smoking.  She has used in the past   cocaine.      MEDICATION: Zosyn, amlodipine, aspirin, clindamycin, enoxaparin, glipizide,   and insulin.      REVIEW OF SYSTEMS: HEENT:  Denies headache or visual change.  Neck:  Denies   neck pain.  Cardiac:  Denies chest pain, palpitation.  Pulmonary:  Denies   shortness of breath or cough.  GI:  No abdominal pain, diarrhea, or   constipation.  :  No dysuria, urgency, or frequency.  Musculoskeletal:  No   joint ache or effusion.  Integumentary:  Unremarkable.   TACE completed, pt tolerated well. No apparent distress noted. Exoseal deployed at 13:35, HOB to be elevated at 15:35. Dressing applied CDI. Pt to be transferred to ROCU, report to be given at bedside.    Neuropsychiatric:  As   above.      PHYSICAL EXAMINATION: Vital Signs:  /91, pulse 94, respirations 16,   temperature 36.2.  She is well developed, well nourished, not in distress.   HEENT:  Extraocular muscles intact.  JEROME.  No oral lesion.  Neck:  Supple.   No JVD.  No bruit.  Lungs:  Clear to auscultation.  Heart:  Regular rate S1,   S2.  No murmur.  Abdomen:  Soft, normoactive bowel sounds.  No organomegaly.   No masses.  Nontender.  Extremities:  No cyanosis, clubbing, or edema.  Skin:   No rashes.      LABORATORY DATA: BUN 9, creatinine 0.49, , .  WBC 7.4,   hemoglobin 12.3, platelet 261.      IMPRESSION:   1. Streptococcus pyogenes bacteremia, so far unclear source.  Cannot rule       out primary bacteremia.  So far examination does not reveal any       pharyngitis.  Skin and soft tissue infection or other source of       infection.   2. Transaminitis, rule out chronic hepatitis C virus infection.  Rule out       other etiologies including autoimmune hepatitis, or due to statin.   3. Diabetes mellitus.      RECOMMENDATION:   1. Continue to monitor repeated blood culture.   2. We will change antibiotics to penicillin and clindamycin.   3. Echocardiogram.   4. We will check an anti smooth muscle antibodies and hepatitis C virus RNA.   5. Further plans after review of repeated blood culture results and above       workup results.   Thank you for allowing me to participate in the care of this patient.               _________________________________   EMILY Pugh   DP:  [id number]   DD:  05/01/2017 13:14:50   DT:  05/01/2017 16:09:10   Job #:  [id number]         Performed Resulted

## 2023-03-23 NOTE — PROGRESS NOTES
I offered to discuss end of life issues, including information on how to make advance directives that the patient could use to name someone who would make medical decisions on their behalf if they became too ill to make themselves.    ___Patient declined  _X_Patient has advance directives on file.    Conjuntivae and eyelids appear normal, Sclera: White without injection no

## 2023-09-03 NOTE — PROGRESS NOTES
Pt arrived to IR ROCU room bay 3 for pre op assessment, no acute distress noted. Orders and labs reviewed on chart. Awaiting consent.      Verbalized Understanding

## 2023-12-31 NOTE — DISCHARGE SUMMARY
Radiology Discharge Summary      Hospital Course: No complications    Admit Date: 3/2/2018  Discharge Date: 03/02/2018     Instructions Given to Patient: Yes  Diet: Resume prior diet  Activity: activity as tolerated    Description of Condition on Discharge: Stable  Vital Signs (Most Recent): Temp: 97 °F (36.1 °C) (03/02/18 1200)  Pulse: 75 (03/02/18 1200)  Resp: (!) 22 (03/02/18 1200)  BP: (!) 113/55 (03/02/18 1200)  SpO2: 100 % (03/02/18 1200)    Discharge Disposition: Home    Discharge Diagnosis: S/p left hepatic lobe chemoembolization    Followup: Follow up imaging in one month from today with triple phase imaging or MRI w/ contrast, and return to clinic in one month from today (Preferably MRI). Patient instructed to call if there is any complication, post procedural pain, or signs of infection.      Mason Golden MD  Radiology       Pt presents a&ox4 c/o "I've been feeling sick for a week now. I've been coughing, body aches, chills. And three days ago my throat has been so sore." Pt confirms she tested positive for flu on 12/26. She also confirms painful swallowing but denies any change in appetite. Pt also denies difficulty breathing. Respirations are even and unlabored on room air. Skin is warm and dry. Ambulatory with steady gait.

## 2024-06-12 NOTE — PROGRESS NOTES
Has he been scheduled for TACE? -Hold Amlodipine and Enalapril given high risk for sepsis and pending OR intervention by OMFS   -c/w Labetalol 200 mg BID -c/w Labetalol 200 mg BID  -Holding Amlodipine and Enalapril given normotensive

## 2025-02-01 NOTE — PROGRESS NOTES
Critical Care Interval Note:  Abg reviewed. RR increased to 18 secondary to hypercarbia  Patient having increased peak pressures. ETT pulled back 1.5cm.  Large amount of thick/dark tan secretions suctioned  Adding mucomyst and HTS nebs    Dawit Jason Guardado PA-C       Discharge instructions reviewed with Pt and Pt's sister Manju; understanding verbalized and handout copy given; powerport packet given with instructions. VSS. Pt denies any c/o pain or nausea. Dermabond covering new port site intact. Pt tolerating clear liquids by mouth without difficulty. PIV removed prior to discharge as ordered. Pt escorted to vehicle via wheelchair.

## 2025-03-19 NOTE — HPI
Lab Results   Component Value Date    HGBA1C 8.8 (H) 12/21/2024       Recent Labs     03/19/25  1100   POCGLU 216*       Blood Sugar Average: Last 72 hrs:  (P) 216  Sliding scale, Refresh A1C   This is Mr. Robin Hawkins, 68 year old male with medical history significant for hepatocellular carcinoma, Essential hypertension, History of prostate cancer, COPD, CAD, Subclinical hypothyroidism. He presented to ED with two day history of epigastric pain, shortness of breath more prominent on lying flat and found to have acute hypoxemic respiratory failure. For the last couple of months; he endorsed that he developed nausea, poor oral intake; and decrease appetite; however, this presentation was mainly the epigastric pain and feeling shortness of breath. He denies fever, chills, respiratory symptoms; loss of consciousness or recent change in his mediations. Of note, Two previous COPD exacerbation admission 10/23-24 2018; and 11/26-29 2018 for for dyspnea, atypical chest pain. In ED, he was having tachypeic, with resp rate > 40; and hypoxic that require BiPAP. On evaluation he was very limited in his conversation secondary to tachypnea and tripoding.

## 2025-03-24 NOTE — TELEPHONE ENCOUNTER
[facility] Medicine Services  DISCHARGE SUMMARY    Patient Name: Gaetano Marcelo  : 1960  MRN: [id number]    Date of Admission: 3/19/2025  9:18 PM  Date of Discharge:  3/24/2025  Primary Care Physician: Tushar Shepherd Jr., MD    Consults       Date and Time Order Name Status Description    3/20/2025  2:33 AM Inpatient General Surgery Consult Completed             Hospital Course     Presenting Problem:     Active Hospital Problems    Diagnosis  POA    **SBO (small bowel obstruction) [K56.609]  Yes    Anemia [D64.9]  Yes    Diabetic foot ulcer [E11.621, L97.509]  Yes    History of DVT (deep vein thrombosis) [Z86.718]  Not Applicable    GERD (gastroesophageal reflux disease) [K21.9]  Yes    Type 2 diabetes mellitus with complication, without long-term current use of insulin [E11.8]  Yes    Depressive disorder [F32.A]  Yes    Chronic pain disorder [G89.4]  Yes    Essential hypertension [I10]  Yes    Gastroesophageal reflux disease without esophagitis [K21.9]  Yes    Hyperlipidemia [E78.5]  Yes      Resolved Hospital Problems   No resolved problems to display.          Hospital Course:  Gaetano Marcelo is a 64 y.o. male     Incarcerated Right Inguinal hernia  - s/p reduction under under sedation in ED  - s/p open right inguinal hernia repair by Dr. Naranjo on 3/21.  Gave Kcentra on 3/20 to reverse xarelto  - doing well, tolerating regular diet.  Ok with surgery to discharge home, f/u with Dr. Naranjo in 2 weeks, no lifting >10 pounds x 6 weeks  - feels better today, ready to go home.      osteomyelitis/Diskitis L4/5 and L5/S1 ruled out  Stable Severe DDD  --h/o of this s/p surgery per Dr. Santiago and IV abx per Dr. Donohue in 2024  --findings again concerning for possible infection on CT abd  --MRI L spine shows severe stable DDD felt to be arthritis change rather than osteo  --WBC wnl  --CRP and ESR wnl  --blood cultures no growth day 2  --continue the home doxy        Diabetic Foot  ----- Message from Coleen Izaguirre sent at 7/30/2018 12:05 PM CDT -----  Contact: pt  Pt sister called to speak you about pt going to the [facility] to get labs has a port 8/20   Callback [phone]  Thank You  ALANNA Izaguirre   Ulcer  - currently on doxycycline. Continue  - wound care     DM2  - hb A1c 5.60  - continue home meds     Anemia  - H/H stable.  - monitor     HTN  HLD  - BP was borderline so holding Clonidine and Hydralazine but starting to creep back up so will restart hydralazine.  Continue metoprolol  - continue statin     GERD  - PPI     History of DVT  PVD  - on ASA S/p Kcentra on 3/20 to reverse xarelto on 3/20 per surgery  - ok to restart xarelto per surgery      Discharge Follow Up Recommendations for outpatient labs/diagnostics:   F/u with PCP in 1 week  F/u with Dr. Naranjo in 2 weeks  No lifting greater than 10 pounds for 6 weeks    Day of Discharge     HPI:   Feels better.  Pain better.  Tolerating diet.  Ready to go home.     Review of Systems  Gen- No fevers, chills  CV- No chest pain, palpitations  Resp- No cough, dyspnea  GI- No N/V/D, abd pain      Vital Signs:   Temp:  [98.1 °F (36.7 °C)-99.1 °F (37.3 °C)] 98.4 °F (36.9 °C)  Heart Rate:  [80-97] 82  Resp:  [16-18] 16  BP: (114-132)/(69-90) 132/90      Physical Exam:  Constitutional: No acute distress, awake, alert  HENT: NCAT, mucous membranes moist  Respiratory: Clear to auscultation bilaterally, respiratory effort normal   Cardiovascular: RRR, no murmurs, rubs, or gallops  Gastrointestinal: Positive bowel sounds, soft, nontender, nondistended, right groin incision c/d/i  Musculoskeletal: No bilateral ankle edema  Psychiatric: Appropriate affect, cooperative  Neurologic: Oriented x 3, CALDERON, speech clear  Skin: No rashes      Pertinent  and/or Most Recent Results     LAB RESULTS:      Lab 03/23/25  0416 03/22/25  0425 03/21/25  0502 03/20/25  0527 03/19/25  2113   WBC 8.55 6.43 3.48 4.39 4.90   HEMOGLOBIN 12.3* 12.9* 12.3* 11.6* 12.7*   HEMATOCRIT 39.2 40.4 39.7 36.8* 40.1   PLATELETS 223 231 224 207 250   NEUTROS ABS  --   --   --  2.36 2.79   IMMATURE GRANS (ABS)  --   --   --  0.01 0.01   LYMPHS ABS  --   --   --  1.43 1.50   MONOS ABS  --   --   --  0.42 0.45    EOS ABS  --   --   --  0.14 0.11   MCV 87.3 86.9 88.0 88.2 88.5   SED RATE  --  12  --   --   --    CRP  --   --  <0.30  --   --    LACTATE  --   --   --   --  1.2         Lab 03/23/25  0416 03/22/25  0425 03/21/25  0502 03/20/25 0527 03/19/25 2113   SODIUM 138 134* 139 138 137   POTASSIUM 4.5 4.9 4.1 4.5 5.0   CHLORIDE 105 103 107 106 103   CO2 23.0 18.0* 22.0 24.0 23.0   ANION GAP 10.0 13.0 10.0 8.0 11.0   BUN 8 9 8 14 17   CREATININE 0.75* 0.74* 0.67* 0.64* 0.92   EGFR 99.5 99.9 103.0 104.4 91.7   GLUCOSE 137* 153* 88 89 120*   CALCIUM 8.1* 8.2* 8.1* 8.1* 8.3*   IONIZED CALCIUM 1.15  --  1.12*  --   --    MAGNESIUM 1.6  --  1.7  --  1.6   PHOSPHORUS 2.5  --  2.9  --   --    HEMOGLOBIN A1C  --   --   --   --  5.60         Lab 03/21/25  0502 03/20/25 0527 03/19/25 2113   TOTAL PROTEIN 4.8* 4.9* 5.6*   ALBUMIN 3.0* 3.1* 3.5   GLOBULIN 1.8 1.8 2.1   ALT (SGPT) 40 40 50*   AST (SGOT) 38 45* 68*   BILIRUBIN 0.3 0.3 0.2   ALK PHOS 65 65 70   LIPASE  --   --  11*                     Brief Urine Lab Results  (Last result in the past 365 days)        Color   Clarity   Blood   Leuk Est   Nitrite   Protein   CREAT   Urine HCG        08/21/24 1216             49.5               Microbiology Results (last 10 days)       Procedure Component Value - Date/Time    Blood Culture - Blood, Arm, Right [[id number]]  (Normal) Collected: 03/21/25 2052    Lab Status: Preliminary result Specimen: Blood from Arm, Right Updated: 03/23/25 2200     Blood Culture No growth at 2 days    Blood Culture - Blood, Arm, Left [[id number]]  (Normal) Collected: 03/21/25 2052    Lab Status: Preliminary result Specimen: Blood from Arm, Left Updated: 03/23/25 2200     Blood Culture No growth at 2 days            MRI Lumbar Spine Without Contrast  Result Date: 3/23/2025  MRI LUMBAR SPINE WO CONTRAST Date of Exam: 3/23/2025 3:29 AM EDT Indication: concern for possible osteo L4/5, L5/S1 on CT abd/pelvis.  Comparison: CT abdomen pelvis 6/10/2024 and lumbar  spine MRI 6/12/2024. Technique:  Routine multiplanar/multisequence sequence images of the lumbar spine were obtained without contrast administration.  Findings: Five lumbar type vertebral bodies are identified. Since prior MRI, there has been interval placement of a posterior fusion construct at the L4-S1 levels. There is stable severe narrowing of the L4-L5 and L5-S1 discs along with stable severe erosive endplate changes with marrow signal abnormalities compatible with degenerative change. Given the relatively stable appearance since 2024, this is felt to be due to degenerative changes rather than osteomyelitis. There is no paraspinal edema. There is mild narrowing of the other lumbar discs. Vertebral bodies maintain normal height. Distal spinal cord and conus medullaris appear unremarkable terminating at the lower L1 level.  Cauda equina appears unremarkable. No focal bone marrow edema or evidence of a marrow replacing process. Paraspinal musculature is preserved.  Retroperitoneal structures appear unremarkable. L1-L2: The disc is unremarkable. There is mild facet and ligamentum flavum hypertrophy without neuroforaminal or spinal canal narrowing. L2-L3: The disc is unremarkable. There is moderate facet and ligamentum flavum hypertrophy without neuroforaminal or spinal canal narrowing. L3-L4: The disc is unremarkable. There is moderate facet and ligamentum flavum hypertrophy without neuroforaminal or spinal canal narrowing. L4-L5: There is stable 3 mm anterolisthesis. There is fusion at this level with dorsal decompression through laminectomy. There is residual facet arthropathy and there is mild residual bilateral neuroforaminal narrowing without spinal canal stenosis. L5-S1: There is fusion at this level and dorsal decompression via laminectomy. There is extensive residual disc osteophyte complex and residual facet arthropathy without definite neuroforaminal or spinal canal stenosis.     Impression: 1.Interval  posterior fusion and laminectomy at the L4-S1 levels. 2.Stable severe degenerative disc disease at L4-L5 and L5-S1 with stable erosive endplate changes and marrow signal abnormalities. Given the relatively stable appearance since 2024, this is felt to be due to degenerative change rather than osteomyelitis. 3.Stable mild anterolisthesis of L4 on L5. Electronically Signed: Christopher Garzon MD  3/23/2025 4:02 AM EDT  Workstation ID: [id number]    Peripheral Block  Result Date: 3/21/2025  Lei Hugo CRNA     3/21/2025  4:39 PM Peripheral Block Patient reassessed immediately prior to procedure Patient location during procedure: OR Reason for block: at surgeon's request and post-op pain management Performed by RADHA/CAA: Lei Hugo CRNA Preanesthetic Checklist Completed: patient identified, IV checked, site marked, risks and benefits discussed, surgical consent, monitors and equipment checked, pre-op evaluation and timeout performed Prep: Pt Position: supine Sterile barriers:cap, gloves, mask and washed/disinfected hands Prep: ChloraPrep Patient monitoring: blood pressure monitoring, continuous pulse oximetry and EKG Procedure Sedation: yes Performed under: general Guidance:ultrasound guided Images:still images obtained, printed/placed on chart Laterality:right Block Type:TAP Injection Technique:single-shot Needle Type:short-bevel and echogenic Needle Gauge:20 G Resistance on Injection: none Medications Used: dexamethasone sodium phosphate injection - Injection  2 mg - 3/21/2025 4:23:00 PM bupivacaine PF (MARCAINE) 0.25 % injection - Injection  30 mL - 3/21/2025 4:23:00 PM Medications Comment: Post Assessment Injection Assessment: negative aspiration for heme, incremental injection and no paresthesia on injection Patient Tolerance:comfortable throughout block Complications:no Additional Notes Mid-Axillary/Lateral TAPs A high-frequency linear transducer, with sterile cover, was placed in the midaxillary line between  "the ASIS and costal margin. The External Oblique Muscle (EOM), Internal Oblique Muscle (IOM), Transverse Abdominus Muscle (MARTINS), and Peritoneum were identified. The insertion site was prepped in sterile fashion and then localized with 2-5 ml of 1% Lidocaine. Using ultrasound-guidance, a 20-gauge B-Hart 4\" Ultraplex 360 non-stimulating echogenic needle was advanced in plane, from medial to lateral, until the tip of the needle was in the fascial plane between the IOM and MARTINS. 1-3ml of preservative free normal saline was used to hydro-dissect the fascial planes. After the fascial plane was verified, the local anesthetic (LA) was injected. The procedure was repeated on the opposite side for bilateral coverage. Aspiration every 5 ml to prevent intravascular injection. Injection was completed with negative aspiration of blood and negative intravascular injection. Injection pressures were normal with minimal resistance. Midaxillary TAPs should reach intercostal nerves T10- T11 and the subcostal nerve T12.  Performed by: Brendan Gee Jr., MD     CT Abdomen Pelvis Without Contrast  Result Date: 3/19/2025  CT SCAN OF THE ABDOMEN AND PELVIS WITHOUT CONTRAST     3/19/2025 4:01 PM HISTORY: Acute right groin pain. COMPARISON: January 9, 2012. PROCEDURE: Axial images were obtained from the lung bases to the pubic symphysis by computed tomography. Oral contrast was administered. This study was performed with techniques to keep radiation doses as low as reasonably achievable, (ALARA). Individualized dose reduction techniques using automated exposure control or adjustment of mA and/or kV according  to the patient size were employed. FINDINGS: ABDOMEN: The lung bases are clear. The heart size is normal. The patient is status post gastric bypass surgery. The limited non-contrast images of the liver are unremarkable. The gallbladder is absent. The spleen is unremarkable. No adrenal masses are seen. The aorta is normal in caliber. " There is no significant free fluid or adenopathy.  There is no nephrolithiasis. There is no hydronephrosis. A ventral hernia is noted left of the umbilicus containing unobstructed bowel. PELVIS:  A right inguinal hernia is noted containing bowel that is mildly distended. An early bowel obstruction is not excluded. The appendix is not identified. The urinary bladder is unremarkable. There is no fluid or adenopathy.  Postoperative changes of posterior fixation of L4-S1 are noted. There is endplate irregularity at these levels. There is possible discitis/osteomyelitis at the L4-5 and L5-S1 levels.    Right inguinal hernia containing bowel that is mildly distended. Early bowel obstruction is not excluded. Possible discitis/osteomyelitis of the L4-5 and L5-S1 levels. The ordering provider's office was notified at  3/19/2025 4:53 PM Images reviewed, interpreted, and dictated by Dr. SEFERINO Jacobo. Transcribed by Anabella Vergara PA-C.      Results for orders placed during the hospital encounter of 06/12/23    Duplex venous upper extremity right CAR    Interpretation Summary    Normal right upper extremity venous duplex scan.      Results for orders placed during the hospital encounter of 06/12/23    Duplex venous upper extremity right CAR    Interpretation Summary    Normal right upper extremity venous duplex scan.          Plan for Follow-up of Pending Labs/Results:   Pending Labs       Order Current Status    Tissue Pathology Exam In process    Blood Culture - Blood, Arm, Left Preliminary result    Blood Culture - Blood, Arm, Right Preliminary result          Discharge Details        Discharge Medications        PAUSE taking these medications        Instructions Start Date   cloNIDine 0.1 MG tablet  Wait to take this until your doctor or other care provider tells you to start again.  Commonly known as: CATAPRES   0.1 mg, 2 Times Daily             Continue These Medications        Instructions Start Date   acetaminophen  325 MG tablet  Commonly known as: TYLENOL   325 mg, Oral, Every 4 Hours PRN      amitriptyline 25 MG tablet  Commonly known as: ELAVIL   25 mg, Oral, Nightly      atorvastatin 20 MG tablet  Commonly known as: LIPITOR   20 mg, Oral, Daily      Blood Glucose Monitoring Suppl device   1 Device, Other, Take As Directed, Using to test glucose.      CALCIUM 1200 PO   1,200 mcg, Oral      citalopram 20 MG tablet  Commonly known as: CeleXA   citalopram 20 mg tablet      clopidogrel 75 MG tablet  Commonly known as: PLAVIX       cyanocobalamin 1000 MCG tablet  Commonly known as: VITAMIN B-12   Vitamin B-12  1,000 mcg tablet   TAKE 1 TABLET BY MOUTH ONCE DAILY      doxycycline 100 MG capsule  Commonly known as: VIBRAMYCIN   100 mg, Oral, 2 Times Daily      esomeprazole 20 MG capsule  Commonly known as: nexIUM   20 mg, Oral, Every Morning Before Breakfast      fish oil 1000 MG capsule capsule   Oral, Daily With Breakfast      fluticasone 50 MCG/ACT nasal spray  Commonly known as: FLONASE   Flonase Allergy Relief 50 mcg/actuation nasal spray,suspension   2 sprays each nostril once daily      Folinic-Plus 4-50-2 MG tablet  Generic drug: Folinic Acid-Vit B6-Vit B12   1 tablet DAILY (route: oral)      gabapentin 400 MG capsule  Commonly known as: NEURONTIN   400 mg, Oral, 2 Times Daily      Ginkgo Biloba 120 MG capsule   ginkgo biloba 40 mg capsule   daily      glucose blood test strip   1 each, Other, 2 Times Daily, PRN      hydrALAZINE 25 MG tablet  Commonly known as: APRESOLINE   25 mg, Oral, 2 Times Daily      HYDROcodone-acetaminophen 5-325 MG per tablet  Commonly known as: NORCO   1 tablet, Oral, Every 6 Hours PRN      Lancets 33G misc   1 each, Not Applicable, 3 Times Daily      metFORMIN  MG 24 hr tablet  Commonly known as: GLUCOPHAGE-XR   500 mg, Oral, 2 Times Daily      methocarbamol 750 MG tablet  Commonly known as: ROBAXIN   750 mg, Oral, 2 Times Daily      metoprolol succinate  MG 24 hr tablet  Commonly  known as: TOPROL-XL   100 mg, Oral, Daily      multivitamin tablet tablet   Multiple Vitamin capsule      oxybutynin XL 15 MG 24 hr tablet  Commonly known as: DITROPAN XL   15 mg, Oral, Daily      Rivaroxaban 2.5 MG tablet  Commonly known as: XARELTO   2.5 mg, Oral, 2 Times Daily      temazepam 15 MG capsule  Commonly known as: RESTORIL   15 mg, Oral, Nightly PRN      trospium 20 MG tablet  Commonly known as: SANCTURA   No dose, route, or frequency recorded.      Vitamin D3 1.25 MG (14344 UT) capsule   Vitamin D3   Daily             Stop These Medications      5-HTP PO     Aspirin Buf(CaCarb-MgCarb-MgO) 81 MG tablet     DEVROM PO     loperamide 2 MG capsule  Commonly known as: IMODIUM     METANX PO     oxyCODONE 5 MG immediate release tablet  Commonly known as: ROXICODONE     Ozempic (1 MG/DOSE) 4 MG/3ML solution pen-injector  Generic drug: Semaglutide (1 MG/DOSE)              Allergies   Allergen Reactions    Hydrochlorothiazide Rash    Penicillins Rash     Beta lactam allergy details  Antibiotic reaction: hives  Age at reaction: child  Dose to reaction time: unknown  Reason for antibiotic: unknown  Epinephrine required for reaction?: unknown  Tolerated antibiotics: amoxicillin, augmentin, cephalexin            Discharge Disposition:  Home or Self Care    Diet:  Hospital:  Diet Order   Procedures    Diet: Regular/House; Fluid Consistency: Thin (IDDSI 0)            Activity:  Activity Instructions       Lifting Restrictions      Type of Restriction: Lifting    Lifting Restrictions: Lifting Restriction (Indicate Limit)    Weight Limit (Pounds): 10    Length of Lifting Restriction: no lifting more than 10 pounds for next 6-8 weeks            Restrictions or Other Recommendations:  No Lifting more than 10 pounds for next 6 weeks       CODE STATUS:    Code Status and Medical Interventions: CPR (Attempt to Resuscitate); Full Support   Ordered at: 03/20/25 0040     Code Status (Patient has no pulse and is not breathing):     CPR (Attempt to Resuscitate)     Medical Interventions (Patient has pulse or is breathing):    Full Support     Level Of Support Discussed With:    Patient       Future Appointments   Date Time Provider Department Center   4/2/2025 12:30 PM Genie Talbot MD [facility]       Additional Instructions for the Follow-ups that You Need to Schedule       Ambulatory Referral to Home Health   As directed      Face to Face Visit Date: 3/24/2025   Follow-up provider for Plan of Care?: I treated the patient in an acute care facility and will not continue treatment after discharge.   Follow-up provider: TUSHAR PAGAN JR. [[id number]]   Reason/Clinical Findings: S/ P hospital stay   Describe mobility limitations that make leaving home difficult: Impaired gait, balance, endurance, and mobility   Nursing/Therapeutic Services Requested: Skilled Nursing Physical Therapy Occupational Therapy   Skilled nursing orders: Pain management Wound care dressing/changes   PT orders: Transfer training Strengthening Home safety assessment   Occupational orders: Strengthening Home safety assessment Energy conservation Activities of daily living   Frequency: 1 Week 1        Discharge Follow-up with PCP   As directed       Currently Documented PCP:    Tushar Pagan Jr., MD    PCP Phone Number:    [phone]     Follow Up Details: with PCP in 1 week        Discharge Follow-up with Specified Provider: with Dr. Naranjo; 2 Weeks   As directed      To: with Dr. Naranjo   Follow Up: 2 Weeks                      Cash Chin MD  03/24/25      Time Spent on Discharge:  I spent  38  minutes on this discharge activity which included: face-to-face encounter with the patient, reviewing the data in the system, coordination of the care with the nursing staff as well as consultants, documentation, and entering orders.

## (undated) DEVICE — DRESSING TELFA N ADH 3X8

## (undated) DEVICE — SEE MEDLINE ITEM 152622

## (undated) DEVICE — GOWN SURGICAL X-LARGE

## (undated) DEVICE — TRAY MINOR GEN SURG

## (undated) DEVICE — ADHESIVE MASTISOL VIAL 48/BX

## (undated) DEVICE — SUT MONOCRYL 4-0 PS-2

## (undated) DEVICE — ADHESIVE DERMABOND ADVANCED

## (undated) DEVICE — SOL NACL 0.9% INJ PF/50151

## (undated) DEVICE — DRAPE THYROID WITH ARMBOARD

## (undated) DEVICE — ELECTRODE REM PLYHSV RETURN 9

## (undated) DEVICE — SHEET EENT SPLIT

## (undated) DEVICE — BLADE SURG CARBON STEEL SZ11

## (undated) DEVICE — SUT PROLENE 2-0 36IN MH BLU

## (undated) DEVICE — ELECTRODE BLADE INSULATED 1 IN

## (undated) DEVICE — SUT VICRYL 3-0 27 SH

## (undated) DEVICE — NDL HYPO REG 25G X 1 1/2

## (undated) DEVICE — COVERS PROBE NR-48 STERILE

## (undated) DEVICE — SEE MEDLINE ITEM 157131

## (undated) DEVICE — DRESSING TRANS 4X4 TEGADERM

## (undated) DEVICE — SUT VICRYL PLUS 4-0 P3 18IN

## (undated) DEVICE — SUT 2-0 VICRYL / SH (J417)

## (undated) DEVICE — SUT 2-0 12-18IN SILK

## (undated) DEVICE — SUT SILK 2-0 SH 18IN BLACK

## (undated) DEVICE — GAUZE SPONGE PEANUT STRL

## (undated) DEVICE — DRAPE INCISE IOBAN 2 23X17IN

## (undated) DEVICE — SUT VICRYL PLUS 3-0 SH 18IN

## (undated) DEVICE — CLOSURE SKIN STERI STRIP 1/4X3

## (undated) DEVICE — DRAPE C ARM 42 X 120 10/BX

## (undated) DEVICE — SUT 0 VICRYL PLUS CT-1 27IN

## (undated) DEVICE — SEE MEDLINE ITEM 146417